# Patient Record
Sex: MALE | NOT HISPANIC OR LATINO | Employment: OTHER
[De-identification: names, ages, dates, MRNs, and addresses within clinical notes are randomized per-mention and may not be internally consistent; named-entity substitution may affect disease eponyms.]

---

## 2018-12-23 ENCOUNTER — HOSPITAL (OUTPATIENT)
Dept: OTHER | Age: 68
End: 2018-12-23
Attending: EMERGENCY MEDICINE

## 2018-12-23 LAB
ANALYZER ANC (IANC): NORMAL
ANION GAP SERPL CALC-SCNC: 12 MMOL/L (ref 10–20)
ANNOTATION COMMENT IMP: POSITIVE
APTT PPP: 35 SECONDS (ref 22–32)
APTT PPP: ABNORMAL S
BASOPHILS # BLD: 0.1 THOUSAND/MCL (ref 0–0.3)
BASOPHILS NFR BLD: 1 %
BUN SERPL-MCNC: 22 MG/DL (ref 6–20)
BUN/CREAT SERPL: 20 (ref 7–25)
CALCIUM SERPL-MCNC: 8.9 MG/DL (ref 8.4–10.2)
CHLORIDE: 105 MMOL/L (ref 98–107)
CO2 SERPL-SCNC: 26 MMOL/L (ref 21–32)
CREAT SERPL-MCNC: 1.12 MG/DL (ref 0.67–1.17)
DIFFERENTIAL METHOD BLD: NORMAL
EOSINOPHIL # BLD: 0.4 THOUSAND/MCL (ref 0.1–0.5)
EOSINOPHIL NFR BLD: 4 %
ERYTHROCYTE [DISTWIDTH] IN BLOOD: 13.2 % (ref 11–15)
GAMMA INTERFERON BACKGROUND BLD IA-ACNC: 0.08 UNIT/ML
GLUCOSE SERPL-MCNC: 114 MG/DL (ref 65–99)
HEMATOCRIT: 43.1 % (ref 39–51)
HGB BLD-MCNC: 14.6 GM/DL (ref 13–17)
IMM GRANULOCYTES # BLD AUTO: 0 THOUSAND/MCL (ref 0–0.2)
IMM GRANULOCYTES NFR BLD: 0 %
INR PPP: 0.9
LYMPHOCYTES # BLD: 2.2 THOUSAND/MCL (ref 1–4)
LYMPHOCYTES NFR BLD: 27 %
M TB IFN-G CD4+ BCKGRND COR BLD-ACNC: 8.81 UNIT/ML
M TB IFN-G CD4+CD8+ BCKGRND COR BLD-ACNC: 8.75 UNIT/ML
MCH RBC QN AUTO: 30.9 PG (ref 26–34)
MCHC RBC AUTO-ENTMCNC: 33.9 GM/DL (ref 32–36.5)
MCV RBC AUTO: 91.1 FL (ref 78–100)
MITOGEN IGNF BCKGRD COR BLD-ACNC: 8.85 UNIT/ML
MONOCYTES # BLD: 0.5 THOUSAND/MCL (ref 0.3–0.9)
MONOCYTES NFR BLD: 5 %
NEUTROPHILS # BLD: 5.2 THOUSAND/MCL (ref 1.8–7.7)
NEUTROPHILS NFR BLD: 63 %
NEUTS SEG NFR BLD: NORMAL %
NRBC (NRBCRE): 0 /100 WBC
NT-PROBNP SERPL-MCNC: 82 PG/ML
PLATELET # BLD: 192 THOUSAND/MCL (ref 140–450)
POTASSIUM SERPL-SCNC: 3.8 MMOL/L (ref 3.4–5.1)
PROTHROMBIN TIME: 9.7 SECONDS (ref 9.7–11.8)
PROTHROMBIN TIME: NORMAL
RBC # BLD: 4.73 MILLION/MCL (ref 4.5–5.9)
SODIUM SERPL-SCNC: 139 MMOL/L (ref 135–145)
TROPONIN I SERPL HS-MCNC: <0.02 NG/ML
WBC # BLD: 8.3 THOUSAND/MCL (ref 4.2–11)

## 2019-01-16 ENCOUNTER — PRIOR ORIGINAL RECORDS (OUTPATIENT)
Dept: OTHER | Age: 69
End: 2019-01-16

## 2019-01-16 ASSESSMENT — PAIN SCALES - GENERAL: PAINLEVEL_OUTOF10: 0

## 2019-02-13 ENCOUNTER — HOSPITAL (OUTPATIENT)
Dept: OTHER | Age: 69
End: 2019-02-13
Attending: INTERNAL MEDICINE

## 2019-02-13 LAB
GLUCOSE BLDC GLUCOMTR-MCNC: 98 MG/DL (ref 65–99)
GLUCOSE BLDC GLUCOMTR-MCNC: 98 MG/DL (ref 65–99)

## 2019-02-14 ENCOUNTER — PRIOR ORIGINAL RECORDS (OUTPATIENT)
Dept: OTHER | Age: 69
End: 2019-02-14

## 2019-02-14 ASSESSMENT — PAIN SCALES - GENERAL: PAINLEVEL_OUTOF10: 0

## 2019-02-19 ENCOUNTER — PRIOR ORIGINAL RECORDS (OUTPATIENT)
Dept: OTHER | Age: 69
End: 2019-02-19

## 2019-03-08 ENCOUNTER — PRIOR ORIGINAL RECORDS (OUTPATIENT)
Dept: OTHER | Age: 69
End: 2019-03-08

## 2019-03-14 ENCOUNTER — PRIOR ORIGINAL RECORDS (OUTPATIENT)
Dept: OTHER | Age: 69
End: 2019-03-14

## 2019-03-18 LAB
ALBUMIN SERPL-MCNC: 3.4 GM/DL (ref 3.6–5.1)
ALBUMIN/GLOB SERPL: 0.8 {RATIO} (ref 1–2.4)
ALP SERPL-CCNC: 88 UNIT/L (ref 45–117)
ALT SERPL-CCNC: 16 UNIT/L
ANALYZER ANC (IANC): ABNORMAL
ANION GAP SERPL CALC-SCNC: 12 MMOL/L (ref 10–20)
AST SERPL-CCNC: 9 UNIT/L
BASOPHILS # BLD: 0 THOUSAND/MCL (ref 0–0.3)
BASOPHILS NFR BLD: 0 %
BILIRUB SERPL-MCNC: 0.7 MG/DL (ref 0.2–1)
BUN SERPL-MCNC: 34 MG/DL (ref 6–20)
BUN/CREAT SERPL: 28 (ref 7–25)
CALCIUM SERPL-MCNC: 8.4 MG/DL (ref 8.4–10.2)
CHLORIDE: 105 MMOL/L (ref 98–107)
CO2 SERPL-SCNC: 24 MMOL/L (ref 21–32)
CREAT SERPL-MCNC: 1.23 MG/DL (ref 0.67–1.17)
DIFFERENTIAL METHOD BLD: ABNORMAL
EOSINOPHIL # BLD: 0.3 THOUSAND/MCL (ref 0.1–0.5)
EOSINOPHIL NFR BLD: 2 %
ERYTHROCYTE [DISTWIDTH] IN BLOOD: 13.8 % (ref 11–15)
GLOBULIN SER-MCNC: 4.5 GM/DL (ref 2–4)
GLUCOSE SERPL-MCNC: 92 MG/DL (ref 65–99)
HEMATOCRIT: 38 % (ref 39–51)
HGB BLD-MCNC: 13 GM/DL (ref 13–17)
IMM GRANULOCYTES # BLD AUTO: 0.1 THOUSAND/MCL (ref 0–0.2)
IMM GRANULOCYTES NFR BLD: 1 %
LIPASE SERPL-CCNC: 103 UNIT/L (ref 73–393)
LYMPHOCYTES # BLD: 1.8 THOUSAND/MCL (ref 1–4)
LYMPHOCYTES NFR BLD: 14 %
MCH RBC QN AUTO: 31.3 PG (ref 26–34)
MCHC RBC AUTO-ENTMCNC: 34.2 GM/DL (ref 32–36.5)
MCV RBC AUTO: 91.6 FL (ref 78–100)
MONOCYTES # BLD: 0.6 THOUSAND/MCL (ref 0.3–0.9)
MONOCYTES NFR BLD: 5 %
NEUTROPHILS # BLD: 10.4 THOUSAND/MCL (ref 1.8–7.7)
NEUTROPHILS NFR BLD: 78 %
NEUTS SEG NFR BLD: ABNORMAL %
NRBC (NRBCRE): 0 /100 WBC
PLATELET # BLD: 202 THOUSAND/MCL (ref 140–450)
POTASSIUM SERPL-SCNC: 3.6 MMOL/L (ref 3.4–5.1)
PROT SERPL-MCNC: 7.9 GM/DL (ref 6.4–8.2)
RBC # BLD: 4.15 MILLION/MCL (ref 4.5–5.9)
SODIUM SERPL-SCNC: 137 MMOL/L (ref 135–145)
WBC # BLD: 13.2 THOUSAND/MCL (ref 4.2–11)

## 2019-03-19 ENCOUNTER — HOSPITAL (OUTPATIENT)
Dept: OTHER | Age: 69
End: 2019-03-19

## 2019-03-19 LAB
ALBUMIN SERPL-MCNC: 3 GM/DL (ref 3.6–5.1)
ALBUMIN/GLOB SERPL: 0.7 {RATIO} (ref 1–2.4)
ALP SERPL-CCNC: 77 UNIT/L (ref 45–117)
ALT SERPL-CCNC: 14 UNIT/L
ANALYZER ANC (IANC): ABNORMAL
ANION GAP SERPL CALC-SCNC: 11 MMOL/L (ref 10–20)
AST SERPL-CCNC: 8 UNIT/L
BASOPHILS # BLD: 0.1 THOUSAND/MCL (ref 0–0.3)
BASOPHILS NFR BLD: 1 %
BILIRUB SERPL-MCNC: 0.7 MG/DL (ref 0.2–1)
BUN SERPL-MCNC: 28 MG/DL (ref 6–20)
BUN/CREAT SERPL: 25 (ref 7–25)
CALCIUM SERPL-MCNC: 8.3 MG/DL (ref 8.4–10.2)
CHLORIDE: 104 MMOL/L (ref 98–107)
CO2 SERPL-SCNC: 27 MMOL/L (ref 21–32)
CREAT SERPL-MCNC: 1.12 MG/DL (ref 0.67–1.17)
DIFFERENTIAL METHOD BLD: ABNORMAL
EOSINOPHIL # BLD: 0.4 THOUSAND/MCL (ref 0.1–0.5)
EOSINOPHIL NFR BLD: 4 %
ERYTHROCYTE [DISTWIDTH] IN BLOOD: 13.6 % (ref 11–15)
GLOBULIN SER-MCNC: 4.4 GM/DL (ref 2–4)
GLUCOSE SERPL-MCNC: 103 MG/DL (ref 65–99)
HEMATOCRIT: 37.1 % (ref 39–51)
HGB BLD-MCNC: 12.6 GM/DL (ref 13–17)
IMM GRANULOCYTES # BLD AUTO: 0 THOUSAND/MCL (ref 0–0.2)
IMM GRANULOCYTES NFR BLD: 1 %
LYMPHOCYTES # BLD: 1.8 THOUSAND/MCL (ref 1–4)
LYMPHOCYTES NFR BLD: 21 %
MCH RBC QN AUTO: 30.8 PG (ref 26–34)
MCHC RBC AUTO-ENTMCNC: 34 GM/DL (ref 32–36.5)
MCV RBC AUTO: 90.7 FL (ref 78–100)
MONOCYTES # BLD: 0.5 THOUSAND/MCL (ref 0.3–0.9)
MONOCYTES NFR BLD: 6 %
NEUTROPHILS # BLD: 6 THOUSAND/MCL (ref 1.8–7.7)
NEUTROPHILS NFR BLD: 67 %
NEUTS SEG NFR BLD: ABNORMAL %
NRBC (NRBCRE): 0 /100 WBC
PLATELET # BLD: 199 THOUSAND/MCL (ref 140–450)
POTASSIUM SERPL-SCNC: 3.7 MMOL/L (ref 3.4–5.1)
PROT SERPL-MCNC: 7.4 GM/DL (ref 6.4–8.2)
RBC # BLD: 4.09 MILLION/MCL (ref 4.5–5.9)
SODIUM SERPL-SCNC: 138 MMOL/L (ref 135–145)
WBC # BLD: 8.8 THOUSAND/MCL (ref 4.2–11)

## 2019-03-20 ENCOUNTER — HOSPITAL (OUTPATIENT)
Dept: OTHER | Age: 69
End: 2019-03-20
Attending: INTERNAL MEDICINE

## 2019-03-20 LAB
ALBUMIN SERPL-MCNC: 2.6 GM/DL (ref 3.6–5.1)
ALBUMIN/GLOB SERPL: 0.6 {RATIO} (ref 1–2.4)
ALP SERPL-CCNC: 81 UNIT/L (ref 45–117)
ALT SERPL-CCNC: 11 UNIT/L
ANALYZER ANC (IANC): ABNORMAL
ANION GAP SERPL CALC-SCNC: 11 MMOL/L (ref 10–20)
AST SERPL-CCNC: 10 UNIT/L
BASOPHILS # BLD: 0.1 THOUSAND/MCL (ref 0–0.3)
BASOPHILS NFR BLD: 1 %
BILIRUB SERPL-MCNC: 0.7 MG/DL (ref 0.2–1)
BUN SERPL-MCNC: 21 MG/DL (ref 6–20)
BUN/CREAT SERPL: 18 (ref 7–25)
CALCIUM SERPL-MCNC: 8.2 MG/DL (ref 8.4–10.2)
CHLORIDE: 106 MMOL/L (ref 98–107)
CO2 SERPL-SCNC: 26 MMOL/L (ref 21–32)
CREAT SERPL-MCNC: 1.17 MG/DL (ref 0.67–1.17)
CRP SERPL-MCNC: 11 MG/DL
DIFFERENTIAL METHOD BLD: ABNORMAL
EOSINOPHIL # BLD: 0.2 THOUSAND/MCL (ref 0.1–0.5)
EOSINOPHIL NFR BLD: 4 %
ERYTHROCYTE [DISTWIDTH] IN BLOOD: 13.2 % (ref 11–15)
GLOBULIN SER-MCNC: 4.2 GM/DL (ref 2–4)
GLUCOSE SERPL-MCNC: 107 MG/DL (ref 65–99)
HEMATOCRIT: 35.8 % (ref 39–51)
HGB BLD-MCNC: 11.9 GM/DL (ref 13–17)
IMM GRANULOCYTES # BLD AUTO: 0 THOUSAND/MCL (ref 0–0.2)
IMM GRANULOCYTES NFR BLD: 1 %
LYMPHOCYTES # BLD: 1.8 THOUSAND/MCL (ref 1–4)
LYMPHOCYTES NFR BLD: 28 %
MAGNESIUM SERPL-MCNC: 2.1 MG/DL (ref 1.7–2.4)
MCH RBC QN AUTO: 30.9 PG (ref 26–34)
MCHC RBC AUTO-ENTMCNC: 33.2 GM/DL (ref 32–36.5)
MCV RBC AUTO: 93 FL (ref 78–100)
MONOCYTES # BLD: 0.4 THOUSAND/MCL (ref 0.3–0.9)
MONOCYTES NFR BLD: 7 %
NEUTROPHILS # BLD: 3.8 THOUSAND/MCL (ref 1.8–7.7)
NEUTROPHILS NFR BLD: 59 %
NEUTS SEG NFR BLD: ABNORMAL %
NRBC (NRBCRE): 0 /100 WBC
PHOSPHATE SERPL-MCNC: 3.5 MG/DL (ref 2.4–4.7)
PLATELET # BLD: 212 THOUSAND/MCL (ref 140–450)
POTASSIUM SERPL-SCNC: 3.7 MMOL/L (ref 3.4–5.1)
PROT SERPL-MCNC: 6.8 GM/DL (ref 6.4–8.2)
RBC # BLD: 3.85 MILLION/MCL (ref 4.5–5.9)
SODIUM SERPL-SCNC: 139 MMOL/L (ref 135–145)
WBC # BLD: 6.3 THOUSAND/MCL (ref 4.2–11)

## 2019-03-22 LAB
ALBUMIN SERPL-MCNC: 2.8 GM/DL (ref 3.6–5.1)
ALBUMIN/GLOB SERPL: 0.7 {RATIO} (ref 1–2.4)
ALP SERPL-CCNC: 84 UNIT/L (ref 45–117)
ALT SERPL-CCNC: 24 UNIT/L
ANALYZER ANC (IANC): ABNORMAL
ANION GAP SERPL CALC-SCNC: 11 MMOL/L (ref 10–20)
AST SERPL-CCNC: 38 UNIT/L
BILIRUB SERPL-MCNC: 0.6 MG/DL (ref 0.2–1)
BUN SERPL-MCNC: 11 MG/DL (ref 6–20)
BUN/CREAT SERPL: 10 (ref 7–25)
CALCIUM SERPL-MCNC: 8.7 MG/DL (ref 8.4–10.2)
CHLORIDE: 109 MMOL/L (ref 98–107)
CO2 SERPL-SCNC: 24 MMOL/L (ref 21–32)
CREAT SERPL-MCNC: 1.13 MG/DL (ref 0.67–1.17)
ERYTHROCYTE [DISTWIDTH] IN BLOOD: 12.9 % (ref 11–15)
GLOBULIN SER-MCNC: 4.3 GM/DL (ref 2–4)
GLUCOSE SERPL-MCNC: 95 MG/DL (ref 65–99)
HEMATOCRIT: 37.5 % (ref 39–51)
HGB BLD-MCNC: 12.7 GM/DL (ref 13–17)
MCH RBC QN AUTO: 31.4 PG (ref 26–34)
MCHC RBC AUTO-ENTMCNC: 33.9 GM/DL (ref 32–36.5)
MCV RBC AUTO: 92.6 FL (ref 78–100)
NRBC (NRBCRE): 0 /100 WBC
PLATELET # BLD: 247 THOUSAND/MCL (ref 140–450)
POTASSIUM SERPL-SCNC: 3.8 MMOL/L (ref 3.4–5.1)
PROT SERPL-MCNC: 7.1 GM/DL (ref 6.4–8.2)
RBC # BLD: 4.05 MILLION/MCL (ref 4.5–5.9)
SODIUM SERPL-SCNC: 140 MMOL/L (ref 135–145)
WBC # BLD: 5.9 THOUSAND/MCL (ref 4.2–11)

## 2019-03-25 LAB
ANALYZER ANC (IANC): ABNORMAL
ANION GAP SERPL CALC-SCNC: 10 MMOL/L (ref 10–20)
BUN SERPL-MCNC: 9 MG/DL (ref 6–20)
BUN/CREAT SERPL: 7 (ref 7–25)
CALCIUM SERPL-MCNC: 8.8 MG/DL (ref 8.4–10.2)
CHLORIDE: 111 MMOL/L (ref 98–107)
CO2 SERPL-SCNC: 26 MMOL/L (ref 21–32)
CREAT SERPL-MCNC: 1.33 MG/DL (ref 0.67–1.17)
ERYTHROCYTE [DISTWIDTH] IN BLOOD: 13.2 % (ref 11–15)
GLUCOSE SERPL-MCNC: 110 MG/DL (ref 65–99)
HEMATOCRIT: 37.4 % (ref 39–51)
HGB BLD-MCNC: 12.6 GM/DL (ref 13–17)
MAGNESIUM SERPL-MCNC: 1.9 MG/DL (ref 1.7–2.4)
MCH RBC QN AUTO: 31.1 PG (ref 26–34)
MCHC RBC AUTO-ENTMCNC: 33.7 GM/DL (ref 32–36.5)
MCV RBC AUTO: 92.3 FL (ref 78–100)
NRBC (NRBCRE): 0 /100 WBC
PLATELET # BLD: 281 THOUSAND/MCL (ref 140–450)
POTASSIUM SERPL-SCNC: 3.7 MMOL/L (ref 3.4–5.1)
RBC # BLD: 4.05 MILLION/MCL (ref 4.5–5.9)
SODIUM SERPL-SCNC: 143 MMOL/L (ref 135–145)
WBC # BLD: 5.9 THOUSAND/MCL (ref 4.2–11)

## 2019-03-26 LAB
ANALYZER ANC (IANC): ABNORMAL
ANION GAP SERPL CALC-SCNC: 12 MMOL/L (ref 10–20)
BUN SERPL-MCNC: 11 MG/DL (ref 6–20)
BUN/CREAT SERPL: 9 (ref 7–25)
CALCIUM SERPL-MCNC: 8.6 MG/DL (ref 8.4–10.2)
CHLORIDE: 110 MMOL/L (ref 98–107)
CO2 SERPL-SCNC: 23 MMOL/L (ref 21–32)
CREAT SERPL-MCNC: 1.28 MG/DL (ref 0.67–1.17)
ERYTHROCYTE [DISTWIDTH] IN BLOOD: 13.2 % (ref 11–15)
GLUCOSE SERPL-MCNC: 86 MG/DL (ref 65–99)
HEMATOCRIT: 37.8 % (ref 39–51)
HGB BLD-MCNC: 12.8 GM/DL (ref 13–17)
MAGNESIUM SERPL-MCNC: 2.2 MG/DL (ref 1.7–2.4)
MCH RBC QN AUTO: 31.2 PG (ref 26–34)
MCHC RBC AUTO-ENTMCNC: 33.9 GM/DL (ref 32–36.5)
MCV RBC AUTO: 92.2 FL (ref 78–100)
NRBC (NRBCRE): 0 /100 WBC
PLATELET # BLD: 294 THOUSAND/MCL (ref 140–450)
POTASSIUM SERPL-SCNC: 3.9 MMOL/L (ref 3.4–5.1)
RBC # BLD: 4.1 MILLION/MCL (ref 4.5–5.9)
SODIUM SERPL-SCNC: 141 MMOL/L (ref 135–145)
WBC # BLD: 6 THOUSAND/MCL (ref 4.2–11)

## 2019-04-08 ENCOUNTER — PRIOR ORIGINAL RECORDS (OUTPATIENT)
Dept: OTHER | Age: 69
End: 2019-04-08

## 2019-04-09 ENCOUNTER — PRIOR ORIGINAL RECORDS (OUTPATIENT)
Dept: OTHER | Age: 69
End: 2019-04-09

## 2019-08-13 ENCOUNTER — TELEPHONE (OUTPATIENT)
Dept: SURGERY | Age: 69
End: 2019-08-13

## 2019-08-14 ENCOUNTER — TELEPHONE (OUTPATIENT)
Dept: SURGERY | Age: 69
End: 2019-08-14

## 2019-08-14 ENCOUNTER — OFFICE VISIT (OUTPATIENT)
Dept: SURGERY | Age: 69
End: 2019-08-14

## 2019-08-14 ENCOUNTER — APPOINTMENT (OUTPATIENT)
Dept: SURGERY | Age: 69
End: 2019-08-14

## 2019-08-14 VITALS
HEIGHT: 64 IN | BODY MASS INDEX: 21.68 KG/M2 | TEMPERATURE: 98.4 F | DIASTOLIC BLOOD PRESSURE: 66 MMHG | SYSTOLIC BLOOD PRESSURE: 126 MMHG | WEIGHT: 127 LBS | HEART RATE: 76 BPM

## 2019-08-14 DIAGNOSIS — K40.90 RIGHT INGUINAL HERNIA: Primary | ICD-10-CM

## 2019-08-14 PROCEDURE — 99202 OFFICE O/P NEW SF 15 MIN: CPT

## 2019-08-14 SDOH — HEALTH STABILITY: MENTAL HEALTH: HOW OFTEN DO YOU HAVE A DRINK CONTAINING ALCOHOL?: NEVER

## 2019-08-18 PROBLEM — K40.90 LEFT INGUINAL HERNIA: Status: ACTIVE | Noted: 2019-08-18

## 2019-08-30 ENCOUNTER — EXTERNAL RECORD (OUTPATIENT)
Dept: HEALTH INFORMATION MANAGEMENT | Facility: OTHER | Age: 69
End: 2019-08-30

## 2019-09-09 PROCEDURE — 49505 PRP I/HERN INIT REDUC >5 YR: CPT

## 2019-09-10 ENCOUNTER — HOSPITAL (OUTPATIENT)
Dept: OTHER | Age: 69
End: 2019-09-10

## 2019-09-11 ENCOUNTER — HOSPITAL (OUTPATIENT)
Dept: OTHER | Age: 69
End: 2019-09-11
Attending: SURGERY

## 2019-09-16 ENCOUNTER — HOSPITAL (OUTPATIENT)
Dept: OTHER | Age: 69
End: 2019-09-16

## 2019-09-18 ENCOUNTER — OFFICE VISIT (OUTPATIENT)
Dept: SURGERY | Age: 69
End: 2019-09-18

## 2019-09-18 VITALS
HEIGHT: 64 IN | BODY MASS INDEX: 21.51 KG/M2 | HEART RATE: 72 BPM | WEIGHT: 126 LBS | DIASTOLIC BLOOD PRESSURE: 50 MMHG | SYSTOLIC BLOOD PRESSURE: 90 MMHG | TEMPERATURE: 96.8 F

## 2019-09-18 DIAGNOSIS — K40.90 RIGHT INGUINAL HERNIA: Primary | ICD-10-CM

## 2019-09-18 PROCEDURE — 99024 POSTOP FOLLOW-UP VISIT: CPT

## 2019-09-18 RX ORDER — PREDNISONE 20 MG/1
20 TABLET ORAL DAILY
COMMUNITY
End: 2020-03-27 | Stop reason: ALTCHOICE

## 2019-09-18 RX ORDER — ATORVASTATIN CALCIUM 20 MG/1
20 TABLET, FILM COATED ORAL DAILY
COMMUNITY

## 2019-09-18 RX ORDER — AMLODIPINE BESYLATE 5 MG/1
5 TABLET ORAL DAILY
COMMUNITY
End: 2020-03-27 | Stop reason: ALTCHOICE

## 2019-09-18 RX ORDER — CETIRIZINE HYDROCHLORIDE 10 MG/1
10 TABLET ORAL DAILY
COMMUNITY

## 2019-10-16 ENCOUNTER — OFFICE VISIT (OUTPATIENT)
Dept: SURGERY | Age: 69
End: 2019-10-16

## 2019-10-16 VITALS
DIASTOLIC BLOOD PRESSURE: 70 MMHG | SYSTOLIC BLOOD PRESSURE: 154 MMHG | TEMPERATURE: 97 F | BODY MASS INDEX: 22.53 KG/M2 | HEART RATE: 96 BPM | WEIGHT: 132 LBS | HEIGHT: 64 IN

## 2019-10-16 DIAGNOSIS — K40.90 RIGHT INGUINAL HERNIA: Primary | ICD-10-CM

## 2019-10-16 PROCEDURE — 99024 POSTOP FOLLOW-UP VISIT: CPT

## 2019-11-19 ENCOUNTER — TRANSFERRED RECORDS (OUTPATIENT)
Dept: HEALTH INFORMATION MANAGEMENT | Facility: CLINIC | Age: 69
End: 2019-11-19

## 2019-11-19 ENCOUNTER — HOSPITAL (OUTPATIENT)
Dept: OTHER | Age: 69
End: 2019-11-19
Attending: INTERNAL MEDICINE

## 2019-11-19 LAB — CREAT BLD-MCNC: 1.2 MG/DL (ref 0.67–1.17)

## 2019-12-23 ENCOUNTER — TRANSFERRED RECORDS (OUTPATIENT)
Dept: HEALTH INFORMATION MANAGEMENT | Facility: CLINIC | Age: 69
End: 2019-12-23

## 2019-12-23 ENCOUNTER — HOSPITAL (OUTPATIENT)
Dept: OTHER | Age: 69
End: 2019-12-23

## 2019-12-23 ENCOUNTER — DIAGNOSTIC TRANS (OUTPATIENT)
Dept: OTHER | Age: 69
End: 2019-12-23

## 2019-12-23 LAB
ALBUMIN SERPL-MCNC: 2.9 G/DL (ref 3.6–5.1)
ALP SERPL-CCNC: 124 UNITS/L (ref 45–117)
ALT SERPL-CCNC: 37 UNITS/L
ANALYZER ANC (IANC): ABNORMAL
ANION GAP SERPL CALC-SCNC: 10 MMOL/L (ref 10–20)
AST SERPL-CCNC: 24 UNITS/L
BASOPHILS # BLD: 0.1 K/MCL (ref 0–0.3)
BASOPHILS NFR BLD: 1 %
BILIRUB CONJ SERPL-MCNC: 0.1 MG/DL (ref 0–0.2)
BILIRUB SERPL-MCNC: 0.2 MG/DL (ref 0.2–1)
BUN SERPL-MCNC: 23 MG/DL (ref 6–20)
BUN/CREAT SERPL: 22 (ref 7–25)
CALCIUM SERPL-MCNC: 9.2 MG/DL (ref 8.4–10.2)
CHLORIDE SERPL-SCNC: 106 MMOL/L (ref 98–107)
CO2 SERPL-SCNC: 28 MMOL/L (ref 21–32)
CREAT SERPL-MCNC: 1.03 MG/DL (ref 0.67–1.17)
DIFFERENTIAL METHOD BLD: ABNORMAL
EOSINOPHIL # BLD: 0.1 K/MCL (ref 0.1–0.5)
EOSINOPHIL NFR BLD: 1 %
ERYTHROCYTE [DISTWIDTH] IN BLOOD: 13.3 % (ref 11–15)
GLUCOSE SERPL-MCNC: 92 MG/DL (ref 65–99)
HCT VFR BLD CALC: 39.1 % (ref 39–51)
HGB BLD-MCNC: 12.6 G/DL (ref 13–17)
IMM GRANULOCYTES # BLD AUTO: 0 K/MCL (ref 0–0.2)
IMM GRANULOCYTES NFR BLD: 0 %
INR PPP: 1
INR PPP: NORMAL
LACTATE BLDV-SCNC: 1.4 MMOL/L (ref 0–2)
LIPASE SERPL-CCNC: 93 UNITS/L (ref 73–393)
LYMPHOCYTES # BLD: 2.1 K/MCL (ref 1–4)
LYMPHOCYTES NFR BLD: 20 %
MCH RBC QN AUTO: 29.9 PG (ref 26–34)
MCHC RBC AUTO-ENTMCNC: 32.2 G/DL (ref 32–36.5)
MCV RBC AUTO: 92.7 FL (ref 78–100)
MONOCYTES # BLD: 0.3 K/MCL (ref 0.3–0.9)
MONOCYTES NFR BLD: 2 %
NEUTROPHILS # BLD: 8.1 K/MCL (ref 1.8–7.7)
NEUTROPHILS NFR BLD: 76 %
NEUTS SEG NFR BLD: ABNORMAL %
NRBC (NRBCRE): 0 /100 WBC
PLATELET # BLD: 334 K/MCL (ref 140–450)
POTASSIUM SERPL-SCNC: 3.3 MMOL/L (ref 3.4–5.1)
PROT SERPL-MCNC: 8 G/DL (ref 6.4–8.2)
PROTHROMBIN TIME (PRT2): NORMAL
PROTHROMBIN TIME: 10.3 SEC (ref 9.7–11.8)
RBC # BLD: 4.22 MIL/MCL (ref 4.5–5.9)
SODIUM SERPL-SCNC: 141 MMOL/L (ref 135–145)
WBC # BLD: 10.7 K/MCL (ref 4.2–11)

## 2019-12-23 PROCEDURE — 99285 EMERGENCY DEPT VISIT HI MDM: CPT | Performed by: EMERGENCY MEDICINE

## 2019-12-24 ENCOUNTER — TRANSFERRED RECORDS (OUTPATIENT)
Dept: HEALTH INFORMATION MANAGEMENT | Facility: CLINIC | Age: 69
End: 2019-12-24

## 2019-12-24 PROCEDURE — 99222 1ST HOSP IP/OBS MODERATE 55: CPT | Performed by: INTERNAL MEDICINE

## 2019-12-26 ENCOUNTER — TRANSFERRED RECORDS (OUTPATIENT)
Dept: HEALTH INFORMATION MANAGEMENT | Facility: CLINIC | Age: 69
End: 2019-12-26

## 2019-12-26 PROCEDURE — 99232 SBSQ HOSP IP/OBS MODERATE 35: CPT | Performed by: INTERNAL MEDICINE

## 2019-12-28 LAB
BACTERIA BLD CULT: NORMAL

## 2019-12-31 LAB
ANER/AEROBE CUL/SMR (AANC) HL: NORMAL

## 2020-01-08 LAB — PATH REPORT, FNA: NORMAL

## 2020-01-14 ENCOUNTER — HOSPITAL (OUTPATIENT)
Dept: OTHER | Age: 70
End: 2020-01-14
Attending: INTERNAL MEDICINE

## 2020-01-17 ENCOUNTER — TRANSFERRED RECORDS (OUTPATIENT)
Dept: HEALTH INFORMATION MANAGEMENT | Facility: CLINIC | Age: 70
End: 2020-01-17

## 2020-01-17 LAB — CREAT BLD-MCNC: 1 MG/DL (ref 0.67–1.17)

## 2020-01-23 ENCOUNTER — HOSPITAL (OUTPATIENT)
Dept: OTHER | Age: 70
End: 2020-01-23
Attending: INTERNAL MEDICINE

## 2020-01-24 LAB
FUNGAL CULTURE/SMEAR (FNCS) HL: NORMAL

## 2020-02-05 LAB
MYCOBACTERIA CUL/SMR (CAFBS) HL: NORMAL

## 2020-02-06 LAB
MYCOBACTERIA CUL/SMR (CAFBS) HL: NORMAL

## 2020-02-10 LAB
MYCOBACTERIA CUL/SMR (CAFBS) HL: NORMAL

## 2020-02-12 ENCOUNTER — HOSPITAL ENCOUNTER (OUTPATIENT)
Dept: PET IMAGING | Age: 70
End: 2020-02-12

## 2020-02-12 ENCOUNTER — APPOINTMENT (OUTPATIENT)
Dept: PET IMAGING | Age: 70
End: 2020-02-12
Attending: INTERNAL MEDICINE

## 2020-02-12 ENCOUNTER — HOSPITAL ENCOUNTER (OUTPATIENT)
Dept: PET IMAGING | Age: 70
End: 2020-02-12
Attending: INTERNAL MEDICINE

## 2020-02-13 ENCOUNTER — TRANSFERRED RECORDS (OUTPATIENT)
Dept: HEALTH INFORMATION MANAGEMENT | Facility: CLINIC | Age: 70
End: 2020-02-13

## 2020-02-13 ENCOUNTER — HOSPITAL ENCOUNTER (OUTPATIENT)
Dept: PET IMAGING | Age: 70
Discharge: HOME | End: 2020-02-13
Attending: INTERNAL MEDICINE

## 2020-02-13 DIAGNOSIS — C34.90 MALIGNANT NEOPLASM OF LUNG, UNSPECIFIED LATERALITY, UNSPECIFIED PART OF LUNG (CMD): ICD-10-CM

## 2020-02-13 LAB — GLUCOSE BLDC GLUCOMTR-MCNC: 91 MG/DL (ref 70–99)

## 2020-02-13 PROCEDURE — 78815 PET IMAGE W/CT SKULL-THIGH: CPT

## 2020-02-13 PROCEDURE — A9552 F18 FDG: HCPCS | Performed by: INTERNAL MEDICINE

## 2020-02-13 PROCEDURE — 82962 GLUCOSE BLOOD TEST: CPT

## 2020-02-13 PROCEDURE — 10006150 HB RX 343: Performed by: INTERNAL MEDICINE

## 2020-02-13 RX ORDER — FLUDEOXYGLUCOSE F-18 300 MCI/ML
10 INJECTION INTRAVENOUS ONCE
Status: COMPLETED | OUTPATIENT
Start: 2020-02-13 | End: 2020-02-13

## 2020-02-13 RX ORDER — FLUDEOXYGLUCOSE F-18 300 MCI/ML
10 INJECTION INTRAVENOUS ONCE
Status: DISCONTINUED | OUTPATIENT
Start: 2020-02-13 | End: 2020-02-14 | Stop reason: SDUPTHER

## 2020-02-13 RX ADMIN — FLUDEOXYGLUCOSE F-18 10.6 MILLICURIE: 300 INJECTION INTRAVENOUS at 10:25

## 2020-02-19 ENCOUNTER — TRANSFERRED RECORDS (OUTPATIENT)
Dept: HEALTH INFORMATION MANAGEMENT | Facility: CLINIC | Age: 70
End: 2020-02-19

## 2020-03-03 ENCOUNTER — HOSPITAL ENCOUNTER (OUTPATIENT)
Dept: GENERAL RADIOLOGY | Age: 70
Discharge: HOME | End: 2020-03-03

## 2020-03-03 DIAGNOSIS — G70.00 MYASTHENIA GRAVIS (CMD): ICD-10-CM

## 2020-03-03 DIAGNOSIS — G70.00 MYASTHENIA GRAVIS WITHOUT EXACERBATION (CMD): Primary | ICD-10-CM

## 2020-03-03 PROCEDURE — 71046 X-RAY EXAM CHEST 2 VIEWS: CPT

## 2020-03-11 ENCOUNTER — HOSPITAL ENCOUNTER (OUTPATIENT)
Dept: RESPIRATORY THERAPY | Age: 70
Discharge: HOME | End: 2020-03-11

## 2020-03-11 DIAGNOSIS — G70.00 MYASTHENIA GRAVIS WITHOUT EXACERBATION (CMD): ICD-10-CM

## 2020-03-11 PROCEDURE — 94726 PLETHYSMOGRAPHY LUNG VOLUMES: CPT

## 2020-03-11 PROCEDURE — 94060 EVALUATION OF WHEEZING: CPT

## 2020-03-11 PROCEDURE — 94729 DIFFUSING CAPACITY: CPT

## 2020-03-25 ENCOUNTER — TELEPHONE (OUTPATIENT)
Dept: SCHEDULING | Age: 70
End: 2020-03-25

## 2020-03-27 ENCOUNTER — OFFICE VISIT (OUTPATIENT)
Dept: PULMONOLOGY | Age: 70
End: 2020-03-27

## 2020-03-27 VITALS
DIASTOLIC BLOOD PRESSURE: 80 MMHG | RESPIRATION RATE: 18 BRPM | BODY MASS INDEX: 22.2 KG/M2 | HEIGHT: 64 IN | SYSTOLIC BLOOD PRESSURE: 134 MMHG | OXYGEN SATURATION: 98 % | TEMPERATURE: 97.1 F | HEART RATE: 74 BPM | WEIGHT: 130 LBS

## 2020-03-27 DIAGNOSIS — J44.9 CHRONIC OBSTRUCTIVE PULMONARY DISEASE, UNSPECIFIED COPD TYPE (CMD): ICD-10-CM

## 2020-03-27 DIAGNOSIS — F17.200 SMOKING: ICD-10-CM

## 2020-03-27 DIAGNOSIS — C34.32 MALIGNANT NEOPLASM OF LOWER LOBE OF LEFT LUNG (CMD): Primary | ICD-10-CM

## 2020-03-27 PROCEDURE — 99205 OFFICE O/P NEW HI 60 MIN: CPT | Performed by: INTERNAL MEDICINE

## 2020-03-27 RX ORDER — AMLODIPINE BESYLATE 10 MG/1
TABLET ORAL
COMMUNITY
Start: 2020-03-26

## 2020-03-27 RX ORDER — UMECLIDINIUM BROMIDE AND VILANTEROL TRIFENATATE 62.5; 25 UG/1; UG/1
POWDER RESPIRATORY (INHALATION)
COMMUNITY
Start: 2020-03-26

## 2020-03-27 RX ORDER — TRAMADOL HYDROCHLORIDE 50 MG/1
TABLET ORAL
COMMUNITY
Start: 2020-01-10

## 2020-03-27 RX ORDER — TAMSULOSIN HYDROCHLORIDE 0.4 MG/1
0.4 CAPSULE ORAL
COMMUNITY
End: 2020-03-27 | Stop reason: ALTCHOICE

## 2020-03-27 SDOH — HEALTH STABILITY: MENTAL HEALTH: HOW OFTEN DO YOU HAVE A DRINK CONTAINING ALCOHOL?: NEVER

## 2020-03-27 ASSESSMENT — ENCOUNTER SYMPTOMS
TREMORS: 0
DIARRHEA: 0
APNEA: 0
SHORTNESS OF BREATH: 1
SINUS PAIN: 0
LIGHT-HEADEDNESS: 0
WOUND: 0
VOICE CHANGE: 0
CONFUSION: 0
SPEECH DIFFICULTY: 0
DIAPHORESIS: 1
ADENOPATHY: 0
CHOKING: 0
SLEEP DISTURBANCE: 0
RHINORRHEA: 0
NAUSEA: 0
HALLUCINATIONS: 0
FEVER: 0
CONSTIPATION: 0
STRIDOR: 0
WHEEZING: 0
DIZZINESS: 0
CHEST TIGHTNESS: 0
EYE REDNESS: 0
APPETITE CHANGE: 0
NUMBNESS: 0
FATIGUE: 0
WEAKNESS: 0
BLOOD IN STOOL: 0
COUGH: 1
EYE PAIN: 0
CHILLS: 0
NERVOUS/ANXIOUS: 0
SORE THROAT: 0
TROUBLE SWALLOWING: 0
ABDOMINAL PAIN: 0
UNEXPECTED WEIGHT CHANGE: 0
BRUISES/BLEEDS EASILY: 0
SINUS PRESSURE: 0
VOMITING: 0
BACK PAIN: 0
ABDOMINAL DISTENTION: 0
HEADACHES: 0

## 2020-03-27 ASSESSMENT — PATIENT HEALTH QUESTIONNAIRE - PHQ9
1. LITTLE INTEREST OR PLEASURE IN DOING THINGS: NOT AT ALL
SUM OF ALL RESPONSES TO PHQ9 QUESTIONS 1 AND 2: 1
2. FEELING DOWN, DEPRESSED OR HOPELESS: SEVERAL DAYS
SUM OF ALL RESPONSES TO PHQ9 QUESTIONS 1 AND 2: 1

## 2020-03-30 ENCOUNTER — HOSPITAL (OUTPATIENT)
Dept: OTHER | Age: 70
End: 2020-03-30
Attending: INTERNAL MEDICINE

## 2020-03-30 LAB
BASE DEFICIT BLDA-SCNC: ABNORMAL MMOL/L
BASE EXCESS BLDA CALC-SCNC: 1 MMOL/L (ref 0–3)
BDY SITE: ABNORMAL
BODY TEMPERATURE: 37 DEGREES
CA-I BLD ISE-SCNC: 1.17 MMOL/L (ref 1.15–1.29)
CA-I BLDA-SCNC: 20 % (ref 15–23)
CHLORIDE BLD-SCNC: 106 MMOL/L (ref 98–107)
COHGB MFR BLD: 1.1 %
CONDITION-RC: ABNORMAL
CONDITION: ABNORMAL
GLUCOSE BLD-MCNC: 98 MG/DL (ref 65–99)
HCO3 BLDA-SCNC: 25 MMOL/L (ref 22–28)
HGB BLD-MCNC: 14.7 G/DL (ref 13–17)
HOROWITZ INDEX BLD+IHG-RTO: 21 %
LACTATE BLDA-MCNC: 1.2 MMOL/L
METHGB MFR BLD: 0.6 %
OXYHGB MFR BLD: 96.8 % (ref 94–98)
PAO2 / FIO2 RATIO (RPFR): 512 (ref 300–500)
PCO2 BLDA: 37 MM HG (ref 35–48)
PH BLDA: 7.44 UNITS (ref 7.35–7.45)
PO2 BLDA: 108 MM HG (ref 83–108)
POTASSIUM BLD-SCNC: 3.8 MMOL/L (ref 3.4–5.1)
SAO2 % BLDA: 98 % (ref 95–99)
SODIUM BLD-SCNC: 140 MMOL/L (ref 135–145)

## 2020-04-01 ENCOUNTER — HOSPITAL ENCOUNTER (OUTPATIENT)
Dept: NUCLEAR MEDICINE | Age: 70
Discharge: HOME | End: 2020-04-01
Attending: INTERNAL MEDICINE

## 2020-04-01 DIAGNOSIS — C34.32 MALIGNANT NEOPLASM OF LOWER LOBE OF LEFT LUNG (CMD): ICD-10-CM

## 2020-04-01 PROCEDURE — A9540 TC99M MAA: HCPCS | Performed by: INTERNAL MEDICINE

## 2020-04-01 PROCEDURE — 78597 LUNG PERFUSION DIFFERENTIAL: CPT

## 2020-04-01 PROCEDURE — 10006150 HB RX 343: Performed by: INTERNAL MEDICINE

## 2020-04-01 RX ADMIN — KIT FOR THE PREPARATION OF TECHNETIUM TC 99M ALBUMIN AGGREGATED 4.2 MILLICURIE: 2.5 INJECTION, POWDER, FOR SOLUTION INTRAVENOUS at 08:15

## 2020-04-03 ENCOUNTER — APPOINTMENT (OUTPATIENT)
Dept: PULMONOLOGY | Age: 70
End: 2020-04-03

## 2020-04-14 ENCOUNTER — TELEPHONE (OUTPATIENT)
Dept: SCHEDULING | Age: 70
End: 2020-04-14

## 2020-04-17 ENCOUNTER — HOSPITAL ENCOUNTER (OUTPATIENT)
Dept: CARDIOLOGY | Age: 70
Discharge: HOME | End: 2020-04-17
Attending: THORACIC SURGERY (CARDIOTHORACIC VASCULAR SURGERY)

## 2020-04-17 DIAGNOSIS — Z01.810 PRE-OPERATIVE CARDIOVASCULAR EXAMINATION: ICD-10-CM

## 2020-04-17 DIAGNOSIS — G70.00 MYASTHENIA GRAVIS (CMD): ICD-10-CM

## 2020-04-17 PROCEDURE — 93351 STRESS TTE COMPLETE: CPT | Performed by: INTERNAL MEDICINE

## 2020-04-17 PROCEDURE — 10002800 HB RX 250 W HCPCS

## 2020-04-17 PROCEDURE — 10002800 HB RX 250 W HCPCS: Performed by: INTERNAL MEDICINE

## 2020-04-17 PROCEDURE — 93351 STRESS TTE COMPLETE: CPT

## 2020-04-17 RX ORDER — ATROPINE SULFATE 0.1 MG/ML
0.25 INJECTION INTRAVENOUS
Status: DISCONTINUED | OUTPATIENT
Start: 2020-04-17 | End: 2020-04-17

## 2020-04-17 RX ORDER — DOBUTAMINE HYDROCHLORIDE 200 MG/100ML
0-20 INJECTION INTRAVENOUS CONTINUOUS
Status: DISCONTINUED | OUTPATIENT
Start: 2020-04-17 | End: 2020-04-18 | Stop reason: HOSPADM

## 2020-04-17 RX ADMIN — DOBUTAMINE IN DEXTROSE 10 MCG/KG/MIN: 200 INJECTION, SOLUTION INTRAVENOUS at 08:30

## 2020-04-17 RX ADMIN — ATROPINE SULFATE 0.25 MG: 0.1 INJECTION INTRAVENOUS at 08:40

## 2020-05-06 ENCOUNTER — TRANSFERRED RECORDS (OUTPATIENT)
Dept: HEALTH INFORMATION MANAGEMENT | Facility: CLINIC | Age: 70
End: 2020-05-06

## 2020-05-09 ENCOUNTER — TRANSFERRED RECORDS (OUTPATIENT)
Dept: HEALTH INFORMATION MANAGEMENT | Facility: CLINIC | Age: 70
End: 2020-05-09

## 2020-06-03 ENCOUNTER — RECORDS - HEALTHEAST (OUTPATIENT)
Dept: ADMINISTRATIVE | Facility: OTHER | Age: 70
End: 2020-06-03

## 2020-06-03 ENCOUNTER — VIRTUAL VISIT (OUTPATIENT)
Dept: FAMILY MEDICINE | Facility: CLINIC | Age: 70
End: 2020-06-03
Payer: MEDICARE

## 2020-06-03 ENCOUNTER — HOME CARE/HOSPICE - HEALTHEAST (OUTPATIENT)
Dept: HOME HEALTH SERVICES | Facility: HOME HEALTH | Age: 70
End: 2020-06-03

## 2020-06-03 DIAGNOSIS — I10 ESSENTIAL HYPERTENSION: ICD-10-CM

## 2020-06-03 DIAGNOSIS — C34.92 MALIGNANT NEOPLASM OF LEFT LUNG, UNSPECIFIED PART OF LUNG (H): Primary | ICD-10-CM

## 2020-06-03 DIAGNOSIS — I63.9 CEREBROVASCULAR ACCIDENT (CVA), UNSPECIFIED MECHANISM (H): ICD-10-CM

## 2020-06-03 PROCEDURE — 99443 ZZC PHYSICIAN TELEPHONE EVALUATION 21-30 MIN: CPT | Performed by: FAMILY MEDICINE

## 2020-06-03 RX ORDER — AMLODIPINE BESYLATE 10 MG/1
10 TABLET ORAL DAILY
Qty: 30 TABLET | Refills: 3 | Status: SHIPPED | OUTPATIENT
Start: 2020-06-03 | End: 2020-06-03

## 2020-06-03 RX ORDER — TRAMADOL HYDROCHLORIDE 50 MG/1
50 TABLET ORAL EVERY 6 HOURS PRN
Qty: 60 TABLET | Refills: 0 | Status: SHIPPED | OUTPATIENT
Start: 2020-06-03 | End: 2020-06-19

## 2020-06-03 RX ORDER — AMLODIPINE BESYLATE 10 MG/1
TABLET ORAL
Qty: 90 TABLET | Refills: 0 | Status: SHIPPED | OUTPATIENT
Start: 2020-06-03 | End: 2020-09-04

## 2020-06-03 RX ORDER — METOPROLOL TARTRATE 25 MG/1
25 TABLET, FILM COATED ORAL 2 TIMES DAILY
Qty: 60 TABLET | Refills: 2 | Status: SHIPPED | OUTPATIENT
Start: 2020-06-03 | End: 2020-06-03

## 2020-06-03 RX ORDER — METOPROLOL TARTRATE 25 MG/1
TABLET, FILM COATED ORAL
Qty: 180 TABLET | Refills: 0 | Status: SHIPPED | OUTPATIENT
Start: 2020-06-03 | End: 2020-08-11

## 2020-06-03 RX ORDER — TRAMADOL HYDROCHLORIDE 50 MG/1
50 TABLET ORAL EVERY 6 HOURS PRN
COMMUNITY
End: 2020-06-03

## 2020-06-03 RX ORDER — METOPROLOL TARTRATE 25 MG/1
25 TABLET, FILM COATED ORAL 2 TIMES DAILY
COMMUNITY
End: 2020-06-03

## 2020-06-03 NOTE — PROGRESS NOTES
"Moises Chacon is a 69 year old male who is being evaluated via a billable telephone visit.      The patient has been notified of following:     \"This telephone visit will be conducted via a call between you and your physician/provider. We have found that certain health care needs can be provided without the need for a physical exam.  This service lets us provide the care you need with a short phone conversation.  If a prescription is necessary we can send it directly to your pharmacy.  If lab work is needed we can place an order for that and you can then stop by our lab to have the test done at a later time.    Telephone visits are billed at different rates depending on your insurance coverage. During this emergency period, for some insurers they may be billed the same as an in-person visit.  Please reach out to your insurance provider with any questions.    If during the course of the call the physician/provider feels a telephone visit is not appropriate, you will not be charged for this service.\"    Patient has given verbal consent for Telephone visit?  Yes    What phone number would you like to be contacted at? [phone]    How would you like to obtain your AVS? Mail a copy    Subjective     Moises Chacon is a 69 year old male who presents via phone visit today for the following health issues:    HPI  New Patient/Transfer of Care    Patient is new to this area as he is living with his sister locally.  Patient apparently with new diagnosis of left-sided lung cancer after having syncopal episode on 5/6/2020 admitted in [place] to [facility].  I do not have full records and some difficulty with getting historical information over the phone today.    Patient is doing well and denies any significant shortness of breath and no cough.  No fever.  Does have some chest pain at surgical wounds and does not have pain medication at this time.  Looks like he was discharged with tramadol and gabapentin 300 " mg 3 times daily though unclear if he took this.  He had follow-up set up in [place] but did not go to them as he was in [facility] with his sister.    Patient with history of hypertension currently on amlodipine 10 mg daily metoprolol 25 mg twice daily.    Patient with history of distant stroke though I do not have any further information on this.  Currently taking aspirin.    Patient also with recent emergency room visit to [facility] for pneumonia treated with cefuroxime and doxycycline.  Tested for coronavirus and normal reportedly.  Again, he is feeling well from a respiratory and infection standpoint at this time.      Patient Active Problem List   Diagnosis     Dyslipidemia     Hypertension     Stroke (H)     Smoker     GERD (gastroesophageal reflux disease)     Cardiomegaly     HYPERLIPIDEMIA LDL GOAL <130     Past Surgical History:   Procedure Laterality Date     NO HISTORY OF SURGERY         Social History     Tobacco Use     Smoking status: Current Every Day Smoker     Smokeless tobacco: Former User     Tobacco comment: 10 cigarettes per day   Substance Use Topics     Alcohol use: No     Family History   Problem Relation Age of Onset     Heart Disease Mother      Cerebrovascular Disease Father      Family History Negative Sister         4-one sister  from heart dis     Family History Negative Brother         2           Reviewed and updated as needed this visit by Provider  Problems         Review of Systems   CONSTITUTIONAL: No fever  RESP: Denies cough or shortness of breath  CV: Chest wall pain but otherwise no pain and no palpitations       Objective   Reported vitals:  There were no vitals taken for this visit.   alert and no distress  PSYCH: History primarily through  and sister  RESP: No cough, no audible wheezing, able to talk in full sentences  Remainder of exam unable to be completed due to telephone visits            Assessment/Plan:  1. Malignant neoplasm of left lung,  unspecified part of lung (H)  Patient with new lung cancer and has not had follow-up care as he has moved to this area.  Will need to get prompt oncology follow-up.  We will try to set up home care to help with medication and overall care.  - traMADol (ULTRAM) 50 MG tablet; Take 1 tablet (50 mg) by mouth every 6 hours as needed for severe pain  Dispense: 60 tablet; Refill: 0  - Oncology/Hematology Adult Referral; Future  - umeclidinium-vilanterol (ANORO ELLIPTA) 62.5-25 MCG/INH oral inhaler; Inhale 1 puff into the lungs daily  Dispense: 1 Inhaler; Refill: 1  - HOME CARE NURSING REFERRAL    2. Essential hypertension  Continue current medication.  - metoprolol tartrate (LOPRESSOR) 25 MG tablet; Take 1 tablet (25 mg) by mouth 2 times daily  Dispense: 60 tablet; Refill: 2  - amLODIPine (NORVASC) 10 MG tablet; Take 1 tablet (10 mg) by mouth daily  Dispense: 30 tablet; Refill: 3    3. Cerebrovascular accident (CVA), unspecified mechanism (H)  Continue aspirin.  - aspirin (ASA) 81 MG EC tablet; Take 1 tablet (81 mg) by mouth daily  Dispense: 90 tablet; Refill: 1    Return in about 3 months (around 9/3/2020) for Medicare Wellness visit.      Phone call duration:  22 minutes    Serge Botello MD

## 2020-06-04 ENCOUNTER — TRANSFERRED RECORDS (OUTPATIENT)
Dept: HEALTH INFORMATION MANAGEMENT | Facility: CLINIC | Age: 70
End: 2020-06-04

## 2020-06-04 ENCOUNTER — TELEPHONE (OUTPATIENT)
Dept: FAMILY MEDICINE | Facility: CLINIC | Age: 70
End: 2020-06-04

## 2020-06-04 ENCOUNTER — TELEPHONE (OUTPATIENT)
Dept: ONCOLOGY | Facility: CLINIC | Age: 70
End: 2020-06-04

## 2020-06-04 NOTE — TELEPHONE ENCOUNTER
Shantelle calling from [facility] for orders.    OK to delay PT start of care until 6/6/20 due to staffing.       to evaluate.      Verbal order given.  Will fax for MD signature.  Ayla Alfonso RN

## 2020-06-04 NOTE — TELEPHONE ENCOUNTER
Using Ukrainian , I spoke to pt and his sister (Rosa) via phone today regarding referral to Oncology for newly diagnosed lung cancer. Rosa speaks some English, pt requires .    Pt had left lobectomy at [facility]/ Thoracic Surgery in early May. Path shows invasive adenocarcinoma, [id number]. I have no imaging records to review & pt denies having recent scans. Pt also denies establishing care with an oncologist, although notes fr  reference a Dr Adolph Dunbar. Pt's sister, Rosa, is also on the phone and she does not have much detail either. They give me permission to contact Thoracic Surgery at [facility] and JOHNY Dunbar's office.    I spoke to Dr Dunbar's office, they confirm that pt is a recently established patient. They have imaging records, path, etc. Will send over BEBETO for medical records request.     Pt will possibly need to see Thoracic Surgery here at [facility] as notes show a mediastinal mass which requires resection per [facility] Thoracic team. And pt will need lung cancer med onc (prefers [place] location if chemo needed). I will update pt once we have more records. Pt & sister agree & verbalize understanding of this plan.

## 2020-06-05 ENCOUNTER — TELEPHONE (OUTPATIENT)
Dept: ONCOLOGY | Facility: CLINIC | Age: 70
End: 2020-06-05

## 2020-06-05 NOTE — TELEPHONE ENCOUNTER
Requested records and imaging from [facility].   Records from [facility] received and sent to scanning.

## 2020-06-06 ENCOUNTER — HOME CARE/HOSPICE - HEALTHEAST (OUTPATIENT)
Dept: HOME HEALTH SERVICES | Facility: HOME HEALTH | Age: 70
End: 2020-06-06

## 2020-06-06 ENCOUNTER — RECORDS - HEALTHEAST (OUTPATIENT)
Dept: ADMINISTRATIVE | Facility: OTHER | Age: 70
End: 2020-06-06

## 2020-06-08 ENCOUNTER — HOME CARE/HOSPICE - HEALTHEAST (OUTPATIENT)
Dept: HOME HEALTH SERVICES | Facility: HOME HEALTH | Age: 70
End: 2020-06-08

## 2020-06-08 ENCOUNTER — TELEPHONE (OUTPATIENT)
Dept: FAMILY MEDICINE | Facility: CLINIC | Age: 70
End: 2020-06-08

## 2020-06-08 NOTE — TELEPHONE ENCOUNTER
[facility] for orders-    PT  2xwk/3wks    Nursing evaluation and  evaluation    Verbal order given.  Will fax for MD signature.  Ayla Alfonso RN

## 2020-06-09 ENCOUNTER — HOME CARE/HOSPICE - HEALTHEAST (OUTPATIENT)
Dept: HOME HEALTH SERVICES | Facility: HOME HEALTH | Age: 70
End: 2020-06-09

## 2020-06-09 ENCOUNTER — MEDICAL CORRESPONDENCE (OUTPATIENT)
Dept: HEALTH INFORMATION MANAGEMENT | Facility: CLINIC | Age: 70
End: 2020-06-09

## 2020-06-09 NOTE — TELEPHONE ENCOUNTER
RECORDS STATUS - ALL OTHER DIAGNOSIS      RECORDS RECEIVED FROM: [facility]/ [facility] ([place])    DATE RECEIVED: 2020    NOTES STATUS DETAILS   OFFICE NOTE from referring provider Serge Trejo MD    OFFICE NOTE from medical oncologist [facility] Records are in [facility]    IMG Report From [facility] is in [facility]   DISCHARGE SUMMARY from hospital     DISCHARGE REPORT from the ER     OPERATIVE REPORT Complete See Biopsy Report In [facility] ([facility])    MEDICATION LIST Complete [facility]   CLINICAL TRIAL TREATMENTS TO DATE     LABS     PATHOLOGY REPORTS Complete-[facility]   Tracking Number:   [id number]   Path Reports are in Epic    2020     Ph: [phone]  Fax: [phone]    [facility] Pathology Dept   [address] [facility] [address]  [phone]     ANYTHING RELATED TO DIAGNOSIS     GENONOMIC TESTING     TYPE:     IMAGING (NEED IMAGES & REPORT)     CT SCANS     MRI     Xray Chest Complete -[facility] Radiology Dept    Ph: [phone]  Fax: [phone]  Mailing Address:   [address] [place] [id number]    Tracking Number:   [id number]    Complete- [facility] ph: [phone]  CT Chest 2020     Ph: [phone] Opt 1   (Already Requested by Tomasa Garay)   Fax: [phone]  Trackin IMG Reports In EPIC   MAMMO     ULTRASOUND     PET       Action    Action Taken 2020 11:12am   I called [facility]'s Radiology and pathology Depts    2020 11:56am   I called [facility] Rad and Path Depts back to see if they received the fax requests and shipping labels.     The radiology dept hasn't received my fax request yet but they are going to move forward with getting the image disc created now.     The pathology dept said the request was received and is being processed right now.     I called [facility] in [place] to request an IMG  disc to be mailed as well. PH: [phone]- Tomasa Garay already requested IMG.     6/10/2020 9:17am   I called [facility] to follow up on the image disc request. They said the image disc was sent out yesterday. The tracking number states that the image disc hasn't been sent yet but maybe it just needs to catch up.     I also checked the tracking number for [facility]  Ray, 10 2020 9:25 AM On [employer] vehicle for delivery [place], [place]   The image disc should arrive today.     The path slides are supposed to arrive today too. Ray, 10 2020 9:25 AM On [employer] vehicle for delivery [place], [place]

## 2020-06-09 NOTE — TELEPHONE ENCOUNTER
RECORDS STATUS - ALL OTHER DIAGNOSIS      RECORDS RECEIVED FROM: [facility]   DATE RECEIVED: 6/11/2020    NOTES STATUS DETAILS   OFFICE NOTE from referring provider Serge Trejo MD   OFFICE NOTE from medical oncologist     DISCHARGE SUMMARY from hospital     DISCHARGE REPORT from the ER     OPERATIVE REPORT     MEDICATION LIST     CLINICAL TRIAL TREATMENTS TO DATE     LABS     PATHOLOGY REPORTS     ANYTHING RELATED TO DIAGNOSIS     GENONOMIC TESTING     TYPE:     IMAGING (NEED IMAGES & REPORT)     CT SCANS     MRI     MAMMO     ULTRASOUND     PET

## 2020-06-10 ENCOUNTER — HOME CARE/HOSPICE - HEALTHEAST (OUTPATIENT)
Dept: HOME HEALTH SERVICES | Facility: HOME HEALTH | Age: 70
End: 2020-06-10

## 2020-06-10 PROCEDURE — 00000346 ZZHCL STATISTIC REVIEW OUTSIDE SLIDES TC 88321: Performed by: INTERNAL MEDICINE

## 2020-06-10 NOTE — TELEPHONE ENCOUNTER
Action 06/10/20 1:49 PM - Myrna   Action Taken  Imaging discs and reports received from [facility] and [facility]; [facility] into PACs.  Report sent to [facility] to be scanned into the chart.     Action 06/10/20 11:35 AM - Myrna   Action Taken  Pathology received from [facility] and sent to be reviewed with filled out pathology form.

## 2020-06-11 ENCOUNTER — HOME CARE/HOSPICE - HEALTHEAST (OUTPATIENT)
Dept: HOME HEALTH SERVICES | Facility: HOME HEALTH | Age: 70
End: 2020-06-11

## 2020-06-11 ENCOUNTER — RECORDS - HEALTHEAST (OUTPATIENT)
Dept: ADMINISTRATIVE | Facility: OTHER | Age: 70
End: 2020-06-11

## 2020-06-11 ENCOUNTER — VIRTUAL VISIT (OUTPATIENT)
Dept: FAMILY MEDICINE | Facility: CLINIC | Age: 70
End: 2020-06-11
Payer: MEDICARE

## 2020-06-11 ENCOUNTER — PRE VISIT (OUTPATIENT)
Dept: ONCOLOGY | Facility: CLINIC | Age: 70
End: 2020-06-11

## 2020-06-11 VITALS — DIASTOLIC BLOOD PRESSURE: 74 MMHG | HEART RATE: 84 BPM | SYSTOLIC BLOOD PRESSURE: 156 MMHG | OXYGEN SATURATION: 96 %

## 2020-06-11 DIAGNOSIS — R07.89 CHEST WALL PAIN FOLLOWING SURGERY: ICD-10-CM

## 2020-06-11 DIAGNOSIS — I63.9 CEREBROVASCULAR ACCIDENT (CVA), UNSPECIFIED MECHANISM (H): ICD-10-CM

## 2020-06-11 DIAGNOSIS — N39.43 BENIGN PROSTATIC HYPERPLASIA WITH POST-VOID DRIBBLING: ICD-10-CM

## 2020-06-11 DIAGNOSIS — J30.2 SEASONAL ALLERGIES: ICD-10-CM

## 2020-06-11 DIAGNOSIS — N40.1 BENIGN PROSTATIC HYPERPLASIA WITH POST-VOID DRIBBLING: ICD-10-CM

## 2020-06-11 DIAGNOSIS — C34.92 MALIGNANT NEOPLASM OF LEFT LUNG, UNSPECIFIED PART OF LUNG (H): Primary | ICD-10-CM

## 2020-06-11 DIAGNOSIS — G89.18 CHEST WALL PAIN FOLLOWING SURGERY: ICD-10-CM

## 2020-06-11 DIAGNOSIS — J98.59 MEDIASTINAL MASS: ICD-10-CM

## 2020-06-11 PROCEDURE — 99214 OFFICE O/P EST MOD 30 MIN: CPT | Mod: 95 | Performed by: FAMILY MEDICINE

## 2020-06-11 RX ORDER — CETIRIZINE HYDROCHLORIDE 10 MG/1
10 TABLET ORAL DAILY
COMMUNITY
End: 2020-06-11

## 2020-06-11 RX ORDER — DOCUSATE SODIUM 100 MG/1
100 CAPSULE, LIQUID FILLED ORAL
COMMUNITY
End: 2020-08-11

## 2020-06-11 RX ORDER — TAMSULOSIN HYDROCHLORIDE 0.4 MG/1
0.4 CAPSULE ORAL DAILY
Qty: 30 CAPSULE | Refills: 1 | Status: SHIPPED | OUTPATIENT
Start: 2020-06-11 | End: 2020-08-03

## 2020-06-11 RX ORDER — GABAPENTIN 300 MG/1
300 CAPSULE ORAL 4 TIMES DAILY
Qty: 120 CAPSULE | Refills: 1 | Status: SHIPPED | OUTPATIENT
Start: 2020-06-11 | End: 2020-06-23

## 2020-06-11 RX ORDER — TAMSULOSIN HYDROCHLORIDE 0.4 MG/1
0.4 CAPSULE ORAL DAILY
COMMUNITY
End: 2020-06-11

## 2020-06-11 RX ORDER — TAMSULOSIN HYDROCHLORIDE 0.4 MG/1
CAPSULE ORAL
Qty: 90 CAPSULE | OUTPATIENT
Start: 2020-06-11

## 2020-06-11 RX ORDER — GABAPENTIN 300 MG/1
300 CAPSULE ORAL
COMMUNITY
End: 2020-06-11

## 2020-06-11 RX ORDER — CETIRIZINE HYDROCHLORIDE 10 MG/1
10 TABLET ORAL DAILY
Qty: 30 TABLET | Refills: 1 | Status: SHIPPED | OUTPATIENT
Start: 2020-06-11 | End: 2020-12-22

## 2020-06-11 NOTE — Clinical Note
Patient needs follow-up with oncology - missed appt today as he was not reachable it appears.  He needs to have this rescheduled - please assist.

## 2020-06-11 NOTE — PROGRESS NOTES
"Moises Chacon is a 69 year old male who is being evaluated via a billable video visit.      The patient has been notified of following:     \"This video visit will be conducted via a call between you and your physician/provider. We have found that certain health care needs can be provided without the need for an in-person physical exam.  This service lets us provide the care you need with a video conversation.  If a prescription is necessary we can send it directly to your pharmacy.  If lab work is needed we can place an order for that and you can then stop by our lab to have the test done at a later time.    Video visits are billed at different rates depending on your insurance coverage.  Please reach out to your insurance provider with any questions.    If during the course of the call the physician/provider feels a video visit is not appropriate, you will not be charged for this service.\"    Patient has given verbal consent for Video visit? Yes    How would you like to obtain your AVS? Mail a copy  Patient would like the video invitation sent by: Text to cell phone: [phone]    Will anyone else be joining your video visit? Desire , wilfrido bueno      Subjective     Moises Chacon is a 69 year old male who presents today via video visit for the following health issues:    HPI  Need video visit to establish home care    Video Start Time: 2:33 PM    Patient is new to this area as he is living with his sister locally.  Patient with new diagnosis of left-sided lung cancer after having syncopal episode on 5/6/2020 admitted in [place] to [facility].  See scanned records for detail.    Patient had plan for follow-up with thoracic surgery for removal of anterior mediastinal mass per records reviewed.  He had recommendations to follow-up with oncology as well.  He had follow-up set up in [place] but did not go to them as he was in [facility] with his sister.    Patient with history " "of hypertension currently on amlodipine 10 mg daily metoprolol 25 mg twice daily.  In reviewing medications with home nurse today patient is only taking metoprolol once daily.  Blood pressure elevated today as noted below.    Patient with history of distant stroke though I do not have any further information on this history.  Currently taking aspirin.    Patient also with recent emergency room visit to [facility] for pneumonia treated with cefuroxime and doxycycline.  Tested for coronavirus and normal reportedly.  Again, he is feeling well from a respiratory and infection standpoint at this time.    Patient Active Problem List   Diagnosis     Dyslipidemia     Hypertension     Cerebrovascular accident (H)     Smoker     GERD (gastroesophageal reflux disease)     Cardiomegaly     HYPERLIPIDEMIA LDL GOAL <130     Mediastinal mass     Chest wall pain following surgery     Seasonal allergies     Benign prostatic hyperplasia with post-void dribbling     Malignant neoplasm of left lung, unspecified part of lung (H)     Past Surgical History:   Procedure Laterality Date     NO HISTORY OF SURGERY         Social History     Tobacco Use     Smoking status: Current Every Day Smoker     Smokeless tobacco: Former User     Tobacco comment: 10 cigarettes per day   Substance Use Topics     Alcohol use: No     Family History   Problem Relation Age of Onset     Heart Disease Mother      Cerebrovascular Disease Father      Family History Negative Sister         4-one sister  from heart dis     Family History Negative Brother         2           Reviewed and updated as needed this visit by Provider  Tobacco         Review of Systems   CONSTITUTIONAL: no fever  RESP: as above, no cough  : some leaking urine, improving      Objective    BP (!) 156/74   Pulse 84   SpO2 96%   Estimated body mass index is 24.2 kg/m  as calculated from the following:    Height as of 09: 1.651 m (5' 5\").    Weight as of 09: 66 kg " (145 lb 6.4 oz).  Physical Exam     GENERAL: Healthy, alert and no distress  EYES: Eyes grossly normal to inspection.  No discharge or erythema, or obvious scleral/conjunctival abnormalities.  RESP: No audible wheeze, cough, or visible cyanosis.  No visible retractions or increased work of breathing.    SKIN: Visible skin clear. No significant rash, abnormal pigmentation or lesions.  NEURO: Cranial nerves grossly intact.  Mentation and speech appropriate for age.  PSYCH: Mentation appears normal, affect normal/bright, judgement and insight intact, normal speech and appearance well-groomed.            Assessment & Plan     1. Malignant neoplasm of left lung, unspecified part of lung (H)  Recommend follow-up with oncology.  Patient had a virtual appointment today but was not able to be reached.  Discussed importance of having this follow-up with oncology as well as thoracic surgery with the oncology clinic.    2. Mediastinal mass  Per notes there was a possibility of he surgery for removal of anterior mediastinal mass.  Recommend follow-up with oncology and thoracic surgery.    3. Chest wall pain following surgery  Continue gabapentin and tramadol.  - gabapentin (NEURONTIN) 300 MG capsule; Take 1 capsule (300 mg) by mouth 4 times daily  Dispense: 120 capsule; Refill: 1    4. Cerebrovascular accident (CVA), unspecified mechanism (H)  Continue aspirin.  I do not know his full history though will likely should consider statin in the future.    5. Seasonal allergies  - cetirizine (ZYRTEC) 10 MG tablet; Take 1 tablet (10 mg) by mouth daily  Dispense: 30 tablet; Refill: 1    6. Benign prostatic hyperplasia with post-void dribbling  - tamsulosin (FLOMAX) 0.4 MG capsule; Take 1 capsule (0.4 mg) by mouth daily  Dispense: 30 capsule; Refill: 1     Tobacco Cessation:   reports that he has been smoking. He has quit using smokeless tobacco.  Tobacco Cessation Action Plan: Information offered: Patient not interested at this  time            Return in about 1 month (around 7/11/2020) for Medicare Wellness visit, video visit.    Serge Botello MD  [facility]      Video-Visit Details    Type of service:  Video Visit    Video End Time: 2:58 PM    Originating Location (pt. Location): Home    Distant Location (provider location):  [facility]     Platform used for Video Visit: Doximity    Return in about 1 month (around 7/11/2020) for Medicare Wellness visit, video visit.       Serge Botello MD

## 2020-06-11 NOTE — PROGRESS NOTES
Spoke with Ines at the oncology location and they will be contacting pt to reschedule.  They have a rescheduling cue for when pt's no show so they can follow up with them.  Sammy Adams RN, BSN

## 2020-06-12 ENCOUNTER — HOME CARE/HOSPICE - HEALTHEAST (OUTPATIENT)
Dept: HOME HEALTH SERVICES | Facility: HOME HEALTH | Age: 70
End: 2020-06-12

## 2020-06-15 ENCOUNTER — RECORDS - HEALTHEAST (OUTPATIENT)
Dept: ADMINISTRATIVE | Facility: OTHER | Age: 70
End: 2020-06-15

## 2020-06-15 ENCOUNTER — HOME CARE/HOSPICE - HEALTHEAST (OUTPATIENT)
Dept: HOME HEALTH SERVICES | Facility: HOME HEALTH | Age: 70
End: 2020-06-15

## 2020-06-15 NOTE — TELEPHONE ENCOUNTER
RECORDS STATUS - ALL OTHER DIAGNOSIS      RECORDS RECEIVED FROM: [facility]/ [facility] ([place])    DATE RECEIVED: 2020    NOTES STATUS DETAILS   OFFICE NOTE from referring provider Serge Trejo MD    OFFICE NOTE from medical oncologist [facility] Records are in [facility]    IMG Report From [facility] is in [facility]   DISCHARGE SUMMARY from hospital     DISCHARGE REPORT from the ER     OPERATIVE REPORT Complete See Biopsy Report In [facility] ([facility])    MEDICATION LIST Complete [facility]   CLINICAL TRIAL TREATMENTS TO DATE     LABS     PATHOLOGY REPORTS Complete-[facility]   Tracking Number:   [id number]   Path Reports are in Epic    2020     Ph: [phone]  Fax: [phone]    [facility] Pathology Dept   [address] [facility] [address]  [phone]     ANYTHING RELATED TO DIAGNOSIS     GENONOMIC TESTING     TYPE:     IMAGING (NEED IMAGES & REPORT)     CT SCANS     MRI     Xray Chest Complete -[facility] Radiology Dept    Ph: [phone]  Fax: [phone]  Mailing Address:   [address] [place] [id number]    Tracking Number:   [id number]    Complete- [facility] ph: [phone]  CT Chest 2020     Ph: [phone] Opt 1   (Already Requested by Tomasa Garay)   Fax: [phone]  Trackin IMG Reports In EPIC   MAMMO     ULTRASOUND     PET       Action    Action Taken 2020 11:12am   I called [facility]'s Radiology and pathology Depts    2020 11:56am   I called [facility] Rad and Path Depts back to see if they received the fax requests and shipping labels.     The radiology dept hasn't received my fax request yet but they are going to move forward with getting the image disc created now.     The pathology dept said the request was received and is being processed right now.     I called [facility] in [place] to request an IMG  disc to be mailed as well. PH: [phone]- Tomasa Garay already requested IMG.     6/10/2020 9:17am   I called [facility] to follow up on the image disc request. They said the image disc was sent out yesterday. The tracking number states that the image disc hasn't been sent yet but maybe it just needs to catch up.     I also checked the tracking number for [facility]  Ray, 10 2020 9:25 AM On [employer] vehicle for delivery [place], [place]   The image disc should arrive today.     The path slides are supposed to arrive today too. Ray, 10 2020 9:25 AM On [employer] vehicle for delivery [place], [place]

## 2020-06-16 ENCOUNTER — RECORDS - HEALTHEAST (OUTPATIENT)
Dept: ADMINISTRATIVE | Facility: OTHER | Age: 70
End: 2020-06-16

## 2020-06-16 ENCOUNTER — HOME CARE/HOSPICE - HEALTHEAST (OUTPATIENT)
Dept: HOME HEALTH SERVICES | Facility: HOME HEALTH | Age: 70
End: 2020-06-16

## 2020-06-18 ENCOUNTER — HOME CARE/HOSPICE - HEALTHEAST (OUTPATIENT)
Dept: HOME HEALTH SERVICES | Facility: HOME HEALTH | Age: 70
End: 2020-06-18

## 2020-06-19 ENCOUNTER — HOSPITAL ENCOUNTER (OUTPATIENT)
Facility: CLINIC | Age: 70
Setting detail: SPECIMEN
End: 2020-06-19
Attending: INTERNAL MEDICINE
Payer: MEDICARE

## 2020-06-19 ENCOUNTER — PRE VISIT (OUTPATIENT)
Dept: ONCOLOGY | Facility: CLINIC | Age: 70
End: 2020-06-19

## 2020-06-19 ENCOUNTER — ONCOLOGY VISIT (OUTPATIENT)
Dept: ONCOLOGY | Facility: CLINIC | Age: 70
End: 2020-06-19
Attending: INTERNAL MEDICINE
Payer: MEDICARE

## 2020-06-19 ENCOUNTER — HOSPITAL ENCOUNTER (OUTPATIENT)
Facility: CLINIC | Age: 70
Setting detail: SPECIMEN
Discharge: HOME | End: 2020-06-19
Attending: INTERNAL MEDICINE | Admitting: INTERNAL MEDICINE
Payer: MEDICARE

## 2020-06-19 VITALS
DIASTOLIC BLOOD PRESSURE: 77 MMHG | SYSTOLIC BLOOD PRESSURE: 145 MMHG | TEMPERATURE: 98.2 F | HEART RATE: 69 BPM | HEIGHT: 65 IN | RESPIRATION RATE: 16 BRPM | WEIGHT: 128.3 LBS | BODY MASS INDEX: 21.38 KG/M2 | OXYGEN SATURATION: 99 %

## 2020-06-19 DIAGNOSIS — D50.9 IRON DEFICIENCY ANEMIA, UNSPECIFIED IRON DEFICIENCY ANEMIA TYPE: ICD-10-CM

## 2020-06-19 DIAGNOSIS — G89.3 CANCER ASSOCIATED PAIN: ICD-10-CM

## 2020-06-19 DIAGNOSIS — C34.92 MALIGNANT NEOPLASM OF LEFT LUNG, UNSPECIFIED PART OF LUNG (H): Primary | ICD-10-CM

## 2020-06-19 LAB
ALBUMIN SERPL-MCNC: 3.1 G/DL (ref 3.4–5)
ALP SERPL-CCNC: 101 U/L (ref 40–150)
ALT SERPL W P-5'-P-CCNC: 15 U/L (ref 0–70)
ANION GAP SERPL CALCULATED.3IONS-SCNC: 7 MMOL/L (ref 3–14)
AST SERPL W P-5'-P-CCNC: 10 U/L (ref 0–45)
BASOPHILS # BLD AUTO: 0.1 10E9/L (ref 0–0.2)
BASOPHILS NFR BLD AUTO: 1.1 %
BILIRUB SERPL-MCNC: 0.3 MG/DL (ref 0.2–1.3)
BUN SERPL-MCNC: 29 MG/DL (ref 7–30)
CALCIUM SERPL-MCNC: 9 MG/DL (ref 8.5–10.1)
CHLORIDE SERPL-SCNC: 106 MMOL/L (ref 94–109)
CO2 SERPL-SCNC: 29 MMOL/L (ref 20–32)
COPATH REPORT: NORMAL
CREAT SERPL-MCNC: 1.14 MG/DL (ref 0.66–1.25)
CRP SERPL-MCNC: 5 MG/L (ref 0–8)
DIFFERENTIAL METHOD BLD: ABNORMAL
EOSINOPHIL # BLD AUTO: 0.4 10E9/L (ref 0–0.7)
EOSINOPHIL NFR BLD AUTO: 5.8 %
ERYTHROCYTE [DISTWIDTH] IN BLOOD BY AUTOMATED COUNT: 13.8 % (ref 10–15)
FERRITIN SERPL-MCNC: 178 NG/ML (ref 26–388)
GFR SERPL CREATININE-BSD FRML MDRD: 64 ML/MIN/{1.73_M2}
GLUCOSE SERPL-MCNC: 110 MG/DL (ref 70–99)
HCT VFR BLD AUTO: 40.1 % (ref 40–53)
HGB BLD-MCNC: 12.7 G/DL (ref 13.3–17.7)
IMM GRANULOCYTES # BLD: 0 10E9/L (ref 0–0.4)
IMM GRANULOCYTES NFR BLD: 0 %
IRON SATN MFR SERPL: 27 % (ref 15–46)
IRON SERPL-MCNC: 71 UG/DL (ref 35–180)
LDH SERPL L TO P-CCNC: 168 U/L (ref 85–227)
LYMPHOCYTES # BLD AUTO: 2.1 10E9/L (ref 0.8–5.3)
LYMPHOCYTES NFR BLD AUTO: 33.7 %
MAGNESIUM SERPL-MCNC: 2.1 MG/DL (ref 1.6–2.3)
MCH RBC QN AUTO: 30.4 PG (ref 26.5–33)
MCHC RBC AUTO-ENTMCNC: 31.7 G/DL (ref 31.5–36.5)
MCV RBC AUTO: 96 FL (ref 78–100)
MONOCYTES # BLD AUTO: 0.3 10E9/L (ref 0–1.3)
MONOCYTES NFR BLD AUTO: 4.4 %
NEUTROPHILS # BLD AUTO: 3.4 10E9/L (ref 1.6–8.3)
NEUTROPHILS NFR BLD AUTO: 55 %
NRBC # BLD AUTO: 0 10*3/UL
NRBC BLD AUTO-RTO: 0 /100
PLATELET # BLD AUTO: 198 10E9/L (ref 150–450)
POTASSIUM SERPL-SCNC: 4 MMOL/L (ref 3.4–5.3)
PROT SERPL-MCNC: 7.9 G/DL (ref 6.8–8.8)
RBC # BLD AUTO: 4.18 10E12/L (ref 4.4–5.9)
SODIUM SERPL-SCNC: 142 MMOL/L (ref 133–144)
TIBC SERPL-MCNC: 267 UG/DL (ref 240–430)
VIT B12 SERPL-MCNC: 399 PG/ML (ref 193–986)
WBC # BLD AUTO: 6.2 10E9/L (ref 4–11)

## 2020-06-19 PROCEDURE — 36415 COLL VENOUS BLD VENIPUNCTURE: CPT

## 2020-06-19 PROCEDURE — 86140 C-REACTIVE PROTEIN: CPT | Performed by: INTERNAL MEDICINE

## 2020-06-19 PROCEDURE — 83735 ASSAY OF MAGNESIUM: CPT | Performed by: INTERNAL MEDICINE

## 2020-06-19 PROCEDURE — 99205 OFFICE O/P NEW HI 60 MIN: CPT | Performed by: INTERNAL MEDICINE

## 2020-06-19 PROCEDURE — 85025 COMPLETE CBC W/AUTO DIFF WBC: CPT | Performed by: INTERNAL MEDICINE

## 2020-06-19 PROCEDURE — 80053 COMPREHEN METABOLIC PANEL: CPT | Performed by: INTERNAL MEDICINE

## 2020-06-19 PROCEDURE — 84238 ASSAY NONENDOCRINE RECEPTOR: CPT | Performed by: INTERNAL MEDICINE

## 2020-06-19 PROCEDURE — 82728 ASSAY OF FERRITIN: CPT | Performed by: INTERNAL MEDICINE

## 2020-06-19 PROCEDURE — 83550 IRON BINDING TEST: CPT | Performed by: INTERNAL MEDICINE

## 2020-06-19 PROCEDURE — G0463 HOSPITAL OUTPT CLINIC VISIT: HCPCS

## 2020-06-19 PROCEDURE — 83615 LACTATE (LD) (LDH) ENZYME: CPT | Performed by: INTERNAL MEDICINE

## 2020-06-19 PROCEDURE — 82607 VITAMIN B-12: CPT | Performed by: INTERNAL MEDICINE

## 2020-06-19 PROCEDURE — 83540 ASSAY OF IRON: CPT | Performed by: INTERNAL MEDICINE

## 2020-06-19 RX ORDER — TRAMADOL HYDROCHLORIDE 50 MG/1
50 TABLET ORAL EVERY 6 HOURS PRN
Qty: 60 TABLET | Refills: 0 | Status: SHIPPED | OUTPATIENT
Start: 2020-06-19 | End: 2020-08-11

## 2020-06-19 RX ORDER — MORPHINE SULFATE 15 MG/1
15 TABLET, FILM COATED, EXTENDED RELEASE ORAL EVERY 12 HOURS
Qty: 60 TABLET | Refills: 0 | Status: SHIPPED | OUTPATIENT
Start: 2020-06-19 | End: 2020-08-11

## 2020-06-19 ASSESSMENT — PAIN SCALES - GENERAL: PAINLEVEL: EXTREME PAIN (8)

## 2020-06-19 ASSESSMENT — MIFFLIN-ST. JEOR: SCORE: 1263.84

## 2020-06-19 NOTE — PROGRESS NOTES
[facility] CANCER CLINIC    NEW PATIENT VISIT NOTE    PATIENT NAME: Moises Chacon MRN # [id number]  DATE OF VISIT: June 19, 2020 YOB: 1950    REFERRING PROVIDER: Serge Botello MD  [id number] [place] [address]    CANCER TYPE: Adenocarcinoma from left lower lobe of lung; PDL 1+60%, K-zena mutation +  STAGE: IIB vs IIIA     TREATMENT SUMMARY:  -Patient has a 50+ pack year smoking history and was noted to have a left lower lobe mass in 2018.  This was not followed initially.  He was worked up for abdominal pain which revealed growth in his left lower lobe mass.  He underwent left lower lobectomy on 5/6/2020 at [facility] in [place].  He has moved to [place] after his surgery and established care with [facility].    CURRENT INTERVENTIONS:  Post surgical resection for locally advanced non-small cell lung cancer     HISTORY OF PRESENT ILLNESS   Moises Chacon is a 69-year-old chronic smoker with past medical history significant for hypertension, stroke and recently diagnosed locally advanced non-small cell lung cancer from left lower lobe for which he seeks to establish care in this clinic.    Moises was seen in person at this visit. He is alone but has his sister joining over the phone and Japanese to English .  History is presented by the patient at this clinic visit.  Additional information gathered from charts.    He presented with pain in his stomach. He presented to his PCP and was worked up with a CT scan. This revealed lung cancer. He had surgery to remove the cancer. He has been taking pain medication and is unable to function without. He has to take pain medication every 6 hrs. Pain medication does help the pain by 70%.     He denies any cough but admits having shortness of breath .     He was told that there is another area with cancer. He was told that this top area has to be operated as  soon as possible.     He denies any CAD. He has hypertension .     PAST MEDICAL HISTORY     Past Medical History:   Diagnosis Date     Dyslipidemia      Hypertension      Stroke (H)           CURRENT OUTPATIENT MEDICATIONS     Current Outpatient Medications   Medication Sig     amLODIPine (NORVASC) 10 MG tablet TAKE 1 TABLET BY MOUTH DAILY.     aspirin (ASA) 81 MG EC tablet Take 1 tablet (81 mg) by mouth daily     cetirizine (ZYRTEC) 10 MG tablet Take 1 tablet (10 mg) by mouth daily     docusate sodium (COLACE) 100 MG capsule Take 100 mg by mouth     gabapentin (NEURONTIN) 300 MG capsule Take 1 capsule (300 mg) by mouth 4 times daily     metoprolol tartrate (LOPRESSOR) 25 MG tablet TAKE 1 TABLET BY MOUTH TWICE DAILY.     tamsulosin (FLOMAX) 0.4 MG capsule Take 1 capsule (0.4 mg) by mouth daily     traMADol (ULTRAM) 50 MG tablet Take 1 tablet (50 mg) by mouth every 6 hours as needed for severe pain     umeclidinium-vilanterol (ANORO ELLIPTA) 62.5-25 MCG/INH oral inhaler Inhale 1 puff into the lungs daily     No current facility-administered medications for this visit.         ALLERGIES    No Known Allergies     SOCIAL HISTORY   He is  and lives with his wife. 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     He smokes - has been smoking for 50 yrs. He smokes 4 cigarettes a day. He used to smoke a pack a day in the past. He denies any recreational drug use     FAMILY HISTORY   None contributory     REVIEW OF SYSTEMS   As above in the HPI, o/w complete 12-point ROS was negative.     PHYSICAL EXAM   B/P: 145/77, T: 98.2, P: 69, R: 16  @LASTSAO2(4)@  Wt Readings from Last 3 Encounters:   06/19/20 58.2 kg (128 lb 4.8 oz)   11/20/09 66 kg (145 lb 6.4 oz)   11/09/09 65.3 kg (144 lb)     GEN: NAD  HEENT: PERRL, EOMI, no icterus, injection or pallor. Oropharynx is clear.  NECK: no cervical or supraclavicular lymphadenopathy  LUNGS: clear bilaterally  CV: regular, no murmurs, rubs, or gallops  ABDOMEN:  soft, non-tender, non-distended, normal bowel sounds, no hepatosplenomegaly by percussion or palpation  EXT: warm, well perfused, no edema  NEURO: alert  SKIN: no rashes     LABORATORY AND IMAGING STUDIES     Recent Labs   Lab Test 06/19/20  1523      POTASSIUM 4.0   CHLORIDE 106   CO2 29   ANIONGAP 7   BUN 29   CR 1.14   *   MERI 9.0     No results for input(s): MAG, PHOS in the last 00909 hours.  Recent Labs   Lab Test 06/19/20  1523   WBC 6.2   HGB 12.7*      MCV 96   NEUTROPHIL 55.0     Recent Labs   Lab Test 06/19/20  1523   BILITOTAL 0.3   ALKPHOS 101   ALT 15   AST 10   ALBUMIN 3.1*     TSH   Date Value Ref Range Status   11/09/2009 1.63 0.4 - 5.0 mU/L Final     No results for input(s): CEA in the last 71472 hours.  Results for orders placed or performed in visit on 11/09/09   CHEST X-RAY 2 VW    Impression       CHEST TWO VIEW* Nov 9, 2009 4:42:00 PM     HISTORY:  SOB (SHORTNESS OF BREATH),      FINDINGS: Mild cardiomegaly. Chest otherwise negative.       Lab Results   Component Value Date    PATH  06/10/2020     Patient Name: FINA SALOMON  MR#: [id number]  Specimen #: [id number]  Collected: 6/10/2020  Received: 6/10/2020  Reported: 6/19/2020 13:40  Ordering Phy(s): MICKEY COVARRUBIAS  Copy To: [facility]  Anatomic Pathology Support Services  [address] [place] [id number] [phone] fax    For improved result formatting, select 'View Enhanced Report Format' under   Linked Documents section.    TEST(S):  27 slides, case #[id number]    FINAL DIAGNOSIS:  CASE FROM [facility], [place], [place] ([id number],   OBTAINED 05/06/20):  A. LYMPH NODE, 4R:  - One lymph node, negative for carcinoma (0/1).    B. LYMPH NODES, LEVEL 7:  - One lymph node, negative for carcinoma (0/1).    C. LUNG, LEFT LOWER LOBE, LOBECTOMY:  - INVASIVE LUNG ADENOCARCINOMA, poorly differentiated, solid predominant,   5.2 cm in greatest  dimension.  - Margins are negative for carcinoma.  - Fifteen lymph nodes, negative for carcinoma (0/15).  - AJCC pathologic staging is pT3 N0.  - See comment and outside synoptic report for details.    D. LYMPH NODE, LEVEL 5:  - One lymph node, negative for carcinoma (0/1).    E. LYMPH NODE, LEVEL 10:  - One lymph node, negative for carcinoma (0/1).    F. LYMPH NODE, LEVEL 11:  - One lymph node, negative for carcinoma (0/1).    G. LYMPH NODE, LEVEL 11 #2:  - Two lymph nodes, negative for carcinoma (0/2).    H. LYMPH NODE, LEVEL 11 #3:  - One lymph node, negative for carcinoma (0/1).    I. LYMPH NODE, LEVEL 12:  - One lymph node, negative for carcinoma (0/1).    COMMENT:  Part C: Immunohistochemical stains show tumor cells positive for TTF-1 and   negative for p40, supporting the  diagnosis. An elastic stain was performed on C5 and C6. All stains were   performed at the referring institution  and provided for review.    Per report, molecular studies demonstrate that the neoplasm is KRAS   mutated (G12D, see outside molecular  genetics report for details).    I have personally reviewed all specimens and/or slides, including the   listed special stains, and used them  with my medical judgement to determine or confirm the final diagnosis.    Electronically signed out by:  Jamee Carmichael M.D., [facility]    CLINICAL HISTORY:  69-year-old man with left lower lobe adenocarcinoma    GROSS:  Received from [facility] in [place], [place] are 27 stained   slides labeled [id number]  (obtained 05/06/20) and a copy of the referring pathologist's report with   patient identifying information.  All slides are returned.    MICROSCOPIC:  Microscopic examination was performed. Representative block is   [id number].    The technical component of this testing was completed at the [facility], with the professional component performed   at the [facility]  [facility], [address] [place] [address],   [place] [address] ([phone])    CPT Codes:  A: 16278-QAH4    COLLECTION SITE:  Client:  [facility]  Location:  [id number] (B)    Resident  [email or web address]         Results for orders placed or performed in visit on 11/09/09   CHEST X-RAY 2 VW    Impression       CHEST TWO VIEW* Nov 9, 2009 4:42:00 PM     HISTORY:  SOB (SHORTNESS OF BREATH),      FINDINGS: Mild cardiomegaly. Chest otherwise negative.         ASSESSMENT    1. Postsurgical pain from recent left lower lobectomy  2. Stage IIb non-small cell adenocarcinoma of lung from left lower lobe s/p left lower lobectomy on 5/6/2020  3. Multiple medical comorbidities including hypertension, dyslipidemia  4. Limited to poor understanding of disease and health    DISCUSSION   I had a lengthy discussion with patient who was seen in person and his sister joined over the phone.  We had Estonian to English  over the Internet connected by video on iPad.    Patient notes that he has significant pain from his recent surgery.  He has to take pain medications almost around-the-clock.  I explained him that we do expect some postsurgical pain but we would have to assess if there are other explanations for his pain.  He is nearly 2 months out from his surgery and we could repeat his restaging CT scan of the chest, abdomen and pelvis.  I will refer him to my colleagues in thoracic surgery and seek their input on this.    I will prescribe him long-acting morphine sulfate 15 mg scheduled every 12 hours in addition to tramadol 50 mg every 6 hours as needed for pain control.    Subsequent management of his lung cancer will depend on findings of his CT scan.  He is a candidate for adjuvant chemotherapy due to his locally advanced non-small cell lung cancer.  Chemotherapy with cisplatin and pemetrexed as recommended for 4 cycles as an outpatient.  Both of these drugs  are administered intravenously in the clinic.  I reviewed the curative intent, the side effects, risks, benefits and alternatives for the chemotherapy regimen.    I will work-up his anemia and have the labs drawn after this clinic visit.  I will get basic labs including CBC with differential and comprehensive metabolic panel done today.     PLAN   -I will get labs for him after this clinic visit  -I will get restaging CT scan of the chest, abdomen and pelvis done soon  -I will have him follow-up with my nurse practitioner to review results from his labs and restaging scans.  -I will refer him to thoracic surgery for consultation given his postoperative pain and persistent mediastinal mass.    Over 60 min of direct face to face time spent with patient with more than 50% time spent in counseling and coordinating care.      Michael Ross  Adj ,  Division of Hematology, Oncology & Transplantation  [facility].

## 2020-06-19 NOTE — PROGRESS NOTES
"Oncology Rooming Note    June 19, 2020 3:30 PM   Moises Chacon is a 69 year old male who presents for:    No chief complaint on file.    Initial Vitals: BP (!) 145/77   Pulse 69   Temp 98.2  F (36.8  C) (Tympanic)   Resp 16   Ht 1.651 m (5' 5\")   Wt 58.2 kg (128 lb 4.8 oz)   SpO2 99%   BMI 21.35 kg/m   Estimated body mass index is 24.2 kg/m  as calculated from the following:    Height as of 11/20/09: 1.651 m (5' 5\").    Weight as of 11/20/09: 66 kg (145 lb 6.4 oz). There is no height or weight on file to calculate BSA.  Data Unavailable Comment: Data Unavailable   No LMP for male patient.  Allergies reviewed: Yes  Medications reviewed: Yes    Medications: Medication refills not needed today.  Pharmacy name entered into MOVL:    [id number] [place], [place] - [address] [id number] [place], [place] - [address] AT [facility] [address] [facility] [id number] - [place], [place] - [address] AT [facility] OF [facility] [address]    Clinical concerns: Pain from surgery and cancer in another area that should be taken out Dr. Ross was notified.      Jaye Abreu CMA                "

## 2020-06-19 NOTE — LETTER
"    6/19/2020         RE: Moises Chacon  [address] [facility] [address]        Dear Colleague,    Thank you for referring your patient, Moises Chacon, to the [facility]. Please see a copy of my visit note below.    Oncology Rooming Note    June 19, 2020 3:30 PM   Moises Chacon is a 69 year old male who presents for:    No chief complaint on file.    Initial Vitals: BP (!) 145/77   Pulse 69   Temp 98.2  F (36.8  C) (Tympanic)   Resp 16   Ht 1.651 m (5' 5\")   Wt 58.2 kg (128 lb 4.8 oz)   SpO2 99%   BMI 21.35 kg/m   Estimated body mass index is 24.2 kg/m  as calculated from the following:    Height as of 11/20/09: 1.651 m (5' 5\").    Weight as of 11/20/09: 66 kg (145 lb 6.4 oz). There is no height or weight on file to calculate BSA.  Data Unavailable Comment: Data Unavailable   No LMP for male patient.  Allergies reviewed: Yes  Medications reviewed: Yes    Medications: Medication refills not needed today.  Pharmacy name entered into AlertMe:    [facility] [address] [place], [place] - [address] [id number] [place], [place] - [address] [facility] [address] [facility] OF [facility] [address] & [id number] - [place], [place] - [address] AT [facility] & [facility] [address]    Clinical concerns: Pain from surgery and cancer in another area that should be taken out Dr. Ross was notified.      Jaye Abreu CMA                  [facility]    NEW PATIENT VISIT NOTE    PATIENT NAME: Moises Chacon MRN # [id number]  DATE OF VISIT: June 19, 2020 YOB: 1950    REFERRING PROVIDER: Serge Botello MD  [address] [place] [address]    CANCER TYPE: Adenocarcinoma from left lower lobe of lung; PDL 1+60%, K-zena mutation +  STAGE: IIB vs IIIA     TREATMENT SUMMARY:  -Patient has a 50+ pack year smoking history and was noted to have a left lower lobe mass in 2018.  This was not followed initially.  He was worked up for " abdominal pain which revealed growth in his left lower lobe mass.  He underwent left lower lobectomy on 5/6/2020 at [facility] in [place].  He has moved to [place] after his surgery and established care with [facility].    CURRENT INTERVENTIONS:  Post surgical resection for locally advanced non-small cell lung cancer     HISTORY OF PRESENT ILLNESS   Moises Chacon is a 69-year-old chronic smoker with past medical history significant for hypertension, stroke and recently diagnosed locally advanced non-small cell lung cancer from left lower lobe for which he seeks to establish care in this clinic.    Moises was seen in person at this visit. He is alone but has his sister joining over the phone and Chinese to English .  History is presented by the patient at this clinic visit.  Additional information gathered from charts.    He presented with pain in his stomach. He presented to his PCP and was worked up with a CT scan. This revealed lung cancer. He had surgery to remove the cancer. He has been taking pain medication and is unable to function without. He has to take pain medication every 6 hrs. Pain medication does help the pain by 70%.     He denies any cough but admits having shortness of breath .     He was told that there is another area with cancer. He was told that this top area has to be operated as soon as possible.     He denies any CAD. He has hypertension .     PAST MEDICAL HISTORY     Past Medical History:   Diagnosis Date     Dyslipidemia      Hypertension      Stroke (H)           CURRENT OUTPATIENT MEDICATIONS     Current Outpatient Medications   Medication Sig     amLODIPine (NORVASC) 10 MG tablet TAKE 1 TABLET BY MOUTH DAILY.     aspirin (ASA) 81 MG EC tablet Take 1 tablet (81 mg) by mouth daily     cetirizine (ZYRTEC) 10 MG tablet Take 1 tablet (10 mg) by mouth daily     docusate sodium (COLACE) 100 MG capsule Take 100 mg by  mouth     gabapentin (NEURONTIN) 300 MG capsule Take 1 capsule (300 mg) by mouth 4 times daily     metoprolol tartrate (LOPRESSOR) 25 MG tablet TAKE 1 TABLET BY MOUTH TWICE DAILY.     tamsulosin (FLOMAX) 0.4 MG capsule Take 1 capsule (0.4 mg) by mouth daily     traMADol (ULTRAM) 50 MG tablet Take 1 tablet (50 mg) by mouth every 6 hours as needed for severe pain     umeclidinium-vilanterol (ANORO ELLIPTA) 62.5-25 MCG/INH oral inhaler Inhale 1 puff into the lungs daily     No current facility-administered medications for this visit.         ALLERGIES    No Known Allergies     SOCIAL HISTORY   He is  and lives with his wife. 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     He smokes - has been smoking for 50 yrs. He smokes 4 cigarettes a day. He used to smoke a pack a day in the past. He denies any recreational drug use     FAMILY HISTORY   None contributory     REVIEW OF SYSTEMS   As above in the HPI, o/w complete 12-point ROS was negative.     PHYSICAL EXAM   B/P: 145/77, T: 98.2, P: 69, R: 16  @LASTSAO2(4)@  Wt Readings from Last 3 Encounters:   06/19/20 58.2 kg (128 lb 4.8 oz)   11/20/09 66 kg (145 lb 6.4 oz)   11/09/09 65.3 kg (144 lb)     GEN: NAD  HEENT: PERRL, EOMI, no icterus, injection or pallor. Oropharynx is clear.  NECK: no cervical or supraclavicular lymphadenopathy  LUNGS: clear bilaterally  CV: regular, no murmurs, rubs, or gallops  ABDOMEN: soft, non-tender, non-distended, normal bowel sounds, no hepatosplenomegaly by percussion or palpation  EXT: warm, well perfused, no edema  NEURO: alert  SKIN: no rashes     LABORATORY AND IMAGING STUDIES     Recent Labs   Lab Test 06/19/20  1523      POTASSIUM 4.0   CHLORIDE 106   CO2 29   ANIONGAP 7   BUN 29   CR 1.14   *   MERI 9.0     No results for input(s): MAG, PHOS in the last 96130 hours.  Recent Labs   Lab Test 06/19/20  1523   WBC 6.2   HGB 12.7*      MCV 96   NEUTROPHIL 55.0     Recent Labs   Lab Test  06/19/20  1523   BILITOTAL 0.3   ALKPHOS 101   ALT 15   AST 10   ALBUMIN 3.1*     TSH   Date Value Ref Range Status   11/09/2009 1.63 0.4 - 5.0 mU/L Final     No results for input(s): CEA in the last 84305 hours.  Results for orders placed or performed in visit on 11/09/09   CHEST X-RAY 2 VW    Impression       CHEST TWO VIEW* Nov 9, 2009 4:42:00 PM     HISTORY:  SOB (SHORTNESS OF BREATH),      FINDINGS: Mild cardiomegaly. Chest otherwise negative.       Lab Results   Component Value Date    PATH  06/10/2020     Patient Name: FINA SALOMON  MR#: [id number]  Specimen #: [id number]  Collected: 6/10/2020  Received: 6/10/2020  Reported: 6/19/2020 13:40  Ordering Phy(s): MICKEY COVARRUBIAS  Copy To: [facility]  Anatomic Pathology Support Services  [address] [place] [address] [phone] fax    For improved result formatting, select 'View Enhanced Report Format' under   Linked Documents section.    TEST(S):  27 slides, case #[id number]    FINAL DIAGNOSIS:  CASE FROM [facility], [place], [place] ([id number],   OBTAINED 05/06/20):  A. LYMPH NODE, 4R:  - One lymph node, negative for carcinoma (0/1).    B. LYMPH NODES, LEVEL 7:  - One lymph node, negative for carcinoma (0/1).    C. LUNG, LEFT LOWER LOBE, LOBECTOMY:  - INVASIVE LUNG ADENOCARCINOMA, poorly differentiated, solid predominant,   5.2 cm in greatest dimension.  - Margins are negative for carcinoma.  - Fifteen lymph nodes, negative for carcinoma (0/15).  - AJCC pathologic staging is pT3 N0.  - See comment and outside synoptic report for details.    D. LYMPH NODE, LEVEL 5:  - One lymph node, negative for carcinoma (0/1).    E. LYMPH NODE, LEVEL 10:  - One lymph node, negative for carcinoma (0/1).    F. LYMPH NODE, LEVEL 11:  - One lymph node, negative for carcinoma (0/1).    G. LYMPH NODE, LEVEL 11 #2:  - Two lymph nodes, negative for carcinoma (0/2).    H. LYMPH NODE, LEVEL 11 #3:  - One  lymph node, negative for carcinoma (0/1).    I. LYMPH NODE, LEVEL 12:  - One lymph node, negative for carcinoma (0/1).    COMMENT:  Part C: Immunohistochemical stains show tumor cells positive for TTF-1 and   negative for p40, supporting the  diagnosis. An elastic stain was performed on C5 and C6. All stains were   performed at the referring institution  and provided for review.    Per report, molecular studies demonstrate that the neoplasm is KRAS   mutated (G12D, see outside molecular  genetics report for details).    I have personally reviewed all specimens and/or slides, including the   listed special stains, and used them  with my medical judgement to determine or confirm the final diagnosis.    Electronically signed out by:  Jamee Carmichael M.D., [facility]    CLINICAL HISTORY:  69-year-old man with left lower lobe adenocarcinoma    GROSS:  Received from [facility] in [place], [place] are 27 stained   slides labeled [id number]  (obtained 05/06/20) and a copy of the referring pathologist's report with   patient identifying information.  All slides are returned.    MICROSCOPIC:  Microscopic examination was performed. Representative block is   [id number].    The technical component of this testing was completed at the [facility], with the professional component performed   at the [facility], [address] [place] [phone] ([phone])    CPT Codes:  A: 40845-TNC7    COLLECTION SITE:  Client:  [facility]  Location:  [facility] (B)    Resident  KER         Results for orders placed or performed in visit on 11/09/09   CHEST X-RAY 2 VW    Impression       CHEST TWO VIEW* Nov 9, 2009 4:42:00 PM     HISTORY:  SOB (SHORTNESS OF BREATH),      FINDINGS: Mild cardiomegaly. Chest otherwise negative.         ASSESSMENT    1. Postsurgical  pain from recent left lower lobectomy  2. Stage IIb non-small cell adenocarcinoma of lung from left lower lobe s/p left lower lobectomy on 5/6/2020  3. Multiple medical comorbidities including hypertension, dyslipidemia  4. Limited to poor understanding of disease and health    DISCUSSION   I had a lengthy discussion with patient who was seen in person and his sister joined over the phone.  We had Chinese to English  over the Internet connected by video on iPad.    Patient notes that he has significant pain from his recent surgery.  He has to take pain medications almost around-the-clock.  I explained him that we do expect some postsurgical pain but we would have to assess if there are other explanations for his pain.  He is nearly 2 months out from his surgery and we could repeat his restaging CT scan of the chest, abdomen and pelvis.  I will refer him to my colleagues in thoracic surgery and seek their input on this.    I will prescribe him long-acting morphine sulfate 15 mg scheduled every 12 hours in addition to tramadol 50 mg every 6 hours as needed for pain control.    Subsequent management of his lung cancer will depend on findings of his CT scan.  He is a candidate for adjuvant chemotherapy due to his locally advanced non-small cell lung cancer.  Chemotherapy with cisplatin and pemetrexed as recommended for 4 cycles as an outpatient.  Both of these drugs are administered intravenously in the clinic.  I reviewed the curative intent, the side effects, risks, benefits and alternatives for the chemotherapy regimen.    I will work-up his anemia and have the labs drawn after this clinic visit.  I will get basic labs including CBC with differential and comprehensive metabolic panel done today.     PLAN   -I will get labs for him after this clinic visit  -I will get restaging CT scan of the chest, abdomen and pelvis done soon  -I will have him follow-up with my nurse practitioner to review results from his  labs and restaging scans.  -I will refer him to thoracic surgery for consultation given his postoperative pain and persistent mediastinal mass.    Over 60 min of direct face to face time spent with patient with more than 50% time spent in counseling and coordinating care.      Michael Hancock ,  Division of Hematology, Oncology & Transplantation  [facility].       Again, thank you for allowing me to participate in the care of your patient.        Sincerely,        Michael Ross MD

## 2020-06-19 NOTE — PROGRESS NOTES
Medical Assistant Note:  Moises Chacon presents today for blood draw.    Patient seen by provider today: Yes: Dr. Ross.   present during visit today: Not Applicable.    Concerns: No Concerns.    Procedure:  Labs drawn    Post Assessment:  Labs drawn without difficulty: Yes.    Discharge Plan:  Departure Mode: Ambulatory.    Face to Face Time: 10 min.    Jaye Abreu CMA

## 2020-06-21 LAB — STFR SERPL-SCNC: 2.8 MG/L (ref 2.2–5)

## 2020-06-22 NOTE — TELEPHONE ENCOUNTER
RECORDS STATUS - ALL OTHER DIAGNOSIS      RECORDS RECEIVED FROM: [facility]/ [facility] ([place])    DATE RECEIVED: 2020    NOTES STATUS DETAILS   OFFICE NOTE from referring provider Serge Trejo MD    OFFICE NOTE from medical oncologist [facility] Records are in [facility]    IMG Report From [facility] is in [facility]   DISCHARGE SUMMARY from hospital     DISCHARGE REPORT from the ER     OPERATIVE REPORT Complete See Biopsy Report In [facility] ([facility])    MEDICATION LIST Complete [facility]   CLINICAL TRIAL TREATMENTS TO DATE     LABS     PATHOLOGY REPORTS Complete-[facility]   Tracking Number:   [id number]   Path Reports are in Epic    2020     Ph: [phone]  Fax: [phone]    [facility] Pathology Dept   [address] [facility] [address]  [phone]     ANYTHING RELATED TO DIAGNOSIS     GENONOMIC TESTING     TYPE:     IMAGING (NEED IMAGES & REPORT)     CT SCANS     MRI     Xray Chest Complete -[facility] Radiology Dept    Ph: [phone]  Fax: [phone]  Mailing Address:   [address] [place] [address]    Tracking Number:   [id number]    Complete- [facility] ph: [phone]  CT Chest 2020     Ph: [phone] Opt 1   (Already Requested by Tomasa Garay)   Fax: [phone]  Trackin IMG Reports In EPIC   MAMMO     ULTRASOUND     PET       Action    Action Taken 2020 11:12am   I called [facility]'s Radiology and pathology Depts    2020 11:56am   I called [facility] Rad and Path Depts back to see if they received the fax requests and shipping labels.     The radiology dept hasn't received my fax request yet but they are going to move forward with getting the image disc created now.     The pathology dept said the request was received and is being processed right now.     I called [facility] in [place] to request an IMG  disc to be mailed as well. PH: [phone]- Tomasa Garay already requested IMG.     6/10/2020 9:17am   I called [facility] to follow up on the image disc request. They said the image disc was sent out yesterday. The tracking number states that the image disc hasn't been sent yet but maybe it just needs to catch up.     I also checked the tracking number for [facility]  Ray, 10 2020 9:25 AM On [employer] vehicle for delivery [place], [place]   The image disc should arrive today.     The path slides are supposed to arrive today too. Ray, 10 2020 9:25 AM On [employer] vehicle for delivery [place], [place]

## 2020-06-23 ENCOUNTER — RECORDS - HEALTHEAST (OUTPATIENT)
Dept: ADMINISTRATIVE | Facility: OTHER | Age: 70
End: 2020-06-23

## 2020-06-23 ENCOUNTER — TELEPHONE (OUTPATIENT)
Dept: FAMILY MEDICINE | Facility: CLINIC | Age: 70
End: 2020-06-23

## 2020-06-23 ENCOUNTER — HOSPITAL ENCOUNTER (OUTPATIENT)
Dept: CT IMAGING | Facility: CLINIC | Age: 70
Discharge: HOME | End: 2020-06-23
Attending: INTERNAL MEDICINE | Admitting: INTERNAL MEDICINE
Payer: MEDICARE

## 2020-06-23 ENCOUNTER — MEDICAL CORRESPONDENCE (OUTPATIENT)
Dept: HEALTH INFORMATION MANAGEMENT | Facility: CLINIC | Age: 70
End: 2020-06-23

## 2020-06-23 ENCOUNTER — TELEPHONE (OUTPATIENT)
Dept: ONCOLOGY | Facility: CLINIC | Age: 70
End: 2020-06-23

## 2020-06-23 ENCOUNTER — HOME CARE/HOSPICE - HEALTHEAST (OUTPATIENT)
Dept: HOME HEALTH SERVICES | Facility: HOME HEALTH | Age: 70
End: 2020-06-23

## 2020-06-23 DIAGNOSIS — D50.9 IRON DEFICIENCY ANEMIA, UNSPECIFIED IRON DEFICIENCY ANEMIA TYPE: ICD-10-CM

## 2020-06-23 DIAGNOSIS — K59.03 DRUG-INDUCED CONSTIPATION: Primary | ICD-10-CM

## 2020-06-23 DIAGNOSIS — C34.92 MALIGNANT NEOPLASM OF LEFT LUNG, UNSPECIFIED PART OF LUNG (H): ICD-10-CM

## 2020-06-23 PROCEDURE — 25000128 H RX IP 250 OP 636: Performed by: INTERNAL MEDICINE

## 2020-06-23 PROCEDURE — 74177 CT ABD & PELVIS W/CONTRAST: CPT

## 2020-06-23 PROCEDURE — 25000125 ZZHC RX 250: Performed by: INTERNAL MEDICINE

## 2020-06-23 RX ORDER — DOCUSATE SODIUM 100 MG/1
100 CAPSULE, LIQUID FILLED ORAL 2 TIMES DAILY
Qty: 60 CAPSULE | Refills: 1 | Status: SHIPPED | OUTPATIENT
Start: 2020-06-23 | End: 2020-08-11

## 2020-06-23 RX ORDER — IOPAMIDOL 755 MG/ML
500 INJECTION, SOLUTION INTRAVASCULAR ONCE
Status: COMPLETED | OUTPATIENT
Start: 2020-06-23 | End: 2020-06-23

## 2020-06-23 RX ADMIN — IOPAMIDOL 63 ML: 755 INJECTION, SOLUTION INTRAVENOUS at 10:24

## 2020-06-23 RX ADMIN — SODIUM CHLORIDE 56 ML: 9 INJECTION, SOLUTION INTRAVENOUS at 10:24

## 2020-06-23 NOTE — TELEPHONE ENCOUNTER
Agnes nurse from [facility] called [phone]   for patient requesting;    1)  To refill Rx  docusate sodium (COLACE) 100 MG            capsule and Rx another stool softener because                 patient is taking morphine (MS CONTIN) 15 MG                CR tablet if it is okay  2)  To discontinue gabapentin (NEURONTIN) 300 MG               capsule               [facility] in [facility],               [phone]      Routed to Serge García   Please review and         Advise.    Panfilo Whitley RN

## 2020-06-23 NOTE — TELEPHONE ENCOUNTER
Agnes from  Home Care calling for orders:    1.  Skilled nursing visits 1 time a week x 3 weeks to teach sister medication set up.  2.  ARIANA castaneda:  Long range planning and resources for new CA diagnosis.    Verbal approval given per RN FMG protocol.    Chloe Robison RN

## 2020-06-23 NOTE — PROGRESS NOTES
Received positive distress screen regarding patient's concern for unintentional weight loss.  Called patient through Belarusian  and unable to reach and leave message.  Will reach out to patient at later time.      Darian Doe, RD, LD  Clinical Dietitian  [facility]  [phone] (direct)

## 2020-06-24 NOTE — TELEPHONE ENCOUNTER
[facility] Nurse Agnes called and informed that  Gabapentin discontinued and Colace sent in for a patient.     Panfilo Whitley RN

## 2020-06-29 ENCOUNTER — PRE VISIT (OUTPATIENT)
Dept: ONCOLOGY | Facility: CLINIC | Age: 70
End: 2020-06-29

## 2020-06-29 ENCOUNTER — VIRTUAL VISIT (OUTPATIENT)
Dept: ONCOLOGY | Facility: CLINIC | Age: 70
End: 2020-06-29
Attending: THORACIC SURGERY (CARDIOTHORACIC VASCULAR SURGERY)
Payer: MEDICARE

## 2020-06-29 ENCOUNTER — PREP FOR PROCEDURE (OUTPATIENT)
Dept: SURGERY | Facility: CLINIC | Age: 70
End: 2020-06-29

## 2020-06-29 DIAGNOSIS — C34.92 MALIGNANT NEOPLASM OF LEFT LUNG, UNSPECIFIED PART OF LUNG (H): Primary | ICD-10-CM

## 2020-06-29 DIAGNOSIS — J98.59 MEDIASTINAL MASS: Primary | ICD-10-CM

## 2020-06-29 DIAGNOSIS — J98.59 MEDIASTINAL MASS: ICD-10-CM

## 2020-06-29 PROCEDURE — 99442 ZZC PHYSICIAN TELEPHONE EVALUATION 11-20 MIN: CPT | Performed by: THORACIC SURGERY (CARDIOTHORACIC VASCULAR SURGERY)

## 2020-06-29 PROCEDURE — 40001009 ZZH VIDEO/TELEPHONE VISIT; NO CHARGE

## 2020-06-29 RX ORDER — CEFAZOLIN SODIUM 1 G/50ML
1 INJECTION, SOLUTION INTRAVENOUS SEE ADMIN INSTRUCTIONS
Status: CANCELLED | OUTPATIENT
Start: 2020-06-29

## 2020-06-29 RX ORDER — CEFAZOLIN SODIUM 2 G/50ML
2 SOLUTION INTRAVENOUS
Status: CANCELLED | OUTPATIENT
Start: 2020-06-29

## 2020-06-29 NOTE — LETTER
"    6/29/2020         RE: Moises Chacon  [address] [facility] [address]        Dear Colleague,    Thank you for referring your patient, Moises Chacon, to the [facility]. Please see a copy of my visit note below.    Moises Chacon is a 69 year old male who is being evaluated via a billable telephone visit.      The patient has been notified of following:     \"This telephone visit will be conducted via a call between you and your physician/provider. We have found that certain health care needs can be provided without the need for a physical exam.  This service lets us provide the care you need with a short phone conversation.  If a prescription is necessary we can send it directly to your pharmacy.  If lab work is needed we can place an order for that and you can then stop by our lab to have the test done at a later time.    Telephone visits are billed at different rates depending on your insurance coverage. During this emergency period, for some insurers they may be billed the same as an in-person visit.  Please reach out to your insurance provider with any questions.    If during the course of the call the physician/provider feels a telephone visit is not appropriate, you will not be charged for this service.\"    Patient has given verbal consent for Telephone visit?  Yes    What phone number would you like to be contacted at? [phone]    Needs Upper sorbian .  Services: [phone]. Opt 1, Opt 0 - Upper sorbian    - Would like to know if he has malignancy    How would you like to obtain your AVS? Mail a copy     Claudette Perez CMA      THORACIC SURGERY - NEW PATIENT OFFICE VISIT      Dear Dr. Botello,    I saw Mr. Chacon at Dr. Ross s request in consultation for the evaluation and treatment of an anterior mediastinal mass.     HPI  Moises Chacon is a 69 year old man with an anterior mediastinal mass.  He is a 50 pack year smoker and was found to have a left lower lobe mass in 2018.  " It was found to be adenocarcinoma and he underwent a robotic left lower lobectomy on 5/6/2020 at [facility] in [place].  The pathology showed a ypT3N0 adenocarcinoma, stage IIB.  He also has a known anterior mediastinal mass, present on imaging since at least 2015.   He continues to have postoperative pain, but has not been taking any pain medication for the last 3 to 4 days.  He walks about 30 minutes daily.  His shortness of breath is much improved.  His appetite is improved.  His weight is stable.      Previsit Tests   CT C/A/P 6/23/2020:  Mediastinal mass 4.1 x 3.2 cm    PET 2/13/2020:  Mediastinal mass 3.4 cm x 3.2 cm, SUV max 2.8    CT 12/23/2019:  Mediastinal mass 3.8 cm, unchanged from 11/19/2019    CT 11/19/2019:    Mediastinal mass 5.6 x 4.2 x 4.1 cm  Increased from 12/18/2018, when the mass was 5 x 3.7 x 3.9 cm; sl. 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  He is down to 5 cigarettes per day.    Physical examination      From a personal perspective, Mr. Chacon is  and lives with his wife in the [place] area.  He is currently living with his sister in [place] as he recovers from the lobectomy.  He has 1 daughter and three sons.  They have two grandchildren.  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      IMPRESSION (C34.92) Malignant neoplasm of left lung, unspecified part of lung (H)  (primary encounter diagnosis)  (J98.59) Mediastinal mass    This is a 69 year old man with a slow growing anterior mediastinal mass.  A malignancy is less likely, given the slow growth, but the differential includes:  Paraganglioma, thymic carcinoid, thymoma, thymic carcinoma.  Germ cell tumor seems less likely - I did not have the opportunity for a  testicular exam since this was a phone visit.    PLAN  I spent a total of 20 minutes with Mr. Chacon and his sister, more than 50% of which were spent in counseling, coordination of care, and face-to-face time. I reviewed the plan as follows:  Procedure planned: Bilateral subxiphoid mediastinal mass resection, possible thoracoscopy  The risks of the operation include bleeding requiring sternotomy or thoracotomy. There is a risk of injury to the phrenic nerves as well as the surrounding thoracic structures.  There is a risk of death.    Consent: Pending  Necessary Preop Tests & Appointments: PAC, PFT  Regional Anesthesia Plan: Intercostal block administered by surgeon  Anticoagulation Plan: Lovenox  Smoking Cessation:   Mr. Chacon was counseled on the importance of smoking cessation and its impact on the leatha-operative course. The patient is strongly encouraged to quit smoking for 3 weeks prior to their procedure. If he feels he is absolutely unable to quit, we will proceed as planned given the potential malignant nature of his disease.  This guideline is in accordance with the World Health Organization (WHO) and has shown to lower the risk of both surgical and anesthesia complications as well as improve post-operative outcomes.     All questions were answered and Moises Chacon and present family were in agreement with the plan.  I appreciate the opportunity to participate in the care of your patient and will keep you updated.  Sincerely,       Phone call duration: 20 minutes; interview conducted with .    Tate Garcia MD        Again, thank you for allowing me to participate in the care of your patient.        Sincerely,        Tate Garcia MD

## 2020-06-29 NOTE — PATIENT INSTRUCTIONS
Will schedule for surgical resection of the mediastinal mass.    Must stop smoking,for 3 to 4 weeks prior to smoking, if possible.    Will undergo pulmonary function testing and PAC evaluation prior to the OR.

## 2020-06-29 NOTE — LETTER
"    6/29/2020         RE: Moises Chacon  [address] [facility] [address]        Dear Colleague,    Thank you for referring your patient, Moises Chacon, to the [facility]. Please see a copy of my visit note below.    Moises Chacon is a 69 year old male who is being evaluated via a billable telephone visit.      The patient has been notified of following:     \"This telephone visit will be conducted via a call between you and your physician/provider. We have found that certain health care needs can be provided without the need for a physical exam.  This service lets us provide the care you need with a short phone conversation.  If a prescription is necessary we can send it directly to your pharmacy.  If lab work is needed we can place an order for that and you can then stop by our lab to have the test done at a later time.    Telephone visits are billed at different rates depending on your insurance coverage. During this emergency period, for some insurers they may be billed the same as an in-person visit.  Please reach out to your insurance provider with any questions.    If during the course of the call the physician/provider feels a telephone visit is not appropriate, you will not be charged for this service.\"    Patient has given verbal consent for Telephone visit?  Yes    What phone number would you like to be contacted at? [phone]    Needs Persian .  Services: [phone]. Opt 1, Opt 0 - Persian    - Would like to know if he has malignancy    How would you like to obtain your AVS? Mail a copy     Claudette Perez CMA      THORACIC SURGERY - NEW PATIENT OFFICE VISIT      Dear Dr. Botello,    I saw Mr. Chacon at Dr. Ross s request in consultation for the evaluation and treatment of an anterior mediastinal mass.     HPI  Moises Chacon is a 69 year old man with an anterior mediastinal mass.  He is a 50 pack year smoker and was found to have a left lower lobe mass in 2018.  " It was found to be adenocarcinoma and he underwent a robotic left lower lobectomy on 5/6/2020 at [facility] in [place].  The pathology showed a ypT3N0 adenocarcinoma, stage IIB.  He also has a known anterior mediastinal mass, present on imaging since at least 2015.   He continues to have postoperative pain, but has not been taking any pain medication for the last 3 to 4 days.  He walks about 30 minutes daily.  His shortness of breath is much improved.  His appetite is improved.  His weight is stable.      Previsit Tests   CT C/A/P 6/23/2020:  Mediastinal mass 4.1 x 3.2 cm    PET 2/13/2020:  Mediastinal mass 3.4 cm x 3.2 cm, SUV max 2.8    CT 12/23/2019:  Mediastinal mass 3.8 cm, unchanged from 11/19/2019    CT 11/19/2019:    Mediastinal mass 5.6 x 4.2 x 4.1 cm  Increased from 12/18/2018, when the mass was 5 x 3.7 x 3.9 cm; sl. 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  He is down to 5 cigarettes per day.    Physical examination      From a personal perspective, Mr. Chacon is  and lives with his wife in the [place] area.  He is currently living with his sister in [place] as he recovers from the lobectomy.  He has 1 daughter and three sons.  They have two grandchildren.  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      IMPRESSION (C34.92) Malignant neoplasm of left lung, unspecified part of lung (H)  (primary encounter diagnosis)  (J98.59) Mediastinal mass    This is a 69 year old man with a slow growing anterior mediastinal mass.  A malignancy is less likely, given the slow growth, but the differential includes:  Paraganglioma, thymic carcinoid, thymoma, thymic carcinoma.  Germ cell tumor seems less likely - I did not have the opportunity for a  testicular exam since this was a phone visit.    PLAN  I spent a total of 20 minutes with Mr. Chacon and his sister, more than 50% of which were spent in counseling, coordination of care, and face-to-face time. I reviewed the plan as follows:  Procedure planned: Bilateral subxiphoid mediastinal mass resection, possible thoracoscopy  The risks of the operation include bleeding requiring sternotomy or thoracotomy. There is a risk of injury to the phrenic nerves as well as the surrounding thoracic structures.  There is a risk of death.    Consent: Pending  Necessary Preop Tests & Appointments: PAC, PFT  Regional Anesthesia Plan: Intercostal block administered by surgeon  Anticoagulation Plan: Lovenox  Smoking Cessation:   Mr. Chacon was counseled on the importance of smoking cessation and its impact on the leatha-operative course. The patient is strongly encouraged to quit smoking for 3 weeks prior to their procedure. If he feels he is absolutely unable to quit, we will proceed as planned given the potential malignant nature of his disease.  This guideline is in accordance with the World Health Organization (WHO) and has shown to lower the risk of both surgical and anesthesia complications as well as improve post-operative outcomes.     All questions were answered and Moises Chacon and present family were in agreement with the plan.  I appreciate the opportunity to participate in the care of your patient and will keep you updated.  Sincerely,       Phone call duration: 20 minutes; interview conducted with .    Tate Garcia MD        Again, thank you for allowing me to participate in the care of your patient.        Sincerely,        Tate Garcia MD

## 2020-06-29 NOTE — PROGRESS NOTES
"Moises Chacon is a 69 year old male who is being evaluated via a billable telephone visit.      The patient has been notified of following:     \"This telephone visit will be conducted via a call between you and your physician/provider. We have found that certain health care needs can be provided without the need for a physical exam.  This service lets us provide the care you need with a short phone conversation.  If a prescription is necessary we can send it directly to your pharmacy.  If lab work is needed we can place an order for that and you can then stop by our lab to have the test done at a later time.    Telephone visits are billed at different rates depending on your insurance coverage. During this emergency period, for some insurers they may be billed the same as an in-person visit.  Please reach out to your insurance provider with any questions.    If during the course of the call the physician/provider feels a telephone visit is not appropriate, you will not be charged for this service.\"    Patient has given verbal consent for Telephone visit?  Yes    What phone number would you like to be contacted at? [phone]    Needs Polish .  Services: [phone]. Opt 1, Opt 0 - Polish    - Would like to know if he has malignancy    How would you like to obtain your AVS? Mail a copy     Claudette Perez [facility]      THORACIC SURGERY - NEW PATIENT OFFICE VISIT      Dear Dr. Botello,    I saw Mr. Chacon at Dr. Ross s request in consultation for the evaluation and treatment of an anterior mediastinal mass.     HPI  Moises Chacon is a 69 year old man with an anterior mediastinal mass.  He is a 50 pack year smoker and was found to have a left lower lobe mass in 2018.  It was found to be adenocarcinoma and he underwent a robotic left lower lobectomy on 5/6/2020 at [facility] in [place].  The pathology showed a ypT3N0 adenocarcinoma, stage IIB.  He also has a known anterior mediastinal mass, present on " imaging since at least 2015.   He continues to have postoperative pain, but has not been taking any pain medication for the last 3 to 4 days.  He walks about 30 minutes daily.  His shortness of breath is much improved.  His appetite is improved.  His weight is stable.      Previsit Tests   CT C/A/P 6/23/2020:  Mediastinal mass 4.1 x 3.2 cm    PET 2/13/2020:  Mediastinal mass 3.4 cm x 3.2 cm, SUV max 2.8    CT 12/23/2019:  Mediastinal mass 3.8 cm, unchanged from 11/19/2019    CT 11/19/2019:    Mediastinal mass 5.6 x 4.2 x 4.1 cm  Increased from 12/18/2018, when the mass was 5 x 3.7 x 3.9 cm; sl. 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  He is down to 5 cigarettes per day.    Physical examination      From a personal perspective, Mr. Chacon is  and lives with his wife in the [place] area.  He is currently living with his sister in [place] as he recovers from the lobectomy.  He has 1 daughter and three sons.  They have two grandchildren.  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      IMPRESSION (C34.92) Malignant neoplasm of left lung, unspecified part of lung (H)  (primary encounter diagnosis)  (J98.59) Mediastinal mass    This is a 69 year old man with a slow growing anterior mediastinal mass.  A malignancy is less likely, given the slow growth, but the differential includes:  Paraganglioma, thymic carcinoid, thymoma, thymic carcinoma.  Germ cell tumor seems less likely - I did not have the opportunity for a testicular exam since this was a phone visit.    PLAN  I spent a total of 20 minutes with Mr. Chacon and his sister, more than 50% of which were spent in counseling, coordination of care, and face-to-face time. I reviewed the plan as  follows:  Procedure planned: Bilateral subxiphoid mediastinal mass resection, possible thoracoscopy  The risks of the operation include bleeding requiring sternotomy or thoracotomy. There is a risk of injury to the phrenic nerves as well as the surrounding thoracic structures.  There is a risk of death.    Consent: Pending  Necessary Preop Tests & Appointments: PAC, PFT  Regional Anesthesia Plan: Intercostal block administered by surgeon  Anticoagulation Plan: Lovenox  Smoking Cessation:   Mr. Chacon was counseled on the importance of smoking cessation and its impact on the leatha-operative course. The patient is strongly encouraged to quit smoking for 3 weeks prior to their procedure. If he feels he is absolutely unable to quit, we will proceed as planned given the potential malignant nature of his disease.  This guideline is in accordance with the World Health Organization (WHO) and has shown to lower the risk of both surgical and anesthesia complications as well as improve post-operative outcomes.     All questions were answered and Moises Chacon and present family were in agreement with the plan.  I appreciate the opportunity to participate in the care of your patient and will keep you updated.  Sincerely,       Phone call duration: 20 minutes; interview conducted with .    Tate Garcia MD

## 2020-06-30 ENCOUNTER — HOME CARE/HOSPICE - HEALTHEAST (OUTPATIENT)
Dept: HOME HEALTH SERVICES | Facility: HOME HEALTH | Age: 70
End: 2020-06-30

## 2020-06-30 ENCOUNTER — TELEPHONE (OUTPATIENT)
Dept: ONCOLOGY | Facility: CLINIC | Age: 70
End: 2020-06-30

## 2020-06-30 ENCOUNTER — VIRTUAL VISIT (OUTPATIENT)
Dept: ONCOLOGY | Facility: CLINIC | Age: 70
End: 2020-06-30
Attending: PHYSICIAN ASSISTANT
Payer: MEDICARE

## 2020-06-30 DIAGNOSIS — C34.92 MALIGNANT NEOPLASM OF LEFT LUNG, UNSPECIFIED PART OF LUNG (H): Primary | ICD-10-CM

## 2020-06-30 DIAGNOSIS — J98.59 MEDIASTINAL MASS: ICD-10-CM

## 2020-06-30 DIAGNOSIS — K59.00 CONSTIPATION, UNSPECIFIED CONSTIPATION TYPE: ICD-10-CM

## 2020-06-30 PROCEDURE — 40001009 ZZH VIDEO/TELEPHONE VISIT; NO CHARGE

## 2020-06-30 PROCEDURE — 99443 ZZC PHYSICIAN TELEPHONE EVALUATION 21-30 MIN: CPT | Performed by: PHYSICIAN ASSISTANT

## 2020-06-30 RX ORDER — AMOXICILLIN 250 MG
1 CAPSULE ORAL DAILY
Qty: 30 TABLET | Refills: 1 | Status: SHIPPED | OUTPATIENT
Start: 2020-06-30 | End: 2020-08-11

## 2020-06-30 NOTE — LETTER
"    6/30/2020         RE: Moises Chacon  [address] [facility] [address]        Dear Colleague,    Thank you for referring your patient, Moises Chacon, to the [facility]. Please see a copy of my visit note below.    Moises Chacon is a 69 year old male who is being evaluated via a billable telephone visit.      The patient has been notified of following:     \"This telephone visit will be conducted via a call between you and your physician/provider. We have found that certain health care needs can be provided without the need for a physical exam.  This service lets us provide the care you need with a short phone conversation.  If a prescription is necessary we can send it directly to your pharmacy.  If lab work is needed we can place an order for that and you can then stop by our lab to have the test done at a later time.    Telephone visits are billed at different rates depending on your insurance coverage. During this emergency period, for some insurers they may be billed the same as an in-person visit.  Please reach out to your insurance provider with any questions.    If during the course of the call the physician/provider feels a telephone visit is not appropriate, you will not be charged for this service.\"    Patient has given verbal consent for Telephone visit?  Yes    What phone number would you like to be contacted at? [phone]    How would you like to obtain your AVS? Mail a copy     Jaye Abreu CMA on 6/30/2020 at 2:15 PM        Oncology/Hematology Visit Note    Jun 30, 2020    Reason for visit: Follow-up lung adenocarcinoma     Oncology HPI: Moises Chacon is a 69 year old male with lung adenocarcinoma. Left lower lobe lung lesions and mediastinal mass noted on imaging.  Biopsy of the left lower lung lesion positive for invasive adenocarcinoma, poorly differentiated.  He met with Dr. Dunbar with [place] [facility] on 1/13/2020 and recommended staging with PET " "scan and MRI, chemoradiation followed by immunotherapy.  He moved to [place] to be with family and established care with Dr. Botello, who referred patient urgently to oncology. He met with Dr Ross on 6/19/2020, who referred him to thoracic surgery and he met with Dr. Garcia on 6/29/2020, who recommended mediastinal mass resection.  CT chest/abdomen/pelvis revealed the mediastinal mass and other tiny pulmonary nodules.    Telephone visit today for close follow up.      Interval History: Moises was on the phone with  and his sister.  We briefly discussed his visit in [place] and his oncology visit.  He met with Dr. Garcia yesterday and he has no interest in quitting smoking right now and he is not sure if he wants surgery or chemotherapy or radiation.  He is feeling pretty well now, but does not elaborate.  His sister states that he \"acts like a child\" and he has been refusing a lot of treatment that is been recommended over the last several months.  He continues to have some left-sided chest pain and he has been refusing his OxyContin over the last 5 days due to constipation.  He has been doing 1 Colace per day, but no laxatives.  No fever or chills, dizziness, vomiting or diarrhea.    Review of Systems: See interval hx. Denies fevers, chills, HA, dizziness, cough, sore throat, SOB, abdominal pain, N/V, diarrhea, bleeding.     PMHx and Social Hx reviewed per EPIC.      Medications:  Current Outpatient Medications   Medication Sig Dispense Refill     amLODIPine (NORVASC) 10 MG tablet TAKE 1 TABLET BY MOUTH DAILY. 90 tablet 0     aspirin (ASA) 81 MG EC tablet Take 1 tablet (81 mg) by mouth daily 90 tablet 1     cetirizine (ZYRTEC) 10 MG tablet Take 1 tablet (10 mg) by mouth daily 30 tablet 1     docusate sodium (COLACE) 100 MG capsule Take 1 capsule (100 mg) by mouth 2 times daily 60 capsule 1     docusate sodium (COLACE) 100 MG capsule Take 100 mg by mouth       metoprolol tartrate (LOPRESSOR) 25 MG tablet " TAKE 1 TABLET BY MOUTH TWICE DAILY. 180 tablet 0     morphine (MS CONTIN) 15 MG CR tablet Take 1 tablet (15 mg) by mouth every 12 hours maximum 2 tablet(s) per day 60 tablet 0     tamsulosin (FLOMAX) 0.4 MG capsule Take 1 capsule (0.4 mg) by mouth daily (Patient not taking: Reported on 6/29/2020) 30 capsule 1     traMADol (ULTRAM) 50 MG tablet Take 1 tablet (50 mg) by mouth every 6 hours as needed for severe pain 60 tablet 0     umeclidinium-vilanterol (ANORO ELLIPTA) 62.5-25 MCG/INH oral inhaler Inhale 1 puff into the lungs daily 1 Inhaler 1       Allergies   Allergen Reactions     Flomax [Tamsulosin]      ITCHING       EXAM:    There were no vitals taken for this visit. Telephone visit, therefore VS and exam were not performed.       Labs:   Results for FINA SALOMON (MRN [id number]) as of 6/30/2020 14:41   6/19/2020 15:23   Sodium 142   Potassium 4.0   Chloride 106   Carbon Dioxide 29   Urea Nitrogen 29   Creatinine 1.14   GFR Estimate 64   GFR Estimate If Black 74   Calcium 9.0   Anion Gap 7   Magnesium 2.1   Albumin 3.1 (L)   Protein Total 7.9   Bilirubin Total 0.3   Alkaline Phosphatase 101   ALT 15   AST 10   CRP Inflammation 5.0   Ferritin 178   Iron 71   Iron Binding Cap 267   Iron Saturation Index 27   Lactate Dehydrogenase 168   Soluble Transferrin Receptor 2.8   Vitamin B12 399   Glucose 110 (H)   WBC 6.2   Hemoglobin 12.7 (L)   Hematocrit 40.1   Platelet Count 198   RBC Count 4.18 (L)   MCV 96   MCH 30.4   MCHC 31.7   RDW 13.8   Diff Method Automated Method   % Neutrophils 55.0   % Lymphocytes 33.7   % Monocytes 4.4   % Eosinophils 5.8   % Basophils 1.1   % Immature Granulocytes 0.0   Nucleated RBCs 0   Absolute Neutrophil 3.4   Absolute Lymphocytes 2.1   Absolute Monocytes 0.3   Absolute Eosinophils 0.4   Absolute Basophils 0.1   Abs Immature Granulocytes 0.0   Absolute Nucleated RBC 0.0       Imaging:   CT CHEST/ABDOMEN/PELVIS WITH CONTRAST 6/23/2020 10:35 AM     CLINICAL HISTORY: Non-small cell lung  cancer; LLL tumor resected. He  has severe pain in the region of resection. He had another RUL mass  that had to be removed in separate procedure. Malignant neoplasm of  left lung, unspecified part of lung (H). Iron deficiency anemia,  unspecified iron deficiency anemia type.     TECHNIQUE: CT scan of the chest, abdomen, and pelvis was performed  following injection of IV contrast. Multiplanar reformats were  obtained. Dose reduction techniques were used.   CONTRAST: 63 mL Isovue-370     COMPARISON: CT chest 4/28/2020. Outside PET/CT 2/13/2020.     FINDINGS:   LUNGS AND PLEURA: Small and mildly loculated left pleural fluid newly  seen. Previously demonstrated hypermetabolic mass at the left lower  lobe is not currently seen, and there are surgical changes in this  area. There are moderate areas of atelectasis and scarring noted. A  tiny 0.2 cm nodule is newly seen at the posterolateral left upper  lobe, series 12 image 70. Stable small right middle lobe peripheral  nodule that is 0.2 cm, series 12 image 183.     MEDIASTINUM/AXILLAE: Heterogeneous mass at the right anterior  mediastinum just deep to the sternum and along the right side of the  ascending aorta is 4.1 x 3.2 cm, stable in size, series 4 image 48.  Subcarinal enlarged lymph node measures 2.1 x 1.5 cm, previously 1.9 x  0.7 cm, series 4 image 60. Another precarinal lymph node is slightly  larger measuring 1.2 x 1 cm, previously 1.1 x 0.3 cm image 57. There  are several adjacent small lymph nodes noted. Diffuse coronary artery  calcifications.     HEPATOBILIARY: Cholelithiasis and distended gallbladder. There is mild  biliary ductal ectasia. This appears increased. Stable finding of a  calcification suggesting choledocholithiasis within the distal common  bile duct measuring 0.6 cm, series 4 image 170. No definable focal  hepatic lesion identified.     PANCREAS: No focal pancreatic lesion identified.     SPLEEN: Normal.     ADRENAL GLANDS: Stable minimal  thickening of the adrenals.     KIDNEYS/BLADDER: Bilateral renal parenchymal thinning without focal  worrisome lesion. No hydronephrosis identified.     BOWEL: No acute abnormality.     PELVIC ORGANS: Mildly prominent prostate measuring 4.9 cm.     ADDITIONAL FINDINGS: Diffuse vascular calcifications. Prominent  infrarenal abdominal aortic aneurysm measuring 5.5 cm with peripheral  mural thrombus. This is stable in size. No associated acute  abnormality at this time. Bilateral iliac artery stents. Stent on the  right extends to the distal aspect of the external iliac artery.     MUSCULOSKELETAL: No convincing destructive bony lesion is seen.  Diffuse spine degenerative changes.                                                                      IMPRESSION:  1.  Resection of the left lower lobe mass. There is some adjacent  scarring and small volume loculated left pleural fluid.  2.  No change of an indeterminate mass at the anterior mediastinum  worrisome for neoplasm.  3.  A few tiny pulmonary nodules noted. One of these is new at the  left upper lobe. Recommend surveillance imaging for follow up.  4.  Mild biliary ductal ectasia is newly seen. Stable calcification  suggesting choledocholithiasis of the distal common bile duct with a  potential stone measuring 0.6 cm.  5.  Infrarenal abdominal aortic aneurysm is stable measuring 5.5 cm.  6.  A few mildly larger indeterminate lymph nodes seen in the chest.      Impression/Plan: Moises Chacon is a 69 year old male with known lung adenocarcinoma status post lobectomy in [place], mediastinal mass and discussion of adjuvant chemoradiation.    Lung adenocarcinoma: Biopsy obtained in [place] positive for invasive adenocarcinoma, poorly differentiated and brain MRI negative for metastasis on 1/17/2020.  Spoke to Dr. Ross and we will recommend carboplatin/pemetrexed/pembrolizumab adjuvant chemotherapy with concurrent radiation, but we would like to wait until he completes  his thoracic surgery and mediastinal mass resection by Dr. Garcia.  Patient is not interested in any chemotherapy prior to the surgery, see below.  We briefly discussed toxicities with chemotherapy, radiation and immunotherapy.  I will hold off on schedule any further appointments with our oncology team for now.    Mediastinal mass: Pesent since 2015 and met with Dr. Garcia with thoracic surgery on 6/29/2020.  Recommended mediastinal mass resection, but preference is for patient to quit smoking prior to surgery.  Patient is very hesitant and not interested in quitting, but I reassured him that he would still proceed with surgery, however the anesthesia and death risks increase with smoking.    Constipation: Currently on OxyContin for pain in the left chest.  Last dose was 5 days ago, he has been constipated and no improvement with Colace daily.  New prescription for Suzanne-Colace sent to pharmacy today and discussed with his home health nurse.      Chart documentation with Dragon Voice recognition Software. Although reviewed after completion, some words and grammatical errors may remain.    Phone call duration: 31 minutes      Dora Carrillo PA-C  Hematology/Oncology  [facility]                  Again, thank you for allowing me to participate in the care of your patient.        Sincerely,        Dora Carrillo PA-C

## 2020-06-30 NOTE — LETTER
"    6/30/2020         RE: Moises Chacon  [address] [facility] [address]        Dear Colleague,    Thank you for referring your patient, Moises Chacon, to the [facility]. Please see a copy of my visit note below.    Moises Chacon is a 69 year old male who is being evaluated via a billable telephone visit.      The patient has been notified of following:     \"This telephone visit will be conducted via a call between you and your physician/provider. We have found that certain health care needs can be provided without the need for a physical exam.  This service lets us provide the care you need with a short phone conversation.  If a prescription is necessary we can send it directly to your pharmacy.  If lab work is needed we can place an order for that and you can then stop by our lab to have the test done at a later time.    Telephone visits are billed at different rates depending on your insurance coverage. During this emergency period, for some insurers they may be billed the same as an in-person visit.  Please reach out to your insurance provider with any questions.    If during the course of the call the physician/provider feels a telephone visit is not appropriate, you will not be charged for this service.\"    Patient has given verbal consent for Telephone visit?  Yes    What phone number would you like to be contacted at? [phone]    How would you like to obtain your AVS? Mail a copy     Jaye Abreu CMA on 6/30/2020 at 2:15 PM        Oncology/Hematology Visit Note    Jun 30, 2020    Reason for visit: Follow-up lung adenocarcinoma     Oncology HPI: Moises Chacon is a 69 year old male with lung adenocarcinoma. Left lower lobe lung lesions and mediastinal mass noted on imaging.  Biopsy of the left lower lung lesion positive for invasive adenocarcinoma, poorly differentiated.  He met with Dr. Dunbar with [place] [facility] on 1/13/2020 and recommended staging with PET " "scan and MRI, chemoradiation followed by immunotherapy.  He moved to [place] to be with family and established care with Dr. Botello, who referred patient urgently to oncology. He met with Dr Ross on 6/19/2020, who referred him to thoracic surgery and he met with Dr. Garcia on 6/29/2020, who recommended mediastinal mass resection.  CT chest/abdomen/pelvis revealed the mediastinal mass and other tiny pulmonary nodules.    Telephone visit today for close follow up.      Interval History: Moises was on the phone with  and his sister.  We briefly discussed his visit in [place] and his oncology visit.  He met with Dr. Garcia yesterday and he has no interest in quitting smoking right now and he is not sure if he wants surgery or chemotherapy or radiation.  He is feeling pretty well now, but does not elaborate.  His sister states that he \"acts like a child\" and he has been refusing a lot of treatment that is been recommended over the last several months.  He continues to have some left-sided chest pain and he has been refusing his OxyContin over the last 5 days due to constipation.  He has been doing 1 Colace per day, but no laxatives.  No fever or chills, dizziness, vomiting or diarrhea.    Review of Systems: See interval hx. Denies fevers, chills, HA, dizziness, cough, sore throat, SOB, abdominal pain, N/V, diarrhea, bleeding.     PMHx and Social Hx reviewed per EPIC.      Medications:  Current Outpatient Medications   Medication Sig Dispense Refill     amLODIPine (NORVASC) 10 MG tablet TAKE 1 TABLET BY MOUTH DAILY. 90 tablet 0     aspirin (ASA) 81 MG EC tablet Take 1 tablet (81 mg) by mouth daily 90 tablet 1     cetirizine (ZYRTEC) 10 MG tablet Take 1 tablet (10 mg) by mouth daily 30 tablet 1     docusate sodium (COLACE) 100 MG capsule Take 1 capsule (100 mg) by mouth 2 times daily 60 capsule 1     docusate sodium (COLACE) 100 MG capsule Take 100 mg by mouth       metoprolol tartrate (LOPRESSOR) 25 MG tablet " TAKE 1 TABLET BY MOUTH TWICE DAILY. 180 tablet 0     morphine (MS CONTIN) 15 MG CR tablet Take 1 tablet (15 mg) by mouth every 12 hours maximum 2 tablet(s) per day 60 tablet 0     tamsulosin (FLOMAX) 0.4 MG capsule Take 1 capsule (0.4 mg) by mouth daily (Patient not taking: Reported on 6/29/2020) 30 capsule 1     traMADol (ULTRAM) 50 MG tablet Take 1 tablet (50 mg) by mouth every 6 hours as needed for severe pain 60 tablet 0     umeclidinium-vilanterol (ANORO ELLIPTA) 62.5-25 MCG/INH oral inhaler Inhale 1 puff into the lungs daily 1 Inhaler 1       Allergies   Allergen Reactions     Flomax [Tamsulosin]      ITCHING       EXAM:    There were no vitals taken for this visit. Telephone visit, therefore VS and exam were not performed.       Labs:   Results for FINA SALOMON (MRN [id number]) as of 6/30/2020 14:41   6/19/2020 15:23   Sodium 142   Potassium 4.0   Chloride 106   Carbon Dioxide 29   Urea Nitrogen 29   Creatinine 1.14   GFR Estimate 64   GFR Estimate If Black 74   Calcium 9.0   Anion Gap 7   Magnesium 2.1   Albumin 3.1 (L)   Protein Total 7.9   Bilirubin Total 0.3   Alkaline Phosphatase 101   ALT 15   AST 10   CRP Inflammation 5.0   Ferritin 178   Iron 71   Iron Binding Cap 267   Iron Saturation Index 27   Lactate Dehydrogenase 168   Soluble Transferrin Receptor 2.8   Vitamin B12 399   Glucose 110 (H)   WBC 6.2   Hemoglobin 12.7 (L)   Hematocrit 40.1   Platelet Count 198   RBC Count 4.18 (L)   MCV 96   MCH 30.4   MCHC 31.7   RDW 13.8   Diff Method Automated Method   % Neutrophils 55.0   % Lymphocytes 33.7   % Monocytes 4.4   % Eosinophils 5.8   % Basophils 1.1   % Immature Granulocytes 0.0   Nucleated RBCs 0   Absolute Neutrophil 3.4   Absolute Lymphocytes 2.1   Absolute Monocytes 0.3   Absolute Eosinophils 0.4   Absolute Basophils 0.1   Abs Immature Granulocytes 0.0   Absolute Nucleated RBC 0.0       Imaging:   CT CHEST/ABDOMEN/PELVIS WITH CONTRAST 6/23/2020 10:35 AM     CLINICAL HISTORY: Non-small cell lung  cancer; LLL tumor resected. He  has severe pain in the region of resection. He had another RUL mass  that had to be removed in separate procedure. Malignant neoplasm of  left lung, unspecified part of lung (H). Iron deficiency anemia,  unspecified iron deficiency anemia type.     TECHNIQUE: CT scan of the chest, abdomen, and pelvis was performed  following injection of IV contrast. Multiplanar reformats were  obtained. Dose reduction techniques were used.   CONTRAST: 63 mL Isovue-370     COMPARISON: CT chest 4/28/2020. Outside PET/CT 2/13/2020.     FINDINGS:   LUNGS AND PLEURA: Small and mildly loculated left pleural fluid newly  seen. Previously demonstrated hypermetabolic mass at the left lower  lobe is not currently seen, and there are surgical changes in this  area. There are moderate areas of atelectasis and scarring noted. A  tiny 0.2 cm nodule is newly seen at the posterolateral left upper  lobe, series 12 image 70. Stable small right middle lobe peripheral  nodule that is 0.2 cm, series 12 image 183.     MEDIASTINUM/AXILLAE: Heterogeneous mass at the right anterior  mediastinum just deep to the sternum and along the right side of the  ascending aorta is 4.1 x 3.2 cm, stable in size, series 4 image 48.  Subcarinal enlarged lymph node measures 2.1 x 1.5 cm, previously 1.9 x  0.7 cm, series 4 image 60. Another precarinal lymph node is slightly  larger measuring 1.2 x 1 cm, previously 1.1 x 0.3 cm image 57. There  are several adjacent small lymph nodes noted. Diffuse coronary artery  calcifications.     HEPATOBILIARY: Cholelithiasis and distended gallbladder. There is mild  biliary ductal ectasia. This appears increased. Stable finding of a  calcification suggesting choledocholithiasis within the distal common  bile duct measuring 0.6 cm, series 4 image 170. No definable focal  hepatic lesion identified.     PANCREAS: No focal pancreatic lesion identified.     SPLEEN: Normal.     ADRENAL GLANDS: Stable minimal  thickening of the adrenals.     KIDNEYS/BLADDER: Bilateral renal parenchymal thinning without focal  worrisome lesion. No hydronephrosis identified.     BOWEL: No acute abnormality.     PELVIC ORGANS: Mildly prominent prostate measuring 4.9 cm.     ADDITIONAL FINDINGS: Diffuse vascular calcifications. Prominent  infrarenal abdominal aortic aneurysm measuring 5.5 cm with peripheral  mural thrombus. This is stable in size. No associated acute  abnormality at this time. Bilateral iliac artery stents. Stent on the  right extends to the distal aspect of the external iliac artery.     MUSCULOSKELETAL: No convincing destructive bony lesion is seen.  Diffuse spine degenerative changes.                                                                      IMPRESSION:  1.  Resection of the left lower lobe mass. There is some adjacent  scarring and small volume loculated left pleural fluid.  2.  No change of an indeterminate mass at the anterior mediastinum  worrisome for neoplasm.  3.  A few tiny pulmonary nodules noted. One of these is new at the  left upper lobe. Recommend surveillance imaging for follow up.  4.  Mild biliary ductal ectasia is newly seen. Stable calcification  suggesting choledocholithiasis of the distal common bile duct with a  potential stone measuring 0.6 cm.  5.  Infrarenal abdominal aortic aneurysm is stable measuring 5.5 cm.  6.  A few mildly larger indeterminate lymph nodes seen in the chest.      Impression/Plan: Moises Chacon is a 69 year old male with known lung adenocarcinoma status post lobectomy in [place], mediastinal mass and discussion of adjuvant chemoradiation.    Lung adenocarcinoma: Biopsy obtained in [place] positive for invasive adenocarcinoma, poorly differentiated and brain MRI negative for metastasis on 1/17/2020.  Spoke to Dr. Ross and we will recommend carboplatin/pemetrexed/pembrolizumab adjuvant chemotherapy with concurrent radiation, but we would like to wait until he completes  his thoracic surgery and mediastinal mass resection by Dr. Garcia.  Patient is not interested in any chemotherapy prior to the surgery, see below.  We briefly discussed toxicities with chemotherapy, radiation and immunotherapy.  I will hold off on schedule any further appointments with our oncology team for now.    Mediastinal mass: Pesent since 2015 and met with Dr. Garcia with thoracic surgery on 6/29/2020.  Recommended mediastinal mass resection, but preference is for patient to quit smoking prior to surgery.  Patient is very hesitant and not interested in quitting, but I reassured him that he would still proceed with surgery, however the anesthesia and death risks increase with smoking.    Constipation: Currently on OxyContin for pain in the left chest.  Last dose was 5 days ago, he has been constipated and no improvement with Colace daily.  New prescription for Suzanne-Colace sent to pharmacy today and discussed with his home health nurse.      Chart documentation with Dragon Voice recognition Software. Although reviewed after completion, some words and grammatical errors may remain.    Phone call duration: 31 minutes      Dora Carrillo PA-C  Hematology/Oncology  [facility]                  Again, thank you for allowing me to participate in the care of your patient.        Sincerely,        Dora Carrillo PA-C

## 2020-06-30 NOTE — PROGRESS NOTES
Received positive distress screen regarding patient's concern for current weight.  Called patient using .  Patient states his appetite has been good and does not have any nutrition concerns at this time,  Informed patient to contact clinic if does have any nutrition needs.  Patient verbalized understanding of plan.      Darian Doe, RD, LD  Clinical Dietitian  [facility] [place] [facility]  [phone] (direct)

## 2020-06-30 NOTE — PROGRESS NOTES
"Moises Chacon is a 69 year old male who is being evaluated via a billable telephone visit.      The patient has been notified of following:     \"This telephone visit will be conducted via a call between you and your physician/provider. We have found that certain health care needs can be provided without the need for a physical exam.  This service lets us provide the care you need with a short phone conversation.  If a prescription is necessary we can send it directly to your pharmacy.  If lab work is needed we can place an order for that and you can then stop by our lab to have the test done at a later time.    Telephone visits are billed at different rates depending on your insurance coverage. During this emergency period, for some insurers they may be billed the same as an in-person visit.  Please reach out to your insurance provider with any questions.    If during the course of the call the physician/provider feels a telephone visit is not appropriate, you will not be charged for this service.\"    Patient has given verbal consent for Telephone visit?  Yes    What phone number would you like to be contacted at? [phone]    How would you like to obtain your AVS? Mail a copy     Jaye Abreu CMA on 6/30/2020 at 2:15 PM        Oncology/Hematology Visit Note    Jun 30, 2020    Reason for visit: Follow-up lung adenocarcinoma     Oncology HPI: Moises Chacon is a 69 year old male with lung adenocarcinoma. Left lower lobe lung lesions and mediastinal mass noted on imaging.  Biopsy of the left lower lung lesion positive for invasive adenocarcinoma, poorly differentiated.  He met with Dr. Dunbar with [place] [facility] on 1/13/2020 and recommended staging with PET scan and MRI, chemoradiation followed by immunotherapy.  He moved to [place] to be with family and established care with Dr. Botello, who referred patient urgently to oncology. He met with Dr Ross on 6/19/2020, who referred him to thoracic surgery " "and he met with Dr. Garcia on 6/29/2020, who recommended mediastinal mass resection.  CT chest/abdomen/pelvis revealed the mediastinal mass and other tiny pulmonary nodules.    Telephone visit today for close follow up.      Interval History: Moises was on the phone with  and his sister.  We briefly discussed his visit in [place] and his oncology visit.  He met with Dr. Garcia yesterday and he has no interest in quitting smoking right now and he is not sure if he wants surgery or chemotherapy or radiation.  He is feeling pretty well now, but does not elaborate.  His sister states that he \"acts like a child\" and he has been refusing a lot of treatment that is been recommended over the last several months.  He continues to have some left-sided chest pain and he has been refusing his OxyContin over the last 5 days due to constipation.  He has been doing 1 Colace per day, but no laxatives.  No fever or chills, dizziness, vomiting or diarrhea.    Review of Systems: See interval hx. Denies fevers, chills, HA, dizziness, cough, sore throat, SOB, abdominal pain, N/V, diarrhea, bleeding.     PMHx and Social Hx reviewed per EPIC.      Medications:  Current Outpatient Medications   Medication Sig Dispense Refill     amLODIPine (NORVASC) 10 MG tablet TAKE 1 TABLET BY MOUTH DAILY. 90 tablet 0     aspirin (ASA) 81 MG EC tablet Take 1 tablet (81 mg) by mouth daily 90 tablet 1     cetirizine (ZYRTEC) 10 MG tablet Take 1 tablet (10 mg) by mouth daily 30 tablet 1     docusate sodium (COLACE) 100 MG capsule Take 1 capsule (100 mg) by mouth 2 times daily 60 capsule 1     docusate sodium (COLACE) 100 MG capsule Take 100 mg by mouth       metoprolol tartrate (LOPRESSOR) 25 MG tablet TAKE 1 TABLET BY MOUTH TWICE DAILY. 180 tablet 0     morphine (MS CONTIN) 15 MG CR tablet Take 1 tablet (15 mg) by mouth every 12 hours maximum 2 tablet(s) per day 60 tablet 0     tamsulosin (FLOMAX) 0.4 MG capsule Take 1 capsule (0.4 mg) by " mouth daily (Patient not taking: Reported on 6/29/2020) 30 capsule 1     traMADol (ULTRAM) 50 MG tablet Take 1 tablet (50 mg) by mouth every 6 hours as needed for severe pain 60 tablet 0     umeclidinium-vilanterol (ANORO ELLIPTA) 62.5-25 MCG/INH oral inhaler Inhale 1 puff into the lungs daily 1 Inhaler 1       Allergies   Allergen Reactions     Flomax [Tamsulosin]      ITCHING       EXAM:    There were no vitals taken for this visit. Telephone visit, therefore VS and exam were not performed.       Labs:   Results for FINA SALOMON (MRN [id number]) as of 6/30/2020 14:41   6/19/2020 15:23   Sodium 142   Potassium 4.0   Chloride 106   Carbon Dioxide 29   Urea Nitrogen 29   Creatinine 1.14   GFR Estimate 64   GFR Estimate If Black 74   Calcium 9.0   Anion Gap 7   Magnesium 2.1   Albumin 3.1 (L)   Protein Total 7.9   Bilirubin Total 0.3   Alkaline Phosphatase 101   ALT 15   AST 10   CRP Inflammation 5.0   Ferritin 178   Iron 71   Iron Binding Cap 267   Iron Saturation Index 27   Lactate Dehydrogenase 168   Soluble Transferrin Receptor 2.8   Vitamin B12 399   Glucose 110 (H)   WBC 6.2   Hemoglobin 12.7 (L)   Hematocrit 40.1   Platelet Count 198   RBC Count 4.18 (L)   MCV 96   MCH 30.4   MCHC 31.7   RDW 13.8   Diff Method Automated Method   % Neutrophils 55.0   % Lymphocytes 33.7   % Monocytes 4.4   % Eosinophils 5.8   % Basophils 1.1   % Immature Granulocytes 0.0   Nucleated RBCs 0   Absolute Neutrophil 3.4   Absolute Lymphocytes 2.1   Absolute Monocytes 0.3   Absolute Eosinophils 0.4   Absolute Basophils 0.1   Abs Immature Granulocytes 0.0   Absolute Nucleated RBC 0.0       Imaging:   CT CHEST/ABDOMEN/PELVIS WITH CONTRAST 6/23/2020 10:35 AM     CLINICAL HISTORY: Non-small cell lung cancer; LLL tumor resected. He  has severe pain in the region of resection. He had another RUL mass  that had to be removed in separate procedure. Malignant neoplasm of  left lung, unspecified part of lung (H). Iron deficiency  anemia,  unspecified iron deficiency anemia type.     TECHNIQUE: CT scan of the chest, abdomen, and pelvis was performed  following injection of IV contrast. Multiplanar reformats were  obtained. Dose reduction techniques were used.   CONTRAST: 63 mL Isovue-370     COMPARISON: CT chest 4/28/2020. Outside PET/CT 2/13/2020.     FINDINGS:   LUNGS AND PLEURA: Small and mildly loculated left pleural fluid newly  seen. Previously demonstrated hypermetabolic mass at the left lower  lobe is not currently seen, and there are surgical changes in this  area. There are moderate areas of atelectasis and scarring noted. A  tiny 0.2 cm nodule is newly seen at the posterolateral left upper  lobe, series 12 image 70. Stable small right middle lobe peripheral  nodule that is 0.2 cm, series 12 image 183.     MEDIASTINUM/AXILLAE: Heterogeneous mass at the right anterior  mediastinum just deep to the sternum and along the right side of the  ascending aorta is 4.1 x 3.2 cm, stable in size, series 4 image 48.  Subcarinal enlarged lymph node measures 2.1 x 1.5 cm, previously 1.9 x  0.7 cm, series 4 image 60. Another precarinal lymph node is slightly  larger measuring 1.2 x 1 cm, previously 1.1 x 0.3 cm image 57. There  are several adjacent small lymph nodes noted. Diffuse coronary artery  calcifications.     HEPATOBILIARY: Cholelithiasis and distended gallbladder. There is mild  biliary ductal ectasia. This appears increased. Stable finding of a  calcification suggesting choledocholithiasis within the distal common  bile duct measuring 0.6 cm, series 4 image 170. No definable focal  hepatic lesion identified.     PANCREAS: No focal pancreatic lesion identified.     SPLEEN: Normal.     ADRENAL GLANDS: Stable minimal thickening of the adrenals.     KIDNEYS/BLADDER: Bilateral renal parenchymal thinning without focal  worrisome lesion. No hydronephrosis identified.     BOWEL: No acute abnormality.     PELVIC ORGANS: Mildly prominent prostate  measuring 4.9 cm.     ADDITIONAL FINDINGS: Diffuse vascular calcifications. Prominent  infrarenal abdominal aortic aneurysm measuring 5.5 cm with peripheral  mural thrombus. This is stable in size. No associated acute  abnormality at this time. Bilateral iliac artery stents. Stent on the  right extends to the distal aspect of the external iliac artery.     MUSCULOSKELETAL: No convincing destructive bony lesion is seen.  Diffuse spine degenerative changes.                                                                      IMPRESSION:  1.  Resection of the left lower lobe mass. There is some adjacent  scarring and small volume loculated left pleural fluid.  2.  No change of an indeterminate mass at the anterior mediastinum  worrisome for neoplasm.  3.  A few tiny pulmonary nodules noted. One of these is new at the  left upper lobe. Recommend surveillance imaging for follow up.  4.  Mild biliary ductal ectasia is newly seen. Stable calcification  suggesting choledocholithiasis of the distal common bile duct with a  potential stone measuring 0.6 cm.  5.  Infrarenal abdominal aortic aneurysm is stable measuring 5.5 cm.  6.  A few mildly larger indeterminate lymph nodes seen in the chest.      Impression/Plan: Moises Chacon is a 69 year old male with known lung adenocarcinoma status post lobectomy in [place], mediastinal mass and discussion of adjuvant chemoradiation.    Lung adenocarcinoma: Biopsy obtained in [place] positive for invasive adenocarcinoma, poorly differentiated and brain MRI negative for metastasis on 1/17/2020.  Spoke to Dr. Ross and we will recommend carboplatin/pemetrexed/pembrolizumab adjuvant chemotherapy with concurrent radiation, but we would like to wait until he completes his thoracic surgery and mediastinal mass resection by Dr. Garcia.  Patient is not interested in any chemotherapy prior to the surgery, see below.  We briefly discussed toxicities with chemotherapy, radiation and immunotherapy.   I will hold off on schedule any further appointments with our oncology team for now.    Mediastinal mass: Pesent since 2015 and met with Dr. Garcia with thoracic surgery on 6/29/2020.  Recommended mediastinal mass resection, but preference is for patient to quit smoking prior to surgery.  Patient is very hesitant and not interested in quitting, but I reassured him that he would still proceed with surgery, however the anesthesia and death risks increase with smoking.    Constipation: Currently on OxyContin for pain in the left chest.  Last dose was 5 days ago, he has been constipated and no improvement with Colace daily.  New prescription for Suzanne-Colace sent to pharmacy today and discussed with his home health nurse.      Chart documentation with Dragon Voice recognition Software. Although reviewed after completion, some words and grammatical errors may remain.    Phone call duration: 31 minutes      Dora Carrillo PA-C  Hematology/Oncology  [facility]

## 2020-07-02 ENCOUNTER — HOME CARE/HOSPICE - HEALTHEAST (OUTPATIENT)
Dept: HOME HEALTH SERVICES | Facility: HOME HEALTH | Age: 70
End: 2020-07-02

## 2020-07-06 ENCOUNTER — PATIENT OUTREACH (OUTPATIENT)
Dept: ONCOLOGY | Facility: CLINIC | Age: 70
End: 2020-07-06

## 2020-07-06 ENCOUNTER — HOME CARE/HOSPICE - HEALTHEAST (OUTPATIENT)
Dept: HOME HEALTH SERVICES | Facility: HOME HEALTH | Age: 70
End: 2020-07-06

## 2020-07-06 ENCOUNTER — MEDICAL CORRESPONDENCE (OUTPATIENT)
Dept: HEALTH INFORMATION MANAGEMENT | Facility: CLINIC | Age: 70
End: 2020-07-06

## 2020-07-06 NOTE — PROGRESS NOTES
Agnes, RN from [facility], called in to clinic asking if there are any updates on scheduling Moises's surgery.     Agnes reports Moises has stopped smoking last week.    Writer will update Romy (OR ) and Dr. Garcia.     Writer will contact Moises and his sister, Rosa with pre-op teaching info as well.     Sharita Saunders RN, BSN, [facility]  Patient Care Coordinator  [facility]  [phone]

## 2020-07-07 ENCOUNTER — HOME CARE/HOSPICE - HEALTHEAST (OUTPATIENT)
Dept: ADMINISTRATIVE | Facility: OTHER | Age: 70
End: 2020-07-07

## 2020-07-13 NOTE — PROGRESS NOTES
"Writer called Moises Jonathon to arrange to set up for pre-op teaching over the phone.     Writer spoke with Moises's sister, Rosa. She was in the car at the time with Moises. She reports Moises is \"still thinking about surgery\" and has not yet made a decision. Writer confirmed they have the clinic phone number to notify me when Moises has made a decision about surgery.     Rosa verbalized understanding and is in agreement with the plan.     Sharita Saunders, RN, BSN, [facility]  Patient Care Coordinator  [facility]  [phone]      "

## 2020-07-21 ENCOUNTER — NURSE TRIAGE (OUTPATIENT)
Dept: NURSING | Facility: CLINIC | Age: 70
End: 2020-07-21

## 2020-07-21 NOTE — TELEPHONE ENCOUNTER
Rosa, Young sister's calls in to report that Moises does want to have the surgery now and as soon as possible. Sending to his provider and to the patient care coordinator Sharita Saunders if possible.

## 2020-07-22 ENCOUNTER — TELEPHONE (OUTPATIENT)
Dept: ONCOLOGY | Facility: CLINIC | Age: 70
End: 2020-07-22

## 2020-07-22 NOTE — TELEPHONE ENCOUNTER
Dr Botello does not schedule surgery.  Looks like FNA routed to oncology RN as well  Sammy Adams RN, BSN

## 2020-07-22 NOTE — TELEPHONE ENCOUNTER
Moises sister, Rosa Laughlin is calling.  She said that Moises isn't going to [place] and would like to have surgery now.  Last Telephone visit with Dr Garcia 6/29/20.  Please contact Rosa Laughlin at [phone].   katerin

## 2020-07-22 NOTE — TELEPHONE ENCOUNTER
Writer called and spoke with Rosa. She reports Moises is no longer going to [place] and would like to schedule surgery as soon as possible. Writer will notify Dr. Garica and his surgery scheduler.    Sharita Saunders RN, BSN, [facility]  Patient Care Coordinator  [facility]  [phone]

## 2020-07-22 NOTE — TELEPHONE ENCOUNTER
Writer called Rosa, pt's brother. She reports Moises is no longer going to [place] and is interested in having surgery done as soon as possible. Writer will notify Dr. Garcia and call pt back with further details.     Rosa verbalized understanding and is in agreement with the plan.     Sharita Saunders RN, BSN, [facility]  Patient Care Coordinator  [facility]  [phone]

## 2020-07-23 ENCOUNTER — MEDICAL CORRESPONDENCE (OUTPATIENT)
Dept: HEALTH INFORMATION MANAGEMENT | Facility: CLINIC | Age: 70
End: 2020-07-23

## 2020-07-23 ENCOUNTER — TELEPHONE (OUTPATIENT)
Dept: SURGERY | Facility: CLINIC | Age: 70
End: 2020-07-23

## 2020-07-23 DIAGNOSIS — Z11.59 ENCOUNTER FOR SCREENING FOR OTHER VIRAL DISEASES: Primary | ICD-10-CM

## 2020-07-23 NOTE — TELEPHONE ENCOUNTER
Spoke with patient to schedule procedure with Dr. Tate Garcia    Procedure was scheduled on 08/19 at [facility] OR  Patient will have H&P with PAC on 08/11    Patient is aware they will receive a call to schedule a COVID-19 test before their procedure.   The test should be with-in 4days of procedure.     Patient is aware a / is needed day of surgery.   Surgery letter was sent via Mail, patient has my direct contact information for any further questions.

## 2020-07-24 NOTE — TELEPHONE ENCOUNTER
FUTURE VISIT INFORMATION      SURGERY INFORMATION:    Date: 20    Location: [facility] or    Surgeon:  Tate Garcia MD     Anesthesia Type:  general    Procedure: Bilateral subxiphoid thoracoscopic thymectomy possible right thoracoscopy, possible sternotomy     Consult: virtual visit     RECORDS REQUESTED FROM:       Primary Care Provider: Serge Botello MD- [facility]    Pertinent Medical History: hypertension, cardiomegaly    Most recent EKG+ [id number]    Most recent PFT's: 20

## 2020-08-03 DIAGNOSIS — N39.43 BENIGN PROSTATIC HYPERPLASIA WITH POST-VOID DRIBBLING: ICD-10-CM

## 2020-08-03 DIAGNOSIS — N40.1 BENIGN PROSTATIC HYPERPLASIA WITH POST-VOID DRIBBLING: ICD-10-CM

## 2020-08-03 RX ORDER — TAMSULOSIN HYDROCHLORIDE 0.4 MG/1
CAPSULE ORAL
Qty: 30 CAPSULE | Refills: 1 | Status: SHIPPED | OUTPATIENT
Start: 2020-08-03 | End: 2020-08-11

## 2020-08-03 RX ORDER — TAMSULOSIN HYDROCHLORIDE 0.4 MG/1
CAPSULE ORAL
Qty: 90 CAPSULE | OUTPATIENT
Start: 2020-08-03

## 2020-08-10 ENCOUNTER — PATIENT OUTREACH (OUTPATIENT)
Dept: ONCOLOGY | Facility: CLINIC | Age: 70
End: 2020-08-10

## 2020-08-10 NOTE — PROGRESS NOTES
Writer called with the assistance of a Japanese , # [phone]. Writer called to discuss pre-op instructions prior to his thoracic surgery with Dr. Garcia, scheduled for 8/19.     left voicemail to return call to Dr. Jose Sheriff's nurse at [phone].    Sharita Saunders, RN, BSN, HEIKE  RN Care Coordinator  [facility]  [phone]

## 2020-08-11 ENCOUNTER — OFFICE VISIT (OUTPATIENT)
Dept: SURGERY | Facility: CLINIC | Age: 70
End: 2020-08-11
Attending: THORACIC SURGERY (CARDIOTHORACIC VASCULAR SURGERY)
Payer: MEDICARE

## 2020-08-11 ENCOUNTER — ANESTHESIA EVENT (OUTPATIENT)
Dept: SURGERY | Facility: CLINIC | Age: 70
DRG: 804 | End: 2020-08-11
Payer: MEDICARE

## 2020-08-11 ENCOUNTER — PRE VISIT (OUTPATIENT)
Dept: SURGERY | Facility: CLINIC | Age: 70
End: 2020-08-11

## 2020-08-11 VITALS
WEIGHT: 132 LBS | SYSTOLIC BLOOD PRESSURE: 207 MMHG | HEART RATE: 66 BPM | OXYGEN SATURATION: 100 % | BODY MASS INDEX: 21.21 KG/M2 | DIASTOLIC BLOOD PRESSURE: 94 MMHG | HEIGHT: 66 IN | RESPIRATION RATE: 16 BRPM | TEMPERATURE: 98.1 F

## 2020-08-11 DIAGNOSIS — J98.59 MEDIASTINAL MASS: ICD-10-CM

## 2020-08-11 DIAGNOSIS — C34.92 MALIGNANT NEOPLASM OF LEFT LUNG, UNSPECIFIED PART OF LUNG (H): ICD-10-CM

## 2020-08-11 DIAGNOSIS — Z01.818 PRE-OP EXAMINATION: Primary | ICD-10-CM

## 2020-08-11 LAB
ANION GAP SERPL CALCULATED.3IONS-SCNC: 5 MMOL/L (ref 3–14)
BUN SERPL-MCNC: 16 MG/DL (ref 7–30)
CALCIUM SERPL-MCNC: 9.1 MG/DL (ref 8.5–10.1)
CHLORIDE SERPL-SCNC: 107 MMOL/L (ref 94–109)
CO2 SERPL-SCNC: 29 MMOL/L (ref 20–32)
CREAT SERPL-MCNC: 0.98 MG/DL (ref 0.66–1.25)
ERYTHROCYTE [DISTWIDTH] IN BLOOD BY AUTOMATED COUNT: 13.5 % (ref 10–15)
GFR SERPL CREATININE-BSD FRML MDRD: 77 ML/MIN/{1.73_M2}
GLUCOSE SERPL-MCNC: 89 MG/DL (ref 70–99)
HCT VFR BLD AUTO: 45.7 % (ref 40–53)
HGB BLD-MCNC: 15.1 G/DL (ref 13.3–17.7)
MCH RBC QN AUTO: 30.3 PG (ref 26.5–33)
MCHC RBC AUTO-ENTMCNC: 33 G/DL (ref 31.5–36.5)
MCV RBC AUTO: 92 FL (ref 78–100)
NT-PROBNP SERPL-MCNC: 358 PG/ML (ref 0–125)
PLATELET # BLD AUTO: 186 10E9/L (ref 150–450)
POTASSIUM SERPL-SCNC: 4.4 MMOL/L (ref 3.4–5.3)
RBC # BLD AUTO: 4.98 10E12/L (ref 4.4–5.9)
SODIUM SERPL-SCNC: 141 MMOL/L (ref 133–144)
WBC # BLD AUTO: 7.2 10E9/L (ref 4–11)

## 2020-08-11 PROCEDURE — 86923 COMPATIBILITY TEST ELECTRIC: CPT | Performed by: PHYSICIAN ASSISTANT

## 2020-08-11 RX ORDER — METOPROLOL TARTRATE 25 MG/1
25 TABLET, FILM COATED ORAL 2 TIMES DAILY
COMMUNITY
End: 2020-10-05

## 2020-08-11 ASSESSMENT — MIFFLIN-ST. JEOR: SCORE: 1291.5

## 2020-08-11 ASSESSMENT — LIFESTYLE VARIABLES: TOBACCO_USE: 1

## 2020-08-11 NOTE — H&P
Pre-Operative H & P     CC:  Preoperative exam to assess for increased cardiopulmonary risk while undergoing surgery and anesthesia.    Date of Encounter: 8/11/2020  Primary Care Physician:  Serge Botello     Reason for visit: pre operative examination, Mediastinal mass    HPI  Moises Chacon is a 70 year old male who presents for pre-operative H & P in preparation for Bilateral subxiphoid thoracoscopic thymectomy, possible right thoracoscopy, possible sternotomy with Dr. Garcia on 8/19/20 at [facility].     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  He ended up undergoing a robotic left lobe lobectomy on 5/6/2020 in [place].  He has been moved to [place] to live with his sister.  Given his mediastinal mass he met with Dr. Garcia and they discussed his treatment options.  The patient otherwise has a past medical history significant for hypertension, hyperlipidemia, smoking, allergies, anemia, history of CVA, history of constipation, GERD, BPH and chronic left chest pain from his surgery.  The patient has also met with oncology and they discussed his treatment options.  The patient has refused to do neoadjuvant chemotherapy and his oncology team will plan to follow-up with him post surgery.  The patient has been scheduled for the procedure as above.    History is obtained from the patient, his sister through a professional Romanian ipad  and chart review    Past Medical History  Past Medical History:   Diagnosis Date     Allergies      Anemia      BPH (benign prostatic hyperplasia)      Chronic pain      Constipation      Dyslipidemia      GERD (gastroesophageal reflux disease)      Hypertension      Mediastinal mass      Primary lung adenocarcinoma (H)      Stroke (H)      Tobacco use        Past Surgical History  Past Surgical History:   Procedure  Laterality Date     ------------OTHER-------------  2016    Patient reports he had coiling ? stenting in the inguinal area     robotic lobectomy  2020       Hx of Blood transfusions/reactions: denies     Hx of abnormal bleeding or anti-platelet use: ASA 81 mg - 7 days prior     Menstrual history: No LMP for male patient.:     Steroid use in the last year: none    Personal or FH with difficulty with Anesthesia:  denies    Prior to Admission Medications  Current Outpatient Medications   Medication Sig Dispense Refill     amLODIPine (NORVASC) 10 MG tablet TAKE 1 TABLET BY MOUTH DAILY. (Patient taking differently: Take 10 mg by mouth every morning ) 90 tablet 0     aspirin (ASA) 81 MG EC tablet Take 1 tablet (81 mg) by mouth daily (Patient taking differently: Take 81 mg by mouth At Bedtime ) 90 tablet 1     cetirizine (ZYRTEC) 10 MG tablet Take 1 tablet (10 mg) by mouth daily (Patient taking differently: Take 10 mg by mouth At Bedtime ) 30 tablet 1     METOPROLOL TARTRATE PO Take by mouth 2 times daily       umeclidinium-vilanterol (ANORO ELLIPTA) 62.5-25 MCG/INH oral inhaler Inhale 1 puff into the lungs daily (Patient taking differently: Inhale 1 puff into the lungs as needed ) 1 Inhaler 1       Allergies  Allergies   Allergen Reactions     Flomax [Tamsulosin]      ITCHING       Social History  Social History     Socioeconomic History     Marital status:      Spouse name: Not on file     Number of children: Not on file     Years of education: Not on file     Highest education level: Not on file   Occupational History     Not on file   Social Needs     Financial resource strain: Not on file     Food insecurity     Worry: Not on file     Inability: Not on file     Transportation needs     Medical: Not on file     Non-medical: Not on file   Tobacco Use     Smoking status: Former Smoker     Packs/day: 0.50     Years: 60.00     Pack years: 30.00     Last attempt to quit: 2020     Years since quittin.0      Smokeless tobacco: Former User   Substance and Sexual Activity     Alcohol use: No     Drug use: Yes     Types: Marijuana     Sexual activity: Not Currently   Lifestyle     Physical activity     Days per week: Not on file     Minutes per session: Not on file     Stress: Not on file   Relationships     Social connections     Talks on phone: Not on file     Gets together: Not on file     Attends Restorationist service: Not on file     Active member of club or organization: Not on file     Attends meetings of clubs or organizations: Not on file     Relationship status: Not on file     Intimate partner violence     Fear of current or ex partner: Not on file     Emotionally abused: Not on file     Physically abused: Not on file     Forced sexual activity: Not on file   Other Topics Concern     Parent/sibling w/ CABG, MI or angioplasty before 65F 55M? Not Asked   Social History Narrative     Not on file       Family History  Family History   Problem Relation Age of Onset     Heart Disease Mother      Cerebrovascular Disease Father      Other - See Comments Brother         passed in [place] war     Cerebrovascular Disease Sister      Cerebrovascular Disease Sister        Review of Systems    ROS/MED HX    ENT/Pulmonary:     (+)CLARY risk factors hypertension, allergic rhinitis, tobacco use (patient has 30 year pack history and quit on 7/28/20. ), Past use 0.5 packs/day  , . .    Neurologic:     (+)CVA (per patient's sister he had this maybe around 2009 x2 and no residual issues. ) date: ?2009 without deficits    Cardiovascular: Comment: Patient's blood pressure 207/94 today. His sister told him not to eat or drink and not take his blood pressure medications before our visit.     His last blood pressure reading at home care RN was 150/80 on 7/6/20.     ? S/p Iliac vs femoral stents in 2016 in [place].     (+) Dyslipidemia, hypertension----. : . . . :. . Previous cardiac testing date:results:date: results:ECG reviewed  "date:8/11/20 results:Sinus rhythm, voltage criteria for left ventricular hypertrophy date: results:          METS/Exercise Tolerance:  4 - Raking leaves, gardening   Hematologic:     (+) Anemia, -     (-) history of blood clots   Musculoskeletal:   (+)  other musculoskeletal- chronic left chest pain after robotic left lower lobectomy       GI/Hepatic:     (+) Other GI/Hepatic history of constipation, ongoing LLQ discomfort     (-) GERD   Renal/Genitourinary:     (+) BPH,       Endo:  - neg endo ROS       Psychiatric:  - neg psychiatric ROS       Infectious Disease:  - neg infectious disease ROS       Malignancy:   (+) Malignancy History of Lung  Lung CA Active status post Surgery. S/p robotic left lower lobectomy on 5/6/20 in [place].     Mediastinal mass        Other:    (+) H/O Chronic Pain,H/O chronic opiod use , no other significant disability        The complete review of systems is negative other than noted in the HPI or here.   Temp: 98.1  F (36.7  C) Temp src: Oral BP: (!) 207/94 Pulse: 66   Resp: 16 SpO2: 100 %         132 lbs 0 oz  5' 6\"[PT REPORTED[   Body mass index is 21.31 kg/m .       Physical Exam  Constitutional: Awake, alert, cooperative, no apparent distress, and appears stated age.  Eyes: Pupils equal, round and reactive to light, extra ocular muscles intact, sclera clear, conjunctiva normal.  HENT: Normocephalic, oral pharynx with moist mucus membranes, edentulous . No goiter appreciated.   Respiratory: Clear to auscultation bilaterally, no crackles or wheezing.  Cardiovascular: Regular rate and rhythm, normal S1 and S2, and no murmur noted.  Carotids +2, no bruits. No edema. Palpable pulses to radial  DP and PT arteries.   GI: Normal bowel sounds, soft, non-distended, non-tender, no masses palpated, no hepatosplenomegaly.  Surgical scars: well healed. Mild tenderness to deep palpation in the LLQ. No rebound or guarding.   Lymph/Hematologic: No cervical lymphadenopathy and no supraclavicular " lymphadenopathy.  Genitourinary:  defer  Skin: Warm and dry.  No rashes at anticipated surgical site.   Musculoskeletal: Limited ROM of neck. There is no redness, warmth, or swelling of the joints. Gross motor strength is normal.    Neurologic: Awake, alert, oriented to name, place and time. Cranial nerves II-XII are grossly intact. Gait is normal.   Neuropsychiatric: Calm, cooperative. Normal affect.     Labs: (personally reviewed)  Results for FINA SALOMON (MRN [id number]) as of 2020 16:37   Ref. Range 2020 12:34   Sodium Latest Ref Range: 133 - 144 mmol/L 141   Potassium Latest Ref Range: 3.4 - 5.3 mmol/L 4.4   Chloride Latest Ref Range: 94 - 109 mmol/L 107   Carbon Dioxide Latest Ref Range: 20 - 32 mmol/L 29   Urea Nitrogen Latest Ref Range: 7 - 30 mg/dL 16   Creatinine Latest Ref Range: 0.66 - 1.25 mg/dL 0.98   GFR Estimate Latest Ref Range: >60 mL/min/1.73_m2 77   GFR Estimate If Black Latest Ref Range: >60 mL/min/1.73_m2 89   Calcium Latest Ref Range: 8.5 - 10.1 mg/dL 9.1   Anion Gap Latest Ref Range: 3 - 14 mmol/L 5   N-Terminal Pro Bnp Latest Ref Range: 0 - 125 pg/mL 358 (H)   Glucose Latest Ref Range: 70 - 99 mg/dL 89   WBC Latest Ref Range: 4.0 - 11.0 10e9/L 7.2   Hemoglobin Latest Ref Range: 13.3 - 17.7 g/dL 15.1   Hematocrit Latest Ref Range: 40.0 - 53.0 % 45.7   Platelet Count Latest Ref Range: 150 - 450 10e9/L 186   RBC Count Latest Ref Range: 4.4 - 5.9 10e12/L 4.98   MCV Latest Ref Range: 78 - 100 fl 92   MCH Latest Ref Range: 26.5 - 33.0 pg 30.3   MCHC Latest Ref Range: 31.5 - 36.5 g/dL 33.0   RDW Latest Ref Range: 10.0 - 15.0 % 13.5   BNP is noted and could be secondary to cancer as well but will order post op troponins.     EKG: Personally reviewed but formal cardiology read pendin20  Normal sinus rhythm, voltage criteria for LVH.       CT CHEST/ABDOMEN/PELVIS WITH CONTRAST 2020 10:35 AM  ADDITIONAL FINDINGS: Diffuse vascular calcifications. Prominent  infrarenal abdominal  aortic aneurysm measuring 5.5 cm with peripheral  mural thrombus. This is stable in size. No associated acute  abnormality at this time. Bilateral iliac artery stents. Stent on the  right extends to the distal aspect of the external iliac artery.    IMPRESSION:  1.  Resection of the left lower lobe mass. There is some adjacent  scarring and small volume loculated left pleural fluid.  2.  No change of an indeterminate mass at the anterior mediastinum  worrisome for neoplasm.  3.  A few tiny pulmonary nodules noted. One of these is new at the  left upper lobe. Recommend surveillance imaging for follow up.  4.  Mild biliary ductal ectasia is newly seen. Stable calcification  suggesting choledocholithiasis of the distal common bile duct with a  potential stone measuring 0.6 cm.  5.  Infrarenal abdominal aortic aneurysm is stable measuring 5.5 cm.  6.  A few mildly larger indeterminate lymph nodes seen in the chest.    The patient's records and results personally reviewed by this provider.     Outside records reviewed from: care everywhere     ASSESSMENT and PLAN  Moises Holt is a 69 year old man who is scheduled for Bilateral subxiphoid thoracoscopic thymectomy, possible right thoracoscopy, possible sternotomy on 8/19/20 by Dr. Garcia in treatment of Mediastinal mass.  PAC referral for risk assessment and optimization for anesthesia with comorbid conditions of HTN, HLD, lung adenocarcinoma, former smoking, allergies, anemia, history of CVA, constipation, GERD, BPH, left chest:    Pre-operative considerations:  1.  Cardiac:  Functional status- METS 4, the patient walks daily from a couple of blocks to 30 minutes.  High risk surgery with 0.4% (RCRI #) risk of major adverse cardiac event.   ~ HTN - continue metoprolol and Norvasc. The patient's blood pressure was 207/94 today. He did not take his blood pressure medication and did not eat or drink before coming to our clinic today. An EKG was obtained and showed normal sinus  "rhythm and voltage criteria for LVH. He denies cardiac symptoms otherwise. He was instructed to go home and take his blood pressure medications right away. He was advised if he developed worsening headache or chest pain he needs to go to the ER. In review of previous BP from prior visit's his blood pressure has been in the 140-150/80 range. BNP will also be ordered.   ~ HLD - Hold ASA 81 mg 7 days prior to surgery.   ~ S/p Iliac stents - the patient reports 4 years ago in [place] he had a procedure, \"to have wires placed into his groin area to open up the area\". He was having pain with walking which is why he had the procedure. (Stent placement can be confirmed on CT abdomen as above) He is not on any anticoagulation.     2.  Pulm:  Airway feasible.  CLARY risk: Intermediate (male, age, HTN)  ~ Lung adenocarcinoma s/p robotic left lower lobectomy on 5/6/20 in [place]. Continues with mediastinal mass - procedure as above. Continue Anoro Ellipta.   ~ Former smoking - the patient smoked 0.5 ppd for 60 years and quit on 7/28/20. He was congratulated and advised to continue smoking cessation.    ~ Allergies - continue Zyrtec.     3. Heme:  Anemia - the patient's last hgb was 12.7 on 6/19/20. Check full labs today.     4. Neuro: History of CVA - patient and his sister were unsure of timing but maybe around 2009. No residual affects.     5. GI:  Risk of PONV score = 1.  If > 2, anti-emetic intervention recommended.  ~ History of constipation 2/2 narcotic medications - patient reports daily bowel movements and off medications. He does have LLQ discomfort. No nausea or vomiting.     6. Musculoskeletal: Chronic left chest pain secondary to lung surgery - the patient is off narcotic pain medications. He is smoking marijuana for his cancer pain. It was discussed to either follow up with his PCP about getting medical marijuana or a referral for our pain clinic here was placed.     VTE risk: 3%    Patient was discussed with Dr" Wahr.    The patient is optimized for their procedure. AVS with information on surgery time/arrival time, meds and NPO status given by nursing staff.        Dai Redman PA-C  Preoperative Assessment Center  [facility]  Clinic and Surgery Center  Phone: [phone]  Fax: [phone]

## 2020-08-11 NOTE — PROGRESS NOTES
Attempt #2 made with Urdu  # [id number]. Left voicemail to return call to Dr. Garcia's RN, Sharita.    Sharita Saunders, RN, BSN, HEIKE  RN Care Coordinator  [facility]  [phone]

## 2020-08-11 NOTE — ANESTHESIA PREPROCEDURE EVALUATION
Anesthesia Pre-Procedure Evaluation    Patient: Moises Salomon   MRN:     [id number] Gender:   male   Age:    70 year old :      1950        Preoperative Diagnosis: Mediastinal mass [J98.59]   Procedure(s):  Bilateral subxiphoid thoracoscopic thymectomy  possible right thoracoscopy, possible sternotomy     Results for MOISES SALOMON (MRN [id number]) as of 2020 16:37   Ref. Range 2020 12:34   Sodium Latest Ref Range: 133 - 144 mmol/L 141   Potassium Latest Ref Range: 3.4 - 5.3 mmol/L 4.4   Chloride Latest Ref Range: 94 - 109 mmol/L 107   Carbon Dioxide Latest Ref Range: 20 - 32 mmol/L 29   Urea Nitrogen Latest Ref Range: 7 - 30 mg/dL 16   Creatinine Latest Ref Range: 0.66 - 1.25 mg/dL 0.98   GFR Estimate Latest Ref Range: >60 mL/min/1.73_m2 77   GFR Estimate If Black Latest Ref Range: >60 mL/min/1.73_m2 89   Calcium Latest Ref Range: 8.5 - 10.1 mg/dL 9.1   Anion Gap Latest Ref Range: 3 - 14 mmol/L 5   N-Terminal Pro Bnp Latest Ref Range: 0 - 125 pg/mL 358 (H)   Glucose Latest Ref Range: 70 - 99 mg/dL 89   WBC Latest Ref Range: 4.0 - 11.0 10e9/L 7.2   Hemoglobin Latest Ref Range: 13.3 - 17.7 g/dL 15.1   Hematocrit Latest Ref Range: 40.0 - 53.0 % 45.7   Platelet Count Latest Ref Range: 150 - 450 10e9/L 186   RBC Count Latest Ref Range: 4.4 - 5.9 10e12/L 4.98   MCV Latest Ref Range: 78 - 100 fl 92   MCH Latest Ref Range: 26.5 - 33.0 pg 30.3   MCHC Latest Ref Range: 31.5 - 36.5 g/dL 33.0   RDW Latest Ref Range: 10.0 - 15.0 % 13.5   BNP is noted and could be secondary to cancer as well but will order post op troponins.     Preop Vitals    BP Readings from Last 3 Encounters:   20 (!) 145/77   20 (!) 156/74   09 178/99    Pulse Readings from Last 3 Encounters:   20 69   20 84   09 67      Resp Readings from Last 3 Encounters:   20 16    SpO2 Readings from Last 3 Encounters:   20 99%   20 96%   09 97%      Temp Readings from Last 1 Encounters:  "  06/19/20 98.2  F (36.8  C) (Tympanic)    Ht Readings from Last 1 Encounters:   06/19/20 1.651 m (5' 5\")      Wt Readings from Last 1 Encounters:   06/19/20 58.2 kg (128 lb 4.8 oz)    Estimated body mass index is 21.35 kg/m  as calculated from the following:    Height as of 6/19/20: 1.651 m (5' 5\").    Weight as of 6/19/20: 58.2 kg (128 lb 4.8 oz).     LDA:        Past Medical History:   Diagnosis Date     Allergies      Anemia      BPH (benign prostatic hyperplasia)      Chronic pain      Constipation      Dyslipidemia      GERD (gastroesophageal reflux disease)      Hypertension      Mediastinal mass      Primary lung adenocarcinoma (H)      Stroke (H)      Tobacco use       Past Surgical History:   Procedure Laterality Date     NO HISTORY OF SURGERY        Allergies   Allergen Reactions     Flomax [Tamsulosin]      ITCHING        Anesthesia Evaluation     . Pt has had prior anesthetic. Type: General    No history of anesthetic complications          ROS/MED HX    ENT/Pulmonary:     (+)CLARY risk factors hypertension, allergic rhinitis, tobacco use (patient has 30 year pack history and quit on 7/28/20. ), Past use 0.5 packs/day  , . .    Neurologic:     (+)CVA (per patient's sister he had this maybe around 2009 x2 and no residual issues. ) date: ?2009 without deficits    Cardiovascular: Comment: Patient's blood pressure 207/94 today. His sister told him not to eat or drink and not take his blood pressure medications before our visit.     His last blood pressure reading at home care RN was 150/80 on 7/6/20.     S/p bilateral iliac artery stent placement    (+) Dyslipidemia, hypertension----. : . . . :. . Previous cardiac testing date:results:date: results:ECG reviewed date:8/11/20 results:Sinus rhythm, voltage criteria for left ventricular hypertrophy date: results:          METS/Exercise Tolerance:  4 - Raking leaves, gardening   Hematologic:     (+) Anemia, -     (-) history of blood clots   Musculoskeletal:   (+) "  other musculoskeletal- chronic left chest pain after robotic left lower lobectomy       GI/Hepatic:     (+) Other GI/Hepatic history of constipation, ongoing LLQ discomfort     (-) GERD   Renal/Genitourinary:  - ROS Renal section negative       Endo:  - neg endo ROS       Psychiatric:  - neg psychiatric ROS       Infectious Disease:  - neg infectious disease ROS       Malignancy:   (+) Malignancy History of Lung  Lung CA Active status post Surgery. S/p robotic left lower lobectomy on 5/6/20 in [place].     Mediastinal mass        Other:    (+) H/O Chronic Pain,H/O chronic opiod use , no other significant disability                        PHYSICAL EXAM:   Mental Status/Neuro: Age Appropriate; A/A/O   Airway: Facies: Feasible  Mallampati: II  Mouth/Opening: Full  TM distance: > 6 cm  Neck ROM: Limited   Respiratory:   Resp. Rate: Normal     Resp. Effort: Normal      CV:   Heart: Normal Sounds  Edema: None  Pulses: Normal   Comments:      Dental: Endentulous                Assessment:   ASA SCORE: 3    H&P: History and physical reviewed and following examination; no interval change.   Smoking Status:  Non-Smoker/Unknown   NPO Status: NPO Appropriate     Plan:   Anes. Type:  General   Pre-Medication: None   Induction:  IV (Standard)   Airway: ETT; Oral   Access/Monitoring: PIV; A-Line   Maintenance: Balanced     Advanced Monitoring: GENARO Adult            ADULT GENARO Checklist:               Absolute Contra-Indications: NONE               Relative Contra-Indications:  NONE               Final Plan: Proceed with GENARO     Postop Plan:   Postop Pain: Opioids; Long Acting Opioids  Postop Sedation/Airway: Not planned  Disposition: Inpatient/Admit     PONV Management:   Adult Risk Factors:, Non-Smoker, Postop Opioids   Prevention: Ondansetron     CONSENT: Direct conversation   Plan and risks discussed with: Patient   Blood Products: Consented (ALL Blood Products)                PAC Discussion and Assessment    ASA Classification:  "3  Case is suitable for: [facility]  Anesthetic techniques and relevant risks discussed: GA with regional block for post-op pain control  Invasive monitoring and risk discussed:   Types:   Possibility and Risk of blood transfusion discussed:   NPO instructions given:   Additional anesthetic preparation and risks discussed:   Needs early admission to pre-op area:   Other:     PAC Resident/NP Anesthesia Assessment:  Moises Holt is a 69 year old man who is scheduled for Bilateral subxiphoid thoracoscopic thymectomy, possible right thoracoscopy, possible sternotomy on 8/19/20 by Dr. Garcia in treatment of Mediastinal mass.  PAC referral for risk assessment and optimization for anesthesia with comorbid conditions of HTN, HLD, lung adenocarcinoma, former smoking, allergies, anemia, history of CVA, constipation, GERD, BPH, left chest:    Pre-operative considerations:  1.  Cardiac:  Functional status- METS 4, the patient walks daily from a couple of blocks to 30 minutes.  High risk surgery with 0.4% (RCRI #) risk of major adverse cardiac event.   ~ HTN - continue metoprolol and Norvasc. The patient's blood pressure was 207/94 today. He did not take his blood pressure medication and did not eat or drink before coming to our clinic today. An EKG was obtained and showed normal sinus rhythm and voltage criteria for LVH. He denies cardiac symptoms otherwise. He was instructed to go home and take his blood pressure medications right away. He was advised if he developed worsening headache or chest pain he needs to go to the ER. In review of previous BP from prior visit's his blood pressure has been in the 140-150/80 range. BNP will also be ordered.   ~ HLD - Hold ASA 81 mg 7 days prior to surgery.   ~ S/p Iliac stents - the patient reports 4 years ago in [place] he had a procedure, \"to have wires placed into his groin area to open up the area\". He was having pain with walking which is why he had the procedure. (Stent placement can " be confirmed on CT abdomen) He is not on any anticoagulation.     2.  Pulm:  Airway feasible.  CLARY risk: Intermediate (male, age, HTN)  ~ Lung adenocarcinoma s/p robotic left lower lobectomy on 5/6/20 in [place]. Continues with mediastinal mass - procedure as above. Continue Anoro Ellipta.   ~ Former smoking - the patient smoked 0.5 ppd for 60 years and quit on 7/28/20. He was congratulated and advised to continue smoking cessation.    ~ Allergies - continue Zyrtec.     3. Heme:  Anemia - the patient's last hgb was 12.7 on 6/19/20. Check full labs today.     4. Neuro: History of CVA - patient and his sister were unsure of timing but maybe around 2009. No residual affects.     5. GI:  Risk of PONV score = 1.  If > 2, anti-emetic intervention recommended.  ~ History of constipation 2/2 narcotic medications - patient reports daily bowel movements and off medications. He does have LLQ discomfort. No nausea or vomiting.     6. Musculoskeletal: Chronic left chest pain secondary to lung surgery - the patient is off narcotic pain medications. He is smoking marijuana for his cancer pain. It was discussed to either follow up with his PCP about getting medical marijuana or a referral for our pain clinic here was placed.     VTE risk: 3%    Patient is optimized and is acceptable candidate for the proposed procedure.  No further diagnostic evaluation is needed.     Patient discussed with Dr Roy.     For further details of assessment, testing, and physical exam please see H and P completed on same date.    Dai Redman PA-C          Mid-Level Provider/Resident: Dai Redman PA-C  Date: 8/11/20  Time:     Attending Anesthesiologist Anesthesia Assessment:        Anesthesiologist:   Date:   Time:   Pass/Fail:   Disposition:     PAC Pharmacist Assessment:        Pharmacist:   Date:   Time:    Dai Redman PA-C

## 2020-08-11 NOTE — PATIENT INSTRUCTIONS
Preparing for Your Surgery      Name:  Moises Chacon   MRN:  [id number]   :  1950   Today's Date:  2020       Arriving for surgery:  Surgery date:  20  Arrival time:  5:45 am    Restrictions due to COVID 19:  Patients are allowed one visitor in the pre-op period  All visitors must wear a mask  No visitors under 18  No ill visitors   parking is not available     Please come to:       [facility] [address] [place] [address]  [id number]       -    Please proceed to the Surgery Lounge on the 3rd floor. [phone]?     - ?If you are in need of directions, wheelchair or escort please stop at the Information Desk in the lobby.  Inform the information person that you are here for surgery; a wheelchair and escort will be provided to the Surgery Lounge .?     What can I eat or drink?  -  You may eat and drink normally for up to 8 hours before your surgery. (Until 11:45 pm)  -  You may have clear liquids until 2 hours before surgery. (Until 5:45 am)    Examples of clear liquids:  Water  Clear broth  Juices (apple, white grape, white cranberry  and cider) without pulp  Noncarbonated, powder based beverages  (lemonade and Randolph-Aid)  Sodas (Sprite, 7-Up, ginger ale and seltzer)  Coffee or tea (without milk or cream)  Gatorade    -  No Alcohol for at least 24 hours before surgery     Which medicines can I take?    Hold Aspirin for 7 days before surgery.   Hold Multivitamins for 7 days before surgery.  Hold Supplements for 7 days before surgery.  Hold Ibuprofen (Advil, Motrin) for 1 day before surgery--unless otherwise directed by surgeon.  Hold Naproxen (Aleve) for 4 days before surgery.    -  DO NOT take these medications the day of surgery:  Docusate Sodium (Colace)  Senna-Docusate (Senokot-Pericolace)    -  PLEASE TAKE these medications the day of surgery:  Amlodipine (Norvasc)  Cetirizine (Zyrtec)  Metoprolol (Lopressor)  Tamsulosin (Flomax)  Tramadol  (Ultram)  Umeclidinium-Vilanterol (AnoroEllipta) Inhaler    How do I prepare myself?  - Please take 2 showers before surgery using Scrubcare or Hibiclens soap.    Use this soap only from the neck to your toes.     Leave the soap on your skin for one minute--then rinse thoroughly.      You may use your own shampoo and conditioner; no other hair products.   - Please remove all jewelry and body piercings.  - No lotions, deodorants or fragrance.  - Bring your ID and insurance card.    - All patients are required to have a Covid-19 test within 4 days of surgery/procedure.      -Patients will be contacted by the [facility] scheduling team within 1 week of surgery to make an appointment.      - Patients may call the Scheduling team at [phone] if they have not been scheduled within 4 days of  surgery.      ALL PATIENTS GOING HOME THE SAME DAY OF SURGERY ARE REQUIRED TO HAVE A RESPONSIBLE ADULT TO DRIVE AND BE IN ATTENDANCE WITH THEM FOR 24 HOURS FOLLOWING SURGERY     Questions or Concerns:    - For any questions regarding the day of surgery or your hospital stay, please contact the Pre Admission Nursing Office at [phone].       - If you have health changes between today and your surgery please call your surgeon.       For questions after surgery please call your surgeons office.

## 2020-08-12 LAB
DLCOUNC-%PRED-PRE: 63 %
DLCOUNC-PRE: 13.95 ML/MIN/MMHG
DLCOUNC-PRED: 21.83 ML/MIN/MMHG
ERV-%PRED-PRE: 11 %
ERV-PRE: 0.12 L
ERV-PRED: 1.08 L
EXPTIME-PRE: 10.02 SEC
FEF2575-%PRED-PRE: 25 %
FEF2575-PRE: 0.5 L/SEC
FEF2575-PRED: 1.97 L/SEC
FEFMAX-%PRED-PRE: 48 %
FEFMAX-PRE: 3.44 L/SEC
FEFMAX-PRED: 7.07 L/SEC
FEV1-%PRED-PRE: 52 %
FEV1-PRE: 1.3 L
FEV1FEV6-PRE: 60 %
FEV1FEV6-PRED: 77 %
FEV1FVC-PRE: 60 %
FEV1FVC-PRED: 77 %
FEV1SVC-PRE: 54 %
FEV1SVC-PRED: 65 %
FIFMAX-PRE: 3.55 L/SEC
FRCPLETH-%PRED-PRE: 90 %
FRCPLETH-PRE: 3.11 L
FRCPLETH-PRED: 3.42 L
FVC-%PRED-PRE: 67 %
FVC-PRE: 2.17 L
FVC-PRED: 3.21 L
IC-%PRED-PRE: 82 %
IC-PRE: 2.28 L
IC-PRED: 2.76 L
INTERPRETATION ECG - MUSE: NORMAL
RVPLETH-%PRED-PRE: 118 %
RVPLETH-PRE: 2.99 L
RVPLETH-PRED: 2.52 L
TLCPLETH-%PRED-PRE: 88 %
TLCPLETH-PRE: 5.39 L
TLCPLETH-PRED: 6.11 L
VA-%PRED-PRE: 70 %
VA-PRE: 3.77 L
VC-%PRED-PRE: 62 %
VC-PRE: 2.4 L
VC-PRED: 3.84 L

## 2020-08-13 NOTE — PROGRESS NOTES
Mailed pre-op instructions including shower instructions, Preparing for your surgery, and map of [facility] to the pt.     Writer will attempt to contact the patient one more time to discuss pre-op information over the phone.     Sharita Saunders, RN, BSN, HEIKE  RN Care Coordinator  [facility]  [phone]

## 2020-08-14 NOTE — PROGRESS NOTES
Rosa returned writer's call. Writer discussed all the pre-op information with Rosa. She verbalized understanding and all questions were answered.     Sharita Saunders, RN, BSN, HEIKE  RN Care Coordinator  [facility]  [phone]

## 2020-08-17 DIAGNOSIS — J98.59 MEDIASTINAL MASS: Primary | ICD-10-CM

## 2020-08-17 DIAGNOSIS — Z11.59 ENCOUNTER FOR SCREENING FOR OTHER VIRAL DISEASES: ICD-10-CM

## 2020-08-17 PROCEDURE — U0003 INFECTIOUS AGENT DETECTION BY NUCLEIC ACID (DNA OR RNA); SEVERE ACUTE RESPIRATORY SYNDROME CORONAVIRUS 2 (SARS-COV-2) (CORONAVIRUS DISEASE [COVID-19]), AMPLIFIED PROBE TECHNIQUE, MAKING USE OF HIGH THROUGHPUT TECHNOLOGIES AS DESCRIBED BY CMS-2020-01-R: HCPCS | Performed by: THORACIC SURGERY (CARDIOTHORACIC VASCULAR SURGERY)

## 2020-08-18 LAB
SARS-COV-2 RNA SPEC QL NAA+PROBE: NOT DETECTED
SPECIMEN SOURCE: NORMAL

## 2020-08-19 ENCOUNTER — HOSPITAL ENCOUNTER (INPATIENT)
Facility: CLINIC | Age: 70
LOS: 2 days | Discharge: HOME | DRG: 804 | End: 2020-08-21
Attending: THORACIC SURGERY (CARDIOTHORACIC VASCULAR SURGERY) | Admitting: THORACIC SURGERY (CARDIOTHORACIC VASCULAR SURGERY)
Payer: MEDICARE

## 2020-08-19 ENCOUNTER — ANESTHESIA (OUTPATIENT)
Dept: SURGERY | Facility: CLINIC | Age: 70
DRG: 804 | End: 2020-08-19
Payer: MEDICARE

## 2020-08-19 ENCOUNTER — APPOINTMENT (OUTPATIENT)
Dept: GENERAL RADIOLOGY | Facility: CLINIC | Age: 70
DRG: 804 | End: 2020-08-19
Attending: THORACIC SURGERY (CARDIOTHORACIC VASCULAR SURGERY)
Payer: MEDICARE

## 2020-08-19 DIAGNOSIS — J98.59 MEDIASTINAL MASS: ICD-10-CM

## 2020-08-19 DIAGNOSIS — J98.59 MEDIASTINAL MASS: Primary | ICD-10-CM

## 2020-08-19 LAB
ABO + RH BLD: NORMAL
ABO + RH BLD: NORMAL
BLD GP AB SCN SERPL QL: NORMAL
BLD PROD TYP BPU: NORMAL
BLD UNIT ID BPU: 0
BLD UNIT ID BPU: 0
BLOOD BANK CMNT PATIENT-IMP: NORMAL
BLOOD BANK CMNT PATIENT-IMP: NORMAL
BLOOD PRODUCT CODE: NORMAL
BLOOD PRODUCT CODE: NORMAL
BPU ID: NORMAL
BPU ID: NORMAL
CREAT SERPL-MCNC: 0.99 MG/DL (ref 0.66–1.25)
GFR SERPL CREATININE-BSD FRML MDRD: 76 ML/MIN/{1.73_M2}
GLUCOSE BLDC GLUCOMTR-MCNC: 91 MG/DL (ref 70–99)
NUM BPU REQUESTED: 2
SPECIMEN EXP DATE BLD: NORMAL
TRANSFUSION STATUS PATIENT QL: NORMAL
TROPONIN I SERPL-MCNC: 0.02 UG/L (ref 0–0.04)

## 2020-08-19 PROCEDURE — 88307 TISSUE EXAM BY PATHOLOGIST: CPT | Performed by: THORACIC SURGERY (CARDIOTHORACIC VASCULAR SURGERY)

## 2020-08-19 PROCEDURE — 8E0W4CZ ROBOTIC ASSISTED PROCEDURE OF TRUNK REGION, PERCUTANEOUS ENDOSCOPIC APPROACH: ICD-10-PCS | Performed by: THORACIC SURGERY (CARDIOTHORACIC VASCULAR SURGERY)

## 2020-08-19 PROCEDURE — 25000128 H RX IP 250 OP 636: Performed by: STUDENT IN AN ORGANIZED HEALTH CARE EDUCATION/TRAINING PROGRAM

## 2020-08-19 PROCEDURE — 88341 IMHCHEM/IMCYTCHM EA ADD ANTB: CPT | Performed by: THORACIC SURGERY (CARDIOTHORACIC VASCULAR SURGERY)

## 2020-08-19 PROCEDURE — 36415 COLL VENOUS BLD VENIPUNCTURE: CPT

## 2020-08-19 PROCEDURE — 25000128 H RX IP 250 OP 636

## 2020-08-19 PROCEDURE — 40000170 ZZH STATISTIC PRE-PROCEDURE ASSESSMENT II: Performed by: THORACIC SURGERY (CARDIOTHORACIC VASCULAR SURGERY)

## 2020-08-19 PROCEDURE — 36000064 ZZH SURGERY LEVEL 4 EA 15 ADDTL MIN - UMMC: Performed by: THORACIC SURGERY (CARDIOTHORACIC VASCULAR SURGERY)

## 2020-08-19 PROCEDURE — 25000128 H RX IP 250 OP 636: Performed by: THORACIC SURGERY (CARDIOTHORACIC VASCULAR SURGERY)

## 2020-08-19 PROCEDURE — 40000275 ZZH STATISTIC RCP TIME EA 10 MIN

## 2020-08-19 PROCEDURE — 37000008 ZZH ANESTHESIA TECHNICAL FEE, 1ST 30 MIN: Performed by: THORACIC SURGERY (CARDIOTHORACIC VASCULAR SURGERY)

## 2020-08-19 PROCEDURE — 71000017 ZZH RECOVERY PHASE 1 LEVEL 3 EA ADDTL HR: Performed by: THORACIC SURGERY (CARDIOTHORACIC VASCULAR SURGERY)

## 2020-08-19 PROCEDURE — 71045 X-RAY EXAM CHEST 1 VIEW: CPT

## 2020-08-19 PROCEDURE — 36000062 ZZH SURGERY LEVEL 4 1ST 30 MIN - UMMC: Performed by: THORACIC SURGERY (CARDIOTHORACIC VASCULAR SURGERY)

## 2020-08-19 PROCEDURE — 25000566 ZZH SEVOFLURANE, EA 15 MIN: Performed by: THORACIC SURGERY (CARDIOTHORACIC VASCULAR SURGERY)

## 2020-08-19 PROCEDURE — 25000125 ZZHC RX 250

## 2020-08-19 PROCEDURE — 27210794 ZZH OR GENERAL SUPPLY STERILE: Performed by: THORACIC SURGERY (CARDIOTHORACIC VASCULAR SURGERY)

## 2020-08-19 PROCEDURE — 84484 ASSAY OF TROPONIN QUANT: CPT | Performed by: PHYSICIAN ASSISTANT

## 2020-08-19 PROCEDURE — 82565 ASSAY OF CREATININE: CPT

## 2020-08-19 PROCEDURE — P9016 RBC LEUKOCYTES REDUCED: HCPCS | Performed by: PHYSICIAN ASSISTANT

## 2020-08-19 PROCEDURE — 37000009 ZZH ANESTHESIA TECHNICAL FEE, EACH ADDTL 15 MIN: Performed by: THORACIC SURGERY (CARDIOTHORACIC VASCULAR SURGERY)

## 2020-08-19 PROCEDURE — 25800030 ZZH RX IP 258 OP 636

## 2020-08-19 PROCEDURE — 25000132 ZZH RX MED GY IP 250 OP 250 PS 637: Mod: GY

## 2020-08-19 PROCEDURE — 12000001 ZZH R&B MED SURG/OB UMMC

## 2020-08-19 PROCEDURE — 71000016 ZZH RECOVERY PHASE 1 LEVEL 3 FIRST HR: Performed by: THORACIC SURGERY (CARDIOTHORACIC VASCULAR SURGERY)

## 2020-08-19 PROCEDURE — 88342 IMHCHEM/IMCYTCHM 1ST ANTB: CPT | Performed by: THORACIC SURGERY (CARDIOTHORACIC VASCULAR SURGERY)

## 2020-08-19 PROCEDURE — 07TM4ZZ RESECTION OF THYMUS, PERCUTANEOUS ENDOSCOPIC APPROACH: ICD-10-PCS | Performed by: THORACIC SURGERY (CARDIOTHORACIC VASCULAR SURGERY)

## 2020-08-19 PROCEDURE — 40000014 ZZH STATISTIC ARTERIAL MONITORING DAILY

## 2020-08-19 PROCEDURE — 25000125 ZZHC RX 250: Performed by: STUDENT IN AN ORGANIZED HEALTH CARE EDUCATION/TRAINING PROGRAM

## 2020-08-19 PROCEDURE — 00000146 ZZHCL STATISTIC GLUCOSE BY METER IP

## 2020-08-19 PROCEDURE — 88305 TISSUE EXAM BY PATHOLOGIST: CPT | Performed by: THORACIC SURGERY (CARDIOTHORACIC VASCULAR SURGERY)

## 2020-08-19 RX ORDER — SODIUM CHLORIDE, SODIUM LACTATE, POTASSIUM CHLORIDE, CALCIUM CHLORIDE 600; 310; 30; 20 MG/100ML; MG/100ML; MG/100ML; MG/100ML
INJECTION, SOLUTION INTRAVENOUS CONTINUOUS PRN
Status: DISCONTINUED | OUTPATIENT
Start: 2020-08-19 | End: 2020-08-19

## 2020-08-19 RX ORDER — CEFAZOLIN SODIUM 1 G/3ML
1 INJECTION, POWDER, FOR SOLUTION INTRAMUSCULAR; INTRAVENOUS SEE ADMIN INSTRUCTIONS
Status: DISCONTINUED | OUTPATIENT
Start: 2020-08-19 | End: 2020-08-19 | Stop reason: HOSPADM

## 2020-08-19 RX ORDER — HYDROMORPHONE HYDROCHLORIDE 1 MG/ML
0.3 INJECTION, SOLUTION INTRAMUSCULAR; INTRAVENOUS; SUBCUTANEOUS
Status: DISCONTINUED | OUTPATIENT
Start: 2020-08-19 | End: 2020-08-21

## 2020-08-19 RX ORDER — HYDROMORPHONE HYDROCHLORIDE 1 MG/ML
.3-.5 INJECTION, SOLUTION INTRAMUSCULAR; INTRAVENOUS; SUBCUTANEOUS EVERY 5 MIN PRN
Status: DISCONTINUED | OUTPATIENT
Start: 2020-08-19 | End: 2020-08-19 | Stop reason: HOSPADM

## 2020-08-19 RX ORDER — CETIRIZINE HYDROCHLORIDE 10 MG/1
10 TABLET ORAL EVERY EVENING
Status: DISCONTINUED | OUTPATIENT
Start: 2020-08-19 | End: 2020-08-21 | Stop reason: HOSPADM

## 2020-08-19 RX ORDER — NALOXONE HYDROCHLORIDE 0.4 MG/ML
.1-.4 INJECTION, SOLUTION INTRAMUSCULAR; INTRAVENOUS; SUBCUTANEOUS
Status: ACTIVE | OUTPATIENT
Start: 2020-08-19 | End: 2020-08-20

## 2020-08-19 RX ORDER — ONDANSETRON 4 MG/1
4 TABLET, ORALLY DISINTEGRATING ORAL EVERY 30 MIN PRN
Status: DISCONTINUED | OUTPATIENT
Start: 2020-08-19 | End: 2020-08-19 | Stop reason: HOSPADM

## 2020-08-19 RX ORDER — GLYCOPYRROLATE 0.2 MG/ML
INJECTION, SOLUTION INTRAMUSCULAR; INTRAVENOUS PRN
Status: DISCONTINUED | OUTPATIENT
Start: 2020-08-19 | End: 2020-08-19

## 2020-08-19 RX ORDER — ONDANSETRON 4 MG/1
4 TABLET, ORALLY DISINTEGRATING ORAL EVERY 6 HOURS PRN
Status: DISCONTINUED | OUTPATIENT
Start: 2020-08-19 | End: 2020-08-21 | Stop reason: HOSPADM

## 2020-08-19 RX ORDER — SODIUM CHLORIDE, SODIUM LACTATE, POTASSIUM CHLORIDE, CALCIUM CHLORIDE 600; 310; 30; 20 MG/100ML; MG/100ML; MG/100ML; MG/100ML
INJECTION, SOLUTION INTRAVENOUS CONTINUOUS
Status: DISCONTINUED | OUTPATIENT
Start: 2020-08-19 | End: 2020-08-19 | Stop reason: HOSPADM

## 2020-08-19 RX ORDER — LIDOCAINE HYDROCHLORIDE 20 MG/ML
INJECTION, SOLUTION INFILTRATION; PERINEURAL PRN
Status: DISCONTINUED | OUTPATIENT
Start: 2020-08-19 | End: 2020-08-19

## 2020-08-19 RX ORDER — LABETALOL 20 MG/4 ML (5 MG/ML) INTRAVENOUS SYRINGE
10
Status: DISCONTINUED | OUTPATIENT
Start: 2020-08-19 | End: 2020-08-19 | Stop reason: HOSPADM

## 2020-08-19 RX ORDER — DEXAMETHASONE SODIUM PHOSPHATE 4 MG/ML
INJECTION, SOLUTION INTRA-ARTICULAR; INTRALESIONAL; INTRAMUSCULAR; INTRAVENOUS; SOFT TISSUE PRN
Status: DISCONTINUED | OUTPATIENT
Start: 2020-08-19 | End: 2020-08-19

## 2020-08-19 RX ORDER — LIDOCAINE 40 MG/G
CREAM TOPICAL
Status: DISCONTINUED | OUTPATIENT
Start: 2020-08-19 | End: 2020-08-21 | Stop reason: HOSPADM

## 2020-08-19 RX ORDER — BUPIVACAINE HYDROCHLORIDE 2.5 MG/ML
INJECTION, SOLUTION EPIDURAL; INFILTRATION; INTRACAUDAL PRN
Status: DISCONTINUED | OUTPATIENT
Start: 2020-08-19 | End: 2020-08-19 | Stop reason: HOSPADM

## 2020-08-19 RX ORDER — NALOXONE HYDROCHLORIDE 0.4 MG/ML
.1-.4 INJECTION, SOLUTION INTRAMUSCULAR; INTRAVENOUS; SUBCUTANEOUS
Status: DISCONTINUED | OUTPATIENT
Start: 2020-08-19 | End: 2020-08-21 | Stop reason: HOSPADM

## 2020-08-19 RX ORDER — CEFAZOLIN SODIUM 2 G/100ML
2 INJECTION, SOLUTION INTRAVENOUS
Status: DISCONTINUED | OUTPATIENT
Start: 2020-08-19 | End: 2020-08-19 | Stop reason: HOSPADM

## 2020-08-19 RX ORDER — CEFAZOLIN SODIUM 2 G/100ML
INJECTION, SOLUTION INTRAVENOUS PRN
Status: DISCONTINUED | OUTPATIENT
Start: 2020-08-19 | End: 2020-08-19

## 2020-08-19 RX ORDER — ASPIRIN 81 MG/1
81 TABLET ORAL DAILY
Status: DISCONTINUED | OUTPATIENT
Start: 2020-08-19 | End: 2020-08-21 | Stop reason: HOSPADM

## 2020-08-19 RX ORDER — EPHEDRINE SULFATE 50 MG/ML
INJECTION, SOLUTION INTRAMUSCULAR; INTRAVENOUS; SUBCUTANEOUS PRN
Status: DISCONTINUED | OUTPATIENT
Start: 2020-08-19 | End: 2020-08-19

## 2020-08-19 RX ORDER — OXYCODONE HYDROCHLORIDE 5 MG/1
5 TABLET ORAL EVERY 4 HOURS PRN
Status: DISCONTINUED | OUTPATIENT
Start: 2020-08-19 | End: 2020-08-20

## 2020-08-19 RX ORDER — FENTANYL CITRATE 50 UG/ML
INJECTION, SOLUTION INTRAMUSCULAR; INTRAVENOUS PRN
Status: DISCONTINUED | OUTPATIENT
Start: 2020-08-19 | End: 2020-08-19

## 2020-08-19 RX ORDER — ONDANSETRON 2 MG/ML
4 INJECTION INTRAMUSCULAR; INTRAVENOUS EVERY 30 MIN PRN
Status: DISCONTINUED | OUTPATIENT
Start: 2020-08-19 | End: 2020-08-19 | Stop reason: HOSPADM

## 2020-08-19 RX ORDER — ACETAMINOPHEN 325 MG/1
975 TABLET ORAL EVERY 6 HOURS
Status: DISCONTINUED | OUTPATIENT
Start: 2020-08-19 | End: 2020-08-21 | Stop reason: HOSPADM

## 2020-08-19 RX ORDER — PROPOFOL 10 MG/ML
INJECTION, EMULSION INTRAVENOUS PRN
Status: DISCONTINUED | OUTPATIENT
Start: 2020-08-19 | End: 2020-08-19

## 2020-08-19 RX ORDER — AMLODIPINE BESYLATE 10 MG/1
10 TABLET ORAL DAILY
Status: DISCONTINUED | OUTPATIENT
Start: 2020-08-19 | End: 2020-08-21 | Stop reason: HOSPADM

## 2020-08-19 RX ORDER — ONDANSETRON 2 MG/ML
INJECTION INTRAMUSCULAR; INTRAVENOUS PRN
Status: DISCONTINUED | OUTPATIENT
Start: 2020-08-19 | End: 2020-08-19

## 2020-08-19 RX ORDER — FENTANYL CITRATE 50 UG/ML
25-50 INJECTION, SOLUTION INTRAMUSCULAR; INTRAVENOUS
Status: DISCONTINUED | OUTPATIENT
Start: 2020-08-19 | End: 2020-08-19 | Stop reason: HOSPADM

## 2020-08-19 RX ADMIN — FENTANYL CITRATE 100 MCG: 50 INJECTION, SOLUTION INTRAMUSCULAR; INTRAVENOUS at 08:51

## 2020-08-19 RX ADMIN — HYDROMORPHONE HYDROCHLORIDE 0.3 MG: 1 INJECTION, SOLUTION INTRAMUSCULAR; INTRAVENOUS; SUBCUTANEOUS at 15:47

## 2020-08-19 RX ADMIN — PHENYLEPHRINE HYDROCHLORIDE 100 MCG: 10 INJECTION INTRAVENOUS at 09:22

## 2020-08-19 RX ADMIN — Medication 5 MG: at 08:40

## 2020-08-19 RX ADMIN — ACETAMINOPHEN 975 MG: 325 TABLET, FILM COATED ORAL at 23:51

## 2020-08-19 RX ADMIN — SUGAMMADEX 200 MG: 100 INJECTION, SOLUTION INTRAVENOUS at 10:59

## 2020-08-19 RX ADMIN — Medication 2 G: at 08:09

## 2020-08-19 RX ADMIN — GLYCOPYRROLATE 0.2 MG: 0.2 INJECTION, SOLUTION INTRAMUSCULAR; INTRAVENOUS at 08:39

## 2020-08-19 RX ADMIN — AMLODIPINE BESYLATE 10 MG: 10 TABLET ORAL at 15:46

## 2020-08-19 RX ADMIN — PHENYLEPHRINE HYDROCHLORIDE 100 MCG: 10 INJECTION INTRAVENOUS at 08:58

## 2020-08-19 RX ADMIN — FENTANYL CITRATE 50 MCG: 50 INJECTION, SOLUTION INTRAMUSCULAR; INTRAVENOUS at 11:35

## 2020-08-19 RX ADMIN — NOREPINEPHRINE BITARTRATE 6.4 MCG: 1 INJECTION, SOLUTION, CONCENTRATE INTRAVENOUS at 09:26

## 2020-08-19 RX ADMIN — PHENYLEPHRINE HYDROCHLORIDE 100 MCG: 10 INJECTION INTRAVENOUS at 09:07

## 2020-08-19 RX ADMIN — OXYCODONE HYDROCHLORIDE 5 MG: 5 TABLET ORAL at 20:10

## 2020-08-19 RX ADMIN — PROPOFOL 180 MG: 10 INJECTION, EMULSION INTRAVENOUS at 07:47

## 2020-08-19 RX ADMIN — HYDROMORPHONE HYDROCHLORIDE 0.5 MG: 1 INJECTION, SOLUTION INTRAMUSCULAR; INTRAVENOUS; SUBCUTANEOUS at 11:20

## 2020-08-19 RX ADMIN — FENTANYL CITRATE 50 MCG: 50 INJECTION, SOLUTION INTRAMUSCULAR; INTRAVENOUS at 08:36

## 2020-08-19 RX ADMIN — Medication 5 MG: at 10:24

## 2020-08-19 RX ADMIN — DEXAMETHASONE SODIUM PHOSPHATE 8 MG: 4 INJECTION, SOLUTION INTRA-ARTICULAR; INTRALESIONAL; INTRAMUSCULAR; INTRAVENOUS; SOFT TISSUE at 10:10

## 2020-08-19 RX ADMIN — NOREPINEPHRINE BITARTRATE 6.4 MCG: 1 INJECTION, SOLUTION, CONCENTRATE INTRAVENOUS at 09:23

## 2020-08-19 RX ADMIN — ACETAMINOPHEN 975 MG: 325 TABLET, FILM COATED ORAL at 15:46

## 2020-08-19 RX ADMIN — HYDROMORPHONE HYDROCHLORIDE 0.5 MG: 1 INJECTION, SOLUTION INTRAMUSCULAR; INTRAVENOUS; SUBCUTANEOUS at 11:30

## 2020-08-19 RX ADMIN — FENTANYL CITRATE 50 MCG: 50 INJECTION, SOLUTION INTRAMUSCULAR; INTRAVENOUS at 11:45

## 2020-08-19 RX ADMIN — PHENYLEPHRINE HYDROCHLORIDE 0.3 MCG: 10 INJECTION INTRAVENOUS at 09:27

## 2020-08-19 RX ADMIN — PHENYLEPHRINE HYDROCHLORIDE 100 MCG: 10 INJECTION INTRAVENOUS at 08:45

## 2020-08-19 RX ADMIN — UMECLIDINIUM BROMIDE AND VILANTEROL TRIFENATATE 1 PUFF: 62.5; 25 POWDER RESPIRATORY (INHALATION) at 17:18

## 2020-08-19 RX ADMIN — CETIRIZINE HYDROCHLORIDE 10 MG: 10 TABLET, FILM COATED ORAL at 20:10

## 2020-08-19 RX ADMIN — PROPOFOL 40 MG: 10 INJECTION, EMULSION INTRAVENOUS at 08:51

## 2020-08-19 RX ADMIN — PHENYLEPHRINE HYDROCHLORIDE 100 MCG: 10 INJECTION INTRAVENOUS at 09:01

## 2020-08-19 RX ADMIN — PHENYLEPHRINE HYDROCHLORIDE 100 MCG: 10 INJECTION INTRAVENOUS at 09:10

## 2020-08-19 RX ADMIN — ONDANSETRON 4 MG: 4 TABLET, ORALLY DISINTEGRATING ORAL at 20:35

## 2020-08-19 RX ADMIN — SODIUM CHLORIDE, POTASSIUM CHLORIDE, SODIUM LACTATE AND CALCIUM CHLORIDE: 600; 310; 30; 20 INJECTION, SOLUTION INTRAVENOUS at 07:38

## 2020-08-19 RX ADMIN — ASPIRIN 81 MG: 81 TABLET, COATED ORAL at 17:17

## 2020-08-19 RX ADMIN — FENTANYL CITRATE 100 MCG: 50 INJECTION, SOLUTION INTRAMUSCULAR; INTRAVENOUS at 07:47

## 2020-08-19 RX ADMIN — FENTANYL CITRATE 50 MCG: 50 INJECTION, SOLUTION INTRAMUSCULAR; INTRAVENOUS at 11:01

## 2020-08-19 RX ADMIN — LIDOCAINE HYDROCHLORIDE 60 MG: 20 INJECTION, SOLUTION INFILTRATION; PERINEURAL at 07:47

## 2020-08-19 RX ADMIN — ROCURONIUM BROMIDE 20 MG: 10 INJECTION INTRAVENOUS at 09:05

## 2020-08-19 RX ADMIN — ENOXAPARIN SODIUM 40 MG: 40 INJECTION SUBCUTANEOUS at 06:51

## 2020-08-19 RX ADMIN — ROCURONIUM BROMIDE 30 MG: 10 INJECTION INTRAVENOUS at 09:55

## 2020-08-19 RX ADMIN — Medication 1 G: at 10:12

## 2020-08-19 RX ADMIN — PHENYLEPHRINE HYDROCHLORIDE 100 MCG: 10 INJECTION INTRAVENOUS at 09:09

## 2020-08-19 RX ADMIN — ONDANSETRON 4 MG: 2 INJECTION INTRAMUSCULAR; INTRAVENOUS at 10:59

## 2020-08-19 RX ADMIN — ROCURONIUM BROMIDE 50 MG: 10 INJECTION INTRAVENOUS at 07:47

## 2020-08-19 RX ADMIN — NOREPINEPHRINE BITARTRATE 6.4 MCG: 1 INJECTION, SOLUTION, CONCENTRATE INTRAVENOUS at 10:27

## 2020-08-19 RX ADMIN — ONDANSETRON 4 MG: 2 INJECTION INTRAMUSCULAR; INTRAVENOUS at 12:06

## 2020-08-19 RX ADMIN — FENTANYL CITRATE 50 MCG: 50 INJECTION, SOLUTION INTRAMUSCULAR; INTRAVENOUS at 12:06

## 2020-08-19 RX ADMIN — Medication 5 MG: at 08:45

## 2020-08-19 RX ADMIN — NOREPINEPHRINE BITARTRATE 6.4 MCG: 1 INJECTION, SOLUTION, CONCENTRATE INTRAVENOUS at 09:14

## 2020-08-19 ASSESSMENT — MIFFLIN-ST. JEOR: SCORE: 1300.5

## 2020-08-19 ASSESSMENT — ACTIVITIES OF DAILY LIVING (ADL)
ADLS_ACUITY_SCORE: 16
ADLS_ACUITY_SCORE: 16

## 2020-08-19 NOTE — PLAN OF CARE
"Care from [id number]. Icelandic  may be needed.    Status: Patient arrive to 7B from PACU around 1337. POD #0: B/L subxiphoid thorascopic thymectomy.  Vitals: BP (!) 141/73 (BP Location: Left arm)   Pulse 93   Temp 96.7  F (35.9  C) (Oral)   Resp 11   Ht 1.676 m (5' 5.98\")   Wt 60.8 kg (134 lb 0.6 oz)   SpO2 98%   BMI 21.65 kg/m  . Capno monitoring continued. Patient was sating low 80s to 90 with 2L O2 via NC. Oxymask was placed and O2 was increased with no change according to the monitor. New capno machine ordered, another pulse oximeter was placed on portable vital machine showing % O2 sats with 2L O2 via NC.  Neuros: Lethargic, snoring.  IV: L PIV SL.  Resp/trach: LS clear, posterior LS with fine crackles.  Diet: No diet order.  Bowel status: BS hypoactive. No BM.  : Voiding using urinal.  Skin: Full skin check completed by writer. Preventative mepilex on coccyx. 4 lap sites with liquid bandage. CT site CDI, hematoma marked & unchanged. CT to water seal with sanguinous output. Abdominal binder.  Pain: Denies pain.  Activity: Patient was rolled for skin check, has not gotten out of bed yet.  Social: PACU RN reported calling family to update them.  Plan: Thoracic surgery was paged regarding patient's status with capnography machine. Stat chest x-ray ordered. Will continue to monitor and follow POC.  "

## 2020-08-19 NOTE — ANESTHESIA CARE TRANSFER NOTE
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    Anesthesia Type:   General     Note:  Airway :Face Mask  Patient transferred to:PACU  Comments: VSS. Nursing noticed hematoma on right abdomen. Surgeon notified and evaluated. Handoff Report: Identifed the Patient, Identified the Reponsible Provider, Reviewed the pertinent medical history, Discussed the surgical course, Reviewed Intra-OP anesthesia mangement and issues during anesthesia, Set expectations for post-procedure period and Allowed opportunity for questions and acknowledgement of understanding      Vitals: (Last set prior to Anesthesia Care Transfer)    CRNA VITALS  8/19/2020 1041 - 8/19/2020 1133      8/19/2020             Pulse:  76    ART BP:  124/64    ART Mean:  90    Ht Rate:  82    SpO2:  100 %    Resp Rate (observed):  (!) 4    EKG:  Sinus rhythm;PVC's;NSR                Electronically Signed By: Rob Gramajo MD  August 19, 2020  11:33 AM

## 2020-08-19 NOTE — PROGRESS NOTES
SPIRITUAL HEALTH SERVICES  [facility] ([place] 3C   PRE-SURGERY VISIT    Had pre-surgery visit with pt.  Provided spiritual support, prayer. Pt's spouse was with him.    Rev. Tania Camp MDiv, [facility]  Staff    Pager [phone]

## 2020-08-19 NOTE — PLAN OF CARE
"Vital signs:  /74   Pulse 93   Temp 96.7  F (35.9  C) (Oral)   Resp 14   Ht 1.676 m (5' 5.98\")   Wt 60.8 kg (134 lb 0.6 oz)   SpO2 93%   BMI 21.65 kg/m      Shift:[id number]     POD 0- Bilateral subxiphoid thoracoscopic thymectomy      Activity: Assist x1-lethargic throughout night, stood at side of bed for around 5 min, walked in place and tried to void  Neuros: A&O x4  Cardiac:tachy at times  Respiratory: Ls:clear and fine crackles, crepitus around neck, pt complains of SOB at times, CAPNO w/ 2 L NC, encourage IS use.   GI/: +BS, last BM 8/18, Bladder scanned @ 2130 and only had 250 ml. Voided 75 ml later.   Diet:Reg  Skin: intact other than 4 surgical port sites and CT site, R lower abd site w/ hematoma-unchanged during shift. L PIV   Lines:L hand PIV  Labs: troponin 0.017  Pain/nausea:chest pain controlled with IV dilaudid & oxycodone, int nausea controlled with zofran  Plan:Cont POC       "

## 2020-08-19 NOTE — OP NOTE
Procedure Date: 2020      PREOPERATIVE DIAGNOSIS:  Thymic tumor.      POSTOPERATIVE DIAGNOSIS:  Thymic tumor.      PROCEDURES PERFORMED:  Subxiphoid and bilateral thoracoscopic thymectomy.      SURGEON:  Cecile Trent MD (I am dictating as cosurgeon with Dr. Garcia, this was a very challenging case that required 2 staff surgeons at all times despite the presence of a qualified resident).      DESCRIPTION OF PROCEDURE:  I participated with placement of ports.  I placed 2 left subcostal ports.  For the left most lateral subcostal port, made a 1 cm incision and then first performed digital dissection to the level of the diaphragm and then placed a 5 mm port through that incision to reduce the chance of peritoneal entrance.  Once we had all our 5 ports in, I assisted Dr. Garcia with the right-sided dissection and then I performed the entire left-sided dissection along the left phrenic nerve and the left leora.  I also performed the circumferential dissection of the innominate vein on the right side, including placement of a vessel loop.  After this, we removed the specimen through an EndoCatch bag.  At the end, we also performed a laparoscopy to see that both lateral subcostal ports were indeed extraperitoneal.      The patient tolerated the procedure well.      Please refer to Dr. Garcia's report for further details.         CECILE TRENT MD             D: 2020   T: 2020   MT: KASSI      Name:     FINA SALOMON   MRN:      [id number]        Account:        [id number]   :      1950           Procedure Date: 2020      Document: [id number]

## 2020-08-19 NOTE — BRIEF OP NOTE
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]    Post-operative diagnosis Same as pre-operative diagnosis    Procedure:  Bilateral Subxiphoid Thoracoscopic Thymectomy     Surgeon: Surgeon(s) and Role:     * Tate Garcia MD - Primary     * Jorge Trent MD - Assisting      * Clark Hardwick MD - Fellow    Anesthesia: General     Estimated blood loss: Less than 50 ml    Drains:  19 Fr Khris Drain (Right Pleural) to Water Seal    Specimens:   ID Type Source Tests Collected by Time Destination   A : Left Superior Mediastinal Lymph Node Tissue Lymph Node SURGICAL PATHOLOGY EXAM Tate Garcia MD 8/19/2020  9:28 AM    B :  Tissue Thymus SURGICAL PATHOLOGY EXAM Tate Garcia MD 8/19/2020 10:42 AM      Findings: None    Complications: None    Implants: None

## 2020-08-19 NOTE — ADDENDUM NOTE
Addendum  created 08/19/20 8898 by Unique Loomis MD    Child order released for a procedure order, Clinical Note Signed, Intraprocedure Blocks edited

## 2020-08-19 NOTE — ANESTHESIA POSTPROCEDURE EVALUATION
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     Anesthesia Type: General       Disposition: Admission   Postop Pain Control: Uneventful            Sign Out: Well controlled pain   PONV: No   Neuro/Psych: Uneventful            Sign Out: Acceptable/Baseline neuro status   Airway/Respiratory: Uneventful            Sign Out: Acceptable/Baseline resp. status   CV/Hemodynamics: Uneventful            Sign Out: Acceptable CV status   Other NRE: NONE   DID A NON-ROUTINE EVENT OCCUR? No         Last Anesthesia Record Vitals:  CRNA VITALS  2020 1041 - 2020 1141      2020             Pulse:  76    ART BP:  124/64    ART Mean:  90    Ht Rate:  82    SpO2:  100 %    Resp Rate (observed):  (!) 4    EKG:  Sinus rhythm;PVC's;NSR          Last PACU Vitals:  Vitals Value Taken Time   /80 2020 12:15 PM   Temp 35.9  C (96.7  F) 2020 11:19 AM   Pulse 92 2020 12:17 PM   Resp 25 2020 11:30 AM   SpO2 99 % 2020 12:17 PM   Temp src     NIBP     Pulse     SpO2     Resp     Temp     Ht Rate     Temp 2     Vitals shown include unvalidated device data.      Electronically Signed By: Myke Medina MD, 2020, 12:18 PM

## 2020-08-19 NOTE — ANESTHESIA PROCEDURE NOTES
Perioperative GENARO Report    Preanesthesia Checklist:  Patient identified, IV assessed, risks and benefits discussed, monitors and equipment assessed, procedure being performed at surgeon's request and anesthesia consent obtained.  General Procedure Information  Modalities:  2D, CW Doppler and PW Doppler    Anterior mediastinal mass  Echocardiographic and Doppler Measurements  Right Ventricle:  Cavity size dilated.   Global function normal.     Left Ventricle:  Hypertrophy present.   Global Function normal.       Ventricular Regional Function:  1- Basal Anteroseptal:  hypokinetic  2- Basal Anterior:  hypokinetic  3- Basal Anterolateral:  hypokinetic  4- Basal Inferolateral:  hypokinetic  5- Basal Inferior:  hypokinetic  6- Basal Inferoseptal:  hypokinetic  7- Mid Anteroseptal:  hypokinetic  8- Mid Anterior:  hypokinetic  9- Mid Anterolateral:  hypokinetic  10- Mid Inferolateral:  hypokinetic  11- Mid Inferior:  hypokinetic  12- Mid Inferoseptal:  hypokinetic  13- Apical Anterior:  hypokinetic  14- Apical Lateral:  hypokinetic  15- Apical Inferior:  hypokinetic  16- Apical Septal:  hypokinetic  17- [place]:  hypokinetic    Valves  Aortic Valve: Annulus normal.  Regurgitation +1.  Leaflets normal.  Leaflet motions normal.    Mitral Valve: Annulus normal.  Leaflets normal.  Leaflet motions normal.    Tricuspid Valve: Annulus normal.  Regurgitation absent.  Leaflets normal.  Leaflet motions normal.    Pulmonic Valve: Annulus normal.      Aorta: Ascending Aorta: Size normal.    Aortic Arch: Size normal.     Descending Aorta: Size normal.         Right Atrium:  Size normal.   Spontaneous echo contrast not present.   Thrombus not present.   Tumor not present.   Device not present.     Left Atrium: Size normal.  Spontaneous echo contrast not present.  Thrombus not present.  Tumor not present.  Device not present.    Left atrial appendage normal.     Atrial Septum: Intra-atrial septal morphology normal.     Ventricular Septum:  Intra-ventricular septum morphology hypertrophy.       Other Findings:   Pericardium:  normal.  Pleural Effusion:  none. Pulmonary Arteries:  normal.  .  No coronary sinus catheter present.  .  .  Post Intervention Findings  . Global function:  Unchanged.   Regional wall motion:  Unchanged   Surgeon(s) notified of all postintervention findings:  Yes  .  .  .   .  .  .  .  .  .  .        Echocardiogram Comments

## 2020-08-19 NOTE — ANESTHESIA PROCEDURE NOTES
Arterial Line Procedure Note  Staff -   Anesthesiologist:  Myke Medina MD  Resident/Fellow: Rob Gramajo MD    Performed By: resident          Location: In OR After Induction  Procedure Start/Stop Times:     patient identified, IV checked, site marked, risks and benefits discussed, informed consent, monitors and equipment checked, pre-op evaluation and at physician/surgeon's request      Correct Patient: Yes      Correct Position: Yes      Correct Site: Yes      Correct Procedure: Yes      Correct Laterality:  Yes    Site Marked:  Yes  Line Placement:     Procedure:  Arterial Line    Insertion Site:  Radial    Insertion laterality:  Right    Skin Prep: Chloraprep      Patient Prep: patient draped and mask      Local skin infiltration:  None    Ultrasound Guided?: No      Catheter size:  20 gauge, Quick cath    Cath secured with: anchor securement device      Dressing:  Tegaderm    Complications:  None obvious    Arterial waveform: Yes      IBP within 10% of NIBP: Yes

## 2020-08-19 NOTE — OP NOTE
Preoperative diagnosis:                         Anterior mediastinal mass  Postoperative diagnosis:                       Anterior mediastinal mass     Procedure:   1. Bilateral subxiphoid VATS thymectomy     Anesthesia: General   Surgeon: Tate Garcia   Co-surgeon:  Jorge Trent  Resident surgeon: Clark Hardwick  EBL:  Less than 30 mL     Complications: none immediate     Description of procedure The patient was brought to the room and placed supine upon the table.  After confirming the patient's identity and verifying the consent, appropriate monitoring devices were placed, as well as SCD boots. General anesthesia was administered and the patient intubated with a single lumen endotracheal tube.   Intravenous antibiotic was administered within 1 hour prior to the incision.  A roll was placed under the back and the arms were secured.  The chest was prepped with chlorhexidine and  draped in the standard surgical fashion.       A subxiphoid incision was made and the pleural space was entered with blunt dissection. A 12 mm port was used to enter the right pleural space and the cavity was insufflated to 10 mm Hg . The port was upsized to 15 mm.  Two 5 mm ports were placed, one in either pleural cavity.  Using a 5 mm, 45 degree camera, a 12 port was placed on either side more laterally.      The mass was noted in the right anterior mediastinum, anterior to the phrenic nerve.   Given its intimate association with the superior vena cava and innominate vein, Dr. Trent assisted with the dissection.  The fat pad near the diaphragm was taken down and the dissection was continued cephalad, taking care to stay away from the phrenic nerve, which was quite far.  The dissection was carried cephalad and towards the right.  The mammary vessels were easily identified and spared from dissection. The right phrenic nerve was again identified and the dissection along the left side was carried out a safe distance away from the phrenic  nerve.  As the bilateral dissection continued cephalad, we identified the innominate vein and identified branches going to the thymus. The horns were dissected free.  The mass was dissected carefully off the SVC and the junction with the innominate vein was exposed.  The innominate was dissected free on either side of the mass and the vein was controlled with double looped vessel loops on either side.  The mass was  from the vein without incident.  An Endo Catch bag was used to bring out the thymus, which was passed off for permanent pathologic analysis.    The cavity was inspected for hemostasis, which was noted.  One 19 Ecuadorean Khris drain was placed. This was secured to the skin using 0 silk suture.  The gas was allowed to escape and the lungs were observed to inflate appropriately.  The scope was passed into the peritoneal cavity and the ports had not violated the peritoneum. The incisions were irrigated and closed.  The areas were cleaned and dried and Exofin was applied.  Sterile dressings were applied to the chest tube site.    At  the end of the case, sponge, needle, and instrument counts were correct.

## 2020-08-19 NOTE — OR NURSING
Pt arrived in PACU at 1119 with an extreme amount of pain. Pt noticed to have a hematoma forming around right incision. Mulu GALVEZ at bedside and ordered to place a 1L bag over incision. Hematoma marked. Pain is getting better.     1145: chest x-ray complete. Mulu GALVEZ at bedside. Pt has a moderate amount of crepitus on left side of chest along with around bilateral neck. Plan to keep chest tube to water seal. Abdominal binder removed and hematoma decreasing. Mulu GALVEZ stated that patient no longer needs the 1L bag on incision but continue to monitor.

## 2020-08-20 ENCOUNTER — APPOINTMENT (OUTPATIENT)
Dept: GENERAL RADIOLOGY | Facility: CLINIC | Age: 70
DRG: 804 | End: 2020-08-20
Attending: THORACIC SURGERY (CARDIOTHORACIC VASCULAR SURGERY)
Payer: MEDICARE

## 2020-08-20 LAB
ANION GAP SERPL CALCULATED.3IONS-SCNC: 6 MMOL/L (ref 3–14)
ANION GAP SERPL CALCULATED.3IONS-SCNC: 7 MMOL/L (ref 3–14)
BUN SERPL-MCNC: 32 MG/DL (ref 7–30)
BUN SERPL-MCNC: 36 MG/DL (ref 7–30)
CALCIUM SERPL-MCNC: 8.8 MG/DL (ref 8.5–10.1)
CALCIUM SERPL-MCNC: 9 MG/DL (ref 8.5–10.1)
CHLORIDE SERPL-SCNC: 104 MMOL/L (ref 94–109)
CHLORIDE SERPL-SCNC: 104 MMOL/L (ref 94–109)
CO2 SERPL-SCNC: 28 MMOL/L (ref 20–32)
CO2 SERPL-SCNC: 28 MMOL/L (ref 20–32)
CREAT SERPL-MCNC: 1.04 MG/DL (ref 0.66–1.25)
CREAT SERPL-MCNC: 1.14 MG/DL (ref 0.66–1.25)
ERYTHROCYTE [DISTWIDTH] IN BLOOD BY AUTOMATED COUNT: 13.7 % (ref 10–15)
GFR SERPL CREATININE-BSD FRML MDRD: 64 ML/MIN/{1.73_M2}
GFR SERPL CREATININE-BSD FRML MDRD: 71 ML/MIN/{1.73_M2}
GLUCOSE BLDC GLUCOMTR-MCNC: 144 MG/DL (ref 70–99)
GLUCOSE SERPL-MCNC: 112 MG/DL (ref 70–99)
GLUCOSE SERPL-MCNC: 119 MG/DL (ref 70–99)
HCT VFR BLD AUTO: 39.9 % (ref 40–53)
HGB BLD-MCNC: 13.1 G/DL (ref 13.3–17.7)
MAGNESIUM SERPL-MCNC: 2 MG/DL (ref 1.6–2.3)
MCH RBC QN AUTO: 29.8 PG (ref 26.5–33)
MCHC RBC AUTO-ENTMCNC: 32.8 G/DL (ref 31.5–36.5)
MCV RBC AUTO: 91 FL (ref 78–100)
PLATELET # BLD AUTO: 171 10E9/L (ref 150–450)
POTASSIUM SERPL-SCNC: 3.7 MMOL/L (ref 3.4–5.3)
POTASSIUM SERPL-SCNC: 4.1 MMOL/L (ref 3.4–5.3)
RBC # BLD AUTO: 4.39 10E12/L (ref 4.4–5.9)
SODIUM SERPL-SCNC: 137 MMOL/L (ref 133–144)
SODIUM SERPL-SCNC: 139 MMOL/L (ref 133–144)
TROPONIN I SERPL-MCNC: 0.02 UG/L (ref 0–0.04)
WBC # BLD AUTO: 15.3 10E9/L (ref 4–11)

## 2020-08-20 PROCEDURE — 25000128 H RX IP 250 OP 636

## 2020-08-20 PROCEDURE — 80048 BASIC METABOLIC PNL TOTAL CA: CPT | Performed by: STUDENT IN AN ORGANIZED HEALTH CARE EDUCATION/TRAINING PROGRAM

## 2020-08-20 PROCEDURE — 85049 AUTOMATED PLATELET COUNT: CPT | Performed by: STUDENT IN AN ORGANIZED HEALTH CARE EDUCATION/TRAINING PROGRAM

## 2020-08-20 PROCEDURE — 93005 ELECTROCARDIOGRAM TRACING: CPT

## 2020-08-20 PROCEDURE — 00000146 ZZHCL STATISTIC GLUCOSE BY METER IP

## 2020-08-20 PROCEDURE — 36415 COLL VENOUS BLD VENIPUNCTURE: CPT | Performed by: STUDENT IN AN ORGANIZED HEALTH CARE EDUCATION/TRAINING PROGRAM

## 2020-08-20 PROCEDURE — 12000001 ZZH R&B MED SURG/OB UMMC

## 2020-08-20 PROCEDURE — 25000132 ZZH RX MED GY IP 250 OP 250 PS 637: Mod: GY | Performed by: STUDENT IN AN ORGANIZED HEALTH CARE EDUCATION/TRAINING PROGRAM

## 2020-08-20 PROCEDURE — 25000128 H RX IP 250 OP 636: Performed by: STUDENT IN AN ORGANIZED HEALTH CARE EDUCATION/TRAINING PROGRAM

## 2020-08-20 PROCEDURE — 71045 X-RAY EXAM CHEST 1 VIEW: CPT | Mod: 77

## 2020-08-20 PROCEDURE — 40000141 ZZH STATISTIC PERIPHERAL IV START W/O US GUIDANCE

## 2020-08-20 PROCEDURE — 83735 ASSAY OF MAGNESIUM: CPT | Performed by: STUDENT IN AN ORGANIZED HEALTH CARE EDUCATION/TRAINING PROGRAM

## 2020-08-20 PROCEDURE — 85027 COMPLETE CBC AUTOMATED: CPT | Performed by: STUDENT IN AN ORGANIZED HEALTH CARE EDUCATION/TRAINING PROGRAM

## 2020-08-20 PROCEDURE — 25000132 ZZH RX MED GY IP 250 OP 250 PS 637: Mod: GY

## 2020-08-20 PROCEDURE — 93010 ELECTROCARDIOGRAM REPORT: CPT | Performed by: INTERNAL MEDICINE

## 2020-08-20 PROCEDURE — 84484 ASSAY OF TROPONIN QUANT: CPT | Performed by: STUDENT IN AN ORGANIZED HEALTH CARE EDUCATION/TRAINING PROGRAM

## 2020-08-20 PROCEDURE — 71045 X-RAY EXAM CHEST 1 VIEW: CPT

## 2020-08-20 RX ORDER — LIDOCAINE 4 G/G
2 PATCH TOPICAL
Status: DISCONTINUED | OUTPATIENT
Start: 2020-08-20 | End: 2020-08-21 | Stop reason: HOSPADM

## 2020-08-20 RX ORDER — POLYETHYLENE GLYCOL 3350 17 G/17G
17 POWDER, FOR SOLUTION ORAL DAILY
Status: DISCONTINUED | OUTPATIENT
Start: 2020-08-20 | End: 2020-08-21 | Stop reason: HOSPADM

## 2020-08-20 RX ORDER — SENNOSIDES 8.6 MG
8.6 TABLET ORAL 2 TIMES DAILY
Status: DISCONTINUED | OUTPATIENT
Start: 2020-08-20 | End: 2020-08-21 | Stop reason: HOSPADM

## 2020-08-20 RX ORDER — METOPROLOL TARTRATE 25 MG/1
25 TABLET, FILM COATED ORAL 2 TIMES DAILY
Status: DISCONTINUED | OUTPATIENT
Start: 2020-08-20 | End: 2020-08-21 | Stop reason: HOSPADM

## 2020-08-20 RX ORDER — BISACODYL 10 MG
10 SUPPOSITORY, RECTAL RECTAL DAILY PRN
Status: DISCONTINUED | OUTPATIENT
Start: 2020-08-20 | End: 2020-08-21 | Stop reason: HOSPADM

## 2020-08-20 RX ORDER — OXYCODONE HYDROCHLORIDE 5 MG/1
5-10 TABLET ORAL EVERY 4 HOURS PRN
Status: DISCONTINUED | OUTPATIENT
Start: 2020-08-20 | End: 2020-08-21 | Stop reason: HOSPADM

## 2020-08-20 RX ORDER — POTASSIUM CHLORIDE 1.5 G/1.58G
40 POWDER, FOR SOLUTION ORAL ONCE
Status: COMPLETED | OUTPATIENT
Start: 2020-08-20 | End: 2020-08-20

## 2020-08-20 RX ORDER — METHOCARBAMOL 500 MG/1
500 TABLET, FILM COATED ORAL 4 TIMES DAILY PRN
Status: DISCONTINUED | OUTPATIENT
Start: 2020-08-20 | End: 2020-08-21 | Stop reason: HOSPADM

## 2020-08-20 RX ADMIN — HYDROMORPHONE HYDROCHLORIDE 0.3 MG: 1 INJECTION, SOLUTION INTRAMUSCULAR; INTRAVENOUS; SUBCUTANEOUS at 11:38

## 2020-08-20 RX ADMIN — ONDANSETRON 4 MG: 4 TABLET, ORALLY DISINTEGRATING ORAL at 15:54

## 2020-08-20 RX ADMIN — ENOXAPARIN SODIUM 40 MG: 40 INJECTION SUBCUTANEOUS at 08:30

## 2020-08-20 RX ADMIN — ACETAMINOPHEN 975 MG: 325 TABLET, FILM COATED ORAL at 05:45

## 2020-08-20 RX ADMIN — OXYCODONE HYDROCHLORIDE 5 MG: 5 TABLET ORAL at 05:45

## 2020-08-20 RX ADMIN — HYDROMORPHONE HYDROCHLORIDE 0.3 MG: 1 INJECTION, SOLUTION INTRAMUSCULAR; INTRAVENOUS; SUBCUTANEOUS at 06:59

## 2020-08-20 RX ADMIN — UMECLIDINIUM BROMIDE AND VILANTEROL TRIFENATATE 1 PUFF: 62.5; 25 POWDER RESPIRATORY (INHALATION) at 08:27

## 2020-08-20 RX ADMIN — METOPROLOL TARTRATE 25 MG: 25 TABLET, FILM COATED ORAL at 19:45

## 2020-08-20 RX ADMIN — OXYCODONE HYDROCHLORIDE 10 MG: 5 TABLET ORAL at 09:42

## 2020-08-20 RX ADMIN — STANDARDIZED SENNA CONCENTRATE 8.6 MG: 8.6 TABLET ORAL at 19:45

## 2020-08-20 RX ADMIN — HYDROMORPHONE HYDROCHLORIDE 0.3 MG: 1 INJECTION, SOLUTION INTRAMUSCULAR; INTRAVENOUS; SUBCUTANEOUS at 01:50

## 2020-08-20 RX ADMIN — ASPIRIN 81 MG: 81 TABLET, COATED ORAL at 08:27

## 2020-08-20 RX ADMIN — LIDOCAINE 2 PATCH: 560 PATCH PERCUTANEOUS; TOPICAL; TRANSDERMAL at 11:26

## 2020-08-20 RX ADMIN — HYDROMORPHONE HYDROCHLORIDE 0.3 MG: 1 INJECTION, SOLUTION INTRAMUSCULAR; INTRAVENOUS; SUBCUTANEOUS at 15:05

## 2020-08-20 RX ADMIN — ACETAMINOPHEN 975 MG: 325 TABLET, FILM COATED ORAL at 18:01

## 2020-08-20 RX ADMIN — ACETAMINOPHEN 975 MG: 325 TABLET, FILM COATED ORAL at 11:34

## 2020-08-20 RX ADMIN — METHOCARBAMOL 500 MG: 500 TABLET, FILM COATED ORAL at 13:48

## 2020-08-20 RX ADMIN — POTASSIUM CHLORIDE 40 MEQ: 1.5 POWDER, FOR SOLUTION ORAL at 18:01

## 2020-08-20 RX ADMIN — AMLODIPINE BESYLATE 10 MG: 10 TABLET ORAL at 08:30

## 2020-08-20 RX ADMIN — OXYCODONE HYDROCHLORIDE 10 MG: 5 TABLET ORAL at 13:40

## 2020-08-20 RX ADMIN — CETIRIZINE HYDROCHLORIDE 10 MG: 10 TABLET, FILM COATED ORAL at 19:45

## 2020-08-20 RX ADMIN — METHOCARBAMOL 500 MG: 500 TABLET, FILM COATED ORAL at 09:42

## 2020-08-20 ASSESSMENT — ACTIVITIES OF DAILY LIVING (ADL)
ADLS_ACUITY_SCORE: 15
ADLS_ACUITY_SCORE: 16
ADLS_ACUITY_SCORE: 15

## 2020-08-20 NOTE — PROVIDER NOTIFICATION
"Paged thoracic on-call re: right chest pain, pt described it \"tight\". Dilaudid 0.3 mg IV given with some relief.   "

## 2020-08-20 NOTE — PLAN OF CARE
Lung sounds diminished. O2 sats 90-92% on 3L. Pain at right CT site this AM and also lower back. Lidoderm patches on lower back per pt request. Walked to de paz in back in AM with some dizziness with assist of one. Voided 200cc at beginning of shift. Tolerated 2 meals well. No nausea. During second walk, pt decided to stop at the BR to void, unable, then wanted to continue to walk to hallway. He became weak and put in chair and rolled back to bed. He was able to pivot back to bed and lift legs. Then he c/o extreme chest pain with some pain radiating to L arm. Thoracic team notified and rapid response called. EKG and labs ordered. Dilaudid 0.3 mg given for chest pain. Oxycodone given x2 this shift and Diaudid additional x1 earlier for CT pain.

## 2020-08-20 NOTE — PLAN OF CARE
"Patient is on O2 at 3 L/nc, sats at high 90's. Noted pt moaning and holding his right chest around 0200, described it as \"tight\"; tachy and SBP at 150's; chest pain relived by Dilaudid and Oxycodone; MD notified. Right CT in place to WS. 4 lap sites celsa, cdi. Voiding in urinal, marginal output, fluid intake encouraged. DBE encouraged. Continue poc.   Vitals:    08/19/20 1959 08/19/20 2347 08/20/20 0151 08/20/20 0443   BP: 134/75 138/78 (!) 156/92 (!) 141/72   BP Location: Left arm Left arm Left arm Left arm   Pulse: 106 97 110 99   Resp: 18 17 19 18   Temp: 96.4  F (35.8  C) 98.4  F (36.9  C)  98.5  F (36.9  C)   TempSrc: Oral Oral  Oral   SpO2: 93% 97% 95% 95%   Weight:       Height:          "

## 2020-08-20 NOTE — DISCHARGE SUMMARY
NAME: Moises Chacon   MRN: [id number]   : 1950     DATE OF ADMISSION: 2020     PRE/POSTOPERATIVE DIAGNOSES: Thymic tumor    PROCEDURES PERFORMED: Subxiphoid and bilateral thoracoscopic thymectomy    PATHOLOGY RESULTS: Pending     CULTURE RESULTS: None     INTRAOPERATIVE COMPLICATIONS: None     POSTOPERATIVE COMPLICATIONS: None     DRAINS/TUBES PRESENT AT DISCHARGE: None    DATE OF DISCHARGE:  2020    HOSPITAL COURSE: Moises Chacon is a 70 year old male who on 2020  underwent the above-named procedures.  He tolerated the operation well and postoperatively was transferred to the general post-surgical unit.  The remainder of his course was essentially uncomplicated.  Prior to discharge, his pain was controlled, he was able to perform ADLs and ambulate independently without difficulty.  On , he was discharged home in stable condition.    DISCHARGE EXAM:   A&O, NAD  Resp non-labored on room air  Chest tube site c/d/i  Extremities WWP     DISCHARGE INSTRUCTIONS:  Discharge Procedure Orders   X-ray Chest 2 vws*   Standing Status: Future Standing Exp. Date: 20     Order Specific Question Answer Comments   Priority Routine      Reason for your hospital stay   Order Comments: Surgery     Adult [facility] Follow-up and recommended labs and tests   Order Comments: 1.) Follow up with primary care physician, Serge Botello, in 1-2 weeks.  2.) Follow up with a thoracic surgery Clinical Nurse Specialist in Thoracic Surgery clinic in 1 month, prior to which a CXR should be performed.    Appointments on [place] and/or [facility] (with [facility] or [facility] provider or service). Call [phone] if you haven't heard regarding these appointments within 7 days of discharge.     Discharge Instructions   Order Comments: THORACIC SURGERY DISCHARGE INSTRUCTIONS    DIET: Regular diet - as prior to admission     If your plans upon discharge include prolonged periods of sitting (i.e a lengthy car or plane ride),  "it is highly beneficial to get up and walk at least once per hour to help prevent swelling and blood clots.     You may remove chest tube dressing 48 hours after tube removal and bandage the site at your own discretion thereafter.  Small amounts of leakage are normal for 2-3 days after removal.  Feel free to call with questions.    You may get incision wet 2 days after operation. Do not submerge, soak, or scrub incision or swim until seen in follow-up.    Take incentive spirometer home for continued frequent use    Activity as tolerated, no strenous activity until seen in follow-up, no lifting greater than 20 pounds for the next 2 weeks.    Stay hydrated. Take over the counter fiber (metamucil or benefiber) and stool softeners (Miralax, docusate or senna) if becoming constipated.     Call for fever greater than 101.5, chills, increased size of incision, red skin around incision, vision changes, muscle strength changes, sensation changes, shortness of breath, or other concerns.    No driving while taking narcotic pain medication.    Transition to ibuprofen or tylenol/acetaminophen for pain control. Do not take tylenol/acetaminophen and acetaminophen containing narcotic (e.g., percocet or vicodin) at the same time. If you have known ulcer problems, or kidney trouble (elevated creatinine) do not take the ibuprofen.    In emergencies, call 911    For other Questions or Concerns;   A.) During weekday working hours (Monday through Friday 8am to 4:30pm)   call [phone] and ask to speak to a clinical nurse specialist.     B.) At nights (after 4:30pm), on weekends, or if urgent call [phone] and   tell the  \"I would like to page job code [phone], the thoracic surgery   fellow on call, please.\"       DISCHARGE MEDICATIONS:   Current Discharge Medication List      START taking these medications    Details   acetaminophen (TYLENOL) 325 MG tablet Take 3 tablets (975 mg) by mouth every 6 hours  Qty:      " Associated Diagnoses: Mediastinal mass      methocarbamol (ROBAXIN) 500 MG tablet Take 1 tablet (500 mg) by mouth 4 times daily as needed for muscle spasms  Qty: 20 tablet, Refills: 0    Associated Diagnoses: Mediastinal mass      oxyCODONE (ROXICODONE) 5 MG tablet Take 1-2 tablets (5-10 mg) by mouth every 4 hours as needed for moderate to severe pain  Qty: 30 tablet, Refills: 0    Associated Diagnoses: Mediastinal mass      polyethylene glycol (MIRALAX) 17 g packet Take 17 g by mouth daily  Qty: 7 packet, Refills: 0    Associated Diagnoses: Mediastinal mass      sennosides (SENOKOT) 8.6 MG tablet Take 1 tablet by mouth 2 times daily  Qty: 14 tablet, Refills: 0    Associated Diagnoses: Mediastinal mass         CONTINUE these medications which have NOT CHANGED    Details   amLODIPine (NORVASC) 10 MG tablet TAKE 1 TABLET BY MOUTH DAILY.  Qty: 90 tablet, Refills: 0    Comments: **Patient requests 90 days supply**  Associated Diagnoses: Essential hypertension      aspirin (ASA) 81 MG EC tablet Take 1 tablet (81 mg) by mouth daily  Qty: 90 tablet, Refills: 1    Associated Diagnoses: Cerebrovascular accident (CVA), unspecified mechanism (H)      cetirizine (ZYRTEC) 10 MG tablet Take 1 tablet (10 mg) by mouth daily  Qty: 30 tablet, Refills: 1    Associated Diagnoses: Seasonal allergies      metoprolol tartrate (LOPRESSOR) 25 MG tablet Take 25 mg by mouth 2 times daily       umeclidinium-vilanterol (ANORO ELLIPTA) 62.5-25 MCG/INH oral inhaler Inhale 1 puff into the lungs daily  Qty: 1 Inhaler, Refills: 1    Associated Diagnoses: Malignant neoplasm of left lung, unspecified part of lung (H)

## 2020-08-20 NOTE — PROVIDER NOTIFICATION
08/20/20 1700   Call Information   Date of Call 08/20/20   Time of Call 1456   Name of person requesting the team Quiana MCCLAIN   Title of person requesting team RN   RRT Arrival time 1501   Time RRT ended 1536   Reason for call   Type of RRT Adult   Primary reason for call Cardiovascular   Cardiovascular Other (describe)  (Sudden onset of severe chest pain)   Was patient transferred from the ED, ICU, or PACU within last 24 hours prior to RRT call? Yes   SBAR   Situation Pt was up ambulating and had sudden onset of severe chest pain radiating down the left arm   Background Hx: HTN, HLD, smoking, anemia, CVA, GERD, BPH, and chronic left chest pain from his surgery. Pt here for Bilateral subxiphoid thoracoscopic thymectomy   Notable History/Conditions Cardiac;Hypertension   Assessment Pt is sitting in the bed. BPs 170's over 80's. Pt complaining of chest pain that is radiating down the left arm. Able to get enough air in.    Interventions CXR;ECG;Labs   Adjustments to Recommend BMP, and troponin   Patient Outcome   Patient Outcome Stabilized on unit   RRT Team   Attending/Primary/Covering Physician Thoracic surgery   Date Attending Physician notified 08/20/20   Time Attending Physician notified 1501   Physician(s) Dr. Reyes   Lead RN Lisa MCCLAIN    RT Fidel   Post RRT Intervention Assessment   Post RRT Assessment Stable/Improved   Date Follow Up Done 08/20/20   Time Follow Up Done 5005

## 2020-08-20 NOTE — PROGRESS NOTES
"THORACIC SURGERY PROGRESS NOTE     S: Pain deep in the right chest, improved with medications, but not using these as frequently as available. Has not been up to chair or walking yet. Voiding. Also having some abdominal discomfort, but passing flatus.      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 Doing well clinically.     - Khris drain to waterseal, to be removed once patient up to chair/after walking   - Encourage pain control with oxycodone q4h, dilaudid for breakthrough, anticipate significant improvement once chest tube out as well  - Wean O2   - Regular diet  - Scheduled bowel regimen  - Dispo: discharge home later today versus tomorrow AM    Seen and discussed with staff, Nereyda Ashley and Jose Grant MD (PGY-1)  General Surgery Resident  Thoracic Surgery      "

## 2020-08-21 ENCOUNTER — APPOINTMENT (OUTPATIENT)
Dept: GENERAL RADIOLOGY | Facility: CLINIC | Age: 70
DRG: 804 | End: 2020-08-21
Attending: THORACIC SURGERY (CARDIOTHORACIC VASCULAR SURGERY)
Payer: MEDICARE

## 2020-08-21 VITALS
WEIGHT: 134.04 LBS | HEIGHT: 66 IN | OXYGEN SATURATION: 96 % | HEART RATE: 71 BPM | SYSTOLIC BLOOD PRESSURE: 169 MMHG | RESPIRATION RATE: 18 BRPM | BODY MASS INDEX: 21.54 KG/M2 | DIASTOLIC BLOOD PRESSURE: 83 MMHG | TEMPERATURE: 98.3 F

## 2020-08-21 LAB
ALBUMIN UR-MCNC: 10 MG/DL
ANION GAP SERPL CALCULATED.3IONS-SCNC: 6 MMOL/L (ref 3–14)
APPEARANCE UR: CLEAR
BILIRUB UR QL STRIP: NEGATIVE
BUN SERPL-MCNC: 28 MG/DL (ref 7–30)
CALCIUM SERPL-MCNC: 9 MG/DL (ref 8.5–10.1)
CHLORIDE SERPL-SCNC: 107 MMOL/L (ref 94–109)
CO2 SERPL-SCNC: 26 MMOL/L (ref 20–32)
COLOR UR AUTO: YELLOW
CREAT SERPL-MCNC: 0.97 MG/DL (ref 0.66–1.25)
ERYTHROCYTE [DISTWIDTH] IN BLOOD BY AUTOMATED COUNT: 14.2 % (ref 10–15)
GFR SERPL CREATININE-BSD FRML MDRD: 78 ML/MIN/{1.73_M2}
GLUCOSE SERPL-MCNC: 95 MG/DL (ref 70–99)
GLUCOSE UR STRIP-MCNC: NEGATIVE MG/DL
HCT VFR BLD AUTO: 38.9 % (ref 40–53)
HGB BLD-MCNC: 13 G/DL (ref 13.3–17.7)
HGB UR QL STRIP: NEGATIVE
KETONES UR STRIP-MCNC: NEGATIVE MG/DL
LEUKOCYTE ESTERASE UR QL STRIP: NEGATIVE
MCH RBC QN AUTO: 30.4 PG (ref 26.5–33)
MCHC RBC AUTO-ENTMCNC: 33.4 G/DL (ref 31.5–36.5)
MCV RBC AUTO: 91 FL (ref 78–100)
NITRATE UR QL: NEGATIVE
PH UR STRIP: 6.5 PH (ref 5–7)
PLATELET # BLD AUTO: 179 10E9/L (ref 150–450)
POTASSIUM SERPL-SCNC: 3.9 MMOL/L (ref 3.4–5.3)
RBC # BLD AUTO: 4.27 10E12/L (ref 4.4–5.9)
RBC #/AREA URNS AUTO: 1 /HPF (ref 0–2)
SODIUM SERPL-SCNC: 138 MMOL/L (ref 133–144)
SOURCE: ABNORMAL
SP GR UR STRIP: 1.01 (ref 1–1.03)
UROBILINOGEN UR STRIP-MCNC: NORMAL MG/DL (ref 0–2)
WBC # BLD AUTO: 14.6 10E9/L (ref 4–11)
WBC #/AREA URNS AUTO: 1 /HPF (ref 0–5)

## 2020-08-21 PROCEDURE — 71045 X-RAY EXAM CHEST 1 VIEW: CPT

## 2020-08-21 PROCEDURE — 81001 URINALYSIS AUTO W/SCOPE: CPT | Performed by: PHYSICIAN ASSISTANT

## 2020-08-21 PROCEDURE — 25000132 ZZH RX MED GY IP 250 OP 250 PS 637: Mod: GY

## 2020-08-21 PROCEDURE — 80048 BASIC METABOLIC PNL TOTAL CA: CPT | Performed by: STUDENT IN AN ORGANIZED HEALTH CARE EDUCATION/TRAINING PROGRAM

## 2020-08-21 PROCEDURE — 25000132 ZZH RX MED GY IP 250 OP 250 PS 637: Mod: GY | Performed by: STUDENT IN AN ORGANIZED HEALTH CARE EDUCATION/TRAINING PROGRAM

## 2020-08-21 PROCEDURE — 25000128 H RX IP 250 OP 636

## 2020-08-21 PROCEDURE — 36415 COLL VENOUS BLD VENIPUNCTURE: CPT | Performed by: STUDENT IN AN ORGANIZED HEALTH CARE EDUCATION/TRAINING PROGRAM

## 2020-08-21 PROCEDURE — 85027 COMPLETE CBC AUTOMATED: CPT | Performed by: STUDENT IN AN ORGANIZED HEALTH CARE EDUCATION/TRAINING PROGRAM

## 2020-08-21 RX ORDER — SENNOSIDES 8.6 MG
1 TABLET ORAL 2 TIMES DAILY
Qty: 14 TABLET | Refills: 0 | Status: SHIPPED | OUTPATIENT
Start: 2020-08-21 | End: 2020-10-05

## 2020-08-21 RX ORDER — METHOCARBAMOL 500 MG/1
500 TABLET, FILM COATED ORAL 4 TIMES DAILY PRN
Qty: 20 TABLET | Refills: 0 | Status: SHIPPED | OUTPATIENT
Start: 2020-08-21 | End: 2020-10-05

## 2020-08-21 RX ORDER — OXYCODONE HYDROCHLORIDE 5 MG/1
5-10 TABLET ORAL EVERY 4 HOURS PRN
Qty: 30 TABLET | Refills: 0 | Status: SHIPPED | OUTPATIENT
Start: 2020-08-21 | End: 2020-10-05

## 2020-08-21 RX ORDER — ACETAMINOPHEN 325 MG/1
975 TABLET ORAL EVERY 6 HOURS
COMMUNITY
Start: 2020-08-21

## 2020-08-21 RX ORDER — POLYETHYLENE GLYCOL 3350 17 G/17G
17 POWDER, FOR SOLUTION ORAL DAILY
Qty: 7 PACKET | Refills: 0 | Status: SHIPPED | OUTPATIENT
Start: 2020-08-21 | End: 2020-10-05

## 2020-08-21 RX ADMIN — ACETAMINOPHEN 975 MG: 325 TABLET, FILM COATED ORAL at 05:52

## 2020-08-21 RX ADMIN — POLYETHYLENE GLYCOL 3350 17 G: 17 POWDER, FOR SOLUTION ORAL at 08:34

## 2020-08-21 RX ADMIN — STANDARDIZED SENNA CONCENTRATE 8.6 MG: 8.6 TABLET ORAL at 08:33

## 2020-08-21 RX ADMIN — ACETAMINOPHEN 975 MG: 325 TABLET, FILM COATED ORAL at 12:09

## 2020-08-21 RX ADMIN — LIDOCAINE 2 PATCH: 560 PATCH PERCUTANEOUS; TOPICAL; TRANSDERMAL at 08:34

## 2020-08-21 RX ADMIN — UMECLIDINIUM BROMIDE AND VILANTEROL TRIFENATATE 1 PUFF: 62.5; 25 POWDER RESPIRATORY (INHALATION) at 08:35

## 2020-08-21 RX ADMIN — ENOXAPARIN SODIUM 40 MG: 40 INJECTION SUBCUTANEOUS at 08:34

## 2020-08-21 RX ADMIN — METOPROLOL TARTRATE 25 MG: 25 TABLET, FILM COATED ORAL at 08:34

## 2020-08-21 RX ADMIN — AMLODIPINE BESYLATE 10 MG: 10 TABLET ORAL at 08:33

## 2020-08-21 RX ADMIN — OXYCODONE HYDROCHLORIDE 5 MG: 5 TABLET ORAL at 12:14

## 2020-08-21 RX ADMIN — ASPIRIN 81 MG: 81 TABLET, COATED ORAL at 08:34

## 2020-08-21 ASSESSMENT — ACTIVITIES OF DAILY LIVING (ADL)
ADLS_ACUITY_SCORE: 12

## 2020-08-21 NOTE — PLAN OF CARE
"Vital signs:  BP (!) 149/83 (BP Location: Left arm)   Pulse 102   Temp 97.8  F (36.6  C) (Oral)   Resp 20   Ht 1.676 m (5' 5.98\")   Wt 60.8 kg (134 lb 0.6 oz)   SpO2 95%   BMI 21.65 kg/m      Shift:7258-8861      POD 1- Bilateral subxiphoid thoracoscopic thymectomy      Activity: Assist x1-SBA was up in chair after rapid response from day shift.   Neuros: A&O x4  Cardiac:tachy at times, new order for metoprolol   Respiratory: Ls:clear/diminished, crepitus around neck, After rapid response from day shift pt was on 4L, weaned pt down to 2L.  GI/: +BS, last BM 8/18-sennosides given, voiding adequately  Diet:Reg  Skin: intact other than 4 surgical port sites and CT site, R PIV   Lines:R PIV  Labs: K 3.7 WBC 15.3 Hgb 13.1   Pain/nausea:Dilaudid given after RR, offered robaxin and oxycodone x3 in the shift and pt refused saying \"no pain\"  Plan:Cont POC, continue to wean off O2.         "

## 2020-08-21 NOTE — PLAN OF CARE
Alert and oriented x 4. Vital signs stable. On room air.Good oral intake. Voiding and passing gas. Pain partially managed. Ambulates independently Intervention/Assessment: Reviewed discharge instruction with  ([id number]) via Ipad. Answered questions, removed iv line. Discharged.  time 2 PM.

## 2020-08-21 NOTE — PLAN OF CARE
Denied pain, accepted tylenol this morning. On 2 liters of O2  oxymask , sats 97% Denied shortness of breath. Incisions intact. Patient was unstable when out of bed and bed alarm turned on.

## 2020-08-23 ENCOUNTER — PATIENT OUTREACH (OUTPATIENT)
Dept: CARE COORDINATION | Facility: CLINIC | Age: 70
End: 2020-08-23

## 2020-08-23 LAB — INTERPRETATION ECG - MUSE: NORMAL

## 2020-08-24 NOTE — PROGRESS NOTES
[facility]: Post-Discharge Note  SITUATION                                                      Admission:    Admission Date: 08/19/20   Reason for Admission: Subxiphoid and bilateral thoracoscopic thymectomy  Discharge:   Discharge Date: 08/21/20  Discharge Diagnosis: Subxiphoid and bilateral thoracoscopic thymectomy  Discharge Service: thoracic surgery  Discharge Plan:  thoracic surgery    BACKGROUND                                                      Moises Chacon is a 70 year old male who on 8/19/2020  underwent the above-named procedures.  He tolerated the operation well and postoperatively was transferred to the general post-surgical unit.  The remainder of his course was essentially uncomplicated.  Prior to discharge, his pain was controlled, he was able to perform ADLs and ambulate independently without difficulty.  On 8/21, he was discharged home in stable condition.    ASSESSMENT      Discharge Assessment  Patient reports symptoms are: Improved(having trouble using the restroom-BM. otherwise feeling well)  Does the patient have all of their medications?: Yes  Does patient know what their new medications are for?: Yes  Does patient have a follow-up appointment scheduled?: Yes  Does patient have any other questions or concerns?: No    Post-op  Did the patient have surgery or a procedure: Yes  Incision: healing  Drainage: No  Bleeding: none  Fever: No  Chills: No  Redness: No  Warmth: No  Swelling: No  Incision site pain: No  Eating & Drinking: eating and drinking without complaints/concerns  PO Intake: regular diet;soft foods  Bowel Function: normal  Urinary Status: voiding without complaint/concerns        PLAN                                                      Outpatient Plan:      Future Appointments   Date Time Provider Department Center   9/1/2020  9:15 AM [id number]           Ninoska Espinosa

## 2020-08-26 LAB — COPATH REPORT: NORMAL

## 2020-08-28 ENCOUNTER — APPOINTMENT (OUTPATIENT)
Dept: GENERAL RADIOLOGY | Facility: CLINIC | Age: 70
DRG: 291 | End: 2020-08-28
Attending: EMERGENCY MEDICINE
Payer: MEDICARE

## 2020-08-28 ENCOUNTER — APPOINTMENT (OUTPATIENT)
Dept: CARDIOLOGY | Facility: CLINIC | Age: 70
DRG: 291 | End: 2020-08-28
Attending: PHYSICIAN ASSISTANT
Payer: MEDICARE

## 2020-08-28 ENCOUNTER — APPOINTMENT (OUTPATIENT)
Dept: CT IMAGING | Facility: CLINIC | Age: 70
DRG: 291 | End: 2020-08-28
Attending: EMERGENCY MEDICINE
Payer: MEDICARE

## 2020-08-28 ENCOUNTER — HOSPITAL ENCOUNTER (INPATIENT)
Facility: CLINIC | Age: 70
LOS: 2 days | Discharge: HOME | DRG: 291 | End: 2020-08-30
Attending: EMERGENCY MEDICINE | Admitting: STUDENT IN AN ORGANIZED HEALTH CARE EDUCATION/TRAINING PROGRAM
Payer: MEDICARE

## 2020-08-28 DIAGNOSIS — R06.00 DYSPNEA, UNSPECIFIED TYPE: ICD-10-CM

## 2020-08-28 DIAGNOSIS — R06.00 ACUTE DYSPNEA: ICD-10-CM

## 2020-08-28 DIAGNOSIS — N30.00 ACUTE CYSTITIS WITHOUT HEMATURIA: Primary | ICD-10-CM

## 2020-08-28 DIAGNOSIS — R07.9 CHEST PAIN, UNSPECIFIED TYPE: ICD-10-CM

## 2020-08-28 DIAGNOSIS — Z98.890 PERSONAL HISTORY OF SURGERY TO HEART AND GREAT VESSELS, PRESENTING HAZARDS TO HEALTH: ICD-10-CM

## 2020-08-28 DIAGNOSIS — Z20.828 EXPOSURE TO SARS-ASSOCIATED CORONAVIRUS: ICD-10-CM

## 2020-08-28 LAB
ALBUMIN SERPL-MCNC: 2.7 G/DL (ref 3.4–5)
ALBUMIN UR-MCNC: 30 MG/DL
ALP SERPL-CCNC: 111 U/L (ref 40–150)
ALT SERPL W P-5'-P-CCNC: 24 U/L (ref 0–70)
ANION GAP SERPL CALCULATED.3IONS-SCNC: 6 MMOL/L (ref 3–14)
APPEARANCE UR: CLEAR
APTT PPP: 35 SEC (ref 22–37)
AST SERPL W P-5'-P-CCNC: ABNORMAL U/L (ref 0–45)
BASOPHILS # BLD AUTO: 0 10E9/L (ref 0–0.2)
BASOPHILS NFR BLD AUTO: 0.2 %
BILIRUB SERPL-MCNC: 0.7 MG/DL (ref 0.2–1.3)
BILIRUB UR QL STRIP: NEGATIVE
BUN SERPL-MCNC: 20 MG/DL (ref 7–30)
CALCIUM SERPL-MCNC: 8.8 MG/DL (ref 8.5–10.1)
CHLORIDE SERPL-SCNC: 102 MMOL/L (ref 94–109)
CO2 SERPL-SCNC: 26 MMOL/L (ref 20–32)
COLOR UR AUTO: YELLOW
CREAT SERPL-MCNC: 0.92 MG/DL (ref 0.66–1.25)
DIFFERENTIAL METHOD BLD: ABNORMAL
EOSINOPHIL # BLD AUTO: 0.1 10E9/L (ref 0–0.7)
EOSINOPHIL NFR BLD AUTO: 0.7 %
ERYTHROCYTE [DISTWIDTH] IN BLOOD BY AUTOMATED COUNT: 13.7 % (ref 10–15)
GFR SERPL CREATININE-BSD FRML MDRD: 82 ML/MIN/{1.73_M2}
GLUCOSE SERPL-MCNC: 126 MG/DL (ref 70–99)
GLUCOSE UR STRIP-MCNC: NEGATIVE MG/DL
HCT VFR BLD AUTO: 39.3 % (ref 40–53)
HGB BLD-MCNC: 12.7 G/DL (ref 13.3–17.7)
HGB UR QL STRIP: NEGATIVE
IMM GRANULOCYTES # BLD: 0.1 10E9/L (ref 0–0.4)
IMM GRANULOCYTES NFR BLD: 0.4 %
INR PPP: 1.05 (ref 0.86–1.14)
INTERPRETATION ECG - MUSE: NORMAL
KETONES UR STRIP-MCNC: NEGATIVE MG/DL
LABORATORY COMMENT REPORT: NORMAL
LACTATE BLD-SCNC: 1.6 MMOL/L (ref 0.7–2)
LACTATE BLD-SCNC: 1.8 MMOL/L (ref 0.7–2)
LACTATE BLD-SCNC: 2.4 MMOL/L (ref 0.7–2)
LACTATE BLD-SCNC: 3.5 MMOL/L (ref 0.7–2)
LEUKOCYTE ESTERASE UR QL STRIP: ABNORMAL
LIPASE SERPL-CCNC: 52 U/L (ref 73–393)
LYMPHOCYTES # BLD AUTO: 1.2 10E9/L (ref 0.8–5.3)
LYMPHOCYTES NFR BLD AUTO: 8.9 %
MCH RBC QN AUTO: 30.2 PG (ref 26.5–33)
MCHC RBC AUTO-ENTMCNC: 32.3 G/DL (ref 31.5–36.5)
MCV RBC AUTO: 93 FL (ref 78–100)
MONOCYTES # BLD AUTO: 0.7 10E9/L (ref 0–1.3)
MONOCYTES NFR BLD AUTO: 5.3 %
MUCOUS THREADS #/AREA URNS LPF: PRESENT /LPF
NEUTROPHILS # BLD AUTO: 11.4 10E9/L (ref 1.6–8.3)
NEUTROPHILS NFR BLD AUTO: 84.5 %
NITRATE UR QL: NEGATIVE
NRBC # BLD AUTO: 0 10*3/UL
NRBC BLD AUTO-RTO: 0 /100
PH UR STRIP: 5.5 PH (ref 5–7)
PLATELET # BLD AUTO: 354 10E9/L (ref 150–450)
POTASSIUM SERPL-SCNC: 4.7 MMOL/L (ref 3.4–5.3)
PROCALCITONIN SERPL-MCNC: <0.05 NG/ML
PROT SERPL-MCNC: 8.1 G/DL (ref 6.8–8.8)
RBC # BLD AUTO: 4.21 10E12/L (ref 4.4–5.9)
RBC #/AREA URNS AUTO: 2 /HPF (ref 0–2)
SARS-COV-2 RNA SPEC QL NAA+PROBE: NEGATIVE
SARS-COV-2 RNA SPEC QL NAA+PROBE: NORMAL
SODIUM SERPL-SCNC: 134 MMOL/L (ref 133–144)
SOURCE: ABNORMAL
SP GR UR STRIP: 1.02 (ref 1–1.03)
SPECIMEN SOURCE: NORMAL
SPECIMEN SOURCE: NORMAL
TROPONIN I SERPL-MCNC: <0.015 UG/L (ref 0–0.04)
UROBILINOGEN UR STRIP-MCNC: 2 MG/DL (ref 0–2)
WBC # BLD AUTO: 13.5 10E9/L (ref 4–11)
WBC #/AREA URNS AUTO: 14 /HPF (ref 0–5)

## 2020-08-28 PROCEDURE — 82040 ASSAY OF SERUM ALBUMIN: CPT

## 2020-08-28 PROCEDURE — 93306 TTE W/DOPPLER COMPLETE: CPT | Mod: 26 | Performed by: STUDENT IN AN ORGANIZED HEALTH CARE EDUCATION/TRAINING PROGRAM

## 2020-08-28 PROCEDURE — 12000001 ZZH R&B MED SURG/OB UMMC

## 2020-08-28 PROCEDURE — 84145 PROCALCITONIN (PCT): CPT | Performed by: EMERGENCY MEDICINE

## 2020-08-28 PROCEDURE — 96365 THER/PROPH/DIAG IV INF INIT: CPT | Performed by: EMERGENCY MEDICINE

## 2020-08-28 PROCEDURE — 93005 ELECTROCARDIOGRAM TRACING: CPT | Performed by: EMERGENCY MEDICINE

## 2020-08-28 PROCEDURE — 93010 ELECTROCARDIOGRAM REPORT: CPT | Performed by: INTERNAL MEDICINE

## 2020-08-28 PROCEDURE — 99285 EMERGENCY DEPT VISIT HI MDM: CPT | Mod: 25 | Performed by: EMERGENCY MEDICINE

## 2020-08-28 PROCEDURE — 84460 ALANINE AMINO (ALT) (SGPT): CPT

## 2020-08-28 PROCEDURE — 87040 BLOOD CULTURE FOR BACTERIA: CPT | Performed by: EMERGENCY MEDICINE

## 2020-08-28 PROCEDURE — 25000128 H RX IP 250 OP 636: Performed by: EMERGENCY MEDICINE

## 2020-08-28 PROCEDURE — 87088 URINE BACTERIA CULTURE: CPT | Performed by: EMERGENCY MEDICINE

## 2020-08-28 PROCEDURE — 85610 PROTHROMBIN TIME: CPT | Performed by: EMERGENCY MEDICINE

## 2020-08-28 PROCEDURE — 25000132 ZZH RX MED GY IP 250 OP 250 PS 637: Mod: GY | Performed by: PHYSICIAN ASSISTANT

## 2020-08-28 PROCEDURE — 71275 CT ANGIOGRAPHY CHEST: CPT

## 2020-08-28 PROCEDURE — 84155 ASSAY OF PROTEIN SERUM: CPT

## 2020-08-28 PROCEDURE — 84075 ASSAY ALKALINE PHOSPHATASE: CPT

## 2020-08-28 PROCEDURE — 96375 TX/PRO/DX INJ NEW DRUG ADDON: CPT | Performed by: EMERGENCY MEDICINE

## 2020-08-28 PROCEDURE — 99223 1ST HOSP IP/OBS HIGH 75: CPT | Mod: AI | Performed by: STUDENT IN AN ORGANIZED HEALTH CARE EDUCATION/TRAINING PROGRAM

## 2020-08-28 PROCEDURE — 83605 ASSAY OF LACTIC ACID: CPT | Performed by: PHYSICIAN ASSISTANT

## 2020-08-28 PROCEDURE — 93010 ELECTROCARDIOGRAM REPORT: CPT | Mod: Z6 | Performed by: EMERGENCY MEDICINE

## 2020-08-28 PROCEDURE — 83605 ASSAY OF LACTIC ACID: CPT | Performed by: EMERGENCY MEDICINE

## 2020-08-28 PROCEDURE — 83690 ASSAY OF LIPASE: CPT | Performed by: EMERGENCY MEDICINE

## 2020-08-28 PROCEDURE — 87086 URINE CULTURE/COLONY COUNT: CPT | Performed by: EMERGENCY MEDICINE

## 2020-08-28 PROCEDURE — 25000132 ZZH RX MED GY IP 250 OP 250 PS 637: Mod: GY | Performed by: EMERGENCY MEDICINE

## 2020-08-28 PROCEDURE — 93005 ELECTROCARDIOGRAM TRACING: CPT

## 2020-08-28 PROCEDURE — 87186 SC STD MICRODIL/AGAR DIL: CPT | Performed by: EMERGENCY MEDICINE

## 2020-08-28 PROCEDURE — 36415 COLL VENOUS BLD VENIPUNCTURE: CPT | Performed by: STUDENT IN AN ORGANIZED HEALTH CARE EDUCATION/TRAINING PROGRAM

## 2020-08-28 PROCEDURE — 80053 COMPREHEN METABOLIC PANEL: CPT | Performed by: EMERGENCY MEDICINE

## 2020-08-28 PROCEDURE — 96366 THER/PROPH/DIAG IV INF ADDON: CPT | Performed by: EMERGENCY MEDICINE

## 2020-08-28 PROCEDURE — 93306 TTE W/DOPPLER COMPLETE: CPT

## 2020-08-28 PROCEDURE — 84484 ASSAY OF TROPONIN QUANT: CPT | Performed by: EMERGENCY MEDICINE

## 2020-08-28 PROCEDURE — 81001 URINALYSIS AUTO W/SCOPE: CPT | Performed by: EMERGENCY MEDICINE

## 2020-08-28 PROCEDURE — 99291 CRITICAL CARE FIRST HOUR: CPT | Performed by: PHYSICIAN ASSISTANT

## 2020-08-28 PROCEDURE — 40000556 ZZH STATISTIC PERIPHERAL IV START W US GUIDANCE

## 2020-08-28 PROCEDURE — 85730 THROMBOPLASTIN TIME PARTIAL: CPT | Performed by: EMERGENCY MEDICINE

## 2020-08-28 PROCEDURE — 36415 COLL VENOUS BLD VENIPUNCTURE: CPT | Performed by: PHYSICIAN ASSISTANT

## 2020-08-28 PROCEDURE — 82247 BILIRUBIN TOTAL: CPT

## 2020-08-28 PROCEDURE — 25800030 ZZH RX IP 258 OP 636: Performed by: EMERGENCY MEDICINE

## 2020-08-28 PROCEDURE — 83605 ASSAY OF LACTIC ACID: CPT | Performed by: STUDENT IN AN ORGANIZED HEALTH CARE EDUCATION/TRAINING PROGRAM

## 2020-08-28 PROCEDURE — C9803 HOPD COVID-19 SPEC COLLECT: HCPCS | Performed by: EMERGENCY MEDICINE

## 2020-08-28 PROCEDURE — U0003 INFECTIOUS AGENT DETECTION BY NUCLEIC ACID (DNA OR RNA); SEVERE ACUTE RESPIRATORY SYNDROME CORONAVIRUS 2 (SARS-COV-2) (CORONAVIRUS DISEASE [COVID-19]), AMPLIFIED PROBE TECHNIQUE, MAKING USE OF HIGH THROUGHPUT TECHNOLOGIES AS DESCRIBED BY CMS-2020-01-R: HCPCS | Performed by: EMERGENCY MEDICINE

## 2020-08-28 PROCEDURE — 25800030 ZZH RX IP 258 OP 636: Performed by: PHYSICIAN ASSISTANT

## 2020-08-28 PROCEDURE — 71045 X-RAY EXAM CHEST 1 VIEW: CPT

## 2020-08-28 PROCEDURE — 25000125 ZZHC RX 250: Performed by: EMERGENCY MEDICINE

## 2020-08-28 PROCEDURE — 85025 COMPLETE CBC W/AUTO DIFF WBC: CPT | Performed by: EMERGENCY MEDICINE

## 2020-08-28 PROCEDURE — 80048 BASIC METABOLIC PNL TOTAL CA: CPT

## 2020-08-28 RX ORDER — METOPROLOL TARTRATE 25 MG/1
25 TABLET, FILM COATED ORAL 2 TIMES DAILY
Status: DISCONTINUED | OUTPATIENT
Start: 2020-08-28 | End: 2020-08-30 | Stop reason: HOSPADM

## 2020-08-28 RX ORDER — SODIUM CHLORIDE 9 MG/ML
INJECTION, SOLUTION INTRAVENOUS CONTINUOUS
Status: DISCONTINUED | OUTPATIENT
Start: 2020-08-28 | End: 2020-08-30 | Stop reason: HOSPADM

## 2020-08-28 RX ORDER — PIPERACILLIN SODIUM, TAZOBACTAM SODIUM 3; .375 G/15ML; G/15ML
3.38 INJECTION, POWDER, LYOPHILIZED, FOR SOLUTION INTRAVENOUS ONCE
Status: COMPLETED | OUTPATIENT
Start: 2020-08-28 | End: 2020-08-28

## 2020-08-28 RX ORDER — ACETAMINOPHEN 325 MG/1
975 TABLET ORAL EVERY 6 HOURS
Status: DISCONTINUED | OUTPATIENT
Start: 2020-08-28 | End: 2020-08-30 | Stop reason: HOSPADM

## 2020-08-28 RX ORDER — ACETAMINOPHEN 325 MG/1
975 TABLET ORAL ONCE
Status: COMPLETED | OUTPATIENT
Start: 2020-08-28 | End: 2020-08-28

## 2020-08-28 RX ORDER — ASPIRIN 81 MG/1
81 TABLET ORAL AT BEDTIME
Status: DISCONTINUED | OUTPATIENT
Start: 2020-08-28 | End: 2020-08-30 | Stop reason: HOSPADM

## 2020-08-28 RX ORDER — SENNOSIDES 8.6 MG
1 TABLET ORAL 2 TIMES DAILY
Status: DISCONTINUED | OUTPATIENT
Start: 2020-08-28 | End: 2020-08-30 | Stop reason: HOSPADM

## 2020-08-28 RX ORDER — IOPAMIDOL 755 MG/ML
75 INJECTION, SOLUTION INTRAVASCULAR ONCE
Status: COMPLETED | OUTPATIENT
Start: 2020-08-28 | End: 2020-08-28

## 2020-08-28 RX ORDER — OXYCODONE HYDROCHLORIDE 5 MG/1
5-10 TABLET ORAL EVERY 4 HOURS PRN
Status: DISCONTINUED | OUTPATIENT
Start: 2020-08-28 | End: 2020-08-30 | Stop reason: HOSPADM

## 2020-08-28 RX ORDER — NALOXONE HYDROCHLORIDE 0.4 MG/ML
.1-.4 INJECTION, SOLUTION INTRAMUSCULAR; INTRAVENOUS; SUBCUTANEOUS
Status: DISCONTINUED | OUTPATIENT
Start: 2020-08-28 | End: 2020-08-30 | Stop reason: HOSPADM

## 2020-08-28 RX ORDER — BISACODYL 10 MG
10 SUPPOSITORY, RECTAL RECTAL DAILY PRN
Status: DISCONTINUED | OUTPATIENT
Start: 2020-08-28 | End: 2020-08-30 | Stop reason: HOSPADM

## 2020-08-28 RX ORDER — LIDOCAINE 40 MG/G
CREAM TOPICAL
Status: DISCONTINUED | OUTPATIENT
Start: 2020-08-28 | End: 2020-08-30 | Stop reason: HOSPADM

## 2020-08-28 RX ORDER — POLYETHYLENE GLYCOL 3350 17 G/17G
17 POWDER, FOR SOLUTION ORAL DAILY
Status: DISCONTINUED | OUTPATIENT
Start: 2020-08-28 | End: 2020-08-30 | Stop reason: HOSPADM

## 2020-08-28 RX ORDER — METHOCARBAMOL 500 MG/1
500 TABLET, FILM COATED ORAL 4 TIMES DAILY PRN
Status: DISCONTINUED | OUTPATIENT
Start: 2020-08-28 | End: 2020-08-30 | Stop reason: HOSPADM

## 2020-08-28 RX ORDER — HYDROMORPHONE HYDROCHLORIDE 1 MG/ML
0.3 INJECTION, SOLUTION INTRAMUSCULAR; INTRAVENOUS; SUBCUTANEOUS
Status: COMPLETED | OUTPATIENT
Start: 2020-08-28 | End: 2020-08-28

## 2020-08-28 RX ORDER — AMLODIPINE BESYLATE 10 MG/1
10 TABLET ORAL DAILY
Status: DISCONTINUED | OUTPATIENT
Start: 2020-08-28 | End: 2020-08-30 | Stop reason: HOSPADM

## 2020-08-28 RX ORDER — CETIRIZINE HYDROCHLORIDE 10 MG/1
10 TABLET ORAL AT BEDTIME
Status: DISCONTINUED | OUTPATIENT
Start: 2020-08-28 | End: 2020-08-30 | Stop reason: HOSPADM

## 2020-08-28 RX ADMIN — CETIRIZINE HYDROCHLORIDE 10 MG: 10 TABLET, FILM COATED ORAL at 21:10

## 2020-08-28 RX ADMIN — SENNOSIDES 1 TABLET: 8.6 TABLET, FILM COATED ORAL at 21:09

## 2020-08-28 RX ADMIN — PIPERACILLIN AND TAZOBACTAM 3.38 G: 3; .375 INJECTION, POWDER, LYOPHILIZED, FOR SOLUTION INTRAVENOUS at 06:24

## 2020-08-28 RX ADMIN — ASPIRIN 81 MG: 81 TABLET, COATED ORAL at 21:10

## 2020-08-28 RX ADMIN — AMLODIPINE BESYLATE 10 MG: 10 TABLET ORAL at 15:57

## 2020-08-28 RX ADMIN — IOPAMIDOL 53 ML: 755 INJECTION, SOLUTION INTRAVENOUS at 08:11

## 2020-08-28 RX ADMIN — HYDROMORPHONE HYDROCHLORIDE 0.3 MG: 1 INJECTION, SOLUTION INTRAMUSCULAR; INTRAVENOUS; SUBCUTANEOUS at 08:29

## 2020-08-28 RX ADMIN — SODIUM CHLORIDE, PRESERVATIVE FREE 44 ML: 5 INJECTION INTRAVENOUS at 08:12

## 2020-08-28 RX ADMIN — ACETAMINOPHEN 975 MG: 325 TABLET, FILM COATED ORAL at 21:09

## 2020-08-28 RX ADMIN — METOPROLOL TARTRATE 25 MG: 25 TABLET, FILM COATED ORAL at 21:10

## 2020-08-28 RX ADMIN — SODIUM CHLORIDE 1000 ML: 9 INJECTION, SOLUTION INTRAVENOUS at 06:07

## 2020-08-28 RX ADMIN — ACETAMINOPHEN 975 MG: 325 TABLET, FILM COATED ORAL at 09:10

## 2020-08-28 RX ADMIN — SODIUM CHLORIDE, POTASSIUM CHLORIDE, SODIUM LACTATE AND CALCIUM CHLORIDE 1000 ML: 600; 310; 30; 20 INJECTION, SOLUTION INTRAVENOUS at 19:26

## 2020-08-28 RX ADMIN — AZITHROMYCIN MONOHYDRATE 500 MG: 500 INJECTION, POWDER, LYOPHILIZED, FOR SOLUTION INTRAVENOUS at 07:35

## 2020-08-28 RX ADMIN — POLYETHYLENE GLYCOL 3350 17 G: 17 POWDER, FOR SOLUTION ORAL at 15:56

## 2020-08-28 RX ADMIN — ACETAMINOPHEN 975 MG: 325 TABLET, FILM COATED ORAL at 15:57

## 2020-08-28 RX ADMIN — METOPROLOL TARTRATE 25 MG: 25 TABLET, FILM COATED ORAL at 15:57

## 2020-08-28 RX ADMIN — SODIUM CHLORIDE: 9 INJECTION, SOLUTION INTRAVENOUS at 16:02

## 2020-08-28 RX ADMIN — SODIUM CHLORIDE: 9 INJECTION, SOLUTION INTRAVENOUS at 07:35

## 2020-08-28 ASSESSMENT — ENCOUNTER SYMPTOMS
DIZZINESS: 0
COUGH: 0
RECTAL PAIN: 0
SHORTNESS OF BREATH: 1
ABDOMINAL PAIN: 0
WEAKNESS: 0
FEVER: 0
NAUSEA: 0
VOMITING: 0

## 2020-08-28 ASSESSMENT — ACTIVITIES OF DAILY LIVING (ADL)
ADLS_ACUITY_SCORE: 18
RETIRED_EATING: 0-->INDEPENDENT
BATHING: 0-->INDEPENDENT
TRANSFERRING: 2-->ASSISTIVE PERSON
SWALLOWING: 0-->SWALLOWS FOODS/LIQUIDS WITHOUT DIFFICULTY
DRESS: 0-->INDEPENDENT
ADLS_ACUITY_SCORE: 18
COGNITION: 0 - NO COGNITION ISSUES REPORTED
RETIRED_COMMUNICATION: 0-->UNDERSTANDS/COMMUNICATES WITHOUT DIFFICULTY
TOILETING: 0-->INDEPENDENT
FALL_HISTORY_WITHIN_LAST_SIX_MONTHS: NO
AMBULATION: 2-->ASSISTIVE PERSON

## 2020-08-28 NOTE — CONSULTS
THORACIC SURGERY CONSULT NOTE    Consult Reason: Acute onset shortness of breath, status post bilateral thoracoscopic thymectomy    HPI: 70-year-old male who had a thymectomy via bilateral thoracoscopic incisions on 8/19/2020.  Did well and had an unremarkable postoperative course and was discharged on postoperative day #2.  Experienced acute onset shortness of breath starting yesterday morning.  CT scan upon presentation to the emergency department today was negative for pulmonary embolism.  It does show small right-sided pleural effusion, small left loculated effusion, and small pericardial effusion as well as some station 5 lymphadenopathy.    A/P: Patient is a 70 year old male status post thymectomy with mediastinal lymphadenopathy and small bilateral pleural effusions.  -CT findings unlikely to be contributing to shortness of breath  -Recommend echocardiogram and cardiac workup as per medicine team  -Thoracic surgery to sign off  -Please feel free to call with questions     Patient and plan discussed with attending Jorge Trent MD     Thank you for the opportunity to participate in the care of this patient.    PMH:  Past Medical History:   Diagnosis Date     Allergies      Anemia      BPH (benign prostatic hyperplasia)      Chronic pain      Constipation      Dyslipidemia      GERD (gastroesophageal reflux disease)      Hypertension      Mediastinal mass      Primary lung adenocarcinoma (H)      Stroke (H)      Tobacco use        PSH:  Past Surgical History:   Procedure Laterality Date     ------------OTHER-------------  2016    Bilateral iliac artery stent placement     robotic lobectomy  05/06/2020     THORACOSCOPIC THYMECTOMY Bilateral 8/19/2020    Procedure: Bilateral subxiphoid thoracoscopic thymectomy;  Surgeon: Tate Garcia MD;  Location:  OR       FH:  family history includes Cerebrovascular Disease in his father, sister, and sister; Heart Disease in his mother; Other - See Comments in his  brother.      SH: Non-contributory     Allergies:  Allergies   Allergen Reactions     Flomax [Tamsulosin]      ITCHING       Home Meds:  Medications Prior to Admission   Medication Sig Dispense Refill Last Dose     acetaminophen (TYLENOL) 325 MG tablet Take 3 tablets (975 mg) by mouth every 6 hours        amLODIPine (NORVASC) 10 MG tablet TAKE 1 TABLET BY MOUTH DAILY. (Patient taking differently: Take 10 mg by mouth every morning ) 90 tablet 0      aspirin (ASA) 81 MG EC tablet Take 1 tablet (81 mg) by mouth daily (Patient taking differently: Take 81 mg by mouth At Bedtime ) 90 tablet 1      cetirizine (ZYRTEC) 10 MG tablet Take 1 tablet (10 mg) by mouth daily (Patient taking differently: Take 10 mg by mouth At Bedtime ) 30 tablet 1      methocarbamol (ROBAXIN) 500 MG tablet Take 1 tablet (500 mg) by mouth 4 times daily as needed for muscle spasms 20 tablet 0      metoprolol tartrate (LOPRESSOR) 25 MG tablet Take 25 mg by mouth 2 times daily         oxyCODONE (ROXICODONE) 5 MG tablet Take 1-2 tablets (5-10 mg) by mouth every 4 hours as needed for moderate to severe pain 30 tablet 0      polyethylene glycol (MIRALAX) 17 g packet Take 17 g by mouth daily 7 packet 0      sennosides (SENOKOT) 8.6 MG tablet Take 1 tablet by mouth 2 times daily 14 tablet 0      umeclidinium-vilanterol (ANORO ELLIPTA) 62.5-25 MCG/INH oral inhaler Inhale 1 puff into the lungs daily (Patient taking differently: Inhale 1 puff into the lungs as needed ) 1 Inhaler 1        ROS:  + for MOSELEY and reproducible chest pain, remainder of 12 point review of systems negative    Physical Exam:  Temp:  [97.6  F (36.4  C)-100.2  F (37.9  C)] 98.5  F (36.9  C)  Pulse:  [] 103  Resp:  [13-38] 20  BP: (103-186)/(73-99) 175/85  SpO2:  [78 %-100 %] 97 %    Gen: NAD, resting comfortably in bed (sleeping upon arrival to bedside)  Lungs: non-labored breathing  CV: Good color  Abd: NDNT  Ext: WWP  Neuro: AOx3    Labs:  ABG No lab results found in last 7  days.  CBC  Recent Labs   Lab 08/28/20 0441   WBC 13.5*   HGB 12.7*        BMP  Recent Labs   Lab 08/28/20 0441      POTASSIUM 4.7   CHLORIDE 102   CO2 26   BUN 20   CR 0.92   *     LFT  Recent Labs   Lab 08/28/20 0441   AST Canceled, Test credited   ALT 24   ALKPHOS 111   BILITOTAL 0.7   ALBUMIN 2.7*   INR 1.05     Pancreas  Recent Labs   Lab 08/28/20 0441   LIPASE 52*       Imaging as described above          Atul Richter PA-C

## 2020-08-28 NOTE — ED PROVIDER NOTES
History     Chief Complaint   Patient presents with     Chest Pain     HPI  Moises Chacon is a 70 year old male who presents to us with a chief complaint of pain shortness of breath.  Symptoms started yesterday morning.  Patient denies any cough or fevers.  No nausea or vomiting.  He has not previously had pain similar to this.  He has a history of  Subxiphoid and bilateral thoracoscopic thymectomy approximately a week ago.  He states he was doing well postsurgically until his symptoms started yesterday.      I have reviewed the Medications, Allergies, Past Medical and Surgical History, and Social History in the [employer] system.  PAST MEDICAL HISTORY:   Past Medical History:   Diagnosis Date     Allergies      Anemia      BPH (benign prostatic hyperplasia)      Chronic pain      Constipation      Dyslipidemia      GERD (gastroesophageal reflux disease)      Hypertension      Mediastinal mass      Primary lung adenocarcinoma (H)      Stroke (H)      Tobacco use        PAST SURGICAL HISTORY:   Past Surgical History:   Procedure Laterality Date     ------------OTHER-------------      Bilateral iliac artery stent placement     robotic lobectomy  2020     THORACOSCOPIC THYMECTOMY Bilateral 2020    Procedure: Bilateral subxiphoid thoracoscopic thymectomy;  Surgeon: Tate Garcia MD;  Location:  OR       Past medical history, past surgical history, medications, and allergies were reviewed with the patient. Additional pertinent items: None    FAMILY HISTORY:   Family History   Problem Relation Age of Onset     Heart Disease Mother      Cerebrovascular Disease Father      Other - See Comments Brother         passed in [place] war     Cerebrovascular Disease Sister      Cerebrovascular Disease Sister        SOCIAL HISTORY:   Social History     Tobacco Use     Smoking status: Former Smoker     Packs/day: 0.50     Years: 60.00     Pack years: 30.00     Last attempt to quit: 2020     Years since quittin.0      Smokeless tobacco: Former User   Substance Use Topics     Alcohol use: No     Social history was reviewed with the patient. Additional pertinent items: None      Patient's Medications   New Prescriptions    No medications on file   Previous Medications    ACETAMINOPHEN (TYLENOL) 325 MG TABLET    Take 3 tablets (975 mg) by mouth every 6 hours    AMLODIPINE (NORVASC) 10 MG TABLET    TAKE 1 TABLET BY MOUTH DAILY.    ASPIRIN (ASA) 81 MG EC TABLET    Take 1 tablet (81 mg) by mouth daily    CETIRIZINE (ZYRTEC) 10 MG TABLET    Take 1 tablet (10 mg) by mouth daily    METHOCARBAMOL (ROBAXIN) 500 MG TABLET    Take 1 tablet (500 mg) by mouth 4 times daily as needed for muscle spasms    METOPROLOL TARTRATE (LOPRESSOR) 25 MG TABLET    Take 25 mg by mouth 2 times daily     OXYCODONE (ROXICODONE) 5 MG TABLET    Take 1-2 tablets (5-10 mg) by mouth every 4 hours as needed for moderate to severe pain    POLYETHYLENE GLYCOL (MIRALAX) 17 G PACKET    Take 17 g by mouth daily    SENNOSIDES (SENOKOT) 8.6 MG TABLET    Take 1 tablet by mouth 2 times daily    UMECLIDINIUM-VILANTEROL (ANORO ELLIPTA) 62.5-25 MCG/INH ORAL INHALER    Inhale 1 puff into the lungs daily   Modified Medications    No medications on file   Discontinued Medications    No medications on file          Allergies   Allergen Reactions     Flomax [Tamsulosin]      ITCHING        Review of Systems   Constitutional: Negative for fever.   Respiratory: Positive for shortness of breath. Negative for cough.    Cardiovascular: Positive for chest pain.   Gastrointestinal: Negative for abdominal pain, nausea, rectal pain and vomiting.   Neurological: Negative for dizziness and weakness.   All other systems reviewed and are negative.    A complete review of systems was performed with pertinent positives and negatives noted in the HPI, and all other systems negative.    Physical Exam   BP: (!) 153/85  Pulse: 103  Temp: 97.6  F (36.4  C)  Resp: 22  SpO2: 94 %      Physical  Exam  Constitutional:       General: He is not in acute distress.     Appearance: He is not diaphoretic.   HENT:      Head: Normocephalic.      Mouth/Throat:      Pharynx: No oropharyngeal exudate.   Eyes:      Extraocular Movements: Extraocular movements intact.   Neck:      Musculoskeletal: Neck supple.   Cardiovascular:      Heart sounds: Normal heart sounds.   Pulmonary:      Effort: Respiratory distress present.      Comments: Bilateral basilar crackles with coarse breath sounds,  Conversational dyspnea    Chest wall incisions from thymectomy clean dry intact  Abdominal:      General: There is no distension.      Palpations: Abdomen is soft.      Tenderness: There is no abdominal tenderness.   Musculoskeletal:         General: No deformity.   Skin:     General: Skin is dry.   Neurological:      Mental Status: He is alert.      Comments: alert   Psychiatric:         Behavior: Behavior normal.         ED Course        Procedures             EKG Interpretation:      Interpreted by Adelfo Cartwright DO  Time reviewed: 440  Symptoms at time of EKG: Chest pain, dyspnea  Rhythm: normal sinus   Rate: 102  Axis: normal  Ectopy: none  Conduction: normal  ST Segments/ T Waves: No ST-T wave elevation or depression, nonspecific T wave changes  Q Waves: none  Comparison to prior: Unchanged    Clinical Impression: Abnormal EKG        Results for orders placed or performed during the hospital encounter of 08/28/20   XR Chest Port 1 View     Status: None    Narrative    EXAM: XR CHEST PORT 1 VW  LOCATION: [facility]  DATE/TIME: 8/28/2020 4:36 AM    INDICATION: Chest pain.  COMPARISON: None.    FINDINGS: Semiupright portable chest. The heart is at the upper limits normal in size. There is no pulmonary edema. Thoracic aorta is calcified. There are bibasilar infiltrates. No pneumothorax.      Impression    IMPRESSION: Bibasilar atelectasis or pneumonia.               CBC with platelets differential     Status:  Abnormal   Result Value Ref Range    WBC 13.5 (H) 4.0 - 11.0 10e9/L    RBC Count 4.21 (L) 4.4 - 5.9 10e12/L    Hemoglobin 12.7 (L) 13.3 - 17.7 g/dL    Hematocrit 39.3 (L) 40.0 - 53.0 %    MCV 93 78 - 100 fl    MCH 30.2 26.5 - 33.0 pg    MCHC 32.3 31.5 - 36.5 g/dL    RDW 13.7 10.0 - 15.0 %    Platelet Count 354 150 - 450 10e9/L    Diff Method Automated Method     % Neutrophils 84.5 %    % Lymphocytes 8.9 %    % Monocytes 5.3 %    % Eosinophils 0.7 %    % Basophils 0.2 %    % Immature Granulocytes 0.4 %    Nucleated RBCs 0 0 /100    Absolute Neutrophil 11.4 (H) 1.6 - 8.3 10e9/L    Absolute Lymphocytes 1.2 0.8 - 5.3 10e9/L    Absolute Monocytes 0.7 0.0 - 1.3 10e9/L    Absolute Eosinophils 0.1 0.0 - 0.7 10e9/L    Absolute Basophils 0.0 0.0 - 0.2 10e9/L    Abs Immature Granulocytes 0.1 0 - 0.4 10e9/L    Absolute Nucleated RBC 0.0    Comprehensive metabolic panel     Status: Abnormal   Result Value Ref Range    Sodium 134 133 - 144 mmol/L    Potassium 4.7 3.4 - 5.3 mmol/L    Chloride 102 94 - 109 mmol/L    Carbon Dioxide 26 20 - 32 mmol/L    Anion Gap 6 3 - 14 mmol/L    Glucose 126 (H) 70 - 99 mg/dL    Urea Nitrogen 20 7 - 30 mg/dL    Creatinine 0.92 0.66 - 1.25 mg/dL    GFR Estimate 82 >60 mL/min/[1.73_m2]    GFR Estimate If Black >90 >60 mL/min/[1.73_m2]    Calcium 8.8 8.5 - 10.1 mg/dL    Bilirubin Total 0.7 0.2 - 1.3 mg/dL    Albumin 2.7 (L) 3.4 - 5.0 g/dL    Protein Total 8.1 6.8 - 8.8 g/dL    Alkaline Phosphatase 111 40 - 150 U/L    ALT 24 0 - 70 U/L    AST Canceled, Test credited 0 - 45 U/L   Troponin I     Status: None   Result Value Ref Range    Troponin I ES <0.015 0.000 - 0.045 ug/L   Lipase     Status: Abnormal   Result Value Ref Range    Lipase 52 (L) 73 - 393 U/L   INR     Status: None   Result Value Ref Range    INR 1.05 0.86 - 1.14   Partial thromboplastin time     Status: None   Result Value Ref Range    PTT 35 22 - 37 sec   EKG 12-lead, tracing only     Status: None   Result Value Ref Range     Interpretation ECG Click View Image link to view waveform and result    Blood culture     Status: None (Preliminary result)    Specimen: Blood    Right Arm   Result Value Ref Range    Specimen Description Blood Right Arm     Culture Micro PENDING             Results for orders placed or performed during the hospital encounter of 08/28/20 (from the past 24 hour(s))   EKG 12-lead, tracing only   Result Value Ref Range    Interpretation ECG Click View Image link to view waveform and result    CBC with platelets differential   Result Value Ref Range    WBC 13.5 (H) 4.0 - 11.0 10e9/L    RBC Count 4.21 (L) 4.4 - 5.9 10e12/L    Hemoglobin 12.7 (L) 13.3 - 17.7 g/dL    Hematocrit 39.3 (L) 40.0 - 53.0 %    MCV 93 78 - 100 fl    MCH 30.2 26.5 - 33.0 pg    MCHC 32.3 31.5 - 36.5 g/dL    RDW 13.7 10.0 - 15.0 %    Platelet Count 354 150 - 450 10e9/L    Diff Method Automated Method     % Neutrophils 84.5 %    % Lymphocytes 8.9 %    % Monocytes 5.3 %    % Eosinophils 0.7 %    % Basophils 0.2 %    % Immature Granulocytes 0.4 %    Nucleated RBCs 0 0 /100    Absolute Neutrophil 11.4 (H) 1.6 - 8.3 10e9/L    Absolute Lymphocytes 1.2 0.8 - 5.3 10e9/L    Absolute Monocytes 0.7 0.0 - 1.3 10e9/L    Absolute Eosinophils 0.1 0.0 - 0.7 10e9/L    Absolute Basophils 0.0 0.0 - 0.2 10e9/L    Abs Immature Granulocytes 0.1 0 - 0.4 10e9/L    Absolute Nucleated RBC 0.0    Comprehensive metabolic panel   Result Value Ref Range    Sodium 134 133 - 144 mmol/L    Potassium 4.7 3.4 - 5.3 mmol/L    Chloride 102 94 - 109 mmol/L    Carbon Dioxide 26 20 - 32 mmol/L    Anion Gap 6 3 - 14 mmol/L    Glucose 126 (H) 70 - 99 mg/dL    Urea Nitrogen 20 7 - 30 mg/dL    Creatinine 0.92 0.66 - 1.25 mg/dL    GFR Estimate 82 >60 mL/min/[1.73_m2]    GFR Estimate If Black >90 >60 mL/min/[1.73_m2]    Calcium 8.8 8.5 - 10.1 mg/dL    Bilirubin Total 0.7 0.2 - 1.3 mg/dL    Albumin 2.7 (L) 3.4 - 5.0 g/dL    Protein Total 8.1 6.8 - 8.8 g/dL    Alkaline Phosphatase 111 40 - 150 U/L     ALT 24 0 - 70 U/L    AST Canceled, Test credited 0 - 45 U/L   Troponin I   Result Value Ref Range    Troponin I ES <0.015 0.000 - 0.045 ug/L   Lipase   Result Value Ref Range    Lipase 52 (L) 73 - 393 U/L   Blood culture    Specimen: Blood    Right Arm   Result Value Ref Range    Specimen Description Blood Right Arm     Culture Micro PENDING    INR   Result Value Ref Range    INR 1.05 0.86 - 1.14   Partial thromboplastin time   Result Value Ref Range    PTT 35 22 - 37 sec   XR Chest Port 1 View    Narrative    EXAM: XR CHEST PORT 1 VW  LOCATION: [facility]  DATE/TIME: 8/28/2020 4:36 AM    INDICATION: Chest pain.  COMPARISON: None.    FINDINGS: Semiupright portable chest. The heart is at the upper limits normal in size. There is no pulmonary edema. Thoracic aorta is calcified. There are bibasilar infiltrates. No pneumothorax.      Impression    IMPRESSION: Bibasilar atelectasis or pneumonia.                 Medications   iopamidol (ISOVUE-370) solution 75 mL (has no administration in time range)   sodium chloride 0.9 % bag 500mL for CT scan flush use (has no administration in time range)   piperacillin-tazobactam (ZOSYN) 3.375 g vial to attach to  mL bag (3.375 g Intravenous New Bag 8/28/20 0624)   azithromycin (ZITHROMAX) 500 mg in sodium chloride 0.9 % 250 mL intermittent infusion (has no administration in time range)   0.9% sodium chloride BOLUS (1,000 mLs Intravenous New Bag 8/28/20 0644)     Followed by   sodium chloride 0.9% infusion (has no administration in time range)   HYDROmorphone (PF) (DILAUDID) injection 0.3 mg (has no administration in time range)             Assessments & Plan (with Medical Decision Making)   70-year-old male presents to us with a chief complaint of chest pain shortness of breath.  He is postop approximately a week thorascopic thymectomy.  Differential includes but not limited to acute coronary syndrome, dysrhythmia, pneumonia, pneumothorax, pulmonary embolism,  postoperative pain, postoperative infection.  Patient was tachycardic and tachypneic here.  Maintain his sats in the mid 90s when talking on room air however he had severe conversational dyspnea.  This improved with supplemental O2.  Chest x-ray showed possible atelectasis versus infection.  CT scan was ordered to rule out PE and better characterize this as the patient's lack of cough and fever and sudden onset of symptoms do not necessarily correspond with pneumonia.  He was given antibiotics regardless.  Labs show normal troponin and CMP.  CBC shows a mild leukocytosis at 13.5.  EKG showed sinus tachycardia without concerning ST changes.  CT scan is pending at this point.  Patient will likely need to be admitted but his care will be signed out to dayshift physician pending CT scan.    I have reviewed the nursing notes.    I have reviewed the findings, diagnosis, plan and need for follow up with the patient.    New Prescriptions    No medications on file       Final diagnoses:   Chest pain, unspecified type   Dyspnea, unspecified type       8/28/2020   [facility], [place], EMERGENCY DEPARTMENT     Adelfo Cartwright,   08/28/20 0651

## 2020-08-28 NOTE — CONSULTS
Oncology Consult Note    Moises Chacon MRN# [id number]   Age: 70 year old YOB: 1950     Date of Admission: 8/28/2020    Reason for consult:  Lung cancer and thymoma           History of Present Illness:   History is obtained via [employer] language interpretation services and via chart review.     Moises Chacon is a 70 year old male with pertinent history of lung adenocarcinoma (PDL-1 60%) kras +), history of anterior mediastinal mass (thymoma) s.p b/l thoracoscopic thymectomy on 8/19/2020 who is admitted with chest pain and shortness of breath. Post operative course was unremarkable. He developed chest pain and shortness of breath since yesterday. He also endorses some soreness around his abdominal surgical wounds. He denies cough, fever or chills.     He has a 50+ pack year smoking history and was noted to have a left lower lobe mass in 2018. This was not followed up. He then developed abdominal pain and then was noted to have growth in the lung mass. He underwent robotic left lower lobectomy at [facility] in [place] on 5/6/2020. Pathology showed pT3N0 poorly differentiated adenocarcinoma, stage IIB. MRI brain was negative for metastases on 1/17/20 per out-patient notes. He then moved to [place] to live with his sister and established with Dr. Ross in June 2020.  Due to persistent mediastinal mass (present since 2015) and pain, Dr. Ross referred him to thoracic surgery.  Dr. Ross recommended carboplatin/pemetrexed/pembrolizumab adjuvant radiation with concurrent radiation however, patient elevated to wait evaluation and resection of the anterior mediastinal mass. He underwent thymectomy and resection of 4.8 cm thymoma on 8/19/2020. Margins were negative and capsular invasion was identified and one lymph node was negative for malignancy.         Review of Systems:     14-point review of systems was otherwise negative except as noted in HPI.          Past Medical History:   Past medical  history was reviewed.   Past Medical History:   Diagnosis Date     Allergies      Anemia      BPH (benign prostatic hyperplasia)      Chronic pain      Constipation      Dyslipidemia      GERD (gastroesophageal reflux disease)      Hypertension      Mediastinal mass      Primary lung adenocarcinoma (H)      Stroke (H)      Tobacco use              Past Surgical History:   Past surgical history was reviewed.   Past Surgical History:   Procedure Laterality Date     ------------OTHER-------------      Bilateral iliac artery stent placement     robotic lobectomy  2020     THORACOSCOPIC THYMECTOMY Bilateral 2020    Procedure: Bilateral subxiphoid thoracoscopic thymectomy;  Surgeon: Tate Garcia MD;  Location:  OR             Social History:   Social history was reviewed.   Social History     Tobacco Use     Smoking status: Former Smoker     Packs/day: 0.50     Years: 60.00     Pack years: 30.00     Last attempt to quit: 2020     Years since quittin.0     Smokeless tobacco: Former User   Substance Use Topics     Alcohol use: No             Family History:   Family history was reviewed.  Family History   Problem Relation Age of Onset     Heart Disease Mother      Cerebrovascular Disease Father      Other - See Comments Brother         passed in [place] war     Cerebrovascular Disease Sister      Cerebrovascular Disease Sister              Allergies:     Allergies   Allergen Reactions     Flomax [Tamsulosin]      ITCHING             Medications:   Medications Reviewed.   Current Facility-Administered Medications   Medication     acetaminophen (TYLENOL) tablet 975 mg     amLODIPine (NORVASC) tablet 10 mg     aspirin EC tablet 81 mg     bisacodyl (DULCOLAX) Suppository 10 mg     cetirizine (zyrTEC) tablet 10 mg     enoxaparin ANTICOAGULANT (LOVENOX) injection 40 mg     lidocaine (LMX4) cream     lidocaine 1 % 0.1-1 mL     melatonin tablet 1 mg     methocarbamol (ROBAXIN) tablet 500 mg      metoprolol tartrate (LOPRESSOR) tablet 25 mg     naloxone (NARCAN) injection 0.1-0.4 mg     oxyCODONE (ROXICODONE) tablet 5-10 mg     Patient is already receiving anticoagulation with heparin, enoxaparin (LOVENOX), warfarin (COUMADIN)  or other anticoagulant medication     polyethylene glycol (MIRALAX) Packet 17 g     sennosides (SENOKOT) tablet 1 tablet     sodium chloride (PF) 0.9% PF flush 3 mL     sodium chloride (PF) 0.9% PF flush 3 mL     sodium chloride 0.9% infusion             Physical Exam:   Vitals were reviewed.  Blood pressure (!) 175/85, pulse 103, temperature 98.5  F (36.9  C), temperature source Oral, resp. rate 20, SpO2 97 %.    Constitutional: awake, laying in bed, appears comfortable, in NAD  CV: RRR  Resp: Breathing comfortably on room air  Abd: Soft, non-tender, well-healing post surgical port incisions  MSK:  Warm, FROM, no joint swelling   Skin: no rash or lesions on limited exam  Neuro: Moves all four extremities         Data:   CMP  Recent Labs   Lab 08/28/20  0441      POTASSIUM 4.7   CHLORIDE 102   CO2 26   ANIONGAP 6   *   BUN 20   CR 0.92   GFRESTIMATED 82   GFRESTBLACK >90   MERI 8.8   PROTTOTAL 8.1   ALBUMIN 2.7*   BILITOTAL 0.7   ALKPHOS 111   AST Canceled, Test credited   ALT 24     CBC  Recent Labs   Lab 08/28/20  0441   WBC 13.5*   RBC 4.21*   HGB 12.7*   HCT 39.3*   MCV 93   MCH 30.2   MCHC 32.3   RDW 13.7        INR  Recent Labs   Lab 08/28/20  0441   INR 1.05     Arterial Blood GasNo lab results found in last 7 days.    IMAGING  Xr Chest Port 1 View  Result Date: 8/28/2020  EXAM: XR CHEST PORT 1 VW LOCATION: [facility] DATE/TIME: 8/28/2020 4:36 AM INDICATION: Chest pain. COMPARISON: None. FINDINGS: Semiupright portable chest. The heart is at the upper limits normal in size. There is no pulmonary edema. Thoracic aorta is calcified. There are bibasilar infiltrates. No pneumothorax.   IMPRESSION: Bibasilar atelectasis or pneumonia.        Ct Chest  Pulmonary Embolism W Contrast  Result Date: 8/28/2020  IMPRESSION: In this patient with a history of lung adenocarcinoma status post recent left lower lobe wedge resection on 8/19/2020: 1. There is been interval growth of a left hilar lymph node which now measures 1.9 x 2.9 cm (1.5 x 2.1 cm prior exam 6/23/2020]. There is also increased mediastinal and AP window lymphadenopathy, all possibly reactive or secondary to CHF. 2. . Small bilateral pleural effusions with the left-sided pleural effusion layering at the left major fissure having a loculated component. Right-sided effusion is causing atelectasis of the right lower lobe. Unable to exclude a malignant component to these pleural effusions. 3. 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 4.  Small amount of subcutaneous air in the neck, likely sequela from recent surgery.    PATHOLOGY  A: Lymph node, left superior mediastinal   B: Thymus    A. Lymph node, left superior mediastinal, excision:   - One lymph node, negative for malignancy (0/1)     B. Thymus, thymectomy:   - Thymoma, type A   - Tumor size: 4.8 cm in greatest dimension   - Resection margins free of tumor   - Capsular invasion identified, focal and microscopic   - AJCC pathologic stage pT1aN0, see synoptic report          Assessment:   70 year old male with pertinent history of lung adenocarcinoma (PDL-1 60%) kras +) s/p LLL lobectomy in 5/2020, history of anterior mediastinal mass (thymoma) s.p b/l thoracoscopic thymectomy on 8/19/2020 who is admitted with chest pain and shortness of breath.     # Stage IIIA or IIIB adenocarcinoma of lung  # Stage II thymoma   - Interval growth noted in the left hilar lymph node 1.9 cm x 2.9 cm vs. 1.5 cm x 2.1 cm.      ATTESTATION.    I have seen this patient and concur with the findings.  I have independently seen and examined this patient and my assessment is in agreement with the above note.  I have reviewed all tests and past medical history and  my evaluation agrees with the findings in the note.    Patient with a stage 2 ([place] staging) Thymoma.  I don't believe he needs any adjuvant XRT or chemotherapy at this time, but I will leave that discussion to Dr. Ross.  Patient is well from our perspective and when discharged should have follow up with Dr. Ross.

## 2020-08-28 NOTE — PROGRESS NOTES
Tried to insert IV on right medial upper arm, but unsuccessful. Pt refused for the 2nd attempt of IV insertion. Pt is a difficult stick.   RN was informed.  Endorsed to [facility].

## 2020-08-28 NOTE — SIGNIFICANT EVENT
RRT called for pt triggering SBA sepsis. Lactic 2.4 reported. MD notified. Pt currently undergoing ECHO, will bolus if EF is WDL; otherwise maintain NS @ 125mL/hr + recheck lactic at 17:00. Pt VSS, ex HTN. Will restart PTA HTN medications.

## 2020-08-28 NOTE — H&P
[facility], [place]    History and Physical - Hospitalist Service, Gold 6       Date of Admission:  8/28/2020    Assessment & Plan   Mr. 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      # Acute dyspnea  # Hx of thymoma s/p thymectomy, 8/21/20  # Hx of lung cancer s/p left lower lobectomy, 5/2020  Admitted after presenting to ED with 1 day hx of severe dyspnea. Had chest pain that resolved in ED. Within context of recent thymectomy on 8/21 that was without any complications. EKG in ED unremarkable and trop negative. CXR with bibasilar atelectasis more likely than infection as pt afebrile and other VSS. COVID test negative. Initially on 3L O2 but learned later this was only for comfort as sats high 90s on RA. TTE obtained that revealed EF 65-70% and reportedly afib (which if accurate would be new) but repeat EKG sinus rhythm and otherwise unremarkable (computer mentioned poss pericarditis but pt denies CP and no friction rub on exam). Pt with hx of lung cancer s/p lobectomy back in May at OSH in [place], and and question dyspnea related to CT chest finding of interval growth of L hilar lymph node and possible malignant pleural effusions since previous scan on 6/23/20. Thoracic surgery consulted and reviewed scan and felt not related to recent thymectomy and have since signed off.   - Oncology and thoracic surgery consulted and appreciate recommendations.   - Await results of oncology rec's  - Will consider IR consult for thoracentesis is dyspnea persists.   - Continue prn supplemental O2 to keep O2 sats >90%.   - Incisional pain: Continue PTA scheduled Tylenol and prn oxycodone.      # Leukocytosis: Most likely reactive and 2/2 stress leukemoid response as pt afebrile, LA WNL, procal neg and other than dyspnea pt has no other s/s of underlying infection.   - CTM via daily CBC    # HTN: BPs stable yet elevated.   - Start pt's PTA antihypertensive regimen of amlodipine 10 mg daily, and metoprolol 25 mg BID with parameters to hold.      # Acute on chronic constipation: States last BM ~ 5 days ago. PTA on scheduled Miralax and Senokot-S of unclear compliance. Denies abd pain and nausea.   - Continue PTA bowel regimen  - Add prn Dulcolax suppository.     # BPH   # Microscopic pyuria  PTA previously prescribed Flomax but apparently not taking due to adverse effect of itching. Currently without any urinary concerns. UA collected in ED and revealed 14 WBCs, likely contaminant. UC ordered but NGTD.  - Continue to monitor UC to ensure NG.    # Remote hx of CVA: Unclear details but apparently occurred in 2009 but no further deficits.   - Continue PTA baby asa       Diet: Combination Diet Regular Diet Adult    DVT Prophylaxis: Enoxaparin (Lovenox) SQ  Melara Catheter: not present  Code Status: Full Code           Disposition Plan   Expected discharge: 2 - 3 days, recommended to prior living arrangement once workup of dyspnea complete and plan regarding enlarging hilar lymphadenopathy complete.  Entered: Mathieu Soria PA-C 08/28/2020, 2:23 PM     The patient's care was discussed with the Attending Physician, Dr. Jaramillo, Bedside Nurse and Patient.    Mathieu Soria PA-C  [facility], [place]  Pager: [phone]  Please see sticky note for cross cover information  ______________________________________________________________________    Chief Complaint   Acute dyspnea    History is obtained from the patient and electronic health record    History of Present Illness    Please refer to ED providers note, Dr. Cartwright, for full details. In brief, Mr. Moises Chacon is a 69 yo  Bengali speaking male with hx of HTN, CVA ( without deficits), PAD s/p iliac stenting (2016), 30 yr ppd smoking hx, constipation, lung cancer s/p LL lobectomy 2020 (at [facility] in [place]), and thymoma s/p thymectomy by thoracic surgery at [facility] 20, admitted to [facility] after presenting to ED with acute dyspnea.      Currently pt admits that dyspnea has significantly improved since evaluation and management in ED. Feels constipated with last BM approximately 5 days ago. Denies abd pain and nausea. Denies other acute physical concerns at this time.     Review of Systems    The 10 point Review of Systems is negative other than noted in the HPI or here.    Past Medical History    I have reviewed this patient's medical history and updated it with pertinent information if needed.   Past Medical History:   Diagnosis Date     Allergies      Anemia      BPH (benign prostatic hyperplasia)      Chronic pain      Constipation      Dyslipidemia      GERD (gastroesophageal reflux disease)      Hypertension      Mediastinal mass      Primary lung adenocarcinoma (H)      Stroke (H)      Tobacco use        Past Surgical History   I have reviewed this patient's surgical history and updated it with pertinent information if needed.  Past Surgical History:   Procedure Laterality Date     ------------OTHER-------------      Bilateral iliac artery stent placement     robotic lobectomy  2020     THORACOSCOPIC THYMECTOMY Bilateral 2020    Procedure: Bilateral subxiphoid thoracoscopic thymectomy;  Surgeon: Tate Garcia MD;  Location:  OR       Social History   I have reviewed this patient's social history and updated it with pertinent information if needed.  Social History     Tobacco Use     Smoking status: Former Smoker     Packs/day: 0.50     Years: 60.00     Pack years: 30.00     Last attempt to quit: 2020     Years since quittin.0     Smokeless tobacco: Former User   Substance Use Topics      Alcohol use: No     Drug use: Yes     Types: Marijuana       Family History   I have reviewed this patient's family history and updated it with pertinent information if needed.  Family History   Problem Relation Age of Onset     Heart Disease Mother      Cerebrovascular Disease Father      Other - See Comments Brother         passed in [place] war     Cerebrovascular Disease Sister      Cerebrovascular Disease Sister        Prior to Admission Medications   Prior to Admission Medications   Prescriptions Last Dose Informant Patient Reported? Taking?   acetaminophen (TYLENOL) 325 MG tablet   No No   Sig: Take 3 tablets (975 mg) by mouth every 6 hours   amLODIPine (NORVASC) 10 MG tablet   No No   Sig: TAKE 1 TABLET BY MOUTH DAILY.   Patient taking differently: Take 10 mg by mouth every morning    aspirin (ASA) 81 MG EC tablet   No No   Sig: Take 1 tablet (81 mg) by mouth daily   Patient taking differently: Take 81 mg by mouth At Bedtime    cetirizine (ZYRTEC) 10 MG tablet   No No   Sig: Take 1 tablet (10 mg) by mouth daily   Patient taking differently: Take 10 mg by mouth At Bedtime    methocarbamol (ROBAXIN) 500 MG tablet   No No   Sig: Take 1 tablet (500 mg) by mouth 4 times daily as needed for muscle spasms   metoprolol tartrate (LOPRESSOR) 25 MG tablet   Yes No   Sig: Take 25 mg by mouth 2 times daily    oxyCODONE (ROXICODONE) 5 MG tablet   No No   Sig: Take 1-2 tablets (5-10 mg) by mouth every 4 hours as needed for moderate to severe pain   polyethylene glycol (MIRALAX) 17 g packet   No No   Sig: Take 17 g by mouth daily   sennosides (SENOKOT) 8.6 MG tablet   No No   Sig: Take 1 tablet by mouth 2 times daily   umeclidinium-vilanterol (ANORO ELLIPTA) 62.5-25 MCG/INH oral inhaler   No No   Sig: Inhale 1 puff into the lungs daily   Patient taking differently: Inhale 1 puff into the lungs as needed       Facility-Administered Medications: None     Allergies   Allergies   Allergen Reactions     Flomax [Tamsulosin]       ITCHING       Physical Exam   Vital Signs: Temp: 98.5  F (36.9  C) Temp src: Oral BP: (!) 175/85 Pulse: 103   Resp: 20 SpO2: 97 % O2 Device: None (Room air) Oxygen Delivery: 3 LPM  Weight: 0 lbs 0 oz  GEN: In NAD  HEENT: NCAT; PERRL; sclerae non-icteric  LUNGS: CTAB  CV: RRR  ABD: +BSs; SNTND; recent abd incisions healing well without dehiscence of infection.   EXT: No BLE edema  SKIN: No acute rashes noted on exposed areas.  NEURO: AAOx3; CNs grossly intact; No acute focal deficits noted.      Data   Data reviewed today: I reviewed all medications, new labs and imaging results over the last 24 hours.  CMP  Recent Labs   Lab 08/28/20 0441      POTASSIUM 4.7   CHLORIDE 102   CO2 26   ANIONGAP 6   *   BUN 20   CR 0.92   GFRESTIMATED 82   GFRESTBLACK >90   MERI 8.8   PROTTOTAL 8.1   ALBUMIN 2.7*   BILITOTAL 0.7   ALKPHOS 111   AST Canceled, Test credited   ALT 24     CBC  Recent Labs   Lab 08/28/20 0441   WBC 13.5*   RBC 4.21*   HGB 12.7*   HCT 39.3*   MCV 93   MCH 30.2   MCHC 32.3   RDW 13.7        INR  Recent Labs   Lab 08/28/20 0441   INR 1.05

## 2020-08-28 NOTE — ED TRIAGE NOTES
Calledf with shortness of breath, 10\/10 chest pain. Worse with inspiration. Took ASA at home. Unclear dose. Given 100 mcg fentanyl about 20 minutes ago by medics with good relief. Vitally stable en route. Hx lung cancer, recent surgical procedure at [facility] for this.

## 2020-08-28 NOTE — ED NOTES
[facility], [place]   ED Nurse to Floor Handoff     Moises Chacon is a 70 year old male who speaks Faroese and basic English with complaints of chest pain and shortness of breath.     ED Chief Complaint: Chest Pain    ED Dx;   Final diagnoses:   Chest pain, unspecified type   Dyspnea, unspecified type         Needed?: Yes    Allergies:   Allergies   Allergen Reactions     Flomax [Tamsulosin]      ITCHING   .  Past Medical Hx:   Past Medical History:   Diagnosis Date     Allergies      Anemia      BPH (benign prostatic hyperplasia)      Chronic pain      Constipation      Dyslipidemia      GERD (gastroesophageal reflux disease)      Hypertension      Mediastinal mass      Primary lung adenocarcinoma (H)      Stroke (H)      Tobacco use       Baseline Mental status: WDL  Current Mental Status changes: at basesline    Infection present or suspected this encounter: cultures pending  Sepsis suspected: No  Isolation type: Special Precautions-COVID-19     Activity level - Baseline/Home:  independent  Activity Level - Current:   Stand with Assist    Bariatric equipment needed?: No    In the ED these meds were given:   Medications   0.9% sodium chloride BOLUS (0 mLs Intravenous Stopped 8/28/20 0726)     Followed by   sodium chloride 0.9% infusion ( Intravenous Rate/Dose Verify 8/28/20 1013)   iopamidol (ISOVUE-370) solution 75 mL (53 mLs Intravenous Given 8/28/20 0811)   sodium chloride 0.9 % bag 500mL for CT scan flush use (44 mLs Intravenous Given 8/28/20 0812)   piperacillin-tazobactam (ZOSYN) 3.375 g vial to attach to  mL bag (0 g Intravenous Stopped 8/28/20 0726)   azithromycin (ZITHROMAX) 500 mg in sodium chloride 0.9 % 250 mL intermittent infusion (0 mg Intravenous Stopped 8/28/20 0900)   HYDROmorphone (PF) (DILAUDID) injection 0.3 mg (0.3 mg Intravenous Given 8/28/20 0829)   acetaminophen (TYLENOL) tablet 975 mg (975 mg Oral Given 8/28/20 0910)       Drips running?   Yes    Home pump  No    Current LDAs  Peripheral IV 08/28/20 Right Wrist (Active)   Site Assessment WDL 08/28/20 0443   Line Status Saline locked 08/28/20 0443   Phlebitis Scale 0-->no symptoms 08/28/20 0443   Infiltration Scale 0 08/28/20 0443   Number of days: 0       Peripheral IV 08/28/20 Left Upper arm (Active)   Site Assessment [facility] 08/28/20 0537   Line Status Saline locked 08/28/20 0537   Phlebitis Scale 0-->no symptoms 08/28/20 0537   Infiltration Scale 0 08/28/20 0537   Number of days: 0       Peripheral IV 08/28/20 Left Upper forearm (Active)   Number of days: 0       Incision/Surgical Site 08/19/20 Bilateral Abdomen (Active)   Number of days: 9       Labs results:   Labs Ordered and Resulted from Time of ED Arrival Up to the Time of Departure from the ED   CBC WITH PLATELETS DIFFERENTIAL - Abnormal; Notable for the following components:       Result Value    WBC 13.5 (*)     RBC Count 4.21 (*)     Hemoglobin 12.7 (*)     Hematocrit 39.3 (*)     Absolute Neutrophil 11.4 (*)     All other components within normal limits   COMPREHENSIVE METABOLIC PANEL - Abnormal; Notable for the following components:    Glucose 126 (*)     Albumin 2.7 (*)     All other components within normal limits   LIPASE - Abnormal; Notable for the following components:    Lipase 52 (*)     All other components within normal limits   ROUTINE UA WITH MICROSCOPIC - Abnormal; Notable for the following components:    Protein Albumin Urine 30 (*)     Leukocyte Esterase Urine Small (*)     WBC Urine 14 (*)     Mucous Urine Present (*)     All other components within normal limits   TROPONIN I   INR   PARTIAL THROMBOPLASTIN TIME   LACTIC ACID WHOLE BLOOD   COVID-19 VIRUS (CORONAVIRUS) BY PCR   SARS-COV-2 (COVID-19) VIRUS RT-PCR   PERIPHERAL IV CATHETER   BLOOD CULTURE   BLOOD CULTURE   BLOOD CULTURE   URINE CULTURE AEROBIC BACTERIAL       Imaging Studies:   Recent Results (from the past 24 hour(s))   XR Chest Port 1 View    Narrative    EXAM:  XR CHEST PORT 1 VW  LOCATION: [facility]  DATE/TIME: 8/28/2020 4:36 AM    INDICATION: Chest pain.  COMPARISON: None.    FINDINGS: Semiupright portable chest. The heart is at the upper limits normal in size. There is no pulmonary edema. Thoracic aorta is calcified. There are bibasilar infiltrates. No pneumothorax.      Impression    IMPRESSION: Bibasilar atelectasis or pneumonia.               CT Chest Pulmonary Embolism w Contrast    Narrative    In this patient with a history of lung adenocarcinoma status post  recent left lower lobe wedge resection on 8/19/2020:  1. There is been interval growth of a left hilar lymph node which now  measures 1.9 x 2.9 cm (1.5 x 2.1 cm prior exam 6/23/2020]. There is  also increased mediastinal and left hilar lymphadenopathy. These  findings are concerning for progression of malignancy.  2. . Small bilateral pleural effusions with the left-sided pleural  effusion layering at the left major fissure having a loculated  component. Right-sided effusion is causing atelectasis of the right  lower lobe. Unable to exclude a malignant component to these pleural  effusions.  3. 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  4.  Small amount of subcutaneous air in the neck, likely sequela from  recent surgery.       Recent vital signs:   /73   Pulse 99   Temp 100.2  F (37.9  C) (Oral)   Resp 17   SpO2 100%     Dale Coma Scale Score: 15 (08/28/20 0730)       Cardiac Rhythm: Normal Sinus  Pt needs tele? To be determined by admitting team  Skin/wound Issues: None    Code Status: Full Code    Pain control: fair    Nausea control: pt had none    Abnormal labs/tests/findings requiring intervention: See Epic     Family present during ED course? No   Family Comments/Social Situation comments: none    Tasks needing completion: None    SERA CobosN RN CCRN    [phone] [facility]

## 2020-08-28 NOTE — ED NOTES
[facility], [place]   ED Nurse to Floor Handoff     Moises Chacon is a 70 year old male who speaks Luxembourgish and lives with family members,  in a home  They arrived in the ED by ambulance from home    ED Chief Complaint: Chest Pain    ED Dx;   Final diagnoses:   Chest pain, unspecified type   Dyspnea, unspecified type         Needed?: Yes    Allergies:   Allergies   Allergen Reactions     Flomax [Tamsulosin]      ITCHING   .  Past Medical Hx:   Past Medical History:   Diagnosis Date     Allergies      Anemia      BPH (benign prostatic hyperplasia)      Chronic pain      Constipation      Dyslipidemia      GERD (gastroesophageal reflux disease)      Hypertension      Mediastinal mass      Primary lung adenocarcinoma (H)      Stroke (H)      Tobacco use       Baseline Mental status: WDL  Current Mental Status changes: at basesline    Infection present or suspected this encounter: yes respiratory  Sepsis suspected: No  Isolation type: Special Precautions-COVID-19     Activity level - Baseline/Home:  Independent  Activity Level - Current:   Stand with Assist    Bariatric equipment needed?: No    In the ED these meds were given:   Medications   0.9% sodium chloride BOLUS (0 mLs Intravenous Stopped 8/28/20 0726)     Followed by   sodium chloride 0.9% infusion ( Intravenous Rate/Dose Verify 8/28/20 1013)   iopamidol (ISOVUE-370) solution 75 mL (53 mLs Intravenous Given 8/28/20 0811)   sodium chloride 0.9 % bag 500mL for CT scan flush use (44 mLs Intravenous Given 8/28/20 0812)   piperacillin-tazobactam (ZOSYN) 3.375 g vial to attach to  mL bag (0 g Intravenous Stopped 8/28/20 0726)   azithromycin (ZITHROMAX) 500 mg in sodium chloride 0.9 % 250 mL intermittent infusion (0 mg Intravenous Stopped 8/28/20 0900)   HYDROmorphone (PF) (DILAUDID) injection 0.3 mg (0.3 mg Intravenous Given 8/28/20 0829)   acetaminophen (TYLENOL) tablet 975 mg (975 mg Oral Given 8/28/20 0910)       Drips  running?  Yes    Home pump  No    Current LDAs  Peripheral IV 08/28/20 Right Wrist (Active)   Site Assessment WDL 08/28/20 0443   Line Status Saline locked 08/28/20 0443   Phlebitis Scale 0-->no symptoms 08/28/20 0443   Infiltration Scale 0 08/28/20 0443   Number of days: 0       Peripheral IV 08/28/20 Left Upper arm (Active)   Site Assessment WDL 08/28/20 0537   Line Status Saline locked 08/28/20 0537   Phlebitis Scale 0-->no symptoms 08/28/20 0537   Infiltration Scale 0 08/28/20 0537   Number of days: 0       Peripheral IV 08/28/20 Left Upper forearm (Active)   Number of days: 0       Incision/Surgical Site 08/19/20 Bilateral Abdomen (Active)   Number of days: 9       Labs results:   Labs Ordered and Resulted from Time of ED Arrival Up to the Time of Departure from the ED   CBC WITH PLATELETS DIFFERENTIAL - Abnormal; Notable for the following components:       Result Value    WBC 13.5 (*)     RBC Count 4.21 (*)     Hemoglobin 12.7 (*)     Hematocrit 39.3 (*)     Absolute Neutrophil 11.4 (*)     All other components within normal limits   COMPREHENSIVE METABOLIC PANEL - Abnormal; Notable for the following components:    Glucose 126 (*)     Albumin 2.7 (*)     All other components within normal limits   LIPASE - Abnormal; Notable for the following components:    Lipase 52 (*)     All other components within normal limits   ROUTINE UA WITH MICROSCOPIC - Abnormal; Notable for the following components:    Protein Albumin Urine 30 (*)     Leukocyte Esterase Urine Small (*)     WBC Urine 14 (*)     Mucous Urine Present (*)     All other components within normal limits   TROPONIN I   INR   PARTIAL THROMBOPLASTIN TIME   LACTIC ACID WHOLE BLOOD   COVID-19 VIRUS (CORONAVIRUS) BY PCR   SARS-COV-2 (COVID-19) VIRUS RT-PCR   PERIPHERAL IV CATHETER   BLOOD CULTURE   BLOOD CULTURE   BLOOD CULTURE   URINE CULTURE AEROBIC BACTERIAL       Imaging Studies:   Recent Results (from the past 24 hour(s))   XR Chest Port 1 View    Narrative     EXAM: XR CHEST PORT 1 VW  LOCATION: [facility]  DATE/TIME: 8/28/2020 4:36 AM    INDICATION: Chest pain.  COMPARISON: None.    FINDINGS: Semiupright portable chest. The heart is at the upper limits normal in size. There is no pulmonary edema. Thoracic aorta is calcified. There are bibasilar infiltrates. No pneumothorax.      Impression    IMPRESSION: Bibasilar atelectasis or pneumonia.               CT Chest Pulmonary Embolism w Contrast    Narrative    Examination: CT CHEST PULMONARY EMBOLISM W CONTRAST, 8/28/2020 8:12 AM    Comparison: 8/28/2020, CT 6/23/2020.    History: PE suspected, high pretest prob. Patient also has a history  of lung adenocarcinoma status post left lower lobe wedge resection on  8/19/2020.    Technique: Axial thin slice images from the lung apices to the  diaphragm were obtained following the injection of intravenous  contrast media in the pulmonary arterial phase.     Contrast dose: 53 cc of isovue 370    Findings:   Mediastinum/hilum: Thyroid is enlarged. Postoperative changes from  recent left lower lobectomy and mediastinal mass resection. The heart  size is stably enlarged. The great vessels are normal in caliber.  There is no evidence of a filling defect in the pulmonary arteries to  suggest a pulmonary embolism. There is increased size of a level 5 AP  window node, now measuring 1.9 x 2.9 cm (series 4, image 36). Several  other mediastinal lymph nodes are enlarged such as a 1.2 cm in short  axis lymph node (series 4, image 40) which measure 9 mm on the prior  exam. Small pericardial effusion is noted. Scattered atherosclerotic  changes involving the slightly ectatic ascending aorta.    Lungs/pleura: Small bilateral pleural effusions with areas of pleural  irregularity and nodularity. Similar appearance of the area of  loculated pleural effusion at the left lower lung layering along the  left major fissure. There is consolidation of the inferior left lower  lobe. No  significant ground glass opacities. Centrilobular  emphysematous changes of the upper lobes.    Upper Abdomen: Aneurysmal proximal abdominal aorta measuring 3.7 cm in  coronal diameter. Visualization of the upper abdomen is otherwise  limited due to bolus timing. Lobulated contour of the liver concerning  for cirrhosis. Liver size is enlarged. Remainder of the limited view  of the upper abdomen is unremarkable.    Bones/Soft tissues: Degenerative changes of the spine without acute  osseous abnormalities. Small amount of subcutaneous air within the  basilar neck likely sequela from recent surgery.      Impression    IMPRESSION:   In this patient with a history of lung adenocarcinoma status post  recent left lower lobe wedge resection on 8/19/2020:  1. There is been interval growth of a left hilar lymph node which now  measures 1.9 x 2.9 cm (1.5 x 2.1 cm prior exam 6/23/2020]. There is  also increased mediastinal and AP window lymphadenopathy, all possibly  reactive or secondary to CHF.  2. . Small bilateral pleural effusions with the left-sided pleural  effusion layering at the left major fissure having a loculated  component. Right-sided effusion is causing atelectasis of the right  lower lobe. Unable to exclude a malignant component to these pleural  effusions.  3. 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  4.  Small amount of subcutaneous air in the neck, likely sequela from  recent surgery.    I have personally reviewed the examination and initial interpretation  and I agree with the findings.    CHAO LOBO MD       Recent vital signs:   /84   Pulse 91   Temp 98.8  F (37.1  C) (Oral)   Resp 13   SpO2 95%     Dale Coma Scale Score: 15 (08/28/20 0730)       Cardiac Rhythm: Tachycardia  Pt needs tele? No  Skin/wound Issues: None    Code Status: Full Code    Pain control: good    Nausea control: pt had none    Abnormal labs/tests/findings requiring intervention: see  results    Family present during ED course? No   Family Comments/Social Situation comments:     Tasks needing completion: None    Isa Abdi, RN    [phone] [facility]

## 2020-08-28 NOTE — PROGRESS NOTES
Rapid Response Team Note    Admission Diagnosis: Dyspnea, unspecified type [R06.00]  Chest pain, unspecified type [R07.9]     Hospital Course   Brief Summary of events leading to rapid response:   A rapid response was called for Moises Chacon due to lactic acidosis identified by abnormal vitals triggering the SIRS/Sepsis screening alert.      The patients is not known to have an infection.    Significant Comorbidities:   Hypertension  Recent thymectomy surgery for thymoma, now 7 days post operative    Medications   Scheduled     acetaminophen  975 mg Oral Q6H     aspirin  81 mg Oral At Bedtime     cetirizine  10 mg Oral At Bedtime     enoxaparin ANTICOAGULANT  40 mg Subcutaneous Q24H     polyethylene glycol  17 g Oral Daily     sennosides  1 tablet Oral BID     sodium chloride (PF)  3 mL Intracatheter Q8H      PRN   bisacodyl, lidocaine 4%, lidocaine (buffered or not buffered), melatonin, methocarbamol, naloxone, oxyCODONE, - MEDICATION INSTRUCTIONS -, sodium chloride (PF)   Allergies   Allergies   Allergen Reactions     Flomax [Tamsulosin]      ITCHING        Physical Exam   Temp: 98.5  F (36.9  C) Temp  Min: 97.6  F (36.4  C)  Max: 100.2  F (37.9  C)  Resp: 20 Resp  Min: 13  Max: 38  SpO2: 97 % SpO2  Min: 78 %  Max: 100 %  Pulse: 103 Pulse  Min: 91  Max: 120    No data recorded  BP: (!) 175/85 Systolic (24hrs), Av , Min:103 , Max:186   Diastolic (24hrs), Av, Min:73, Max:99     I/Os: No intake/output data recorded.     Exam:   General: in no acute distress  Mental Status: is at baseline mental status.  LUNGS: CTAB  CV: Tachy yet regular  EXT: No BLE edema  NEURO: AAOx3    Significant Results and Procedures   Lactic Acid:   Recent Labs   Lab Test 20  1441 20  0619   LACT  --  1.8   LACTS 2.4*  --      CBC:   Recent Labs   Lab Test 20  0441 20  0703 20  0702   WBC 13.5* 14.6* 15.3*   HGB 12.7* 13.0* 13.1*   HCT 39.3* 38.9* 39.9*    179 171        Assessment   In assessment  a rapid response was called on Moises Chacon due to SIRS/Sepsis trigger.     This presentation is likely due to hypovolemia and worsened by the comorbidities listed above. The patient is NOT critically ill at this time.    Sepsis Evaluation   NO EVIDENCE OF SEPSIS at this time.  Vital sign, physical exam, and lab findings are likely due to hypovolemia.    Plan   IVF bolus with repeat LA in 2 hrs    Disposition: The patient will remain on the current unit. We will continue to monitor this patient closely..    The Internal Medicine primary team was able to be reached and they are in agreement with the above plan.    Reassessment   Reassessment and plan follow-up will be performed by the primary team. The current agreed upon plan for reassessment/follow-up includes the following labs: lactic acid and will be completed in 2 hours.      Time Spent on this Encounter   Total Critical Care time spent by me, excluding procedures, was 30 minutes.    Mathieu Soria PA-C  [facility] Job Code Contact #[id number]

## 2020-08-28 NOTE — PROVIDER NOTIFICATION
08/28/20 1430   Call Information   Date of Call 08/28/20   Time of Call 1449   Name of person requesting the team Xavi KEENE   Title of person requesting team RN   RRT Arrival time 1450   Time RRT ended 1500   Reason for call   Type of RRT Adult   Primary reason for call Sepsis suspected   Sepsis Suspected Elevated Lactate level   Was patient transferred from the ED, ICU, or PACU within last 24 hours prior to RRT call? Yes   SBAR   Situation LA 2.4   Background  status post thymectomy with mediastinal lymphadenopathy and small bilateral pleural effusions. Admitted with SOB.   Notable History/Conditions Cancer   Assessment A&O, MOSELEY   Interventions IV fluids   Patient Outcome   Patient Outcome Stabilized on unit   RRT Team   Attending/Primary/Covering Physician OZ Catalan   Date Attending Physician notified 08/28/20   Time Attending Physician notified 1449   Physician(s) OZ Catalan   Lead RN Sharita Macias   Post RRT Intervention Assessment   Post RRT Assessment Stable/Improved   Date Follow Up Done 08/28/20   Time Follow Up Done 8545   Comments LA pending

## 2020-08-29 LAB
ANION GAP SERPL CALCULATED.3IONS-SCNC: 7 MMOL/L (ref 3–14)
BACTERIA SPEC CULT: ABNORMAL
BACTERIA SPEC CULT: ABNORMAL
BUN SERPL-MCNC: 18 MG/DL (ref 7–30)
CALCIUM SERPL-MCNC: 8.2 MG/DL (ref 8.5–10.1)
CHLORIDE SERPL-SCNC: 111 MMOL/L (ref 94–109)
CO2 SERPL-SCNC: 22 MMOL/L (ref 20–32)
CREAT SERPL-MCNC: 0.88 MG/DL (ref 0.66–1.25)
ERYTHROCYTE [DISTWIDTH] IN BLOOD BY AUTOMATED COUNT: 13.9 % (ref 10–15)
GFR SERPL CREATININE-BSD FRML MDRD: 86 ML/MIN/{1.73_M2}
GLUCOSE SERPL-MCNC: 88 MG/DL (ref 70–99)
HCT VFR BLD AUTO: 33.8 % (ref 40–53)
HGB BLD-MCNC: 10.9 G/DL (ref 13.3–17.7)
Lab: ABNORMAL
MAGNESIUM SERPL-MCNC: 1.9 MG/DL (ref 1.6–2.3)
MCH RBC QN AUTO: 29.9 PG (ref 26.5–33)
MCHC RBC AUTO-ENTMCNC: 32.2 G/DL (ref 31.5–36.5)
MCV RBC AUTO: 93 FL (ref 78–100)
PLATELET # BLD AUTO: 291 10E9/L (ref 150–450)
POTASSIUM SERPL-SCNC: 4 MMOL/L (ref 3.4–5.3)
RBC # BLD AUTO: 3.64 10E12/L (ref 4.4–5.9)
SODIUM SERPL-SCNC: 140 MMOL/L (ref 133–144)
SPECIMEN SOURCE: ABNORMAL
WBC # BLD AUTO: 12.3 10E9/L (ref 4–11)

## 2020-08-29 PROCEDURE — 80048 BASIC METABOLIC PNL TOTAL CA: CPT | Performed by: PHYSICIAN ASSISTANT

## 2020-08-29 PROCEDURE — 40000893 ZZH STATISTIC PT IP EVAL DEFER

## 2020-08-29 PROCEDURE — 25000128 H RX IP 250 OP 636: Performed by: PHYSICIAN ASSISTANT

## 2020-08-29 PROCEDURE — 25000132 ZZH RX MED GY IP 250 OP 250 PS 637: Mod: GY | Performed by: INTERNAL MEDICINE

## 2020-08-29 PROCEDURE — 25000132 ZZH RX MED GY IP 250 OP 250 PS 637: Mod: GY | Performed by: PHYSICIAN ASSISTANT

## 2020-08-29 PROCEDURE — 25800030 ZZH RX IP 258 OP 636: Performed by: EMERGENCY MEDICINE

## 2020-08-29 PROCEDURE — 85027 COMPLETE CBC AUTOMATED: CPT | Performed by: PHYSICIAN ASSISTANT

## 2020-08-29 PROCEDURE — 83735 ASSAY OF MAGNESIUM: CPT | Performed by: PHYSICIAN ASSISTANT

## 2020-08-29 PROCEDURE — 25000128 H RX IP 250 OP 636: Performed by: INTERNAL MEDICINE

## 2020-08-29 PROCEDURE — 99232 SBSQ HOSP IP/OBS MODERATE 35: CPT | Performed by: INTERNAL MEDICINE

## 2020-08-29 PROCEDURE — 99233 SBSQ HOSP IP/OBS HIGH 50: CPT | Performed by: INTERNAL MEDICINE

## 2020-08-29 PROCEDURE — 12000001 ZZH R&B MED SURG/OB UMMC

## 2020-08-29 PROCEDURE — 36415 COLL VENOUS BLD VENIPUNCTURE: CPT | Performed by: PHYSICIAN ASSISTANT

## 2020-08-29 RX ORDER — FUROSEMIDE 10 MG/ML
20 INJECTION INTRAMUSCULAR; INTRAVENOUS ONCE
Status: COMPLETED | OUTPATIENT
Start: 2020-08-29 | End: 2020-08-29

## 2020-08-29 RX ORDER — CIPROFLOXACIN 250 MG/1
500 TABLET, FILM COATED ORAL EVERY 12 HOURS SCHEDULED
Status: DISCONTINUED | OUTPATIENT
Start: 2020-08-29 | End: 2020-08-29

## 2020-08-29 RX ORDER — AMOXICILLIN 875 MG
875 TABLET ORAL EVERY 12 HOURS SCHEDULED
Status: DISCONTINUED | OUTPATIENT
Start: 2020-08-29 | End: 2020-08-30 | Stop reason: HOSPADM

## 2020-08-29 RX ADMIN — ENOXAPARIN SODIUM 40 MG: 40 INJECTION SUBCUTANEOUS at 15:23

## 2020-08-29 RX ADMIN — CETIRIZINE HYDROCHLORIDE 10 MG: 10 TABLET, FILM COATED ORAL at 22:00

## 2020-08-29 RX ADMIN — METOPROLOL TARTRATE 25 MG: 25 TABLET, FILM COATED ORAL at 09:15

## 2020-08-29 RX ADMIN — SODIUM CHLORIDE: 9 INJECTION, SOLUTION INTRAVENOUS at 03:31

## 2020-08-29 RX ADMIN — METOPROLOL TARTRATE 25 MG: 25 TABLET, FILM COATED ORAL at 19:53

## 2020-08-29 RX ADMIN — ASPIRIN 81 MG: 81 TABLET, COATED ORAL at 22:00

## 2020-08-29 RX ADMIN — ACETAMINOPHEN 975 MG: 325 TABLET, FILM COATED ORAL at 20:00

## 2020-08-29 RX ADMIN — AMOXICILLIN 875 MG: 875 TABLET, FILM COATED ORAL at 19:53

## 2020-08-29 RX ADMIN — ACETAMINOPHEN 975 MG: 325 TABLET, FILM COATED ORAL at 03:27

## 2020-08-29 RX ADMIN — AMOXICILLIN 875 MG: 875 TABLET, FILM COATED ORAL at 12:31

## 2020-08-29 RX ADMIN — SENNOSIDES 1 TABLET: 8.6 TABLET, FILM COATED ORAL at 09:15

## 2020-08-29 RX ADMIN — ACETAMINOPHEN 975 MG: 325 TABLET, FILM COATED ORAL at 09:15

## 2020-08-29 RX ADMIN — FUROSEMIDE 20 MG: 10 INJECTION, SOLUTION INTRAVENOUS at 11:33

## 2020-08-29 RX ADMIN — AMLODIPINE BESYLATE 10 MG: 10 TABLET ORAL at 09:15

## 2020-08-29 RX ADMIN — POLYETHYLENE GLYCOL 3350 17 G: 17 POWDER, FOR SOLUTION ORAL at 09:15

## 2020-08-29 ASSESSMENT — ACTIVITIES OF DAILY LIVING (ADL)
ADLS_ACUITY_SCORE: 18

## 2020-08-29 NOTE — UTILIZATION REVIEW
"Admission Status; Secondary Review Determination     Admission Date: 8/28/2020  4:30 AM      Under the authority of the Utilization Management Committee, the utilization review process indicated a secondary review on the above patient.  The review outcome is based on review of the medical records, discussions with staff, and applying clinical experience noted on the date of the review.        (X)      Inpatient Status Appropriate - This patient's medical care is consistent with medical management for inpatient care and reasonable inpatient medical practice.      () Observation Status Appropriate - This patient does not meet hospital inpatient criteria and is placed in observation status. If this patient's primary payer is Medicare and was admitted as an inpatient, Condition Code 44 should be used and patient status changed to \"observation\".   () Admission Status NOT Appropriate - This patient's medical care is not consistent with medical management for Inpatient or Observation Status.          RATIONALE FOR DETERMINATION   71 yo male with CVA, PAD, HTN, lung cancer s/p LL lobectomy (May 2020) and  and thymoma s/p thymectomy by thoracic surgery at [facility] (8/21/20) who presented to the ER with acute dyspnea. CT chest w/ interval growth of a L hilar lymph node, bilateral pleural effusions and possible increased mediastinal lymphadenopathy. He has been started on IV antibiotics Overnight with lactic acidosis and received an IV fluid bolus. I spoke with Dr. Urbano who is caring for this patient today. On exam, Mr. Chacon has some crackles and they are going to start IV diuretics. Also concern for atrial fibrillation and he is being placed on telemetry. Given above, this patient is appropriate for inpatient cares. Additionally, the patient requires hospital based medical care which is anticipated to require a stay of 2 or more midnights; according to CMS guidelines the patient should be admitted as inpatient.     The " severity of illness, intensity of service provided, expected LOS and risk for adverse outcome make the care complex, high risk and appropriate for hospital admission.        The information on this document is developed by the utilization review team in order for the business office to ensure compliance.  This only denotes the appropriateness of proper admission status and does not reflect the quality of care rendered.         The definitions of Inpatient Status and Observation Status used in making the determination above are those provided in the CMS Coverage Manual, Chapter 1 and Chapter 6, section 70.4.      Sincerely,     Naila Brown MD MPH  Utilization Review  [facility].

## 2020-08-29 NOTE — PLAN OF CARE
/71 (BP Location: Right arm)   Pulse 83   Temp 95.4  F (35.2  C) (Axillary)   Resp 18   Wt 62.4 kg (137 lb 9.6 oz)   SpO2 96%   BMI 22.22 kg/m      Time: 7894-2499     Reason for admission: Chest pain, SOB  Vitals: VSS on RA  Activity: Up ad phoebe  Pain: Denies  Neuro:  A&Ox4, calls appropriately  Cardiac: NSR with occasional Afib??  Respiratory: C/o SOB, LS with bilateral crackles in bases  GI/: Good UO, last BM 8/28  Diet: Regular with poor appetite  Lines: 2 PIVs SL  Skin/Wounds: bruising, scabs/healing sites from recent surgery on chest  Labs:     New changes this shift: Pt given IV Lasix with good UO and some improvement in SOB. Tele ordered to watch for Afib. Amoxicillin started. Thoracic surgery signed off.      Plan: Expected discharge tomorrow to prior living arrangement, sister Rosa will be able to provide transportation.      Continue to monitor and follow POC

## 2020-08-29 NOTE — PLAN OF CARE
5A defer  Discharge Planner PT   Patient plan for discharge: home  Current status: pt denies concerns for mobility or need for IP PT. Demonstrates IND with all mobility in room. Endorses SOB however VSS on RA. PT to complete orders and sign off.   Barriers to return to prior living situation: medical status/O2 demand  Recommendations for discharge: anticipate to home  Rationale for recommendations: pt is mobilizing IND, has no mobility concerns.        Entered by: Adam Cherry 08/29/2020 9:36 AM

## 2020-08-29 NOTE — PROVIDER NOTIFICATION
Lactic recheck 3.5. MD aware. No RRT activated. 1L LR bolus rx'd and infusing. Lactic recheck again at 21:00.  Pt in NAD.

## 2020-08-29 NOTE — PLAN OF CARE
Time: 1400 - 1530    Reason for admission/Dx:   Dyspnea, unspecified type [R06.00]  Chest pain, unspecified type [R07.9]     BP (!) 153/84   Pulse 99   Temp 98.5  F (36.9  C) (Oral)   Resp 20   SpO2 97%     Shift changes:  Pt admitted for progressive worsening of SOB and onset of CP x1 day. Of note, pt s/p 1-week thorascopic thymectomy. Thorascopic sites appear scabbed and healing well, however tennis ball sized ecchymosis noted to R-abdomen. Pt notes CP resolved since 1x Dilaudid IV in ED. Trop negative. EKG sinus tach. Upon arrival to , pt HTN, tachycardic (low 100s), otherwise VSS, on RA, sats 96%+. Despite saturations, pt endorses subjective SOB/MOSELEY. Sepsis triggered; lactic 2.4, however procal negative. RRT called, while pt undergoing bedside TTE; resulted EF grossly normal.  MIVF NS @ 125mL/hr.  Interim, lactic recheck 3.5; MD aware; consequently 1L LR bolus admin'd. 2nd Lactic recheck 1.6. AOx4, has PMH of CVA, without obvious focal deficits, up ad phoebe. Kiswahili speaking, professional  utilized. Chest CT performed in [facility]; results concerning for malignancy. Urine and Blood cx pending. IV Zosyn and IV Azithromycin given. Thoracic surgery consulted. LBM reported x4 days, Miralax given. Pt hesitant to void; voided only 2x this shift, per pt report and with bedside urinal; 200mL and 125mL, despite MIVF and 1L bolus. Bladder scan for max 34mL.     Plan: Oncology to consult. Possible IR-thoracentesis. F/u on renal status.

## 2020-08-29 NOTE — PROGRESS NOTES
[facility] Progress Note - Hospitalist Service, Gold 11       Date of Admission:  8/28/2020  Assessment & Plan   Mr. Moises Chacon is a 69 yo Yakut speaking male with hx of HTN, CVA (2009 without deficits), PAD s/p iliac stenting (2016), 30 yr ppd smoking hx, constipation, lung cancer s/p LL lobectomy 5/2020 (at [facility] in [place]), and thymoma s/p thymectomy by thoracic surgery at [facility] 8/21/20, admitted to [facility] after presenting to ED with acute dyspnea.       Acute dyspnea  Symptoms appear to be intermittent. Differential includes a.fib, diastolic heart failure, and pneumonia. No fever and negative procalcitonin. Bilateral crackles suggest volume overload. No PE on CT. Echo with normal systolic function.  - IV lasix today  - telemetry to watch for atrial fibrillation  - monitor for fever or signs of pneumonia    Hx of thymoma s/p thymectomy, 8/21/20  Hx of lung cancer s/p left lower lobectomy, 5/2020 Pt with hx of lung cancer s/p lobectomy back in May at OSH in [place], and and question dyspnea related to CT chest finding of interval growth of L hilar lymph node and possible malignant pleural effusions since previous scan on 6/23/20. Thoracic surgery consulted and reviewed scan and felt not related to recent thymectomy and have since signed off.      Leukocytosis  UTI, enterococcus  BPH  - started amoxicillin today  - susceptibilities pending  - CBC in the morning     HTN:  - Continue amlodipine 10 mg daily, and metoprolol 25 mg BID       Constipation:  - Continue PTA bowel regimen  - Add prn Dulcolax suppository.      Hx of CVA:    - Continue PTA baby asa     Diet: Combination Diet Regular Diet Adult    DVT Prophylaxis: Enoxaparin (Lovenox) SQ  Melara Catheter: not present  Code Status: Full Code           Disposition Plan   Expected discharge: Tomorrow, recommended to prior living arrangement once adequate pain management/ tolerating PO medications,  antibiotic plan established and breathing stable on room air.  Entered: Jared Urbano MD 08/29/2020, 4:13 PM       The patient's care was discussed with the Bedside Nurse and Patient.    Jared Urbano MD  Hospitalist Service, [address], [place]  Pager: [phone]  Please see sticky note for cross cover information  ______________________________________________________________________    Interval History   Reports he is breathing comfortably this afternoon. Reports difficulty breathing comes on suddenly out of the blue. More difficulty breathing when lying down. Better when sitting up. No chest pain. No swelling in legs. No fever/chills    Data reviewed today: I reviewed all medications, new labs and imaging results over the last 24 hours. I have personally reviewed EKG which shows normal sinus rhythm    Physical Exam   Vital Signs: Temp: 95.4  F (35.2  C) Temp src: Axillary BP: 136/71 Pulse: 83   Resp: 18 SpO2: 96 % O2 Device: None (Room air)    Weight: 137 lbs 9.6 oz  Constitutional: awake, alert, cooperative, no apparent distress, and appears stated age  Respiratory: Breathing comfortably on room air, crackles in bilateral bases, no wheezing.  Cardiovascular: Normal apical impulse, regular rate and rhythm, normal S1 and S2, no S3 or S4, and no murmur noted

## 2020-08-29 NOTE — PLAN OF CARE
5A OT - Defer    Discharge Planner OT   Patient plan for discharge: Home  Current status: Pt up IND in room. Per PT, pt demonstrating IND with all mobility, denying concerns about ADLs. Pt endorsing SOB, but AVSS with activity on RA. Pt with no IP OT needs. Will complete orders.  Barriers to return to prior living situation: medical status  Recommendations for discharge: Home with assist for heavier IADLs, as needed.  Rationale for recommendations: Pt at ADL and functional mobility baseline.        Entered by: Marlin Cruz 08/29/2020 11:11 AM

## 2020-08-30 ENCOUNTER — PATIENT OUTREACH (OUTPATIENT)
Dept: CARE COORDINATION | Facility: CLINIC | Age: 70
End: 2020-08-30

## 2020-08-30 VITALS
BODY MASS INDEX: 21.33 KG/M2 | HEART RATE: 76 BPM | DIASTOLIC BLOOD PRESSURE: 66 MMHG | SYSTOLIC BLOOD PRESSURE: 130 MMHG | OXYGEN SATURATION: 95 % | RESPIRATION RATE: 18 BRPM | TEMPERATURE: 98.1 F | WEIGHT: 132.1 LBS

## 2020-08-30 LAB
ANION GAP SERPL CALCULATED.3IONS-SCNC: 10 MMOL/L (ref 3–14)
BUN SERPL-MCNC: 15 MG/DL (ref 7–30)
CALCIUM SERPL-MCNC: 8.8 MG/DL (ref 8.5–10.1)
CHLORIDE SERPL-SCNC: 107 MMOL/L (ref 94–109)
CO2 SERPL-SCNC: 17 MMOL/L (ref 20–32)
CREAT SERPL-MCNC: 0.76 MG/DL (ref 0.66–1.25)
ERYTHROCYTE [DISTWIDTH] IN BLOOD BY AUTOMATED COUNT: 13.7 % (ref 10–15)
GFR SERPL CREATININE-BSD FRML MDRD: >90 ML/MIN/{1.73_M2}
GLUCOSE SERPL-MCNC: 79 MG/DL (ref 70–99)
HCT VFR BLD AUTO: 36.4 % (ref 40–53)
HGB BLD-MCNC: 12.1 G/DL (ref 13.3–17.7)
INTERPRETATION ECG - MUSE: NORMAL
MCH RBC QN AUTO: 30.3 PG (ref 26.5–33)
MCHC RBC AUTO-ENTMCNC: 33.2 G/DL (ref 31.5–36.5)
MCV RBC AUTO: 91 FL (ref 78–100)
PLATELET # BLD AUTO: 336 10E9/L (ref 150–450)
POTASSIUM SERPL-SCNC: 4.5 MMOL/L (ref 3.4–5.3)
RBC # BLD AUTO: 3.99 10E12/L (ref 4.4–5.9)
SODIUM SERPL-SCNC: 135 MMOL/L (ref 133–144)
WBC # BLD AUTO: 13.2 10E9/L (ref 4–11)

## 2020-08-30 PROCEDURE — 36415 COLL VENOUS BLD VENIPUNCTURE: CPT | Performed by: INTERNAL MEDICINE

## 2020-08-30 PROCEDURE — 25000132 ZZH RX MED GY IP 250 OP 250 PS 637: Mod: GY | Performed by: PHYSICIAN ASSISTANT

## 2020-08-30 PROCEDURE — 80048 BASIC METABOLIC PNL TOTAL CA: CPT | Performed by: INTERNAL MEDICINE

## 2020-08-30 PROCEDURE — 99239 HOSP IP/OBS DSCHRG MGMT >30: CPT | Performed by: INTERNAL MEDICINE

## 2020-08-30 PROCEDURE — 85027 COMPLETE CBC AUTOMATED: CPT | Performed by: INTERNAL MEDICINE

## 2020-08-30 PROCEDURE — 25000132 ZZH RX MED GY IP 250 OP 250 PS 637: Mod: GY | Performed by: INTERNAL MEDICINE

## 2020-08-30 RX ORDER — FUROSEMIDE 20 MG
20 TABLET ORAL DAILY
Qty: 7 TABLET | Refills: 0 | Status: ON HOLD | OUTPATIENT
Start: 2020-08-30 | End: 2020-09-19

## 2020-08-30 RX ADMIN — SENNOSIDES 1 TABLET: 8.6 TABLET, FILM COATED ORAL at 09:35

## 2020-08-30 RX ADMIN — AMOXICILLIN 875 MG: 875 TABLET, FILM COATED ORAL at 09:35

## 2020-08-30 RX ADMIN — AMLODIPINE BESYLATE 10 MG: 10 TABLET ORAL at 09:35

## 2020-08-30 RX ADMIN — METOPROLOL TARTRATE 25 MG: 25 TABLET, FILM COATED ORAL at 09:35

## 2020-08-30 RX ADMIN — POLYETHYLENE GLYCOL 3350 17 G: 17 POWDER, FOR SOLUTION ORAL at 09:34

## 2020-08-30 ASSESSMENT — ACTIVITIES OF DAILY LIVING (ADL)
ADLS_ACUITY_SCORE: 18

## 2020-08-30 NOTE — PLAN OF CARE
/66 (BP Location: Right arm)   Pulse 76   Temp 98.1  F (36.7  C) (Oral)   Resp 18   Wt 62.4 kg (137 lb 9.6 oz)   SpO2 95%   BMI 22.22 kg/m      Time: 8104-8157     Reason for admission: Chest pain, SOB  Vitals: VSS on RA  Activity: Up ad phoebe  Pain: Denies  Neuro:  A&Ox4, calls appropriately  Cardiac: NSR   Respiratory: C/o SOB, LS with bilateral crackles in bases  GI/: Good UO, last BM 8/28  Diet: Regular with poor appetite  Lines: 2 PIVs SL  Skin/Wounds: bruising, scabs/healing sites from recent surgery on chest    Pt medically ready to discharge today. AVS reviewed and acknowledged with use of Armenian . PIVs removed. Medications for discharge picked up from Pharmacy. Sister Rosa providing ride back to home for patient. Discharged from hospital at 1400.

## 2020-08-30 NOTE — PLAN OF CARE
Time: 1900-0700    Reason for admission: 4740-4106  Vitals: VSS on RA  Activity: Independent   Pain: Denies  Neuro: A&Ox4.   Cardiac: Tele NSR.  Respiratory: C/o SOB, LS fine crackles  GI/: Good urine OP. Diuresed with IV lasix once 8/29.  Diet: Regular  Lines:  L PIV SL. R PIV SL.   Skin/Wounds: Generalized bruising and scabs.      New changes this shift: Kyrgyz speaking, iPad utilized for . Minimal english.     Plan: Discharge today, sister Rosa will provide transport.

## 2020-08-31 ENCOUNTER — TELEPHONE (OUTPATIENT)
Dept: FAMILY MEDICINE | Facility: CLINIC | Age: 70
End: 2020-08-31

## 2020-08-31 ENCOUNTER — PATIENT OUTREACH (OUTPATIENT)
Dept: NURSING | Facility: CLINIC | Age: 70
End: 2020-08-31
Payer: MEDICARE

## 2020-08-31 DIAGNOSIS — C34.92 MALIGNANT NEOPLASM OF LEFT LUNG, UNSPECIFIED PART OF LUNG (H): Primary | ICD-10-CM

## 2020-08-31 NOTE — LETTER
[place] CARE COORDINATION  [facility]  August 31, 2020    Moises Chacon  [address] [address]      Dear Moises,    I am a clinic community health worker who works with Serge Botello MD at the [facility]. I wanted to thank you for spending the time to talk with me.  Below is a description of clinic care coordination and how I can further assist you.      The clinic care coordination team is made up of a registered nurse,  and community health worker who understand the health care system. The goal of clinic care coordination is to help you manage your health and improve access to the health care system in the most efficient manner. The team can assist you in meeting your health care goals by providing education, coordinating services, strengthening the communication among your providers and supporting you with any resource needs.    Please feel free to contact me at [phone] with any questions or concerns. We are focused on providing you with the highest-quality healthcare experience possible and that all starts with you.     Sincerely,     JAMES Dillon   Clinic Care Coordination  [facility]:  [place], [place], [place], [place], and [place]  Phone: [phone]

## 2020-08-31 NOTE — DISCHARGE SUMMARY
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             Unresulted Labs Ordered in the Past 30 Days of this Admission     Date and Time Order Name Status Description    8/28/2020 0543 Blood culture Preliminary     8/28/2020 0543 Blood culture Preliminary     8/28/2020 0441 Blood culture Preliminary       These results will be followed up by Dr. Urbano    Discharge Disposition   Discharged to home  Condition at discharge: Stable    Hospital Course   Mr. Moises Chacon is a 69 yo male with history of CVA (2009 without deficits), PAD s/p iliac stenting (2016), 30 yr ppd smoking hx, constipation, lung cancer s/p LL lobectomy 5/2020 (at [facility] in [place]), and thymoma s/p thymectomy by thoracic surgery at [facility] 8/21/20, admitted to [facility] after presenting to ED with acute dyspnea.       Acute dyspnea  Symptoms appeared to be intermittent. Differential included a.fib, diastolic heart failure, and pneumonia. During his hospital stay, he had no fever and his procalcitonin was negative. Bilateral crackles on exam suggested volume overload. He was given furosemide with improvement in his breathing. He had no PE on CT. Echo showed normal systolic function. He will discharge with one week of furosemide 20mg PO daily with weight check and electrolyte check with PCP.    Due to  leukocytosis, UA was performed which showed evidence of UTI. Urine culture grew enterococcus sensitive to amoxicillin, and he was sent home with amox/clav to complete a 5 day course (would also cover potential pneumonia).    Consultations This Hospital Stay   VASCULAR ACCESS CARE ADULT IP CONSULT  THORACIC SURGERY ADULT IP CONSULT  ONCOLOGY ADULT IP CONSULT  PHYSICAL THERAPY ADULT IP CONSULT  OCCUPATIONAL THERAPY ADULT IP CONSULT    Code Status   Full Code    Time Spent on this Encounter   I, Jared Urbano MD, personally saw the patient today and spent greater than 30 minutes discharging this patient.       Jared Urbano MD  [facility] [place]  ______________________________________________________________________    Physical Exam   Vital Signs: Temp: 98.1  F (36.7  C) Temp src: Oral BP: 130/66 Pulse: 76   Resp: 18 SpO2: 95 % O2 Device: Nasal cannula Oxygen Delivery: 1 LPM  Weight: 132 lbs 1.6 oz  Constitutional: awake, alert, cooperative, no apparent distress, and appears stated age  Respiratory: Breathing comfortably on room air, minimal crackles in bilateral bases, no wheezing.  Cardiovascular: Normal apical impulse, regular rate and rhythm, normal S1 and S2, no S3 or S4, and no murmur noted       Primary Care Physician   Serge Botello    Discharge Orders      Reason for your hospital stay    You were admitted for difficulty breathing due to fluid on the lungs. We will start furosemide 20mg daily to help get rid of the fluid. Follow up with your doctor within a week to see if you still need this medication.    You were also found to have a urinary tract infection. Take amoxicillin/clavulanate for the next 5 days to help get rid of that infection or any possible lung infection.     Follow Up and recommended labs and tests    Follow up with primary care provider, Serge Botello, within 7 days for hospital follow- up and for electrolyte check.     Activity    Your  activity upon discharge: activity as tolerated     When to contact your care team    Call your primary doctor if you have any of the following: temperature greater than 100.4,  increased shortness of breath, increased pain or any other concerning symptoms.     Full Code     Diet    Follow this diet upon discharge: Regular diet. Start eating less junk food (candy, cookies, chips) and more healthy food (vegetables, meats, and fruits).       Significant Results and Procedures   Most Recent 3 CBC's:  Recent Labs   Lab Test 08/30/20  0710 08/29/20  0725 08/28/20 0441   WBC 13.2* 12.3* 13.5*   HGB 12.1* 10.9* 12.7*   MCV 91 93 93    291 354     Most Recent 3 BMP's:  Recent Labs   Lab Test 08/30/20 0710 08/29/20 0725 08/28/20 0441    140 134   POTASSIUM 4.5 4.0 4.7   CHLORIDE 107 111* 102   CO2 17* 22 26   BUN 15 18 20   CR 0.76 0.88 0.92   ANIONGAP 10 7 6   MERI 8.8 8.2* 8.8   GLC 79 88 126*     Most Recent 2 LFT's:  Recent Labs   Lab Test 08/28/20 0441 06/19/20  1523   AST Canceled, Test credited 10   ALT 24 15   ALKPHOS 111 101   BILITOTAL 0.7 0.3   ,   Results for orders placed or performed during the hospital encounter of 08/28/20   XR Chest Port 1 View    Narrative    EXAM: XR CHEST PORT 1 VW  LOCATION: [facility]  DATE/TIME: 8/28/2020 4:36 AM    INDICATION: Chest pain.  COMPARISON: None.    FINDINGS: Semiupright portable chest. The heart is at the upper limits normal in size. There is no pulmonary edema. Thoracic aorta is calcified. There are bibasilar infiltrates. No pneumothorax.      Impression    IMPRESSION: Bibasilar atelectasis or pneumonia.               CT Chest Pulmonary Embolism w Contrast    Narrative    Examination: CT CHEST PULMONARY EMBOLISM W CONTRAST, 8/28/2020 8:12 AM    Comparison: 8/28/2020, CT 6/23/2020.    History: PE suspected, high pretest prob. Patient also has a history  of lung adenocarcinoma status post left lower lobe wedge resection  on  8/19/2020.    Technique: Axial thin slice images from the lung apices to the  diaphragm were obtained following the injection of intravenous  contrast media in the pulmonary arterial phase.     Contrast dose: 53 cc of isovue 370    Findings:   Mediastinum/hilum: Thyroid is enlarged. Postoperative changes from  recent left lower lobectomy and mediastinal mass resection. The heart  size is stably enlarged. The great vessels are normal in caliber.  There is no evidence of a filling defect in the pulmonary arteries to  suggest a pulmonary embolism. There is increased size of a level 5 AP  window node, now measuring 1.9 x 2.9 cm (series 4, image 36). Several  other mediastinal lymph nodes are enlarged such as a 1.2 cm in short  axis lymph node (series 4, image 40) which measure 9 mm on the prior  exam. Small pericardial effusion is noted. Scattered atherosclerotic  changes involving the slightly ectatic ascending aorta.    Lungs/pleura: Small bilateral pleural effusions with areas of pleural  irregularity and nodularity. Similar appearance of the area of  loculated pleural effusion at the left lower lung layering along the  left major fissure. There is consolidation of the inferior left lower  lobe. No significant ground glass opacities. Centrilobular  emphysematous changes of the upper lobes.    Upper Abdomen: Aneurysmal proximal abdominal aorta measuring 3.7 cm in  coronal diameter. Visualization of the upper abdomen is otherwise  limited due to bolus timing. Lobulated contour of the liver concerning  for cirrhosis. Liver size is enlarged. Remainder of the limited view  of the upper abdomen is unremarkable.    Bones/Soft tissues: Degenerative changes of the spine without acute  osseous abnormalities. Small amount of subcutaneous air within the  basilar neck likely sequela from recent surgery.      Impression    IMPRESSION:   In this patient with a history of lung adenocarcinoma status post  recent left lower lobe  wedge resection on 2020:  1. There is been interval growth of a left hilar lymph node which now  measures 1.9 x 2.9 cm (1.5 x 2.1 cm prior exam 2020]. There is  also increased mediastinal and AP window lymphadenopathy, all possibly  reactive or secondary to CHF.  2. . Small bilateral pleural effusions with the left-sided pleural  effusion layering at the left major fissure having a loculated  component. Right-sided effusion is causing atelectasis of the right  lower lobe. Unable to exclude a malignant component to these pleural  effusions.  3. 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  4.  Small amount of subcutaneous air in the neck, likely sequela from  recent surgery.    I have personally reviewed the examination and initial interpretation  and I agree with the findings.    CHAO LOBO MD   Echo Complete    Narrative    [id number]  [id number]  [id number]  [id number]^WYATT^LINO^DUY           [facility],[place]  Echocardiography Laboratory  [address] [place] [address]     Name: FINA SALOMON  MRN: [id number]  : 1950  Study Date: 2020 02:46 PM  Age: 70 yrs  Gender: Male  Patient Location: [facility]  Reason For Study: Dyspnea  Ordering Physician: LINO SCHNEIDER  Performed By: SERENITY Sykes     BSA: 1.7 m2  Height: 66 in  Weight: 134 lb  BP: 175/85 mmHg  _____________________________________________________________________________  __        Procedure  Complete Portable Echo Adult.  _____________________________________________________________________________  __        Interpretation Summary  Global and regional left ventricular function is hyperkinetic with an EF of  65-70%.  The right ventricle is normal size. Global right ventricular function is  normal. Biventricular hypertrophy.  No significant valvular abnormalities.  Elevated biventricular filling pressures.  There is no prior study for direct  comparison.  _____________________________________________________________________________  __        Left Ventricle  Global and regional left ventricular function is hyperkinetic with an EF of  65-70%. Left ventricular size is normal. Moderate concentric wall thickening  consistent with left ventricular hypertrophy is present. Diastolic function  not assessed due to atrial fibrillation. Left atrial pressures are likely  elevated.     Right Ventricle  The right ventricle is normal size. Global right ventricular function is  normal. There is moderate right ventricular hypertrophy.     Atria  Both atria appear normal.        Mitral Valve  The mitral valve is normal. Trace mitral insufficiency is present.     Aortic Valve  Aortic valve is normal in structure and function. The aortic valve is  tricuspid.     Tricuspid Valve  The tricuspid valve is normal. Trace tricuspid insufficiency is present. The  peak velocity of the tricuspid regurgitant jet is not obtainable.     Pulmonic Valve  The pulmonic valve cannot be assessed. Trace pulmonic insufficiency is  present.     Vessels  Ascending aorta 3.9 cm. Sinuses of Valsalva 3.6 cm. Dilation of the inferior  vena cava is present with abnormal respiratory variation in diameter. IVC  diameter >2.1 cm collapsing <50% with sniff suggests a high RA pressure  estimated at 15 mmHg or greater.     Pericardium  Trivial pericardial effusion is present. Chamber compression is not present;  there is no evidence for tamponade.        Compared to Previous Study  There is no prior study for direct comparison.  _____________________________________________________________________________  __     MMode/2D Measurements & Calculations  IVSd: 1.3 cm  LVIDd: 3.9 cm  LVIDs: 2.8 cm  LVPWd: 1.3 cm  FS: 29.5 %  LV mass(C)d: 173.5 grams  LV mass(C)dI: 102.8 grams/m2  Ao root diam: 3.6 cm  asc Aorta Diam: 3.9 cm  LVOT diam: 2.0 cm  LVOT area: 3.1 cm2  LA Volume (BP): 44.5 ml  LA Volume Index (BP):  26.3 ml/m2     RWT: 0.66        Doppler Measurements & Calculations  MV E max yessy: 70.4 cm/sec  MV A max yessy: 94.3 cm/sec  MV E/A: 0.75  MV dec slope: 550.0 cm/sec2  PA V2 max: 64.4 cm/sec  PA max P.7 mmHg  PA acc time: 0.09 sec  E/E' av.1  Lateral E/e': 16.6  Medial E/e': 11.6     _____________________________________________________________________________  __           Report approved by: Shraddha Dumont 2020 03:25 PM            Discharge Medications   Discharge Medication List as of 2020 11:14 AM      START taking these medications    Details   amoxicillin-clavulanate (AUGMENTIN) 875-125 MG tablet Take 1 tablet by mouth 2 times daily for 5 days, Disp-10 tablet,R-0, E-Prescribe      furosemide (LASIX) 20 MG tablet Take 1 tablet (20 mg) by mouth daily for 7 days, Disp-7 tablet,R-0, E-Prescribe         CONTINUE these medications which have NOT CHANGED    Details   acetaminophen (TYLENOL) 325 MG tablet Take 3 tablets (975 mg) by mouth every 6 hours, OTC      amLODIPine (NORVASC) 10 MG tablet TAKE 1 TABLET BY MOUTH DAILY., Disp-90 tablet,R-0, E-Prescribe**Patient requests 90 days supply**      aspirin (ASA) 81 MG EC tablet Take 1 tablet (81 mg) by mouth daily, Disp-90 tablet,R-1, E-Prescribe      cetirizine (ZYRTEC) 10 MG tablet Take 1 tablet (10 mg) by mouth daily, Disp-30 tablet,R-1, E-Prescribe      methocarbamol (ROBAXIN) 500 MG tablet Take 1 tablet (500 mg) by mouth 4 times daily as needed for muscle spasms, Disp-20 tablet,R-0, E-Prescribe      metoprolol tartrate (LOPRESSOR) 25 MG tablet Take 25 mg by mouth 2 times daily , Historical      oxyCODONE (ROXICODONE) 5 MG tablet Take 1-2 tablets (5-10 mg) by mouth every 4 hours as needed for moderate to severe pain, Disp-30 tablet,R-0, E-Prescribe      polyethylene glycol (MIRALAX) 17 g packet Take 17 g by mouth daily, Disp-7 packet,R-0, E-Prescribe      sennosides (SENOKOT) 8.6 MG tablet Take 1 tablet by mouth 2 times daily, Disp-14 tablet,R-0,  E-Prescribe      umeclidinium-vilanterol (ANORO ELLIPTA) 62.5-25 MCG/INH oral inhaler Inhale 1 puff into the lungs daily, Disp-1 Inhaler,R-1, E-Prescribe           Allergies   Allergies   Allergen Reactions     Flomax [Tamsulosin]      ITCHING

## 2020-08-31 NOTE — PROGRESS NOTES
Clinic Care Coordination Contact  Community Health Worker Initial Outreach  Spoke with patients Rosa villanueva    Chart Review: Referral made off of discharge report. In hospital on 8/28-8/30 for chest pain. Has follow up with PCP today at 1:30pm, and two appointments tomorrow.     CHW introduced self and role with CC. Rosa states that patient is doing well  She has questions about upcoming appoints, CHW clarified appointments tomorrow. Times and locations.   Rosa understood and does not have further questions.   CHW inquired if patient is in need of nhy support or resources however Rosa declined.  CHW notified Rosa of letter being sent with further contact information.      Patient accepts CC: No, patient is not in need of CC support at this time. Patient will be sent Care Coordination introduction letter for future reference.     JAMES Dillon   Clinic Care Coordination  [facility] Clinics:  [place], [place], [place], [place], and [place]  Phone: [phone]

## 2020-08-31 NOTE — TELEPHONE ENCOUNTER
Pt was more than 10 min late provider unable to see pt.     Pt is here with his sister acting as interp -    Pt needs to have BMP and f/u with PCP as he is now on lasix and not taking any other BP medications.      Pt is somewhat winded today after walking.  He denies chest pain and states the shortness of breath is worse when he wears mask.     Pt was rescheduled to see ZG tomorrow and will check BMP today.      Vitals stable for discharge today.  Continue with medications as discharged with     /76   P 76  R 20     Lungs sound clear today.     Agnes Dowd RN

## 2020-08-31 NOTE — PROGRESS NOTES
Date: 8/31/2020 Status: Kasie   Time: 1:30 PM  1:30 PM Length: 20  20   Visit Type: OFFICE VISIT SB1 [0023] FERNANDO:     Provider: Serge Botello MD  PHONE,  Department:  FAMILY [facility]  UR  SERVICE     Patient has clinic visit within 24-48 hours of Discharge so no post DC follow up call is needed

## 2020-09-01 ENCOUNTER — HOSPITAL ENCOUNTER (OUTPATIENT)
Dept: GENERAL RADIOLOGY | Facility: CLINIC | Age: 70
Discharge: HOME | End: 2020-09-01
Attending: PHYSICIAN ASSISTANT | Admitting: PHYSICIAN ASSISTANT
Payer: MEDICARE

## 2020-09-01 ENCOUNTER — OFFICE VISIT (OUTPATIENT)
Dept: FAMILY MEDICINE | Facility: CLINIC | Age: 70
End: 2020-09-01
Payer: MEDICARE

## 2020-09-01 VITALS
TEMPERATURE: 98.3 F | SYSTOLIC BLOOD PRESSURE: 126 MMHG | OXYGEN SATURATION: 100 % | DIASTOLIC BLOOD PRESSURE: 78 MMHG | BODY MASS INDEX: 20.51 KG/M2 | HEART RATE: 84 BPM | WEIGHT: 127 LBS

## 2020-09-01 DIAGNOSIS — R06.09 DYSPNEA ON EXERTION: Primary | ICD-10-CM

## 2020-09-01 DIAGNOSIS — F43.23 ADJUSTMENT DISORDER WITH MIXED ANXIETY AND DEPRESSED MOOD: ICD-10-CM

## 2020-09-01 DIAGNOSIS — N39.0 URINARY TRACT INFECTION WITHOUT HEMATURIA, SITE UNSPECIFIED: ICD-10-CM

## 2020-09-01 DIAGNOSIS — J98.59 MEDIASTINAL MASS: ICD-10-CM

## 2020-09-01 LAB
ERYTHROCYTE [DISTWIDTH] IN BLOOD BY AUTOMATED COUNT: 13.4 % (ref 10–15)
HCT VFR BLD AUTO: 40.4 % (ref 40–53)
HGB BLD-MCNC: 13.4 G/DL (ref 13.3–17.7)
MCH RBC QN AUTO: 29.7 PG (ref 26.5–33)
MCHC RBC AUTO-ENTMCNC: 33.2 G/DL (ref 31.5–36.5)
MCV RBC AUTO: 90 FL (ref 78–100)
PLATELET # BLD AUTO: 470 10E9/L (ref 150–450)
RBC # BLD AUTO: 4.51 10E12/L (ref 4.4–5.9)
WBC # BLD AUTO: 8.2 10E9/L (ref 4–11)

## 2020-09-01 PROCEDURE — 85027 COMPLETE CBC AUTOMATED: CPT | Performed by: FAMILY MEDICINE

## 2020-09-01 PROCEDURE — 99214 OFFICE O/P EST MOD 30 MIN: CPT | Performed by: FAMILY MEDICINE

## 2020-09-01 PROCEDURE — 80053 COMPREHEN METABOLIC PANEL: CPT | Performed by: FAMILY MEDICINE

## 2020-09-01 PROCEDURE — 36415 COLL VENOUS BLD VENIPUNCTURE: CPT | Performed by: FAMILY MEDICINE

## 2020-09-01 PROCEDURE — 71046 X-RAY EXAM CHEST 2 VIEWS: CPT

## 2020-09-01 RX ORDER — MIRTAZAPINE 7.5 MG/1
7.5 TABLET, FILM COATED ORAL AT BEDTIME
Qty: 30 TABLET | Refills: 0 | Status: SHIPPED | OUTPATIENT
Start: 2020-09-01 | End: 2020-10-05

## 2020-09-01 ASSESSMENT — ENCOUNTER SYMPTOMS
WHEEZING: 0
SHORTNESS OF BREATH: 1
COUGH: 0
PALPITATIONS: 0
FEVER: 0

## 2020-09-01 NOTE — PROGRESS NOTES
Subjective     Moises Chacon is a 70 year old male who presents to clinic today for the following health issues:    HPI         Hospital Follow-up Visit:    Hospital/Nursing Home/IP Rehab Facility: 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 No   Problems taking medications regularly:  None  Medication changes since discharge: None  Problems adhering to non-medication therapy:  None        Summary of hospitalization:  [facility] discharge summary reviewed  Diagnostic Tests/Treatments reviewed.  Follow up needed: none  Other Healthcare Providers Involved in Patient s Care:         None  Update since discharge: stable.   Post Discharge Medication Reconciliation: discharge medications reconciled, continue medications without change.  Plan of care communicated with patient              Patient is 15-year-old male recently moved from [place] and is currently being taken care of his older sister who presents to the clinic for hospital follow-up.  Extensive past medical history including [place] lung adenocarcinoma that is post left lower lobectomy at [facility], thymoma status post thymectomy was having follow-up for evaluation regarding dyspnea.  He was thought that he was having fluid overload and was diuresed.  He did have a echocardiogram that did not show diastolic dysfunction and hypokinetic ejection fraction.    He still having exertional shortness of breath.  Unable to go up flight of stairs.  He is able to walk without difficulty and does not endorse any chest pain, palpitations, bleeding or leg swelling.    His symptoms  of urinary tract infections are improving.     His sister voices tremendous concern that he has had very low appetite following his surgeries.  In addition has noticed that he has been more anxious and also had depressed mood.  States that he was kicked out of his family household from [place] after finding a house that he was.  She is now current caregiver and feels some exhaustion as well.      Review of Systems   Constitutional: Negative for fever.   Respiratory: Positive for shortness of breath. Negative for cough and wheezing.    Cardiovascular: Negative for chest pain and palpitations.            Objective    /78 (BP Location: Right arm, Patient Position: Sitting, Cuff Size: Adult Regular)   Pulse 84   Temp 98.3  F (36.8  C) (Oral)   Wt 57.6 kg (127 lb)   SpO2 100%   BMI 20.51 kg/m    Body mass index is 20.51 kg/m .  Physical Exam  Constitutional:       Appearance: He is not ill-appearing.   Cardiovascular:      Rate and Rhythm: Normal rate and regular rhythm.   Pulmonary:      Effort: Pulmonary effort is normal.      Breath sounds: Normal breath sounds. No rales.   Musculoskeletal:      Right lower leg: No edema.      Left lower leg: No edema.   Psychiatric:      Comments: Guarded            CBC RESULTS:   Recent Labs   Lab Test 08/30/20  0710   WBC 13.2*   RBC 3.99*   HGB 12.1*   HCT 36.4*   MCV 91   MCH 30.3   MCHC 33.2   RDW 13.7        Last Comprehensive Metabolic Panel:  Sodium   Date Value Ref Range Status   08/30/2020 135 133 - 144 mmol/L Final     Potassium   Date Value Ref Range Status   08/30/2020 4.5 3.4 - 5.3 mmol/L Final     Comment:     Specimen slightly hemolyzed, potassium may be falsely elevated     Chloride   Date Value Ref Range Status   08/30/2020 107 94 - 109 mmol/L Final     Carbon Dioxide   Date Value Ref Range Status   08/30/2020 17 (L) 20 - 32 mmol/L Final     Anion Gap   Date Value Ref Range Status   08/30/2020 10 3 - 14 mmol/L Final     Glucose   Date Value Ref  Range Status   08/30/2020 79 70 - 99 mg/dL Final     Urea Nitrogen   Date Value Ref Range Status   08/30/2020 15 7 - 30 mg/dL Final     Creatinine   Date Value Ref Range Status   08/30/2020 0.76 0.66 - 1.25 mg/dL Final     GFR Estimate   Date Value Ref Range Status   08/30/2020 >90 >60 mL/min/[1.73_m2] Final     Comment:     Non  GFR Calc  Starting 12/18/2018, serum creatinine based estimated GFR (eGFR) will be   calculated using the Chronic Kidney Disease Epidemiology Collaboration   (CKD-EPI) equation.       Calcium   Date Value Ref Range Status   08/30/2020 8.8 8.5 - 10.1 mg/dL Final               Assessment & Plan     Dyspnea on exertion  Multifactorial etiologies.  He is currently not in fluid overload and echocardiogram in combination with clinical exam does not show congestive heart failure.  Per chart review, he does have normocytic anemia in combination with both cardiopulmonary compensation as he has both biventricular hypertrophy in the setting of left lower lobectomy given history of lung adenocarcinoma.  He is currently in no respiratory distress and is saturating well on room air.  We will continue to monitor, recheck labs.  - CBC with platelets  - Comprehensive metabolic panel (BMP + Alb, Alk Phos, ALT, AST, Total. Bili, TP)    Urinary tract infection without hematuria, site unspecified  - CBC with platelets    Adjustment disorder with mixed anxiety and depressed mood  Start Remeron for management of mental health including decreased appetite.  - mirtazapine (REMERON) 7.5 MG tablet; Take 1 tablet (7.5 mg) by mouth At Bedtime       Return in about 1 month (around 10/1/2020) for mental health recheck.    Dylan Villasenor MD  [facility]

## 2020-09-02 LAB
ALBUMIN SERPL-MCNC: 2.9 G/DL (ref 3.4–5)
ALP SERPL-CCNC: 122 U/L (ref 40–150)
ALT SERPL W P-5'-P-CCNC: 22 U/L (ref 0–70)
ANION GAP SERPL CALCULATED.3IONS-SCNC: 7 MMOL/L (ref 3–14)
AST SERPL W P-5'-P-CCNC: 18 U/L (ref 0–45)
BILIRUB SERPL-MCNC: 0.3 MG/DL (ref 0.2–1.3)
BUN SERPL-MCNC: 20 MG/DL (ref 7–30)
CALCIUM SERPL-MCNC: 10 MG/DL (ref 8.5–10.1)
CHLORIDE SERPL-SCNC: 103 MMOL/L (ref 94–109)
CO2 SERPL-SCNC: 27 MMOL/L (ref 20–32)
CREAT SERPL-MCNC: 0.97 MG/DL (ref 0.66–1.25)
GFR SERPL CREATININE-BSD FRML MDRD: 78 ML/MIN/{1.73_M2}
GLUCOSE SERPL-MCNC: 96 MG/DL (ref 70–99)
POTASSIUM SERPL-SCNC: 4.1 MMOL/L (ref 3.4–5.3)
PROT SERPL-MCNC: 8.9 G/DL (ref 6.8–8.8)
SODIUM SERPL-SCNC: 137 MMOL/L (ref 133–144)

## 2020-09-03 LAB
BACTERIA SPEC CULT: NO GROWTH
SPECIMEN SOURCE: NORMAL

## 2020-09-04 DIAGNOSIS — I10 ESSENTIAL HYPERTENSION: ICD-10-CM

## 2020-09-04 RX ORDER — AMLODIPINE BESYLATE 10 MG/1
TABLET ORAL
Qty: 90 TABLET | Refills: 3 | Status: SHIPPED | OUTPATIENT
Start: 2020-09-04 | End: 2021-08-11

## 2020-09-04 NOTE — TELEPHONE ENCOUNTER
Prescription approved per [facility] Refill Protocol.  Sammy Adams RN, BSN

## 2020-09-18 ENCOUNTER — APPOINTMENT (OUTPATIENT)
Dept: GENERAL RADIOLOGY | Facility: CLINIC | Age: 70
DRG: 291 | End: 2020-09-18
Attending: EMERGENCY MEDICINE
Payer: MEDICARE

## 2020-09-18 ENCOUNTER — HOSPITAL ENCOUNTER (INPATIENT)
Facility: CLINIC | Age: 70
LOS: 1 days | Discharge: HOME | DRG: 291 | End: 2020-09-19
Attending: EMERGENCY MEDICINE | Admitting: INTERNAL MEDICINE
Payer: MEDICARE

## 2020-09-18 ENCOUNTER — TELEPHONE (OUTPATIENT)
Dept: ONCOLOGY | Facility: CLINIC | Age: 70
End: 2020-09-18

## 2020-09-18 DIAGNOSIS — Z20.828 EXPOSURE TO SARS-ASSOCIATED CORONAVIRUS: ICD-10-CM

## 2020-09-18 DIAGNOSIS — R06.02 SOB (SHORTNESS OF BREATH): ICD-10-CM

## 2020-09-18 DIAGNOSIS — R06.00 ACUTE DYSPNEA: ICD-10-CM

## 2020-09-18 LAB
ALBUMIN SERPL-MCNC: 2.6 G/DL (ref 3.4–5)
ALP SERPL-CCNC: 119 U/L (ref 40–150)
ALT SERPL W P-5'-P-CCNC: 15 U/L (ref 0–70)
ANION GAP SERPL CALCULATED.3IONS-SCNC: 4 MMOL/L (ref 3–14)
AST SERPL W P-5'-P-CCNC: 13 U/L (ref 0–45)
BASOPHILS # BLD AUTO: 0 10E9/L (ref 0–0.2)
BASOPHILS NFR BLD AUTO: 0.4 %
BILIRUB SERPL-MCNC: 0.5 MG/DL (ref 0.2–1.3)
BUN SERPL-MCNC: 17 MG/DL (ref 7–30)
CALCIUM SERPL-MCNC: 8.8 MG/DL (ref 8.5–10.1)
CHLORIDE SERPL-SCNC: 104 MMOL/L (ref 94–109)
CO2 SERPL-SCNC: 28 MMOL/L (ref 20–32)
CREAT SERPL-MCNC: 1.14 MG/DL (ref 0.66–1.25)
DIFFERENTIAL METHOD BLD: ABNORMAL
EOSINOPHIL # BLD AUTO: 0.1 10E9/L (ref 0–0.7)
EOSINOPHIL NFR BLD AUTO: 1.1 %
ERYTHROCYTE [DISTWIDTH] IN BLOOD BY AUTOMATED COUNT: 13.2 % (ref 10–15)
GFR SERPL CREATININE-BSD FRML MDRD: 64 ML/MIN/{1.73_M2}
GLUCOSE SERPL-MCNC: 102 MG/DL (ref 70–99)
HCT VFR BLD AUTO: 35.9 % (ref 40–53)
HGB BLD-MCNC: 11.3 G/DL (ref 13.3–17.7)
IMM GRANULOCYTES # BLD: 0 10E9/L (ref 0–0.4)
IMM GRANULOCYTES NFR BLD: 0.3 %
LACTATE BLD-SCNC: 1.3 MMOL/L (ref 0.7–2)
LACTATE BLD-SCNC: 2 MMOL/L (ref 0.7–2)
LYMPHOCYTES # BLD AUTO: 1.2 10E9/L (ref 0.8–5.3)
LYMPHOCYTES NFR BLD AUTO: 13.7 %
MCH RBC QN AUTO: 29.1 PG (ref 26.5–33)
MCHC RBC AUTO-ENTMCNC: 31.5 G/DL (ref 31.5–36.5)
MCV RBC AUTO: 93 FL (ref 78–100)
MONOCYTES # BLD AUTO: 0.4 10E9/L (ref 0–1.3)
MONOCYTES NFR BLD AUTO: 4.7 %
NEUTROPHILS # BLD AUTO: 7.1 10E9/L (ref 1.6–8.3)
NEUTROPHILS NFR BLD AUTO: 79.8 %
NRBC # BLD AUTO: 0 10*3/UL
NRBC BLD AUTO-RTO: 0 /100
NT-PROBNP SERPL-MCNC: 1082 PG/ML (ref 0–900)
PLATELET # BLD AUTO: 297 10E9/L (ref 150–450)
POTASSIUM SERPL-SCNC: 4.8 MMOL/L (ref 3.4–5.3)
PROCALCITONIN SERPL-MCNC: <0.05 NG/ML
PROT SERPL-MCNC: 8.3 G/DL (ref 6.8–8.8)
RBC # BLD AUTO: 3.88 10E12/L (ref 4.4–5.9)
SARS-COV-2 RNA SPEC QL NAA+PROBE: NORMAL
SODIUM SERPL-SCNC: 136 MMOL/L (ref 133–144)
SPECIMEN SOURCE: NORMAL
TROPONIN I SERPL-MCNC: <0.015 UG/L (ref 0–0.04)
TROPONIN I SERPL-MCNC: <0.015 UG/L (ref 0–0.04)
WBC # BLD AUTO: 9 10E9/L (ref 4–11)

## 2020-09-18 PROCEDURE — C9803 HOPD COVID-19 SPEC COLLECT: HCPCS | Performed by: EMERGENCY MEDICINE

## 2020-09-18 PROCEDURE — 93005 ELECTROCARDIOGRAM TRACING: CPT | Performed by: EMERGENCY MEDICINE

## 2020-09-18 PROCEDURE — 85025 COMPLETE CBC W/AUTO DIFF WBC: CPT | Performed by: EMERGENCY MEDICINE

## 2020-09-18 PROCEDURE — 93010 ELECTROCARDIOGRAM REPORT: CPT | Mod: Z6 | Performed by: EMERGENCY MEDICINE

## 2020-09-18 PROCEDURE — 96374 THER/PROPH/DIAG INJ IV PUSH: CPT | Performed by: EMERGENCY MEDICINE

## 2020-09-18 PROCEDURE — 83605 ASSAY OF LACTIC ACID: CPT | Performed by: INTERNAL MEDICINE

## 2020-09-18 PROCEDURE — 25000132 ZZH RX MED GY IP 250 OP 250 PS 637: Mod: GY | Performed by: STUDENT IN AN ORGANIZED HEALTH CARE EDUCATION/TRAINING PROGRAM

## 2020-09-18 PROCEDURE — 99223 1ST HOSP IP/OBS HIGH 75: CPT | Mod: AI | Performed by: INTERNAL MEDICINE

## 2020-09-18 PROCEDURE — 99285 EMERGENCY DEPT VISIT HI MDM: CPT | Mod: 25 | Performed by: EMERGENCY MEDICINE

## 2020-09-18 PROCEDURE — 25000128 H RX IP 250 OP 636: Performed by: EMERGENCY MEDICINE

## 2020-09-18 PROCEDURE — 71045 X-RAY EXAM CHEST 1 VIEW: CPT

## 2020-09-18 PROCEDURE — 12000001 ZZH R&B MED SURG/OB UMMC

## 2020-09-18 PROCEDURE — 36415 COLL VENOUS BLD VENIPUNCTURE: CPT | Performed by: INTERNAL MEDICINE

## 2020-09-18 PROCEDURE — 84484 ASSAY OF TROPONIN QUANT: CPT | Performed by: EMERGENCY MEDICINE

## 2020-09-18 PROCEDURE — 83880 ASSAY OF NATRIURETIC PEPTIDE: CPT | Performed by: EMERGENCY MEDICINE

## 2020-09-18 PROCEDURE — 80053 COMPREHEN METABOLIC PANEL: CPT | Performed by: EMERGENCY MEDICINE

## 2020-09-18 PROCEDURE — 83605 ASSAY OF LACTIC ACID: CPT | Performed by: EMERGENCY MEDICINE

## 2020-09-18 PROCEDURE — 25000128 H RX IP 250 OP 636: Performed by: STUDENT IN AN ORGANIZED HEALTH CARE EDUCATION/TRAINING PROGRAM

## 2020-09-18 PROCEDURE — U0003 INFECTIOUS AGENT DETECTION BY NUCLEIC ACID (DNA OR RNA); SEVERE ACUTE RESPIRATORY SYNDROME CORONAVIRUS 2 (SARS-COV-2) (CORONAVIRUS DISEASE [COVID-19]), AMPLIFIED PROBE TECHNIQUE, MAKING USE OF HIGH THROUGHPUT TECHNOLOGIES AS DESCRIBED BY CMS-2020-01-R: HCPCS | Performed by: STUDENT IN AN ORGANIZED HEALTH CARE EDUCATION/TRAINING PROGRAM

## 2020-09-18 PROCEDURE — 84145 PROCALCITONIN (PCT): CPT | Performed by: EMERGENCY MEDICINE

## 2020-09-18 RX ORDER — FUROSEMIDE 10 MG/ML
20 INJECTION INTRAMUSCULAR; INTRAVENOUS ONCE
Status: COMPLETED | OUTPATIENT
Start: 2020-09-18 | End: 2020-09-18

## 2020-09-18 RX ORDER — PROCHLORPERAZINE MALEATE 5 MG
5 TABLET ORAL EVERY 6 HOURS PRN
Status: DISCONTINUED | OUTPATIENT
Start: 2020-09-18 | End: 2020-09-19 | Stop reason: HOSPADM

## 2020-09-18 RX ORDER — NALOXONE HYDROCHLORIDE 0.4 MG/ML
.1-.4 INJECTION, SOLUTION INTRAMUSCULAR; INTRAVENOUS; SUBCUTANEOUS
Status: DISCONTINUED | OUTPATIENT
Start: 2020-09-18 | End: 2020-09-19 | Stop reason: HOSPADM

## 2020-09-18 RX ORDER — ONDANSETRON 4 MG/1
4 TABLET, ORALLY DISINTEGRATING ORAL EVERY 6 HOURS PRN
Status: DISCONTINUED | OUTPATIENT
Start: 2020-09-18 | End: 2020-09-19 | Stop reason: HOSPADM

## 2020-09-18 RX ORDER — AMLODIPINE BESYLATE 10 MG/1
10 TABLET ORAL DAILY
Status: DISCONTINUED | OUTPATIENT
Start: 2020-09-19 | End: 2020-09-19 | Stop reason: HOSPADM

## 2020-09-18 RX ORDER — AMOXICILLIN 250 MG
1 CAPSULE ORAL 2 TIMES DAILY PRN
Status: DISCONTINUED | OUTPATIENT
Start: 2020-09-18 | End: 2020-09-19 | Stop reason: HOSPADM

## 2020-09-18 RX ORDER — ONDANSETRON 2 MG/ML
4 INJECTION INTRAMUSCULAR; INTRAVENOUS EVERY 6 HOURS PRN
Status: DISCONTINUED | OUTPATIENT
Start: 2020-09-18 | End: 2020-09-19 | Stop reason: HOSPADM

## 2020-09-18 RX ORDER — LIDOCAINE 40 MG/G
CREAM TOPICAL
Status: DISCONTINUED | OUTPATIENT
Start: 2020-09-18 | End: 2020-09-19 | Stop reason: HOSPADM

## 2020-09-18 RX ORDER — ACETAMINOPHEN 325 MG/1
650 TABLET ORAL EVERY 4 HOURS PRN
Status: DISCONTINUED | OUTPATIENT
Start: 2020-09-18 | End: 2020-09-19 | Stop reason: HOSPADM

## 2020-09-18 RX ORDER — OXYCODONE HYDROCHLORIDE 5 MG/1
5-10 TABLET ORAL EVERY 4 HOURS PRN
Status: DISCONTINUED | OUTPATIENT
Start: 2020-09-18 | End: 2020-09-19 | Stop reason: HOSPADM

## 2020-09-18 RX ORDER — PROCHLORPERAZINE 25 MG
12.5 SUPPOSITORY, RECTAL RECTAL EVERY 12 HOURS PRN
Status: DISCONTINUED | OUTPATIENT
Start: 2020-09-18 | End: 2020-09-19 | Stop reason: HOSPADM

## 2020-09-18 RX ORDER — POLYETHYLENE GLYCOL 3350 17 G/17G
17 POWDER, FOR SOLUTION ORAL DAILY PRN
Status: DISCONTINUED | OUTPATIENT
Start: 2020-09-18 | End: 2020-09-19 | Stop reason: HOSPADM

## 2020-09-18 RX ORDER — METOPROLOL TARTRATE 25 MG/1
25 TABLET, FILM COATED ORAL 2 TIMES DAILY
Status: DISCONTINUED | OUTPATIENT
Start: 2020-09-18 | End: 2020-09-19 | Stop reason: HOSPADM

## 2020-09-18 RX ADMIN — METOPROLOL TARTRATE 25 MG: 25 TABLET, FILM COATED ORAL at 21:58

## 2020-09-18 RX ADMIN — FUROSEMIDE 20 MG: 10 INJECTION, SOLUTION INTRAVENOUS at 20:08

## 2020-09-18 RX ADMIN — ACETAMINOPHEN 650 MG: 325 TABLET, FILM COATED ORAL at 23:44

## 2020-09-18 RX ADMIN — FUROSEMIDE 20 MG: 10 INJECTION, SOLUTION INTRAVENOUS at 23:17

## 2020-09-18 RX ADMIN — OXYCODONE HYDROCHLORIDE 5 MG: 5 TABLET ORAL at 23:45

## 2020-09-18 ASSESSMENT — ENCOUNTER SYMPTOMS
MYALGIAS: 0
COUGH: 0
SORE THROAT: 0
FEVER: 0
SHORTNESS OF BREATH: 1
ABDOMINAL PAIN: 0
DYSURIA: 0

## 2020-09-18 ASSESSMENT — ACTIVITIES OF DAILY LIVING (ADL)
AMBULATION: 0-->INDEPENDENT
BATHING: 0-->INDEPENDENT
RETIRED_EATING: 0-->INDEPENDENT
SWALLOWING: 0-->SWALLOWS FOODS/LIQUIDS WITHOUT DIFFICULTY
TRANSFERRING: 0-->INDEPENDENT
RETIRED_COMMUNICATION: 0-->UNDERSTANDS/COMMUNICATES WITHOUT DIFFICULTY
FALL_HISTORY_WITHIN_LAST_SIX_MONTHS: NO
COGNITION: 0 - NO COGNITION ISSUES REPORTED
TOILETING: 0-->INDEPENDENT
DRESS: 0-->INDEPENDENT

## 2020-09-18 NOTE — ED PROVIDER NOTES
ED Provider Note  [facility]      History     Chief Complaint   Patient presents with     Shortness of Breath     The history is provided by the patient and medical records. A  was used (Official [employer] ).     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  Per review of patient's chart, patient was recently hospitalized here from 8/28/2020 to 8/30/2020 after presenting to the Emergency Department with acute dyspnea.  Patient had no fever and his procalcitonin was negative while hospitalized.  Patient's exam suggested volume overload and he was given furosemide with improvement of symptoms.  Patient did have a CT chest PE that was negative.  Patient had an echocardiogram done that showed normal systolic function.  Patient was discharged with a prescription for furosemide 20 mg daily for 1 week with plan to have weight check and electrolyte check with PCP.  Patient was also diagnosed with a UTI while hospitalized and he was started on Augmentin twice daily for 5 days.    Patient returns to the Emergency Department today for evaluation of increasing shortness of breath since last night.  He states that it is exacerbated when he is lying flat.  He denies any associated chest pain, cough or fevers.  He also denies any leg swelling.  The patient states that he has never had shortness of breath similar to this in the past.  He denies any known exposure to COVID-19.  Patient reports that he is still taking the furosemide that he was prescribed during his last hospitalization and he states that this does help with the shortness of breath; (however, reviewing that hospitalization, he was only placed on the  furosemide for 5 days and I do not see any notes of this medication being extended). No difficulty swallowing.     Past Medical History  Past Medical History:   Diagnosis Date     Allergies      Anemia      BPH (benign prostatic hyperplasia)      Chronic pain      Constipation      Dyslipidemia      GERD (gastroesophageal reflux disease)      Hypertension      Mediastinal mass      Primary lung adenocarcinoma (H)      Stroke (H)      Tobacco use      Past Surgical History:   Procedure Laterality Date     ------------OTHER-------------      Bilateral iliac artery stent placement     robotic lobectomy  2020     THORACOSCOPIC THYMECTOMY Bilateral 2020    Procedure: Bilateral subxiphoid thoracoscopic thymectomy;  Surgeon: Tate Garcia MD;  Location:  OR     acetaminophen (TYLENOL) 325 MG tablet  amLODIPine (NORVASC) 10 MG tablet  aspirin (ASA) 81 MG EC tablet  cetirizine (ZYRTEC) 10 MG tablet  furosemide (LASIX) 20 MG tablet  methocarbamol (ROBAXIN) 500 MG tablet  metoprolol tartrate (LOPRESSOR) 25 MG tablet  mirtazapine (REMERON) 7.5 MG tablet  oxyCODONE (ROXICODONE) 5 MG tablet  polyethylene glycol (MIRALAX) 17 g packet  sennosides (SENOKOT) 8.6 MG tablet  umeclidinium-vilanterol (ANORO ELLIPTA) 62.5-25 MCG/INH oral inhaler      Allergies   Allergen Reactions     Flomax [Tamsulosin]      ITCHING     Family History  Family History   Problem Relation Age of Onset     Heart Disease Mother      Cerebrovascular Disease Father      Other - See Comments Brother         passed in [employer] war     Cerebrovascular Disease Sister      Cerebrovascular Disease Sister      Social History   Social History     Tobacco Use     Smoking status: Former Smoker     Packs/day: 0.50     Years: 60.00     Pack years: 30.00     Last attempt to quit: 2020     Years since quittin.1     Smokeless tobacco: Former User   Substance Use Topics     Alcohol use: No     Drug use: Not Currently     Types: Marijuana      Past  medical history, past surgical history, medications, allergies, family history, and social history were reviewed with the patient. No additional pertinent items.       Review of Systems   Constitutional: Negative for fever.   HENT: Negative for sore throat.    Respiratory: Positive for shortness of breath (worse with lying flat). Negative for cough.    Cardiovascular: Negative for chest pain and leg swelling.   Gastrointestinal: Negative for abdominal pain.   Genitourinary: Negative for dysuria.   Musculoskeletal: Negative for myalgias.   Neurological:        Negative for loss of taste/smell   All other systems reviewed and are negative.      Physical Exam   BP: (!) 143/86  Pulse: 118  Temp: 99.7  F (37.6  C)  Resp: 22  Weight: 58.8 kg (129 lb 11.2 oz)  SpO2: 98 %  Physical Exam  General: awake, alert, NAD  Head: normal cephalic  HEENT: pupils equal, conjugate gaze in tact  Neck: Supple  CV: Tachycardic rate.  No murmur  Lungs: clear to ascultation, no wheezing or crackles noted  Abd: soft, non-tender, no guarding, no peritoneal signs  EXT: Patient has mild swelling in bilateral lower extremities  Neuro: awake, answers questions appropriately. No focal deficits noted       ED Course      Procedures     1:37 PM  The patient was seen and examined by Wilfredo Hooker MD in Room ED18.                EKG Interpretation:      Interpreted by Wilfredo Hooker MD  Time reviewed: 0245  Symptoms at time of EKG: SOB   Rhythm: sinus tachycardia  Rate: 100-110  Axis: Right Axis Deviation  Ectopy: none  Conduction: normal  ST Segments/ T Waves: No acute ischemic changes  Q Waves: I, II, aVf and aVl  Comparison to prior: new Q waves.     Clinical Impression: New Q waves in I, II, aVL, aVF           Results for orders placed or performed during the hospital encounter of 09/18/20   XR Chest Port 1 View     Status: None    Narrative    Exam: Chest x-ray, 1 view 9/18/2020 2:17 PM    Indication: Shortness of breath    Comparison: X-ray  9/1/2020    Findings:   Single frontal radiograph of the chest. Bilateral pleural effusion,  left greater than right. No pneumothorax. The left heart border is  partially obscured. Mild interstitial streakiness. No acute osseous  abnormalities. Visualized upper abdomen is unremarkable.      Impression    Impression: Mild interstitial streakiness with bilateral pleural  effusions, left greater than right, suggestive of pulmonary edema.    I have personally reviewed the examination and initial interpretation  and I agree with the findings.    CHAO LOBO MD   CBC with platelets differential     Status: Abnormal   Result Value Ref Range    WBC 9.0 4.0 - 11.0 10e9/L    RBC Count 3.88 (L) 4.4 - 5.9 10e12/L    Hemoglobin 11.3 (L) 13.3 - 17.7 g/dL    Hematocrit 35.9 (L) 40.0 - 53.0 %    MCV 93 78 - 100 fl    MCH 29.1 26.5 - 33.0 pg    MCHC 31.5 31.5 - 36.5 g/dL    RDW 13.2 10.0 - 15.0 %    Platelet Count 297 150 - 450 10e9/L    Diff Method Automated Method     % Neutrophils 79.8 %    % Lymphocytes 13.7 %    % Monocytes 4.7 %    % Eosinophils 1.1 %    % Basophils 0.4 %    % Immature Granulocytes 0.3 %    Nucleated RBCs 0 0 /100    Absolute Neutrophil 7.1 1.6 - 8.3 10e9/L    Absolute Lymphocytes 1.2 0.8 - 5.3 10e9/L    Absolute Monocytes 0.4 0.0 - 1.3 10e9/L    Absolute Eosinophils 0.1 0.0 - 0.7 10e9/L    Absolute Basophils 0.0 0.0 - 0.2 10e9/L    Abs Immature Granulocytes 0.0 0 - 0.4 10e9/L    Absolute Nucleated RBC 0.0    Comprehensive metabolic panel     Status: Abnormal   Result Value Ref Range    Sodium 136 133 - 144 mmol/L    Potassium 4.8 3.4 - 5.3 mmol/L    Chloride 104 94 - 109 mmol/L    Carbon Dioxide 28 20 - 32 mmol/L    Anion Gap 4 3 - 14 mmol/L    Glucose 102 (H) 70 - 99 mg/dL    Urea Nitrogen 17 7 - 30 mg/dL    Creatinine 1.14 0.66 - 1.25 mg/dL    GFR Estimate 64 >60 mL/min/[1.73_m2]    GFR Estimate If Black 74 >60 mL/min/[1.73_m2]    Calcium 8.8 8.5 - 10.1 mg/dL    Bilirubin Total 0.5 0.2 - 1.3 mg/dL     Albumin 2.6 (L) 3.4 - 5.0 g/dL    Protein Total 8.3 6.8 - 8.8 g/dL    Alkaline Phosphatase 119 40 - 150 U/L    ALT 15 0 - 70 U/L    AST 13 0 - 45 U/L   Troponin I     Status: None   Result Value Ref Range    Troponin I ES <0.015 0.000 - 0.045 ug/L   Lactic acid     Status: None   Result Value Ref Range    Lactic Acid 2.0 0.7 - 2.0 mmol/L   Nt probnp inpatient     Status: Abnormal   Result Value Ref Range    N-Terminal Pro BNP Inpatient 1,082 (H) 0 - 900 pg/mL   EKG 12 lead     Status: None (Preliminary result)   Result Value Ref Range    Interpretation ECG Click View Image link to view waveform and result      Medications   furosemide (LASIX) injection 20 mg (has no administration in time range)        Assessments & Plan (with Medical Decision Making)   Mr. Chacon is a 70-year-old male who presents with shortness of breath.  Differential could include things such as infection, disease progression, anemia, ACS, CHF, covid infection, PE.    Patient has normal vital signs.  He is not hypoxic.  On exam patient does not appear in any respiratory distress on room air, mildly tachypneic.  When attempted to lay down patient last only several seconds, desats, and needs to sit back up.  Unable to tolerate lying flat even with oxygen on.    Patient is a normal white count, no left shift.  Patient's electrolytes are unremarkable.  He has a negative troponin, negative lactic acid.  Patient's hemoglobin is also unremarkable.    Chest x-ray with pleural effusions and interstitial streakiness concerning for pulmonary edema.  Gave IV Lasix.  Additionally patient has new Q waves, I ordered an ECHO which is pending at this time.     Reviewed previous CT scan which had a mediastinal adenopathy, wondering if this is contributing to his positional shortness of breath.  Attempted to repeat CT scan though patient cannot tolerate lying flat at this time.  Plan is to admit to the hospital, can diurese, reattempt imaging.  Of note patient is  still at risk for PE though orthopnea is less characteristic of this and PE study was negative a month ago, additionally patient now has an elevated BNP and this was not elevated as significantly during his last admission which also points to volume overload but would consider PE if patient does not improve with diuresis.    I have reviewed the nursing notes. I have reviewed the findings, diagnosis, plan and need for follow up with the patient.    New Prescriptions    No medications on file       Final diagnoses:   SOB (shortness of breath)       --  I, Pravin Thomson, am serving as a trained medical scribe to document services personally performed by Wilfredo Hooker MD, based on the provider's statements to me.     I, Wilfredo Hooker MD, was physically present and have reviewed and verified the accuracy of this note documented by Pravin Thomson.    Wilfredo Hooker MD  [facility], [place], EMERGENCY DEPARTMENT  9/18/2020     Wilfredo Hooker MD  09/18/20 [id number]

## 2020-09-18 NOTE — ED TRIAGE NOTES
Pt presents with sister from home. Per Pt sister Pt has had increased difficulty breathing without pain. Pt in august had surgery on lungs to remove lung cancer. Pt states since surgery has had difficulty breathing. Sister interpreting for Pt as  line not functioning. Pt does not appear in acute distress.

## 2020-09-18 NOTE — TELEPHONE ENCOUNTER
Sister Rosa and pt called in to triage stating he is having difficulty breathing. Rosa is translating for pt, stated this just started today. Surgery was on 8/19 with thoracic, sister stated he's had sob for awhile now but today seems much worse to her. She imitated how pt sounds, by making short gasping breaths on the phone. Writer asked to listen to pt breathing if she'd put the phone up to his mouth, writer can hear pt breathing in short breaths, no wheezing, does not sound labored. Asked if he was having chest pain or other pains, both stated no. Asked him to take deep breaths and if that caused more pain, again stated no. Asked about skin color, Rosa stated he is dark skinned and hard to tell if he looks any different. Asked if this is much worse than usual, she stated yes. He was supposed to have a follow-up virtual visit with Andree Myers yesterday but Rosa stated she cancelled it because she thought it was in the clinic and she couldn't drive him yesterday.    Advised he be seen in the ED for evaluation, the closest ED is fine. Rosa stated she wants to bring him to [facility] since that's where he had his surgery, she feels he is safe to drive that distance. Routed to care team.

## 2020-09-19 ENCOUNTER — APPOINTMENT (OUTPATIENT)
Dept: CARDIOLOGY | Facility: CLINIC | Age: 70
DRG: 291 | End: 2020-09-19
Attending: EMERGENCY MEDICINE
Payer: MEDICARE

## 2020-09-19 ENCOUNTER — NURSE TRIAGE (OUTPATIENT)
Dept: NURSING | Facility: CLINIC | Age: 70
End: 2020-09-19

## 2020-09-19 VITALS
WEIGHT: 126.3 LBS | RESPIRATION RATE: 18 BRPM | OXYGEN SATURATION: 99 % | DIASTOLIC BLOOD PRESSURE: 71 MMHG | HEART RATE: 72 BPM | BODY MASS INDEX: 20.4 KG/M2 | SYSTOLIC BLOOD PRESSURE: 127 MMHG | TEMPERATURE: 98.4 F

## 2020-09-19 LAB
ANION GAP SERPL CALCULATED.3IONS-SCNC: 7 MMOL/L (ref 3–14)
BUN SERPL-MCNC: 24 MG/DL (ref 7–30)
CALCIUM SERPL-MCNC: 9.4 MG/DL (ref 8.5–10.1)
CHLORIDE SERPL-SCNC: 102 MMOL/L (ref 94–109)
CO2 SERPL-SCNC: 26 MMOL/L (ref 20–32)
CREAT SERPL-MCNC: 1.32 MG/DL (ref 0.66–1.25)
ERYTHROCYTE [DISTWIDTH] IN BLOOD BY AUTOMATED COUNT: 13.4 % (ref 10–15)
FERRITIN SERPL-MCNC: 693 NG/ML (ref 26–388)
GFR SERPL CREATININE-BSD FRML MDRD: 53 ML/MIN/{1.73_M2}
GLUCOSE SERPL-MCNC: 94 MG/DL (ref 70–99)
HCT VFR BLD AUTO: 35.6 % (ref 40–53)
HGB BLD-MCNC: 11.1 G/DL (ref 13.3–17.7)
INTERPRETATION ECG - MUSE: NORMAL
IRON SATN MFR SERPL: 9 % (ref 15–46)
IRON SERPL-MCNC: 18 UG/DL (ref 35–180)
LABORATORY COMMENT REPORT: NORMAL
MAGNESIUM SERPL-MCNC: 2.2 MG/DL (ref 1.6–2.3)
MCH RBC QN AUTO: 28.9 PG (ref 26.5–33)
MCHC RBC AUTO-ENTMCNC: 31.2 G/DL (ref 31.5–36.5)
MCV RBC AUTO: 93 FL (ref 78–100)
PHOSPHATE SERPL-MCNC: 4.3 MG/DL (ref 2.5–4.5)
PLATELET # BLD AUTO: 278 10E9/L (ref 150–450)
POTASSIUM SERPL-SCNC: 4.5 MMOL/L (ref 3.4–5.3)
RBC # BLD AUTO: 3.84 10E12/L (ref 4.4–5.9)
SARS-COV-2 RNA SPEC QL NAA+PROBE: NEGATIVE
SODIUM SERPL-SCNC: 135 MMOL/L (ref 133–144)
SPECIMEN SOURCE: NORMAL
TIBC SERPL-MCNC: 194 UG/DL (ref 240–430)
TRANSFERRIN SERPL-MCNC: 153 MG/DL (ref 210–360)
WBC # BLD AUTO: 8.8 10E9/L (ref 4–11)

## 2020-09-19 PROCEDURE — 83540 ASSAY OF IRON: CPT | Performed by: STUDENT IN AN ORGANIZED HEALTH CARE EDUCATION/TRAINING PROGRAM

## 2020-09-19 PROCEDURE — 93306 TTE W/DOPPLER COMPLETE: CPT | Mod: 26 | Performed by: INTERNAL MEDICINE

## 2020-09-19 PROCEDURE — 36415 COLL VENOUS BLD VENIPUNCTURE: CPT | Performed by: STUDENT IN AN ORGANIZED HEALTH CARE EDUCATION/TRAINING PROGRAM

## 2020-09-19 PROCEDURE — 85027 COMPLETE CBC AUTOMATED: CPT | Performed by: STUDENT IN AN ORGANIZED HEALTH CARE EDUCATION/TRAINING PROGRAM

## 2020-09-19 PROCEDURE — 25000132 ZZH RX MED GY IP 250 OP 250 PS 637: Mod: GY | Performed by: STUDENT IN AN ORGANIZED HEALTH CARE EDUCATION/TRAINING PROGRAM

## 2020-09-19 PROCEDURE — 84466 ASSAY OF TRANSFERRIN: CPT | Performed by: STUDENT IN AN ORGANIZED HEALTH CARE EDUCATION/TRAINING PROGRAM

## 2020-09-19 PROCEDURE — 83735 ASSAY OF MAGNESIUM: CPT | Performed by: STUDENT IN AN ORGANIZED HEALTH CARE EDUCATION/TRAINING PROGRAM

## 2020-09-19 PROCEDURE — 83550 IRON BINDING TEST: CPT | Performed by: STUDENT IN AN ORGANIZED HEALTH CARE EDUCATION/TRAINING PROGRAM

## 2020-09-19 PROCEDURE — 82728 ASSAY OF FERRITIN: CPT | Performed by: STUDENT IN AN ORGANIZED HEALTH CARE EDUCATION/TRAINING PROGRAM

## 2020-09-19 PROCEDURE — 99239 HOSP IP/OBS DSCHRG MGMT >30: CPT | Performed by: PEDIATRICS

## 2020-09-19 PROCEDURE — 80048 BASIC METABOLIC PNL TOTAL CA: CPT | Performed by: STUDENT IN AN ORGANIZED HEALTH CARE EDUCATION/TRAINING PROGRAM

## 2020-09-19 PROCEDURE — 93306 TTE W/DOPPLER COMPLETE: CPT

## 2020-09-19 PROCEDURE — 84100 ASSAY OF PHOSPHORUS: CPT | Performed by: STUDENT IN AN ORGANIZED HEALTH CARE EDUCATION/TRAINING PROGRAM

## 2020-09-19 RX ORDER — FUROSEMIDE 20 MG
20 TABLET ORAL DAILY
Qty: 30 TABLET | Refills: 1 | Status: SHIPPED | OUTPATIENT
Start: 2020-09-19 | End: 2020-10-05

## 2020-09-19 RX ADMIN — METOPROLOL TARTRATE 25 MG: 25 TABLET, FILM COATED ORAL at 08:35

## 2020-09-19 RX ADMIN — AMLODIPINE BESYLATE 10 MG: 10 TABLET ORAL at 08:35

## 2020-09-19 ASSESSMENT — ACTIVITIES OF DAILY LIVING (ADL)
ADLS_ACUITY_SCORE: 14
ADLS_ACUITY_SCORE: 16

## 2020-09-19 NOTE — ED NOTES
[facility], [place]   ED Nurse to Floor Handoff     Moises Chacon is a 70 year old male who speaks Wolof and lives with family members,  in a home  They arrived in the ED by car from home    ED Chief Complaint: Shortness of Breath    ED Dx;   Final diagnoses:   SOB (shortness of breath)         Needed?: Yes    Allergies:   Allergies   Allergen Reactions     Flomax [Tamsulosin]      ITCHING   .  Past Medical Hx:   Past Medical History:   Diagnosis Date     Allergies      Anemia      BPH (benign prostatic hyperplasia)      Chronic pain      Constipation      Dyslipidemia      GERD (gastroesophageal reflux disease)      Hypertension      Mediastinal mass      Primary lung adenocarcinoma (H)      Stroke (H)      Tobacco use       Baseline Mental status: WDL  Current Mental Status changes: at basesline    Infection present or suspected this encounter: no  Sepsis suspected: No  Isolation type: Special Precautions-COVID-19     Activity level - Baseline/Home:  Independent  Activity Level - Current:   Unknown    Bariatric equipment needed?: No    In the ED these meds were given:   Medications   furosemide (LASIX) injection 20 mg (has no administration in time range)   lidocaine 1 % 0.1-1 mL (has no administration in time range)   lidocaine (LMX4) cream (has no administration in time range)   sodium chloride (PF) 0.9% PF flush 3 mL (has no administration in time range)   sodium chloride (PF) 0.9% PF flush 3 mL (has no administration in time range)   naloxone (NARCAN) injection 0.1-0.4 mg (has no administration in time range)   melatonin tablet 1 mg (has no administration in time range)   acetaminophen (TYLENOL) tablet 650 mg (has no administration in time range)   ondansetron (ZOFRAN-ODT) ODT tab 4 mg (has no administration in time range)     Or   ondansetron (ZOFRAN) injection 4 mg (has no administration in time range)   prochlorperazine (COMPAZINE) injection 5 mg (has no  administration in time range)     Or   prochlorperazine (COMPAZINE) tablet 5 mg (has no administration in time range)     Or   prochlorperazine (COMPAZINE) suppository 12.5 mg (has no administration in time range)   amLODIPine (NORVASC) tablet 10 mg (has no administration in time range)   metoprolol tartrate (LOPRESSOR) tablet 25 mg (has no administration in time range)   senna-docusate (SENOKOT-S/PERICOLACE) 8.6-50 MG per tablet 1 tablet (has no administration in time range)   polyethylene glycol (MIRALAX) Packet 17 g (has no administration in time range)       Drips running?  No    Home pump  No    Current LDAs  Peripheral IV 09/18/20 Left Upper forearm (Active)   Number of days: 0       Incision/Surgical Site 08/19/20 Bilateral Abdomen (Active)   Number of days: 30       Labs results:   Labs Ordered and Resulted from Time of ED Arrival Up to the Time of Departure from the ED   CBC WITH PLATELETS DIFFERENTIAL - Abnormal; Notable for the following components:       Result Value    RBC Count 3.88 (*)     Hemoglobin 11.3 (*)     Hematocrit 35.9 (*)     All other components within normal limits   COMPREHENSIVE METABOLIC PANEL - Abnormal; Notable for the following components:    Glucose 102 (*)     Albumin 2.6 (*)     All other components within normal limits   NT PROBNP INPATIENT - Abnormal; Notable for the following components:    N-Terminal Pro BNP Inpatient 1,082 (*)     All other components within normal limits   TROPONIN I   LACTIC ACID WHOLE BLOOD   TROPONIN I   COVID-19 VIRUS (CORONAVIRUS) BY PCR   IP ASSIGN PROVIDER TEAM TO TREATMENT TEAM   PERIPHERAL IV CATHETER   INTAKE AND OUTPUT   PULSE OXIMETRY NURSING   VITAL SIGNS   APPLY PNEUMATIC COMPRESSION DEVICE (PCD)       Imaging Studies:   Recent Results (from the past 24 hour(s))   XR Chest Port 1 View    Narrative    Exam: Chest x-ray, 1 view 9/18/2020 2:17 PM    Indication: Shortness of breath    Comparison: X-ray 9/1/2020    Findings:   Single frontal  radiograph of the chest. Bilateral pleural effusion,  left greater than right. No pneumothorax. The left heart border is  partially obscured. Mild interstitial streakiness. No acute osseous  abnormalities. Visualized upper abdomen is unremarkable.      Impression    Impression: Mild interstitial streakiness with bilateral pleural  effusions, left greater than right, suggestive of pulmonary edema.    I have personally reviewed the examination and initial interpretation  and I agree with the findings.    CHAO LOBO MD       Recent vital signs:   BP (!) 157/96   Pulse 95   Temp 99.7  F (37.6  C) (Oral)   Resp 22   Wt 58.8 kg (129 lb 11.2 oz)   SpO2 100%   BMI 20.94 kg/m      [place] Coma Scale Score: 15 (09/18/20 1941)       Cardiac Rhythm: Other  Pt needs tele? No  Skin/wound Issues: None    Code Status: Full Code    Pain control: pt had none    Nausea control: pt had none    Abnormal labs/tests/findings requiring intervention: see results    Family present during ED course? Yes   Family Comments/Social Situation comments: sister at bedside    Tasks needing completion: None    Mookie Boyd, RN  [phone] [facility]

## 2020-09-19 NOTE — PLAN OF CARE
Pt admitted from ED for dyspnea. A&Ox4. VSS on 2 L O2 NC. On O2 for comfort, O2 sats in the high 90s to 100%. SOB. Covid result pending. Up ind. Romanian speaking, Ipad  used. Complains of pain in L arm, prn tylenol and oxy given. Triggered sepsis, lactic 1.3. IV lasix given. Voiding. No BM. L PIV-SL. 2 gram NA diet. Tele-NSR. Plan for echo today.

## 2020-09-19 NOTE — TELEPHONE ENCOUNTER
Family would like to know when he is coming home so that they can pick him up. Transferred to the switchboard.    Ninoska Butt RN  [place] Nurse Advisor  1:26 PM  9/19/2020    Additional Information    General information question, no triage required and triager able to answer question    Protocols used: INFORMATION ONLY CALL-A-AH

## 2020-09-19 NOTE — H&P
[facility], [place]    History and Physical - [facility] Night Service        Date of Admission:  9/18/2020    Assessment & Plan   Moises Chacon is a 70 year old male admitted on 9/18/2020. 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 Admitted on 8/28 with similar situation. Resolved with Lasix. CT chest PE (8/28) was negative. Echo (8/28) showed EF 65-70%. In the ED (9/18), BNP 1082, lactate 2 and troponin negative. EKG showed new Q waves, Given IV Lasix 20 x1. Likely diagnosis is acute pulmonary edema 2/2 non-compliance, but other things to consider include to arrhythmia, PE, infection, infarct/ischemia, new valvular abnormalities.  - Echo pending  - Repeat dose of Lasix  - If SOB does not improve with Lasix, consider CT Chest to evaluate for PE and the widen mediastinal.     #Acute on Chronic Anemia  Baseline Hgb ~13-15. Likely due to anemia of chronic disease. Hx of  surgeries in the last year. Iron study in June was normal.   - Repeat iron study  - Trend Hgb    Chronic  #HTN - PTA amlodipine and metoprolol   #Chronic constipation - PTA  Miralax and Senokot-S   #BPH - NA  #Hx of CVA -  PTA aspirin       Diet: 2g Na Diet  Fluids: NA  DVT Prophylaxis: Pneumatic Compression  Melara Catheter: not present  Code Status: Full  Rule Out COVID-19 Handoff:  Moises is a LOW SUSPICION PUI.  Follow these instructions:    If COVID test positive -> continue isolation precautions    If COVID test negative -> discontinue COVID-specific isolation precautions    Disposition Plan   Expected discharge: 2 - 3  "days, recommended to prior living arrangement once safe disposition plan/ TCU bed available.  Entered: Amandeep Mauricio MD 09/18/2020, 7:05 PM       The patient's care was discussed with the Attending Physician, Dr. Barber.    Amandeep Mauricio MD  [facility], [place]  Pager: [phone]  Please see sticky note for cross cover information  ______________________________________________________________________    Chief Complaint   \"shortness of breath\"    History is obtained from the patient    History of Present Illness   Moises Chacon is a 70 year old male with a PMHx of history of lung cancer s/p LL lobectomy (5/2020, at [facility] in [place]), CVA (2009, without deficits), PAD s/p iliac stenting (2016), 30-year smoking history (pack per day) who presents with shortness of breath    Pt states that he started experiencing some SOB yesterday morning. He attempted to use his Lasix, but it didn't work for him. He states that its worse when lying down. He doesn't normally use oxygen at home. Pt has had a similar episode episode which he was admitted for at the end of August. Nothing came of his work up but the symptoms improved at that admission with Lasix. He was prescribed a 7 day supply. **Unclear if he got a refill or has been using the Lasix PRN.** Pt hasn't been around any sick contacts or had changes to his medications. Pt denies any fevers, chills, cough, CP, ABP, swelling, N/V, acute BM or urinary changes.     Review of Systems    The 10 point Review of Systems is negative other than noted in the HPI or here.       Past Medical History    I have reviewed this patient's medical history and updated it with pertinent information if needed.   Past Medical History:   Diagnosis Date     Allergies      Anemia      BPH (benign prostatic hyperplasia)      Chronic pain      Constipation      Dyslipidemia      GERD (gastroesophageal reflux disease)      Hypertension      " Mediastinal mass      Primary lung adenocarcinoma (H)      Stroke (H)      Tobacco use         Past Surgical History   I have reviewed this patient's surgical history and updated it with pertinent information if needed.  Past Surgical History:   Procedure Laterality Date     ------------OTHER-------------  2016    Bilateral iliac artery stent placement     robotic lobectomy  05/06/2020     THORACOSCOPIC THYMECTOMY Bilateral 8/19/2020    Procedure: Bilateral subxiphoid thoracoscopic thymectomy;  Surgeon: Tate Garcia MD;  Location:  OR        Social History   I have reviewed this patient's social history and updated it with pertinent information if needed. Moises Chacon  reports that he quit smoking about 7 weeks ago. He has a 30.00 pack-year smoking history. He has quit using smokeless tobacco. He reports previous drug use. Drug: Marijuana. He reports that he does not drink alcohol.    Family History   I have reviewed this patient's family history and updated it with pertinent information if needed.   Family History   Problem Relation Age of Onset     Heart Disease Mother      Cerebrovascular Disease Father      Other - See Comments Brother         passed in [place] war     Cerebrovascular Disease Sister      Cerebrovascular Disease Sister        Prior to Admission Medications   Prior to Admission Medications   Prescriptions Last Dose Informant Patient Reported? Taking?   acetaminophen (TYLENOL) 325 MG tablet Unknown at Unknown time  No No   Sig: Take 3 tablets (975 mg) by mouth every 6 hours   amLODIPine (NORVASC) 10 MG tablet Unknown at Unknown time  No No   Sig: TAKE 1 TABLET BY MOUTH DAILY.   aspirin (ASA) 81 MG EC tablet Unknown at Unknown time  No No   Sig: Take 1 tablet (81 mg) by mouth daily   Patient taking differently: Take 81 mg by mouth At Bedtime    cetirizine (ZYRTEC) 10 MG tablet Unknown at Unknown time  No No   Sig: Take 1 tablet (10 mg) by mouth daily   Patient taking differently: Take 10 mg by  mouth At Bedtime    furosemide (LASIX) 20 MG tablet   No No   Sig: Take 1 tablet (20 mg) by mouth daily for 7 days   methocarbamol (ROBAXIN) 500 MG tablet Unknown at Unknown time  No No   Sig: Take 1 tablet (500 mg) by mouth 4 times daily as needed for muscle spasms   metoprolol tartrate (LOPRESSOR) 25 MG tablet   Yes No   Sig: Take 25 mg by mouth 2 times daily    mirtazapine (REMERON) 7.5 MG tablet Unknown at Unknown time  No No   Sig: Take 1 tablet (7.5 mg) by mouth At Bedtime   oxyCODONE (ROXICODONE) 5 MG tablet Unknown at Unknown time  No No   Sig: Take 1-2 tablets (5-10 mg) by mouth every 4 hours as needed for moderate to severe pain   polyethylene glycol (MIRALAX) 17 g packet Unknown at Unknown time  No No   Sig: Take 17 g by mouth daily   sennosides (SENOKOT) 8.6 MG tablet Unknown at Unknown time  No No   Sig: Take 1 tablet by mouth 2 times daily   umeclidinium-vilanterol (ANORO ELLIPTA) 62.5-25 MCG/INH oral inhaler Unknown at Unknown time  No No   Sig: Inhale 1 puff into the lungs daily   Patient taking differently: Inhale 1 puff into the lungs as needed       Facility-Administered Medications: None     Allergies   Allergies   Allergen Reactions     Flomax [Tamsulosin]      ITCHING       Physical Exam   Vital Signs: Temp: 99.7  F (37.6  C) Temp src: Oral BP: (!) 158/92 Pulse: 110   Resp: 22 SpO2: 100 % O2 Device: None (Room air)    Weight: 129 lbs 11.2 oz    Constitutional: awake, alert, cooperative, no apparent distress, and appears stated age  Eyes: Lids and lashes normal, pupils equal, round and reactive to light, extra ocular muscles intact, sclera clear, conjunctiva normal  Respiratory: tachypneic, good air exchange, no retractions and no crackles or wheezing  Cardiovascular: normal apical pulses , tachycardic with regular rhythm, normal S1 and S2 and no edema  GI: No scars, normal bowel sounds, soft, non-distended, non-tender  Skin: no bruising or bleeding and no lesions  Musculoskeletal: no lower  extremity pitting edema present  there is no redness, warmth, or swelling of the joints  Neurologic: Awake, alert, oriented to name, place and time.      Data   Data reviewed today: I reviewed all medications, new labs and imaging results over the last 24 hours. I personally reviewed no images or EKG's today.    Recent Labs   Lab 09/18/20 1939 09/18/20  1234   WBC  --  9.0   HGB  --  11.3*   MCV  --  93   PLT  --  297   NA  --  136   POTASSIUM  --  4.8   CHLORIDE  --  104   CO2  --  28   BUN  --  17   CR  --  1.14   ANIONGAP  --  4   MERI  --  8.8   GLC  --  102*   ALBUMIN  --  2.6*   PROTTOTAL  --  8.3   BILITOTAL  --  0.5   ALKPHOS  --  119   ALT  --  15   AST  --  13   TROPI <0.015 <0.015

## 2020-09-19 NOTE — UTILIZATION REVIEW
[facility] Utilization Review  Admission Status; Secondary Review Determination     Admission Date: 9/18/2020 12:39 PM      Under the authority of the Utilization Management Committee, the utilization review process indicated a secondary review on the above patient.  The review outcome is based on review of the medical records, discussions with staff, and applying clinical experience noted on the date of the review.        (X)      Inpatient Status Appropriate - This patient's medical care is consistent with medical management for inpatient care and reasonable inpatient medical practice.          RATIONALE FOR DETERMINATION   70-year-old male with history of lung cancer status post left lower lobectomy, peripheral arterial disease status post cardiac stenting, hypertension thymoma status thymectomy, admitted for pulmonary edema.  Patient was recently discharged with 7 days of Lasix only, started on IV Lasix, elevated BNP, orthopnea, hypoxia.  Plan for echocardiogram today, CT chest PE, acute hypoxic respiratory failure requiring 2 L oxygen via nasal cannula.  Complex patient who needs ongoing monitoring in the hospital with interventions including IV Lasix, further work-up with echo and CT chest, agree with inpatient status      The severity of illness, intensity of service provided, expected LOS and risk for adverse outcome make the care complex, high risk and appropriate for hospital admission.The patient requires hospital based medical care which is anticipated to require a stay of 2 or more midnights; according to CMS guidelines the patient should be admitted as inpatient        The information on this document is developed by the utilization review team in order for the business office to ensure compliance.  This only denotes the appropriateness of proper admission status and does not reflect the quality of care rendered.         The definitions of Inpatient Status and Observation Status used in  making the determination above are those provided in the CMS Coverage Manual, Chapter 1 and Chapter 6, section 70.4.      Sincerely,       Liza Artis MD  Physician Advisor  Utilization Review-[place]    Phone: [phone]

## 2020-09-19 NOTE — PLAN OF CARE
/71 (BP Location: Left arm)   Pulse 72   Temp 98.4  F (36.9  C) (Oral)   Resp 18   Wt 57.3 kg (126 lb 4.8 oz)   SpO2 99%   BMI 20.40 kg/m      Time: 7868-7228     Reason for admission: SOB  Vitals: VSS on RA  Activity: Up ad phoebe  Pain: Denies  Neuro: A&Ox4  Cardiac: WDL  Respiratory: Diminished LS, c/o MOSELEY  GI/: Urinary hesitancy; history chronic constipation  Diet: 2g Na   Lines: R PIV SL  Skin/Wounds: CDI  Labs: Cr=1.32    New changes this shift: Weaned from 2L O2 to RA. Echo completed, pending results. Utilization Review switched pt to obs status. COVID negative, precautions d/c'ed. Medically ready for discharge. PIV removed. AVS reviewed and acknowledged by pt. Medications returned from security and picked up Furosemide at discharge pharmacy. Sister Rosa provided ride home for discharge. Left unit at 1445

## 2020-09-19 NOTE — PROGRESS NOTES
OBSERVATION GOALS:     1. Pt's SOB will be back to normal. -Met, weaned to RA   2. Alternative etiology will be pursued if breathign still abnl. -Not met, not necessary.   3.  Pt will receive lasix on 9/19. -Met, received x2 doses 9/18 but not 9/19 yet. But per MD, pt over diuresed yesterday so next dose will actually be tomorrow 9/20.     Pt medically ready for discharge.

## 2020-09-20 ENCOUNTER — PATIENT OUTREACH (OUTPATIENT)
Dept: CARE COORDINATION | Facility: CLINIC | Age: 70
End: 2020-09-20

## 2020-09-20 NOTE — DISCHARGE SUMMARY
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  The   following labs/tests are recommended: BMP.      Appointments on [place] and/or [facility] (with [facility] or [facility]   provider or service). Call [phone] if you haven't heard regarding   these appointments within 7 days of discharge.            RECOMMEND CONSIDERATION OF ADDITIONAL OF ACE-I BY PCP PRESUMING  NORMAL LABS AND BP WILL TOLERATE    Unresulted Labs Ordered in the Past 30 Days of this Admission     No orders found for last 31 day(s).          Discharge Disposition   Discharged to home  Condition at discharge: Stable       Hospital Course      HPI:  \"Moises Chacon is a 70 year old male with a PMHx of history of lung cancer s/p LL lobectomy (5/2020, at [facility] in [place]), CVA (2009, without deficits), PAD s/p iliac stenting (2016), 30-year smoking history (pack per day) who presents with shortness of breath     Pt states that he started experiencing some SOB yesterday morning. He attempted to use his Lasix, but it didn't work for him. He states that its worse when lying down. He doesn't normally use oxygen at home. Pt has had a similar episode episode which he was admitted for at the end of August. Nothing came of his work up but the " "symptoms improved at that admission with Lasix. He was prescribed a 7 day supply. **Unclear if he got a refill or has been using the Lasix PRN.** Pt hasn't been around any sick contacts or had changes to his medications. Pt denies any fevers, chills, cough, CP, ABP, swelling, N/V, acute BM or urinary changes. \"      ------  Radiography performed in the ED showed mild interstitial streakiness with bilateral pleural effusions left greater than right suggestive of pulmonary edema.  Patient received IV Lasix and was admitted to general internal medicine where he received a second dose.  Noted that the patient's BNP was greater than 1000, pro-Matteo low, troponin low and there was no leukocytosis nor fevers.  COVID 19 was negative.  Patient given a second dose of Lasix.  T    The following morning the patient noted that he felt completely back to normal.  He was not hypoxic. It was suspected   We conducted his interview lying completely flat for over 10 minutes.  There is no peripheral edema.  He noted that this presentation seemed almost identical to the last.  And voiced that he is better now and want to go home.  Echocardiogram was performed (see below for full report), which showed numerous findings supportive of heart failure with preserved ejection fraction.   Acute diastolic heart failure suspect to explain presentation.  Patient was discharged on diuretics with instructions to follow-up in a week with his primary care provider.  Recommend consideration of the addition of an ACE inhibitor.          Consultations This Hospital Stay   MEDICATION HISTORY IP PHARMACY CONSULT    Code Status   Prior    Time Spent on this Encounter   I, Adam Solis MD, personally saw the patient today and spent greater than 30 minutes discharging this patient.       Adam Solis MD  [facility], " [place]  ______________________________________________________________________    Physical Exam   Vital Signs: Temp: 98.4  F (36.9  C) Temp src: Oral BP: 127/71 Pulse: 72   Resp: 18 SpO2: 99 % O2 Device: Nasal cannula Oxygen Delivery: 2 LPM  Weight: 126 lbs 4.8 oz  Constitutional: awake, alert, cooperative, no apparent distress, and appears stated age.   Very peasant  Eyes: Lids and lashes normal, pupils equal, round and reactive to light, extra ocular muscles intact, sclera clear, conjunctiva normal  Respiratory: tachypneic, good air exchange, no retractions and no crackles or wheezing  Cardiovascular: normal apical pulses , tachycardic with regular rhythm, normal S1 and S2 and no edema  GI: No scars, normal bowel sounds, soft, non-distended, non-tender  Skin: no bruising or bleeding and no lesions  Neurologic: Awake, alert, oriented to name, place and time.         Primary Care Physician   Serge Botello    Discharge Orders      Reason for your hospital stay    You were admitted with difficulty breathing.  This improved with lasix, and is now back to normal     Adult [facility] Follow-up and recommended labs and tests    Follow up with primary care provider, Serge Botello, within 7 days to evaluate medication change and to evaluate treatment change.  The following labs/tests are recommended: BMP.      Appointments on [place] and/or [facility] (with [facility] or [facility] provider or service). Call [phone] if you haven't heard regarding these appointments within 7 days of discharge.     Activity    Your activity upon discharge: activity as tolerated     Diet    Follow this diet upon discharge: Orders Placed This Encounter      2 Gram Sodium Diet       Significant Results and Procedures   Results for orders placed or performed during the hospital encounter of 09/18/20   XR Chest Port 1 View    Narrative    Exam: Chest x-ray, 1 view 9/18/2020 2:17 PM    Indication: Shortness of breath    Comparison: X-ray  2020    Findings:   Single frontal radiograph of the chest. Bilateral pleural effusion,  left greater than right. No pneumothorax. The left heart border is  partially obscured. Mild interstitial streakiness. No acute osseous  abnormalities. Visualized upper abdomen is unremarkable.      Impression    Impression: Mild interstitial streakiness with bilateral pleural  effusions, left greater than right, suggestive of pulmonary edema.    I have personally reviewed the examination and initial interpretation  and I agree with the findings.    CHAO LOBO MD   Echocardiogram Complete    Narrative    [id number]  [id number]  [id number]  [id number]^AD^MAGNOLIA^LETICIA           [facility],[place]  Echocardiography Laboratory  [address] [id number]     Name: FINA SALOMON  MRN: [id number]  : 1950  Study Date: 2020 11:24 AM  Age: 70 yrs  Gender: Male  Patient Location: [facility]  Reason For Study: Dyspnea  Ordering Physician: MAGNOLIA DUONG  Performed By: Ying Bai RDCS     BSA: 1.7 m2  Height: 66 in  Weight: 129 lb  HR: 75  BP: 161/95 mmHg  _____________________________________________________________________________  __        Procedure  Echocardiogram with two-dimensional, color and spectral Doppler performed.  _____________________________________________________________________________  __        Interpretation Summary  Global and regional left ventricular function is normal with an EF of 55-60%.  Moderate concentric wall thickening consistent with left ventricular  hypertrophy is present.  The right ventricle is normal size.  Global right ventricular function is normal. There is mild right ventricular  hypertrophy.  Small circumferential pericardial effusion is present without any hemodynamic  significance.  Dilation of the inferior vena cava is present with abnormal respiratory  variation in diameter suggests a high RA pressure estimated at 15 mmHg or  greater.  No  significant valvular abnormalities were noted.        This study was compared with the study from 2020 .There has been no  change. Consider cardiac MRI to rule out infiltrattive disease.  _____________________________________________________________________________  __        Left Ventricle  Left ventricular size is normal. Global and regional left ventricular function  is normal with an EF of 55-60%. Moderate concentric wall thickening consistent  with left ventricular hypertrophy is present. Left ventricular diastolic  function is indeterminate.     Right Ventricle  The right ventricle is normal size. Global right ventricular function is  normal. There is mild right ventricular hypertrophy.     Mitral Valve  The mitral valve is normal. Trace mitral insufficiency is present.     Aortic Valve  The aortic valve is tricuspid.        Tricuspid Valve  The tricuspid valve is normal. Trace tricuspid insufficiency is present. The  right ventricular systolic pressure is approximated at 20.1 mmHg plus the  right atrial pressure.     Pulmonic Valve  The pulmonic valve is normal. Trace pulmonic insufficiency is present.     Vessels  The aorta root is normal. Dilation of the inferior vena cava is present with  abnormal respiratory variation in diameter. IVC diameter >2.1 cm collapsing  <50% with sniff suggests a high RA pressure estimated at 15 mmHg or greater.     Pericardium  Small circumferential pericardial effusion is present without any hemodynamic  significance.     Compared to Previous Study  This study was compared with the study from 2020 . There has been no  change.     _____________________________________________________________________________  __        Doppler Measurements & Calculations     PA acc time: 0.12 sec  TR max yessy: 224.0 cm/sec  TR max P.1 mmHg     _____________________________________________________________________________  __           Report approved by: Paul SHANNON 2020  12:27 PM            Discharge Medications   Discharge Medication List as of 9/19/2020 12:34 PM      CONTINUE these medications which have CHANGED    Details   furosemide (LASIX) 20 MG tablet Take 1 tablet (20 mg) by mouth daily, Disp-30 tablet,R-1, E-Prescribe         CONTINUE these medications which have NOT CHANGED    Details   acetaminophen (TYLENOL) 325 MG tablet Take 3 tablets (975 mg) by mouth every 6 hours, OTC      amLODIPine (NORVASC) 10 MG tablet TAKE 1 TABLET BY MOUTH DAILY., Disp-90 tablet,R-3, E-Prescribe      aspirin (ASA) 81 MG EC tablet Take 1 tablet (81 mg) by mouth daily, Disp-90 tablet,R-1, E-Prescribe      cetirizine (ZYRTEC) 10 MG tablet Take 1 tablet (10 mg) by mouth daily, Disp-30 tablet,R-1, E-Prescribe      methocarbamol (ROBAXIN) 500 MG tablet Take 1 tablet (500 mg) by mouth 4 times daily as needed for muscle spasms, Disp-20 tablet,R-0, E-Prescribe      metoprolol tartrate (LOPRESSOR) 25 MG tablet Take 25 mg by mouth 2 times daily , Historical      mirtazapine (REMERON) 7.5 MG tablet Take 1 tablet (7.5 mg) by mouth At Bedtime, Disp-30 tablet,R-0, E-Prescribe      oxyCODONE (ROXICODONE) 5 MG tablet Take 1-2 tablets (5-10 mg) by mouth every 4 hours as needed for moderate to severe pain, Disp-30 tablet,R-0, E-Prescribe      polyethylene glycol (MIRALAX) 17 g packet Take 17 g by mouth daily, Disp-7 packet,R-0, E-Prescribe      sennosides (SENOKOT) 8.6 MG tablet Take 1 tablet by mouth 2 times daily, Disp-14 tablet,R-0, E-Prescribe      umeclidinium-vilanterol (ANORO ELLIPTA) 62.5-25 MCG/INH oral inhaler Inhale 1 puff into the lungs daily, Disp-1 Inhaler,R-1, E-Prescribe           Allergies   Allergies   Allergen Reactions     Flomax [Tamsulosin]      ITCHING

## 2020-09-22 ENCOUNTER — PATIENT OUTREACH (OUTPATIENT)
Dept: NURSING | Facility: CLINIC | Age: 70
End: 2020-09-22
Attending: FAMILY MEDICINE
Payer: MEDICARE

## 2020-09-22 SDOH — HEALTH STABILITY: PHYSICAL HEALTH: ON AVERAGE, HOW MANY MINUTES DO YOU ENGAGE IN EXERCISE AT THIS LEVEL?: 10 MIN

## 2020-09-22 SDOH — SOCIAL STABILITY: SOCIAL INSECURITY: WITHIN THE LAST YEAR, HAVE YOU BEEN HUMILIATED OR EMOTIONALLY ABUSED IN OTHER WAYS BY YOUR PARTNER OR EX-PARTNER?: NO

## 2020-09-22 SDOH — ECONOMIC STABILITY: TRANSPORTATION INSECURITY
IN THE PAST 12 MONTHS, HAS LACK OF TRANSPORTATION KEPT YOU FROM MEETINGS, WORK, OR FROM GETTING THINGS NEEDED FOR DAILY LIVING?: NO

## 2020-09-22 SDOH — ECONOMIC STABILITY: TRANSPORTATION INSECURITY
IN THE PAST 12 MONTHS, HAS THE LACK OF TRANSPORTATION KEPT YOU FROM MEDICAL APPOINTMENTS OR FROM GETTING MEDICATIONS?: NO

## 2020-09-22 SDOH — SOCIAL STABILITY: SOCIAL INSECURITY: WITHIN THE LAST YEAR, HAVE YOU BEEN AFRAID OF YOUR PARTNER OR EX-PARTNER?: NO

## 2020-09-22 SDOH — HEALTH STABILITY: PHYSICAL HEALTH: ON AVERAGE, HOW MANY DAYS PER WEEK DO YOU ENGAGE IN MODERATE TO STRENUOUS EXERCISE (LIKE A BRISK WALK)?: 7 DAYS

## 2020-09-22 SDOH — SOCIAL STABILITY: SOCIAL INSECURITY
WITHIN THE LAST YEAR, HAVE YOU BEEN KICKED, HIT, SLAPPED, OR OTHERWISE PHYSICALLY HURT BY YOUR PARTNER OR EX-PARTNER?: NO

## 2020-09-22 SDOH — HEALTH STABILITY: MENTAL HEALTH
STRESS IS WHEN SOMEONE FEELS TENSE, NERVOUS, ANXIOUS, OR CAN'T SLEEP AT NIGHT BECAUSE THEIR MIND IS TROUBLED. HOW STRESSED ARE YOU?: ONLY A LITTLE

## 2020-09-22 SDOH — ECONOMIC STABILITY: INCOME INSECURITY: HOW HARD IS IT FOR YOU TO PAY FOR THE VERY BASICS LIKE FOOD, HOUSING, MEDICAL CARE, AND HEATING?: HARD

## 2020-09-22 SDOH — SOCIAL STABILITY: SOCIAL INSECURITY
WITHIN THE LAST YEAR, HAVE TO BEEN RAPED OR FORCED TO HAVE ANY KIND OF SEXUAL ACTIVITY BY YOUR PARTNER OR EX-PARTNER?: NO

## 2020-09-22 SDOH — SOCIAL STABILITY: SOCIAL NETWORK: IN A TYPICAL WEEK, HOW MANY TIMES DO YOU TALK ON THE PHONE WITH FAMILY, FRIENDS, OR NEIGHBORS?: NOT ASKED

## 2020-09-22 ASSESSMENT — ACTIVITIES OF DAILY LIVING (ADL): DEPENDENT_IADLS:: COOKING;CLEANING;LAUNDRY;SHOPPING;MEDICATION MANAGEMENT;TRANSPORTATION

## 2020-09-22 NOTE — LETTER
[place] CARE COORDINATION  [facility]  September 22, 2020    Moises Chacon  [address] [address]      Dear Moises,    I am a clinic care coordinator who works with Serge Botello MD at [facility]. I wanted to thank you for spending the time to talk with me.  Below is a description of clinic care coordination and how I can further assist you.      The clinic care coordination team is made up of a registered nurse,  and community health worker who understand the health care system. The goal of clinic care coordination is to help you manage your health and improve access to the health care system in the most efficient manner. The team can assist you in meeting your health care goals by providing education, coordinating services, strengthening the communication among your providers and supporting you with any resource needs.    Please feel free to contact me at [phone] with any questions or concerns. We are focused on providing you with the highest-quality healthcare experience possible and that all starts with you.     Sincerely,     DODIE Spence   Care Coordination Team  [phone]      Enclosed: I have enclosed a copy of the Complex Care Plan. This has helpful information and goals that we have talked about. Please keep this in an easy to access place to use as needed.

## 2020-09-22 NOTE — PROGRESS NOTES
Clinic Care Coordination Contact    Clinic Care Coordination Contact  OUTREACH    Referral Information:  Referral Source: IP Report    Primary Diagnosis: CHF    Chief Complaint   Patient presents with     Clinic Care Coordination - Initial     [facility]        Universal Utilization: 35% Admission or ED Risk  Clinic Utilization  Difficulty keeping appointments:: No  Compliance Concerns: No  No-Show Concerns: No  No PCP office visit in Past Year: No  Utilization    Last refreshed: 9/22/2020 12:49 PM:  Hospital Admissions 3           Last refreshed: 9/22/2020 12:49 PM:  ED Visits 2           Last refreshed: 9/22/2020 12:49 PM:  No Show Count (past year) 4              Current as of: 9/22/2020 12:49 PM              Clinical Concerns:  Current Medical Concerns:  Moises was hospitalized at [facility] from 9/18/20 - 9/19/20 after presenting with shortness of breath. Pt has had a similar episode episode which he was admitted for at the end of August.   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  Continue medications without change.  Patients sister is currently helping him manage his medications.  No affordability concerns at this time.    Functional Status:  Dependent ADLs:: Independent  Dependent IADLs:: Cooking, Cleaning, Laundry, Shopping, Medication Management, Transportation  Bed or  wheelchair confined:: No  Mobility Status: Independent  Fallen 2 or more times in the past year?: No  Any fall with injury in the past year?: No    Living Situation:  Current living arrangement:: I live in a private home with family(With sister)  Type of residence:: Private home - stairs    Lifestyle & Psychosocial Needs:    ARIANA LA spoke by phone with Yoakosuas sister with him present.  He lives with her in a one bedroom basement apartment and she helps him with cooking, cleaning, shopping, laundry and medication management.  They have discussed his moving in to an FDC and are requesting assistance with that process.  She is aware of an FDC in [place] that they know people at and she will find out the name of that FDC. ARIANA LA suggested that [place] have [place] to see if he qualifies for the EW and they are requesitng assistance getting that set up.        Lifestyle     Physical activity     Days per week: 7 days     Minutes per session: 10 min     Stress: Only a little     Social Needs     Financial resource strain: Hard     Food insecurity     Worry: Not on file     Inability: Not on file     Transportation needs     Medical: No     Non-medical: No     Inadequate nutrition (GOAL):: No  Tube Feeding: No  Inadequate activity/exercise (GOAL):: No  Significant changes in sleep pattern (GOAL): No  Transportation means:: Accessible car     Religion or spiritual beliefs that impact treatment:: No  Mental health DX:: No  Mental health management concern (GOAL):: No  Informal Support system:: Family   Socioeconomic History     Marital status:      Spouse name: Not on file     Number of children: Not on file     Years of education: Not on file     Highest education level: Not on file   Relationships     Social connections     Talks on phone: Not on file     Gets together: Not on file     Attends Rastafari service: Not on file     Active member of club or organization: Not on file     Attends  meetings of clubs or organizations: Not on file     Relationship status: Not on file     Intimate partner violence     Fear of current or ex partner: No     Emotionally abused: No     Physically abused: No     Forced sexual activity: No     Tobacco Use     Smoking status: Former Smoker     Packs/day: 0.50     Years: 60.00     Pack years: 30.00     Last attempt to quit: 2020     Years since quittin.1     Smokeless tobacco: Former User   Substance and Sexual Activity     Alcohol use: No     Drug use: Not Currently     Types: Marijuana     Sexual activity: Not Currently           Resources and Interventions:  Current Resources:      Community Resources: None  Supplies used at home:: None  Equipment Currently Used at Home: cane, straight    Advance Care Plan/Directive  Advanced Care Plans/Directives on file:: No  Advanced Care Plan/Directive Status: Declined Further Information    Referrals Placed: Community Resources, Senior Linkage Line, [facility] Resources, Assisted Living     Goals:   Goals        General    1.  Find Housing (pt-stated)     Notes - Note created  2020 11:50 AM by Emma Hernandez LSW    Goal Statement: In the next three months I will move in to an assisted living  Date Goal set: 20  Barriers: My sister is not able to meet my care needs.  Strengths: I have a safe place to live until I can find an Assisted Living.  Date to Achieve By: 20  Patient expressed understanding of goal: Yes  Action steps to achieve this goal:  1. Care Coordinator will assist me with getting connected to [facility] to have a [place].  2. [place] and [place] for [place]-LTC   3. Care Coordination team will assist me as needed with this process.              Patient/Caregiver understanding: Yes    Outreach Frequency: monthly  Future Appointments              In 3 days Serge Botello MD [facility]    In 1 week UC[facility]2  [facility], [facility]    In 1 week Michael Ross MD [facility], [facility]    In 1 week Dylan Villasenor MD [facility]          Plan: Moises has PCP visit scheduled on 9/24/20. He has an Oncology appointment scheduled on 9/29/20.   CC will assist [place] if [place] qual[place] for the Elderly Waiver.    CC will outreach again in 5 - 7 days.    DODIE Spence   Care Coordination Team  [phone]

## 2020-09-22 NOTE — LETTER
[facility]  Complex Care Plan  About Me:    Patient Name:  Moises Chacon    YOB: 1950  Age:         70 year old   [place] MRN:    [id number] Telephone Information:  Home Phone [phone]   Mobile [phone]       Address:  [address] [address] Email address:  No e-mail address on record      Emergency Contact(s)    Name Relationship Lgl Grd Work Phone Home Phone Mobile Phone   1. TAWNYA CAHCON Sister   [phone]   2. TAWNYA BELTRAN Sister  [phone]            Primary language:  Divehi     needed? Yes   [facility] [employer]:  [phone] op. 1  Other communication barriers: Language barrier  Preferred Method of Communication:     Current living arrangement: I live in a private home with family(With sister)  Mobility Status/ Medical Equipment: Independent    Health Maintenance  Health Maintenance Reviewed: Due/Overdue   LIPID  HEPATITIS C SCREENING  ADVANCE CARE PLANNING  COLORECTAL CANCER SCREENING   MEDICARE ANNUAL WELLNESS VISIT      My Access Plan  Medical Emergency 911   Primary Clinic Line [facility] [phone]   24 Hour Appointment Line [phone] or  [phone]) (toll-free)   24 Hour Nurse Line [phone] (toll-free)   Preferred Urgent Care     [facility]  [phone]   Preferred Pharmacy [facility] [address] [place] [facility] [address] [address] [facility]     Behavioral Health Crisis Line The National Suicide Prevention Lifeline at 1-452.860.3590 or 911             My Care Team Members  Patient Care Team       Relationship Specialty Notifications Start End    Serge Botello MD PCP - General Family Practice  5/27/20     Phone: [phone] Fax: [phone] [address] [facility] [address]    Serge Botello MD Assigned PCP   6/14/20     Phone: [phone] Fax: [phone]          [address] [facility] [address]    Emma Hernandez LSW Lead Care Coordinator Primary Care - CC  9/22/20     Phone: [phone]         Ange Atkins MA Community Health Worker   9/22/20             My Care Plans  Self Management and Treatment Plan  Goals and (Comments)  Goals        General    1.  Find Housing (pt-stated)     Notes - Note created  9/22/2020 11:50 AM by Emma Hernandez LSW    Goal Statement: In the next three months I will move in to an assisted living  Date Goal set: 9/22/20  Barriers: My sister is not able to meet my care needs.  Strengths: I have a safe place to live until I can find an Assisted Living.  Date to Achieve By: 12/30/20  Patient expressed understanding of goal: Yes  Action steps to achieve this goal:  1. Care Coordinator will assist me with getting connected to [facility] [place] have a [place].  2. [place] and [place] for [place]-L[place]   3. Care Coordination team will assist me as needed with this process.               Action Plans on File:                       Advance Care Plans/Directives Type:   Honoring Choices    Advance Care Planning and Health Care Directives  When it comes to decisions about your health care, it s important that your voice is heard. You may not always be able to speak for yourself.    We encourage you to have discussions with your loved ones, cultural or spiritual leaders and health care providers about your goals, values, beliefs and choices.    We are a part of [employer] Choices [place] , supporting and promoting the benefits of advance care planning conversations.    Our goals are to:    Help you make informed decisions about your healthcare choices and ensure that those choices are honored    Offer advance care planning discussions with trained staff    Ensure your choices are clearly defined, documented and available in your medical record    Translate your choices into  medical orders as needed    Why is Advance Care Planning important?    Know what your health care choices are and decide what is right for you    An unexpected illness or injury could leave you unable to participate in important treatment decisions    When choices are left to others to decide that responsibility can be difficult and stressful    By discussing and outlining your choices, your voice is heard in the care you want to receive    How can I learn more?  We offer free classes at multiple locations, days and times. Our trained facilitators will provide information and guide you through a Health Care Directive document. They can also review, notarize and add your document to your medical record.    Call [facility] at [phone] or toll free at [phone] for assistance.       My Medical and Care Information  Problem List   Patient Active Problem List   Diagnosis     Dyslipidemia     Hypertension     Cerebrovascular accident (H)     Smoker     GERD (gastroesophageal reflux disease)     Cardiomegaly     HYPERLIPIDEMIA LDL GOAL <130     s/p Bilateral subxiphoid VATS thymectomy, 8/19     Chest wall pain following surgery     Seasonal allergies     Benign prostatic hyperplasia with post-void dribbling     Malignant neoplasm of left lung, unspecified part of lung (H)     Acute dyspnea     SOB (shortness of breath)      Current Medications and Allergies:  See printed Medication Report.    Care Coordination Start Date: 9/22/2020   Frequency of Care Coordination: monthly   Form Last Updated: 09/22/2020

## 2020-09-29 ENCOUNTER — ONCOLOGY VISIT (OUTPATIENT)
Dept: ONCOLOGY | Facility: CLINIC | Age: 70
End: 2020-09-29
Attending: INTERNAL MEDICINE
Payer: MEDICARE

## 2020-09-29 ENCOUNTER — ANCILLARY PROCEDURE (OUTPATIENT)
Dept: CT IMAGING | Facility: CLINIC | Age: 70
End: 2020-09-29
Attending: CLINICAL NURSE SPECIALIST
Payer: MEDICARE

## 2020-09-29 VITALS
DIASTOLIC BLOOD PRESSURE: 83 MMHG | BODY MASS INDEX: 20.15 KG/M2 | HEIGHT: 66 IN | WEIGHT: 125.4 LBS | OXYGEN SATURATION: 98 % | SYSTOLIC BLOOD PRESSURE: 188 MMHG | HEART RATE: 72 BPM | RESPIRATION RATE: 16 BRPM | TEMPERATURE: 98.2 F

## 2020-09-29 DIAGNOSIS — I50.9 ACUTE ON CHRONIC CONGESTIVE HEART FAILURE, UNSPECIFIED HEART FAILURE TYPE (H): ICD-10-CM

## 2020-09-29 DIAGNOSIS — C34.92 MALIGNANT NEOPLASM OF LEFT LUNG, UNSPECIFIED PART OF LUNG (H): Primary | ICD-10-CM

## 2020-09-29 DIAGNOSIS — J98.59 MEDIASTINAL MASS: ICD-10-CM

## 2020-09-29 DIAGNOSIS — C37 MALIGNANT THYMOMA (H): ICD-10-CM

## 2020-09-29 LAB
ALBUMIN SERPL-MCNC: 3 G/DL (ref 3.4–5)
ALP SERPL-CCNC: 109 U/L (ref 40–150)
ALT SERPL W P-5'-P-CCNC: 16 U/L (ref 0–70)
ANION GAP SERPL CALCULATED.3IONS-SCNC: 8 MMOL/L (ref 3–14)
AST SERPL W P-5'-P-CCNC: 11 U/L (ref 0–45)
BASOPHILS # BLD AUTO: 0.1 10E9/L (ref 0–0.2)
BASOPHILS NFR BLD AUTO: 0.9 %
BILIRUB SERPL-MCNC: 0.4 MG/DL (ref 0.2–1.3)
BUN SERPL-MCNC: 10 MG/DL (ref 7–30)
CALCIUM SERPL-MCNC: 9.5 MG/DL (ref 8.5–10.1)
CHLORIDE SERPL-SCNC: 101 MMOL/L (ref 94–109)
CO2 SERPL-SCNC: 28 MMOL/L (ref 20–32)
CREAT SERPL-MCNC: 1.08 MG/DL (ref 0.66–1.25)
DIFFERENTIAL METHOD BLD: ABNORMAL
EOSINOPHIL # BLD AUTO: 0.3 10E9/L (ref 0–0.7)
EOSINOPHIL NFR BLD AUTO: 4.7 %
ERYTHROCYTE [DISTWIDTH] IN BLOOD BY AUTOMATED COUNT: 13.3 % (ref 10–15)
GFR SERPL CREATININE-BSD FRML MDRD: 68 ML/MIN/{1.73_M2}
GLUCOSE SERPL-MCNC: 92 MG/DL (ref 70–99)
HCT VFR BLD AUTO: 38.9 % (ref 40–53)
HGB BLD-MCNC: 12.6 G/DL (ref 13.3–17.7)
IMM GRANULOCYTES # BLD: 0 10E9/L (ref 0–0.4)
IMM GRANULOCYTES NFR BLD: 0.3 %
LYMPHOCYTES # BLD AUTO: 1.7 10E9/L (ref 0.8–5.3)
LYMPHOCYTES NFR BLD AUTO: 26.6 %
MCH RBC QN AUTO: 29.2 PG (ref 26.5–33)
MCHC RBC AUTO-ENTMCNC: 32.4 G/DL (ref 31.5–36.5)
MCV RBC AUTO: 90 FL (ref 78–100)
MONOCYTES # BLD AUTO: 0.3 10E9/L (ref 0–1.3)
MONOCYTES NFR BLD AUTO: 4.2 %
NEUTROPHILS # BLD AUTO: 4.1 10E9/L (ref 1.6–8.3)
NEUTROPHILS NFR BLD AUTO: 63.3 %
NRBC # BLD AUTO: 0 10*3/UL
NRBC BLD AUTO-RTO: 0 /100
NT-PROBNP SERPL-MCNC: 641 PG/ML (ref 0–125)
PLATELET # BLD AUTO: 404 10E9/L (ref 150–450)
POTASSIUM SERPL-SCNC: 4.1 MMOL/L (ref 3.4–5.3)
PROT SERPL-MCNC: 9 G/DL (ref 6.8–8.8)
RBC # BLD AUTO: 4.31 10E12/L (ref 4.4–5.9)
SODIUM SERPL-SCNC: 137 MMOL/L (ref 133–144)
WBC # BLD AUTO: 6.4 10E9/L (ref 4–11)

## 2020-09-29 PROCEDURE — 99495 TRANSJ CARE MGMT MOD F2F 14D: CPT | Mod: ZP | Performed by: INTERNAL MEDICINE

## 2020-09-29 PROCEDURE — G0463 HOSPITAL OUTPT CLINIC VISIT: HCPCS | Mod: ZF

## 2020-09-29 PROCEDURE — 83880 ASSAY OF NATRIURETIC PEPTIDE: CPT | Performed by: INTERNAL MEDICINE

## 2020-09-29 PROCEDURE — 85025 COMPLETE CBC W/AUTO DIFF WBC: CPT | Performed by: INTERNAL MEDICINE

## 2020-09-29 PROCEDURE — 80053 COMPREHEN METABOLIC PANEL: CPT | Performed by: INTERNAL MEDICINE

## 2020-09-29 ASSESSMENT — MIFFLIN-ST. JEOR: SCORE: 1261.24

## 2020-09-29 ASSESSMENT — PAIN SCALES - GENERAL: PAINLEVEL: NO PAIN (0)

## 2020-09-29 NOTE — NURSING NOTE
"Oncology Rooming Note    September 29, 2020 11:04 AM   Moises Chacon is a 70 year old male who presents for:    Chief Complaint   Patient presents with     Oncology Clinic Visit     Return: Malignant neoplasm of left lung, unspecified part of lung      Initial Vitals: BP (!) 188/83 (BP Location: Right arm, Patient Position: Sitting, Cuff Size: Adult Regular)   Pulse 72   Temp 98.2  F (36.8  C) (Oral)   Resp 16   Ht 1.676 m (5' 5.98\")   Wt 56.9 kg (125 lb 6.4 oz)   SpO2 98%   BMI 20.25 kg/m   Estimated body mass index is 20.25 kg/m  as calculated from the following:    Height as of this encounter: 1.676 m (5' 5.98\").    Weight as of this encounter: 56.9 kg (125 lb 6.4 oz). Body surface area is 1.63 meters squared.  No Pain (0) Comment: Data Unavailable   No LMP for male patient.  Allergies reviewed: Yes  Medications reviewed: Yes    Medications: Medication refills not needed today.  Pharmacy name entered into [facility]:    [id number] [place], [place] - [address] [id number] [place], [place] - [address] AT [facility] [address] [facility] [id number] - [place], [place] - [address] AT [facility] OF [facility] & [facility] [address]    Clinical concerns: N/A      Jo-Ann Blackburn CMA              "

## 2020-09-29 NOTE — LETTER
9/29/2020         RE: Moises Chacon  [address] [facility] [address] [facility] [address]        Dear Colleague,    Thank you for referring your patient, Moises Chacon, to the [facility]. Please see a copy of my visit note below.    [facility]  HEMATOLOGY AND ONCOLOGY    FOLLOW-UP VISIT NOTE    PATIENT NAME: Moises Chacon MRN # [id number]  DATE OF VISIT: Sep 29, 2020 YOB: 1950    REFERRING PROVIDER: Serge Botello MD  [id number] [place] [address]    CANCER TYPE: Adenocarcinoma from left lower lobe of lung; PDL 1+60%, K-zena mutation +  STAGE: IIB vs IIIA     TREATMENT SUMMARY:  -Patient has a 50+ pack year smoking history and was noted to have a left lower lobe mass in 2018.  This was not followed initially.  He was worked up for abdominal pain which revealed growth in his left lower lobe mass.  He underwent left lower lobectomy on 5/6/2020 at [facility] in [place].  He has moved to [place] after his surgery and established care with [facility].  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     Mr. Chacon was admitted with acute shortness of breath on 9/18 and is being seen for post hospital discharge follow up.     Since last visit he also had resection of his mediastinal mass done by Dr. Garcia and has revealed thymoma.         PAST MEDICAL HISTORY     Past Medical History:   Diagnosis Date     Allergies      Anemia      BPH (benign prostatic hyperplasia)      Chronic pain      Constipation      Dyslipidemia      GERD (gastroesophageal reflux disease)      Hypertension      Mediastinal mass      Primary lung adenocarcinoma (H)      Stroke (H)      Tobacco use          CURRENT  OUTPATIENT MEDICATIONS     Current Outpatient Medications   Medication Sig     acetaminophen (TYLENOL) 325 MG tablet Take 3 tablets (975 mg) by mouth every 6 hours     amLODIPine (NORVASC) 10 MG tablet TAKE 1 TABLET BY MOUTH DAILY.     aspirin (ASA) 81 MG EC tablet Take 1 tablet (81 mg) by mouth daily (Patient taking differently: Take 81 mg by mouth At Bedtime )     cetirizine (ZYRTEC) 10 MG tablet Take 1 tablet (10 mg) by mouth daily (Patient taking differently: Take 10 mg by mouth At Bedtime )     furosemide (LASIX) 20 MG tablet Take 1 tablet (20 mg) by mouth daily     methocarbamol (ROBAXIN) 500 MG tablet Take 1 tablet (500 mg) by mouth 4 times daily as needed for muscle spasms     metoprolol tartrate (LOPRESSOR) 25 MG tablet Take 25 mg by mouth 2 times daily      mirtazapine (REMERON) 7.5 MG tablet Take 1 tablet (7.5 mg) by mouth At Bedtime     oxyCODONE (ROXICODONE) 5 MG tablet Take 1-2 tablets (5-10 mg) by mouth every 4 hours as needed for moderate to severe pain     polyethylene glycol (MIRALAX) 17 g packet Take 17 g by mouth daily     sennosides (SENOKOT) 8.6 MG tablet Take 1 tablet by mouth 2 times daily     umeclidinium-vilanterol (ANORO ELLIPTA) 62.5-25 MCG/INH oral inhaler Inhale 1 puff into the lungs daily (Patient taking differently: Inhale 1 puff into the lungs as needed )     No current facility-administered medications for this visit.         ALLERGIES      Allergies   Allergen Reactions     Flomax [Tamsulosin]      ITCHING        REVIEW OF SYSTEMS   As above in the HPI, o/w complete 12-point ROS was negative.     PHYSICAL EXAM   There were no vitals taken for this visit.  GEN: NAD  HEENT: PERRL, EOMI, no icterus, injection or pallor. Oropharynx is clear.  LYMPHATICs: no cervical or supraclavicular lymphadenopathy; no other abn lymphadenopathy  PULMONARY: clear with good air entry bilaterally  CARDIOVASCULAR: regular, no murmurs, rubs, or gallops  GASTROINTESTINAL: soft, non-tender, non-distended,  normal bowel sounds, no hepatosplenomegaly by percussion or palpation  MUSCULOSKELTAL: warm, well perfused, no edema  NEURO: awake, alert and oriented to time place and person, cranial nerves intact - II - XII, no focal neurologic deficits  SKIN: no rashes     LABORATORY AND IMAGING STUDIES     Recent Labs   Lab Test 09/19/20  0740 09/18/20  1234 09/01/20  1123 08/30/20  0710 08/29/20  0725    136 137 135 140   POTASSIUM 4.5 4.8 4.1 4.5 4.0   CHLORIDE 102 104 103 107 111*   CO2 26 28 27 17* 22   ANIONGAP 7 4 7 10 7   BUN 24 17 20 15 18   CR 1.32* 1.14 0.97 0.76 0.88   GLC 94 102* 96 79 88   MERI 9.4 8.8 10.0 8.8 8.2*     Recent Labs   Lab Test 09/19/20  0740 08/29/20  0725 08/20/20  1524 06/19/20  1523   MAG 2.2 1.9 2.0 2.1   PHOS 4.3  --   --   --      Recent Labs   Lab Test 09/19/20  0740 09/18/20  1234 09/01/20  1123 08/30/20  0710 08/29/20  0725 08/28/20  0441  06/19/20  1523   WBC 8.8 9.0 8.2 13.2* 12.3* 13.5*   < > 6.2   HGB 11.1* 11.3* 13.4 12.1* 10.9* 12.7*   < > 12.7*    297 470* 336 291 354   < > 198   MCV 93 93 90 91 93 93   < > 96   NEUTROPHIL  --  79.8  --   --   --  84.5  --  55.0    < > = values in this interval not displayed.     Recent Labs   Lab Test 09/18/20  1234 09/01/20  1123 08/28/20  0441 06/19/20  1523   BILITOTAL 0.5 0.3 0.7 0.3   ALKPHOS 119 122 111 101   ALT 15 22 24 15   AST 13 18 Canceled, Test credited 10   ALBUMIN 2.6* 2.9* 2.7* 3.1*   LDH  --   --   --  168     TSH   Date Value Ref Range Status   11/09/2009 1.63 0.4 - 5.0 mU/L Final     No results for input(s): CEA in the last 18758 hours.  Results for orders placed or performed during the hospital encounter of 09/18/20   XR Chest Port 1 View    Narrative    Exam: Chest x-ray, 1 view 9/18/2020 2:17 PM    Indication: Shortness of breath    Comparison: X-ray 9/1/2020    Findings:   Single frontal radiograph of the chest. Bilateral pleural effusion,  left greater than right. No pneumothorax. The left heart border  is  partially obscured. Mild interstitial streakiness. No acute osseous  abnormalities. Visualized upper abdomen is unremarkable.      Impression    Impression: Mild interstitial streakiness with bilateral pleural  effusions, left greater than right, suggestive of pulmonary edema.    I have personally reviewed the examination and initial interpretation  and I agree with the findings.    CHAO LOBO MD   Echocardiogram Complete    Narrative    [id number]  [id number]  [id number]  [id number]^AD^MAGNOLIA^LETICIA           [facility],[place]  Echocardiography Laboratory  [address] [place] [address]     Name: FINA SALOMON  MRN: [id number]  : 1950  Study Date: 2020 11:24 AM  Age: 70 yrs  Gender: Male  Patient Location: [facility]  Reason For Study: Dyspnea  Ordering Physician: MAGNOLIA DUONG  Performed By: Ying Bai RDCS     BSA: 1.7 m2  Height: 66 in  Weight: 129 lb  HR: 75  BP: 161/95 mmHg  _____________________________________________________________________________  __        Procedure  Echocardiogram with two-dimensional, color and spectral Doppler performed.  _____________________________________________________________________________  __        Interpretation Summary  Global and regional left ventricular function is normal with an EF of 55-60%.  Moderate concentric wall thickening consistent with left ventricular  hypertrophy is present.  The right ventricle is normal size.  Global right ventricular function is normal. There is mild right ventricular  hypertrophy.  Small circumferential pericardial effusion is present without any hemodynamic  significance.  Dilation of the inferior vena cava is present with abnormal respiratory  variation in diameter suggests a high RA pressure estimated at 15 mmHg or  greater.  No significant valvular abnormalities were noted.        This study was compared with the study from 2020 .There has been no  change. Consider cardiac MRI  to rule out infiltrattive disease.  _____________________________________________________________________________  __        Left Ventricle  Left ventricular size is normal. Global and regional left ventricular function  is normal with an EF of 55-60%. Moderate concentric wall thickening consistent  with left ventricular hypertrophy is present. Left ventricular diastolic  function is indeterminate.     Right Ventricle  The right ventricle is normal size. Global right ventricular function is  normal. There is mild right ventricular hypertrophy.     Mitral Valve  The mitral valve is normal. Trace mitral insufficiency is present.     Aortic Valve  The aortic valve is tricuspid.        Tricuspid Valve  The tricuspid valve is normal. Trace tricuspid insufficiency is present. The  right ventricular systolic pressure is approximated at 20.1 mmHg plus the  right atrial pressure.     Pulmonic Valve  The pulmonic valve is normal. Trace pulmonic insufficiency is present.     Vessels  The aorta root is normal. Dilation of the inferior vena cava is present with  abnormal respiratory variation in diameter. IVC diameter >2.1 cm collapsing  <50% with sniff suggests a high RA pressure estimated at 15 mmHg or greater.     Pericardium  Small circumferential pericardial effusion is present without any hemodynamic  significance.     Compared to Previous Study  This study was compared with the study from 2020 . There has been no  change.     _____________________________________________________________________________  __        Doppler Measurements & Calculations     PA acc time: 0.12 sec  TR max yessy: 224.0 cm/sec  TR max P.1 mmHg     _____________________________________________________________________________  __           Report approved by: Paul SHANNON 2020 12:27 PM            ASSESSMENT AND PLAN     1. Postsurgical pain from recent left lower lobectomy  2. Stage IIb non-small cell adenocarcinoma of lung from  left lower lobe s/p left lower lobectomy on 5/6/2020  3. 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 Dyspnea on minimal exertion  5. Dysphagia  6. Multiple medical comorbidities including hypertension, dyslipidemia  7. Limited to poor understanding of disease and health      Patient presents for Hospital Followup Visit:    Hospital:  [facility]      Date of Admission: 09/18/2020  Date of Discharge: 09/19/2020  Transitional Care Management Phone Call: Done  Reason(s) for Admission: Acute hypoxic respiratory failure            Problems taking medications regularly:  None       Medication changes since discharge: Started on Lasix 20 mg po daily       Problems adhering to non-medication therapy:  None    Summary of hospitalization:  [facility] discharge summary reviewed  Diagnostic Tests/Treatments reviewed.  Follow up needed: Cardiology  Other Healthcare Providers Involved in Patient s Care:         None  Update since discharge: improved. Patient continues to have shortness of breath and can barely walk 3 min before he has chest pain.     ROS: Detailed ROS negative except for persistent pain at the surgical site, shortness of breath with minimal exertion, food sticking in mid chest           1. Malignant neoplasm of left lung, unspecified part of lung (H)  - CBC with platelets differential  - Comprehensive metabolic panel  - His CT chest was reviewed in his presence. Official read remains pending.   New effusion bilateral likely secondary to his recent CHF.   No obvious new lung mets  Persistent stable lymph nodes though exam limited by lack of intravenous contrast    Patient unable to walk 3 min before having to wait for chest pain. He is not a candidate for adjuvant chemotherapy.  We will continue to monitor closely  - Plant to follow up in 2-3 months with CT Chest/Abdomen/Pelvis w Contrast and following labs   - CBC with platelets  differential; Future   - Comprehensive metabolic panel; Future   - Lactate Dehydrogenase; Future    2. Malignant thymoma (H)  Reviewed pathology findings with patient  Negative margins and stage I disease  He does not need any additional surveillance or interventions for this.     3. Acute on chronic congestive heart failure, unspecified heart failure type (H)  - Patient started on lasix during hospitalization   He denies feeling dizzy, having dry mouth, feeling dehydrated   Will repeat labs today including    - CBC with platelets differential   - Comprehensive metabolic panel   - N terminal pro BNP outpatient  - CARDIOLOGY EVAL ADULT REFERRAL; Future    4. Dysphagia:  - Patient feels that the food sticks in his mid chest  - It was not clear if this was primarily due to shortness of breath and related anxiety of choking or mechanical dysphagia  - Will refer to speech/swallow therapy for further evaluation     Post Discharge Medication Reconciliation: discharge medications reconciled, continue medications without change.  Issues to address: Issues identified were:   condition status changes and persistent SOB and dysphagia as noted above.  Plan of care communicated with patient and family    Over 45 min of direct face to face time spent with patient with more than 50% time spent in counseling and coordinating care.              Again, thank you for allowing me to participate in the care of your patient.        Sincerely,        Michael Ross MD

## 2020-09-29 NOTE — PROGRESS NOTES
[facility]  HEMATOLOGY AND ONCOLOGY    FOLLOW-UP VISIT NOTE    PATIENT NAME: Moises Chacon MRN # [id number]  DATE OF VISIT: Sep 29, 2020 YOB: 1950    REFERRING PROVIDER: Serge Botello MD  [id number] [place] [address]    CANCER TYPE: Adenocarcinoma from left lower lobe of lung; PDL 1+60%, K-zena mutation +  STAGE: IIB vs IIIA     TREATMENT SUMMARY:  -Patient has a 50+ pack year smoking history and was noted to have a left lower lobe mass in 2018.  This was not followed initially.  He was worked up for abdominal pain which revealed growth in his left lower lobe mass.  He underwent left lower lobectomy on 5/6/2020 at [facility] in [place].  He has moved to [place] after his surgery and established care with [facility].  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     Mr. Chacon was admitted with acute shortness of breath on 9/18 and is being seen for post hospital discharge follow up.     Since last visit he also had resection of his mediastinal mass done by Dr. Garcia and has revealed thymoma.         PAST MEDICAL HISTORY     Past Medical History:   Diagnosis Date     Allergies      Anemia      BPH (benign prostatic hyperplasia)      Chronic pain      Constipation      Dyslipidemia      GERD (gastroesophageal reflux disease)      Hypertension      Mediastinal mass      Primary lung adenocarcinoma (H)      Stroke (H)      Tobacco use          CURRENT OUTPATIENT MEDICATIONS     Current Outpatient Medications   Medication Sig     acetaminophen (TYLENOL) 325 MG tablet Take 3 tablets (975 mg) by mouth every 6 hours     amLODIPine (NORVASC) 10 MG tablet TAKE 1 TABLET BY MOUTH DAILY.     aspirin (ASA) 81 MG EC  tablet Take 1 tablet (81 mg) by mouth daily (Patient taking differently: Take 81 mg by mouth At Bedtime )     cetirizine (ZYRTEC) 10 MG tablet Take 1 tablet (10 mg) by mouth daily (Patient taking differently: Take 10 mg by mouth At Bedtime )     furosemide (LASIX) 20 MG tablet Take 1 tablet (20 mg) by mouth daily     methocarbamol (ROBAXIN) 500 MG tablet Take 1 tablet (500 mg) by mouth 4 times daily as needed for muscle spasms     metoprolol tartrate (LOPRESSOR) 25 MG tablet Take 25 mg by mouth 2 times daily      mirtazapine (REMERON) 7.5 MG tablet Take 1 tablet (7.5 mg) by mouth At Bedtime     oxyCODONE (ROXICODONE) 5 MG tablet Take 1-2 tablets (5-10 mg) by mouth every 4 hours as needed for moderate to severe pain     polyethylene glycol (MIRALAX) 17 g packet Take 17 g by mouth daily     sennosides (SENOKOT) 8.6 MG tablet Take 1 tablet by mouth 2 times daily     umeclidinium-vilanterol (ANORO ELLIPTA) 62.5-25 MCG/INH oral inhaler Inhale 1 puff into the lungs daily (Patient taking differently: Inhale 1 puff into the lungs as needed )     No current facility-administered medications for this visit.         ALLERGIES      Allergies   Allergen Reactions     Flomax [Tamsulosin]      ITCHING        REVIEW OF SYSTEMS   As above in the HPI, o/w complete 12-point ROS was negative.     PHYSICAL EXAM   There were no vitals taken for this visit.  GEN: NAD  HEENT: PERRL, EOMI, no icterus, injection or pallor. Oropharynx is clear.  LYMPHATICs: no cervical or supraclavicular lymphadenopathy; no other abn lymphadenopathy  PULMONARY: clear with good air entry bilaterally  CARDIOVASCULAR: regular, no murmurs, rubs, or gallops  GASTROINTESTINAL: soft, non-tender, non-distended, normal bowel sounds, no hepatosplenomegaly by percussion or palpation  MUSCULOSKELTAL: warm, well perfused, no edema  NEURO: awake, alert and oriented to time place and person, cranial nerves intact - II - XII, no focal neurologic deficits  SKIN: no rashes      LABORATORY AND IMAGING STUDIES     Recent Labs   Lab Test 09/19/20  0740 09/18/20  1234 09/01/20  1123 08/30/20  0710 08/29/20  0725    136 137 135 140   POTASSIUM 4.5 4.8 4.1 4.5 4.0   CHLORIDE 102 104 103 107 111*   CO2 26 28 27 17* 22   ANIONGAP 7 4 7 10 7   BUN 24 17 20 15 18   CR 1.32* 1.14 0.97 0.76 0.88   GLC 94 102* 96 79 88   MERI 9.4 8.8 10.0 8.8 8.2*     Recent Labs   Lab Test 09/19/20  0740 08/29/20  0725 08/20/20  1524 06/19/20  1523   MAG 2.2 1.9 2.0 2.1   PHOS 4.3  --   --   --      Recent Labs   Lab Test 09/19/20  0740 09/18/20  1234 09/01/20  1123 08/30/20  0710 08/29/20  0725 08/28/20  0441  06/19/20  1523   WBC 8.8 9.0 8.2 13.2* 12.3* 13.5*   < > 6.2   HGB 11.1* 11.3* 13.4 12.1* 10.9* 12.7*   < > 12.7*    297 470* 336 291 354   < > 198   MCV 93 93 90 91 93 93   < > 96   NEUTROPHIL  --  79.8  --   --   --  84.5  --  55.0    < > = values in this interval not displayed.     Recent Labs   Lab Test 09/18/20  1234 09/01/20  1123 08/28/20  0441 06/19/20  1523   BILITOTAL 0.5 0.3 0.7 0.3   ALKPHOS 119 122 111 101   ALT 15 22 24 15   AST 13 18 Canceled, Test credited 10   ALBUMIN 2.6* 2.9* 2.7* 3.1*   LDH  --   --   --  168     TSH   Date Value Ref Range Status   11/09/2009 1.63 0.4 - 5.0 mU/L Final     No results for input(s): CEA in the last 75515 hours.  Results for orders placed or performed during the hospital encounter of 09/18/20   XR Chest Port 1 View    Narrative    Exam: Chest x-ray, 1 view 9/18/2020 2:17 PM    Indication: Shortness of breath    Comparison: X-ray 9/1/2020    Findings:   Single frontal radiograph of the chest. Bilateral pleural effusion,  left greater than right. No pneumothorax. The left heart border is  partially obscured. Mild interstitial streakiness. No acute osseous  abnormalities. Visualized upper abdomen is unremarkable.      Impression    Impression: Mild interstitial streakiness with bilateral pleural  effusions, left greater than right, suggestive of  pulmonary edema.    I have personally reviewed the examination and initial interpretation  and I agree with the findings.    CHAO LOBO MD   Echocardiogram Complete    Narrative    [id number]  [id number]  [id number]  [id number]^AD^MAGNOLIA^LETICIA           [facility]  Echocardiography Laboratory  [address] [place] [id number]     Name: FINA SALOMON  MRN: [id number]  : 1950  Study Date: 2020 11:24 AM  Age: 70 yrs  Gender: Male  Patient Location: [facility]  Reason For Study: Dyspnea  Ordering Physician: MAGNOLIA DUONG  Performed By: Ying Bai RDCS     BSA: 1.7 m2  Height: 66 in  Weight: 129 lb  HR: 75  BP: 161/95 mmHg  _____________________________________________________________________________  __        Procedure  Echocardiogram with two-dimensional, color and spectral Doppler performed.  _____________________________________________________________________________  __        Interpretation Summary  Global and regional left ventricular function is normal with an EF of 55-60%.  Moderate concentric wall thickening consistent with left ventricular  hypertrophy is present.  The right ventricle is normal size.  Global right ventricular function is normal. There is mild right ventricular  hypertrophy.  Small circumferential pericardial effusion is present without any hemodynamic  significance.  Dilation of the inferior vena cava is present with abnormal respiratory  variation in diameter suggests a high RA pressure estimated at 15 mmHg or  greater.  No significant valvular abnormalities were noted.        This study was compared with the study from 2020 .There has been no  change. Consider cardiac MRI to rule out infiltrattive disease.  _____________________________________________________________________________  __        Left Ventricle  Left ventricular size is normal. Global and regional left ventricular function  is normal with an EF of 55-60%. Moderate  concentric wall thickening consistent  with left ventricular hypertrophy is present. Left ventricular diastolic  function is indeterminate.     Right Ventricle  The right ventricle is normal size. Global right ventricular function is  normal. There is mild right ventricular hypertrophy.     Mitral Valve  The mitral valve is normal. Trace mitral insufficiency is present.     Aortic Valve  The aortic valve is tricuspid.        Tricuspid Valve  The tricuspid valve is normal. Trace tricuspid insufficiency is present. The  right ventricular systolic pressure is approximated at 20.1 mmHg plus the  right atrial pressure.     Pulmonic Valve  The pulmonic valve is normal. Trace pulmonic insufficiency is present.     Vessels  The aorta root is normal. Dilation of the inferior vena cava is present with  abnormal respiratory variation in diameter. IVC diameter >2.1 cm collapsing  <50% with sniff suggests a high RA pressure estimated at 15 mmHg or greater.     Pericardium  Small circumferential pericardial effusion is present without any hemodynamic  significance.     Compared to Previous Study  This study was compared with the study from 2020 . There has been no  change.     _____________________________________________________________________________  __        Doppler Measurements & Calculations     PA acc time: 0.12 sec  TR max yessy: 224.0 cm/sec  TR max P.1 mmHg     _____________________________________________________________________________  __           Report approved by: Paul SHANNON 2020 12:27 PM            ASSESSMENT AND PLAN     1. Postsurgical pain from recent left lower lobectomy  2. Stage IIb non-small cell adenocarcinoma of lung from left lower lobe s/p left lower lobectomy on 2020  3. 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 Dyspnea on minimal exertion  5. Dysphagia  6. Multiple medical comorbidities including hypertension,  dyslipidemia  7. Limited to poor understanding of disease and health      Patient presents for Hospital Followup Visit:    Hospital:  [facility]      Date of Admission: 09/18/2020  Date of Discharge: 09/19/2020  Transitional Care Management Phone Call: Done  Reason(s) for Admission: Acute hypoxic respiratory failure            Problems taking medications regularly:  None       Medication changes since discharge: Started on Lasix 20 mg po daily       Problems adhering to non-medication therapy:  None    Summary of hospitalization:  [facility] discharge summary reviewed  Diagnostic Tests/Treatments reviewed.  Follow up needed: Cardiology  Other Healthcare Providers Involved in Patient s Care:         None  Update since discharge: improved. Patient continues to have shortness of breath and can barely walk 3 min before he has chest pain.     ROS: Detailed ROS negative except for persistent pain at the surgical site, shortness of breath with minimal exertion, food sticking in mid chest           1. Malignant neoplasm of left lung, unspecified part of lung (H)  - CBC with platelets differential  - Comprehensive metabolic panel  - His CT chest was reviewed in his presence. Official read remains pending.   New effusion bilateral likely secondary to his recent CHF.   No obvious new lung mets  Persistent stable lymph nodes though exam limited by lack of intravenous contrast    Patient unable to walk 3 min before having to wait for chest pain. He is not a candidate for adjuvant chemotherapy.  We will continue to monitor closely  - Plant to follow up in 2-3 months with CT Chest/Abdomen/Pelvis w Contrast and following labs   - CBC with platelets differential; Future   - Comprehensive metabolic panel; Future   - Lactate Dehydrogenase; Future    2. Malignant thymoma (H)  Reviewed pathology findings with patient  Negative margins and stage I disease  He does not need any additional surveillance or  interventions for this.     3. Acute on chronic congestive heart failure, unspecified heart failure type (H)  - Patient started on lasix during hospitalization   He denies feeling dizzy, having dry mouth, feeling dehydrated   Will repeat labs today including    - CBC with platelets differential   - Comprehensive metabolic panel   - N terminal pro BNP outpatient  - CARDIOLOGY EVAL ADULT REFERRAL; Future    4. Dysphagia:  - Patient feels that the food sticks in his mid chest  - It was not clear if this was primarily due to shortness of breath and related anxiety of choking or mechanical dysphagia  - Will refer to speech/swallow therapy for further evaluation     Post Discharge Medication Reconciliation: discharge medications reconciled, continue medications without change.  Issues to address: Issues identified were:   condition status changes and persistent SOB and dysphagia as noted above.  Plan of care communicated with patient and family    Over 45 min of direct face to face time spent with patient with more than 50% time spent in counseling and coordinating care.

## 2020-10-02 NOTE — PROGRESS NOTES
"Pre-Visit Planning     Future Appointments   Date Time Provider Department Center   10/5/2020  1:30 PM Serge Botello MD [facility]   10/16/2020 11:00 AM [id number] [facility]   10/16/2020 11:00 AM RH SLP OP [facility]     Arrival Time for this Appointment:  1:10 PM   Appointment Notes for this encounter:    reviewed JQ / post hosp 08/19/2020 [facility] lung cancer surgery  please use ipad or phones to reach  [phone] option 0    Called via interp to confirm 1 pm appointment on 10/5-  Spoke with pt's sister as she is his care giver.     Due for medicare wellness, PSA and colonoscopy    Patient preferred phone number: [phone]    Questionnaires Reviewed/Assigned  None     Health Maintenance Due   Topic Date Due     HF ACTION PLAN  1950     LIPID  1950     HEPATITIS C SCREENING  1950     ANNUAL REVIEW OF HM ORDERS  1950     ADVANCE CARE PLANNING  1950     COLORECTAL CANCER SCREENING  08/11/1958     DTAP/TDAP/TD IMMUNIZATION (1 - Tdap) 08/11/1973     ZOSTER IMMUNIZATION (1 of 2) 08/11/1998     PSA  11/09/2010     MEDICARE ANNUAL WELLNESS VISIT  08/11/2013     Pneumococcal Vaccine: 65+ Years (1 of 1 - PPSV23) 08/11/2013     INFLUENZA VACCINE (1) 09/01/2020          Labwork- see print out from oncology visit     Imaging- Most recent CT 9/29                                                                IMPRESSION:   1. No acute findings in the chest in this patient status post thymoma  resection, superior mediastinal lymph node excision and left lower  lobe wedge resection.  2. Chronic pulmonary alveolar edema from congestive heart failure.  Bilateral pleural effusions, the left with a loculated component which  is stable. Stable enlargement of aortopulmonary window lymph node  node.  3. Severe coronary disease.      ER/IP-9-18 to 9/19   HPI:  \"Moises Chacon is a 70 year old male with a PMHx of history of lung cancer s/p LL lobectomy (5/2020, at [facility] " [facility] in [place]), CVA (2009, without deficits), PAD s/p iliac stenting (2016), 30-year smoking history (pack per day) who presents with shortness of breath     Pt states that he started experiencing some SOB yesterday morning. He attempted to use his Lasix, but it didn't work for him. He states that its worse when lying down. He doesn't normally use oxygen at home. Pt has had a similar episode episode which he was admitted for at the end of August. Nothing came of his work up but the symptoms improved at that admission with Lasix. He was prescribed a 7 day supply. **Unclear if he got a refill or has been using the Lasix PRN.** Pt hasn't been around any sick contacts or had changes to his medications. Pt denies any fevers, chills, cough, CP, ABP, swelling, N/V, acute BM or urinary changes. \"     ------  Radiography performed in the ED showed mild interstitial streakiness with bilateral pleural effusions left greater than right suggestive of pulmonary edema.  Patient received IV Lasix and was admitted to general internal medicine where he received a second dose.  Noted that the patient's BNP was greater than 1000, pro-Matteo low, troponin low and there was no leukocytosis nor fevers.  COVID 19 was negative.  Patient given a second dose of Lasix.  T     The following morning the patient noted that he felt completely back to normal.  He was not hypoxic. It was suspected   We conducted his interview lying completely flat for over 10 minutes.  There is no peripheral edema.  He noted that this presentation seemed almost identical to the last.  And voiced that he is better now and want to go home.  Echocardiogram was performed (see below for full report), which showed numerous findings supportive of heart failure with preserved ejection fraction.   Acute diastolic heart failure suspect to explain presentation.  Patient was discharged on diuretics with instructions to follow-up in a week with his primary care provider.  " "Recommend consideration of the addition of an ACE inhibitor.       Specialty visits - 9/29/20 Dr Cody COPELAND notes printed -on desk       Spoke to patient via phone. Are there any additional questions or concerns you'd like to review with your provider during your visit? Yes: Pt is not taking medications.  \"He does not want too\"  Only taking Lasix and inhaler.      Patient does not have additional questions or concerns.        Visit is not preventive.    Meds  Is there anything on your medication list that needs to be updated? Yes: PT IS ONLY TAKING LASIX 20 MG DAILY AND ANORO DAILY.      Current Outpatient Medications   Medication     acetaminophen (TYLENOL) 325 MG tablet     amLODIPine (NORVASC) 10 MG tablet     aspirin (ASA) 81 MG EC tablet     cetirizine (ZYRTEC) 10 MG tablet     furosemide (LASIX) 20 MG tablet     methocarbamol (ROBAXIN) 500 MG tablet     metoprolol tartrate (LOPRESSOR) 25 MG tablet     mirtazapine (REMERON) 7.5 MG tablet     oxyCODONE (ROXICODONE) 5 MG tablet     polyethylene glycol (MIRALAX) 17 g packet     sennosides (SENOKOT) 8.6 MG tablet     umeclidinium-vilanterol (ANORO ELLIPTA) 62.5-25 MCG/INH oral inhaler     No current facility-administered medications for this visit.      Which pharmacy do you prefer to use for medications during this visit if needed?     Do you need refills on any of your medications? Yes: if he is to continue all medication s    Health Maintenance Due   Topic Date Due     HF ACTION PLAN  1950     LIPID  1950     HEPATITIS C SCREENING  1950     ANNUAL REVIEW OF HM ORDERS  1950     ADVANCE CARE PLANNING  1950     COLORECTAL CANCER SCREENING  08/11/1958     DTAP/TDAP/TD IMMUNIZATION (1 - Tdap) 08/11/1973     ZOSTER IMMUNIZATION (1 of 2) 08/11/1998     PSA  11/09/2010     MEDICARE ANNUAL WELLNESS VISIT  08/11/2013     Pneumococcal Vaccine: 65+ Years (1 of 1 - PPSV23) 08/11/2013     INFLUENZA VACCINE (1) 09/01/2020     Patient is due " "for:  preventive care visit  Patient declined appointment.    MyChart  Patient is not active on MyChart. Encouraged MyChart activation.  declined     Questionnaire Review   NA    Call Summary  \"Thank you for your time today.  If anything comes up before your appointment, please feel free to contact us at [phone].\"     Agnes Dowd RN       "

## 2020-10-05 ENCOUNTER — OFFICE VISIT (OUTPATIENT)
Dept: FAMILY MEDICINE | Facility: CLINIC | Age: 70
End: 2020-10-05
Payer: MEDICARE

## 2020-10-05 ENCOUNTER — PATIENT OUTREACH (OUTPATIENT)
Dept: NURSING | Facility: CLINIC | Age: 70
End: 2020-10-05
Payer: MEDICARE

## 2020-10-05 VITALS
WEIGHT: 122.6 LBS | HEART RATE: 85 BPM | DIASTOLIC BLOOD PRESSURE: 80 MMHG | BODY MASS INDEX: 19.7 KG/M2 | SYSTOLIC BLOOD PRESSURE: 130 MMHG | TEMPERATURE: 98.3 F | HEIGHT: 66 IN | OXYGEN SATURATION: 96 %

## 2020-10-05 DIAGNOSIS — C34.92 MALIGNANT NEOPLASM OF LEFT LUNG, UNSPECIFIED PART OF LUNG (H): Primary | ICD-10-CM

## 2020-10-05 DIAGNOSIS — I10 ESSENTIAL HYPERTENSION: ICD-10-CM

## 2020-10-05 DIAGNOSIS — F43.23 ADJUSTMENT DISORDER WITH MIXED ANXIETY AND DEPRESSED MOOD: ICD-10-CM

## 2020-10-05 DIAGNOSIS — R13.19 ESOPHAGEAL DYSPHAGIA: ICD-10-CM

## 2020-10-05 DIAGNOSIS — Z23 NEED FOR PROPHYLACTIC VACCINATION AND INOCULATION AGAINST INFLUENZA: ICD-10-CM

## 2020-10-05 DIAGNOSIS — N39.43 BENIGN PROSTATIC HYPERPLASIA WITH POST-VOID DRIBBLING: ICD-10-CM

## 2020-10-05 DIAGNOSIS — N40.1 BENIGN PROSTATIC HYPERPLASIA WITH POST-VOID DRIBBLING: ICD-10-CM

## 2020-10-05 DIAGNOSIS — J98.59 MEDIASTINAL MASS: ICD-10-CM

## 2020-10-05 DIAGNOSIS — R06.00 ACUTE DYSPNEA: ICD-10-CM

## 2020-10-05 DIAGNOSIS — E44.1 MILD PROTEIN-CALORIE MALNUTRITION (H): ICD-10-CM

## 2020-10-05 PROBLEM — E46 PROTEIN-CALORIE MALNUTRITION (H): Status: ACTIVE | Noted: 2020-10-05

## 2020-10-05 PROCEDURE — G0008 ADMIN INFLUENZA VIRUS VAC: HCPCS | Performed by: FAMILY MEDICINE

## 2020-10-05 PROCEDURE — 99495 TRANSJ CARE MGMT MOD F2F 14D: CPT | Mod: 25 | Performed by: FAMILY MEDICINE

## 2020-10-05 PROCEDURE — 90662 IIV NO PRSV INCREASED AG IM: CPT | Performed by: FAMILY MEDICINE

## 2020-10-05 RX ORDER — OMEPRAZOLE 40 MG/1
40 CAPSULE, DELAYED RELEASE ORAL DAILY
Qty: 30 CAPSULE | Refills: 1 | Status: SHIPPED | OUTPATIENT
Start: 2020-10-05 | End: 2020-11-11

## 2020-10-05 RX ORDER — METOPROLOL TARTRATE 25 MG/1
25 TABLET, FILM COATED ORAL 2 TIMES DAILY
Qty: 60 TABLET | Refills: 1 | Status: SHIPPED | OUTPATIENT
Start: 2020-10-05 | End: 2020-11-11

## 2020-10-05 RX ORDER — METOPROLOL TARTRATE 25 MG/1
TABLET, FILM COATED ORAL
Qty: 180 TABLET | OUTPATIENT
Start: 2020-10-05

## 2020-10-05 RX ORDER — OMEPRAZOLE 40 MG/1
CAPSULE, DELAYED RELEASE ORAL
Qty: 90 CAPSULE | OUTPATIENT
Start: 2020-10-05

## 2020-10-05 RX ORDER — FUROSEMIDE 20 MG
20 TABLET ORAL DAILY
Qty: 30 TABLET | Refills: 1 | Status: SHIPPED | OUTPATIENT
Start: 2020-10-05 | End: 2020-10-12

## 2020-10-05 RX ORDER — SENNOSIDES 8.6 MG
1 TABLET ORAL 2 TIMES DAILY
Qty: 60 TABLET | Refills: 0 | Status: SHIPPED | OUTPATIENT
Start: 2020-10-05

## 2020-10-05 RX ORDER — MIRTAZAPINE 7.5 MG/1
7.5 TABLET, FILM COATED ORAL AT BEDTIME
Qty: 30 TABLET | Refills: 1 | Status: SHIPPED | OUTPATIENT
Start: 2020-10-05 | End: 2020-11-11

## 2020-10-05 RX ORDER — OXYCODONE HYDROCHLORIDE 5 MG/1
5 TABLET ORAL EVERY 6 HOURS PRN
Qty: 30 TABLET | Refills: 0 | Status: SHIPPED | OUTPATIENT
Start: 2020-10-05 | End: 2020-11-11

## 2020-10-05 SDOH — ECONOMIC STABILITY: FOOD INSECURITY: WITHIN THE PAST 12 MONTHS, THE FOOD YOU BOUGHT JUST DIDN'T LAST AND YOU DIDN'T HAVE MONEY TO GET MORE.: NEVER TRUE

## 2020-10-05 SDOH — ECONOMIC STABILITY: FOOD INSECURITY: WITHIN THE PAST 12 MONTHS, YOU WORRIED THAT YOUR FOOD WOULD RUN OUT BEFORE YOU GOT MONEY TO BUY MORE.: NEVER TRUE

## 2020-10-05 ASSESSMENT — MIFFLIN-ST. JEOR: SCORE: 1248.86

## 2020-10-05 ASSESSMENT — ACTIVITIES OF DAILY LIVING (ADL): DEPENDENT_IADLS:: COOKING;CLEANING;LAUNDRY;SHOPPING;MEDICATION MANAGEMENT;TRANSPORTATION

## 2020-10-05 NOTE — NURSING NOTE
"Spoke with pt and sister via virtual interp    Reviewed need to take all medications as directed.  Per pt's sister pt has not been taking medications because 'he does not think they work for him\"    Pt has not been taking metoprolol for several months..  He would like not take this medication.      Per PCP ok to hold metoprolol for now as BP is in normal range.       Pt's and pt's sister will taking all other medications as advised.  Reviewed new medications called in as well.     Sister will call to schedule f/u as she does not have calendar with her.     Sister expressed desire to speak with  PAL will call to f/u on this     Pt and sister expressed understanding and acceptance of the plan. had no further questions at this time.  Advised can call back to clinic at any time with concerns.     Agnes Dowd RN         "

## 2020-10-05 NOTE — LETTER
Thank you for choosing [facility] today for your health care needs.     [facility] is transforming primary care  At [facility], we re dedicated to constantly improve how we serve the health care needs of our patients and communities. We re currently making changes to the way we deliver care.     Changes you ll notice include:    An emphasis on building a relationship with a primary care provider    Access to a PAL (personal advocate and liaison) to help guide you with your care needs    Appointment lengths tailored to your specific needs and greater access to a care team to help you and your provider improve and maintain your health and well-being    Improved online access to your care team    Benefits of a primary care provider  If you don t have a designated primary care provider, we encourage you to get to know our care team online and find a provider you d like to see. Most of our providers have a short video on their online provider page. Visit [email or web address] to explore our providers and locations.    Benefits of having a primary care provider include:      They get to know you - your health history, family history and goals, making it easier to make a health plan together.     You get to know them - making health-related conversations and decisions easier      Primary care doctors help you when you re sick or hurt - but also focus on keeping you healthy with preventive care and screenings.      A doctor who sees you regularly is more likely to notice changes in your health.     You ll be connected to a broad care team who partners with your provider to support you.    Patient Advocate Liaison (PAL)   To help make sure you get the right care, at the right time, we include PALs, or Patient Advocate Liaisons, as part of your care team. Your PAL will be your first line of contact. They ll advocate for your needs and help you navigate our services, connecting you with care team members and community resources to ensure  your care is well coordinated. You ll be introduced to a PAL in an upcoming visit.     Expanded care team access with tailored appointment lengths  Depending on your health care needs, you may have longer or shorter appointments and see additional care team providers - including Medication Therapy Management (MTM) pharmacists, diabetes educators, behavioral health clinicians, or social workers. At times, they may be included in your visit with your provider, or you may see them individually.     Online access to your health care records and care team  [employer] is our online tool that makes it easy to see your health care information and communicate with your care team.     [employer] allows you to:     View your health maintenance plan so you know when you re due for a preventive screening    Send secure messages to your care team    View your health history and visit summaries     Schedule appointments     Complete questionnaires and eCheck-in before appointments      Get care from your provider with an e-visit      View and pay your bill     Sign up at [employer]. Once you have an account, you also can download the mobile faustino.     Agnes Dowd RN, [facility]   Phone [phone]   Fax [phone]

## 2020-10-05 NOTE — PROGRESS NOTES
Clinic Care Coordination Contact    Follow Up Progress Note      Assessment: ARIANA LA confirmed with [facility] that [place] on Oct 8 at 1 pm with Cassie. Cassie states that Moises needs to complete a MA application (EDOC 3531) and will need to close out of his [place] MA.  She will place referral for   to assist with the application.      ARIANA RAMIREZ spoke with Rosa and Moises  By phone with [employer] .  Explained the above and they are in agreement with having the [place] and having [facility]  client relations assist with a MA application.   Moises 's sister Rosa expressed that she is 75 and has her own health issues and it is  too difficult for her to care for Moises as well.  She states that his wife is  him and his son dropped him off to live with her in May  after he received a cancer diagnoses.      Goals addressed this encounter:   Goals Addressed                 This Visit's Progress       Patient Stated      1.  Find Housing (pt-stated)   20%     Goal Statement: In the next three months I will move in to an assisted living  Date Goal set: 9/22/20  Barriers: My sister is not able to meet my care needs.  Strengths: I have a safe place to live until I can find an Assisted Living.  Date to Achieve By: 12/30/20  Patient expressed understanding of goal: Yes  Action steps to achieve this goal:  1. Care Coordinator will assist me with getting connected to [facility] to have a [place].  2. [place] and [place] for [place]-LTC   3. Care Coordination team will assist me as needed with this process.               Intervention/Education provided during outreach: Contacted [facility] regarding [place]     Outreach Frequency: monthly    Plan:   Moises plans [place] 10/8/20 and he and Rosa will  work on completing a MA application with the help of [facility]  client relations.  Care Coordinator will follow up in 5 business days.    DODIE Spence   Care Coordination Team  [phone]

## 2020-10-05 NOTE — PATIENT INSTRUCTIONS
Take omeprazole every day - this may help with swallowing if due to stomach issues    Follow with the swallowing specialist on October 16th    Take mirtazapine for sleep and mood    Consider counseling - referral placed    Take the metoprolol and furosemide for your heart    Oxycodone for pain as needed

## 2020-10-05 NOTE — PROGRESS NOTES
Subjective     Moises Chacon is a 70 year old male who presents to clinic today for the following health issues:    HPI           Hospital Follow-up Visit:    Hospital/Nursing Home/IP Rehab Facility: [facility]  Date of Admission: Sept 18  Date of Discharge: Sept 19  Reason(s) for Admission: SOB      Was your hospitalization related to COVID-19? No   Problems taking medications regularly:  furosemide - was having chest pain - now taking tramadol, morphine, stimulant    Medication changes since discharge: None  Problems adhering to non-medication therapy:  None    Summary of hospitalization:  [facility] discharge summary reviewed  Diagnostic Tests/Treatments reviewed.  Follow up needed: none  Other Healthcare Providers Involved in Patient s Care:         None  Update since discharge: improved.       Post Discharge Medication Reconciliation: discharge medications reconciled, continue medications without change.  Plan of care communicated with patient and sister              History through video .    History of left-sided lung cancer after having syncopal episode on 5/6/2020 admitted in [place] to [facility] [place].  See scanned records for detail.  He states he continues to have left-sided lower chest pain from his surgery.    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  Following with oncology, Dr. Ross.    Patient with history of hypertension currently prescribed amlodipine 10 mg daily metoprolol 25 mg twice daily but not taking them consistently.    Patient with history of distant stroke though I do not have any further information on this history.  Currently taking aspirin.    Having swallowing difficulty with issue swallowing solid foods.  Has pain when swallowing as well.  Has follow-up with speech therapy for evaluation.    Patient taking furosemide for presumed diastolic dysfunction causing acute dyspnea and  "shortness of breath leading to hospitalization in September.    Review of Systems   RESP: Denies cough, shortness of breath with exertion  CV: Chest wall pain that is constant through the day      Objective    /80 (BP Location: Right arm, Patient Position: Chair, Cuff Size: Adult Large)   Pulse 85   Temp 98.3  F (36.8  C) (Oral)   Ht 1.676 m (5' 6\")   Wt 55.6 kg (122 lb 9.6 oz)   SpO2 96%   BMI 19.79 kg/m    Body mass index is 19.79 kg/m .  Physical Exam   GENERAL: healthy, alert and no distress  HENT: oropharynx clear and oral mucous membranes moist  RESP: lungs clear to auscultation - no rales, rhonchi or wheezes  CV: regular rate and rhythm, normal S1 S2, no S3 or S4, no murmur, click or rub, no peripheral edema and peripheral pulses strong  ABDOMEN: soft, nontender, without hepatosplenomegaly or masses            Assessment & Plan     Malignant neoplasm of left lung, unspecified part of lung (H)  Continue follow-up with oncology.  - umeclidinium-vilanterol (ANORO ELLIPTA) 62.5-25 MCG/INH oral inhaler; Inhale 1 puff into the lungs daily    Benign prostatic hyperplasia with post-void dribbling    Essential hypertension  Patient declines to take blood pressure medications at this time, continue to monitor.  - metoprolol tartrate (LOPRESSOR) 25 MG tablet; Take 1 tablet (25 mg) by mouth 2 times daily    Mild protein-calorie malnutrition (H)    s/p Bilateral subxiphoid VATS thymectomy, 8/19  - sennosides (SENOKOT) 8.6 MG tablet; Take 1 tablet by mouth 2 times daily  - oxyCODONE (ROXICODONE) 5 MG tablet; Take 1 tablet (5 mg) by mouth every 6 hours as needed for moderate to severe pain    Adjustment disorder with mixed anxiety and depressed mood  Recommend continue to take mirtazapine daily at night with sleep dysfunction adjustment disorder/depression.  Patient with some cognitive difficulty it appears clear difficulty with translation.  Unclear how much of this may be depression versus dementia versus " other.  Recommend follow-up in 3 weeks for recheck.  - mirtazapine (REMERON) 7.5 MG tablet; Take 1 tablet (7.5 mg) by mouth At Bedtime  - MENTAL HEALTH REFERRAL  - Adult; Outpatient Treatment; Individual/Couples/Family/Group Therapy/Health Psychology; [facility] [phone]; We will contact you to schedule the appointment or please call with any questions    Acute dyspnea  - furosemide (LASIX) 20 MG tablet; Take 1 tablet (20 mg) by mouth daily    Esophageal dysphagia  Recommend trial of PPI.  Follow-up with speech evaluation.  Consider upper endoscopy if not improving.  - omeprazole (PRILOSEC) 40 MG DR capsule; Take 1 capsule (40 mg) by mouth daily    Need for prophylactic vaccination and inoculation against influenza  - FLUZONE HIGH DOSE 65+  [47615]  - Vaccine Administration, Initial [07649]          Return in about 3 weeks (around 10/26/2020) for medication recheck.    Serge Botello MD  [facility]

## 2020-10-06 ENCOUNTER — TELEPHONE (OUTPATIENT)
Dept: FAMILY MEDICINE | Facility: CLINIC | Age: 70
End: 2020-10-06

## 2020-10-06 ENCOUNTER — CARE COORDINATION (OUTPATIENT)
Dept: CARDIOLOGY | Facility: CLINIC | Age: 70
End: 2020-10-06

## 2020-10-06 NOTE — TELEPHONE ENCOUNTER
We did not reach Moises Chacon, we left a message with our contact number [phone].  If we do not hear from them within the next 3 business days we will mail a letter offering our scheduling services.    If they do call to schedule, in the future, we will inform you of the outcome.    Thank you for your referral,  [facility]

## 2020-10-06 NOTE — PROGRESS NOTES
Referral- Heart Failure    REFERRING CLINIC INFORMATION:    Referring Clinic: Oncology   Referring Provider: Michael Ross MD  Contact Information:     REFERRING PATIENT INFORMATION:    Patient Phone Number:   Home Phone [phone]   Mobile [phone]      Consent to Communicate: none   Insurance Obtained: yes      PATIENT/REFERRAL Hx:     October 6, 2020  Received a call from oncology. 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     Adolph Hernandez CMA  Heart Failure, Advanced Heart Failure & CORE  Referral Specialist &     [facility]  Cardiology  Office: [phone]  [phone]

## 2020-10-08 ENCOUNTER — PATIENT OUTREACH (OUTPATIENT)
Dept: NURSING | Facility: CLINIC | Age: 70
End: 2020-10-08
Payer: MEDICARE

## 2020-10-08 ENCOUNTER — TELEPHONE (OUTPATIENT)
Dept: FAMILY MEDICINE | Facility: CLINIC | Age: 70
End: 2020-10-08

## 2020-10-08 VITALS
TEMPERATURE: 96.8 F | RESPIRATION RATE: 18 BRPM | HEIGHT: 64 IN | BODY MASS INDEX: 24.16 KG/M2 | DIASTOLIC BLOOD PRESSURE: 70 MMHG | WEIGHT: 141.54 LBS | SYSTOLIC BLOOD PRESSURE: 106 MMHG | HEART RATE: 89 BPM

## 2020-10-08 VITALS
BODY MASS INDEX: 23.73 KG/M2 | RESPIRATION RATE: 16 BRPM | SYSTOLIC BLOOD PRESSURE: 114 MMHG | HEIGHT: 64 IN | HEART RATE: 94 BPM | DIASTOLIC BLOOD PRESSURE: 72 MMHG | WEIGHT: 139 LBS | TEMPERATURE: 97.9 F

## 2020-10-08 SDOH — SOCIAL STABILITY: SOCIAL NETWORK: IN A TYPICAL WEEK, HOW MANY TIMES DO YOU TALK ON THE PHONE WITH FAMILY, FRIENDS, OR NEIGHBORS?: NEVER

## 2020-10-08 ASSESSMENT — ACTIVITIES OF DAILY LIVING (ADL): DEPENDENT_IADLS:: COOKING;CLEANING;LAUNDRY;SHOPPING;MEDICATION MANAGEMENT;TRANSPORTATION

## 2020-10-08 NOTE — PROGRESS NOTES
"Clinic Care Coordination Contact    Follow Up Progress Note      Assessment: Spoke by phone with Rosa today and asked if she can sign a BEBETO in order for clinic to release health summary to the [facility] [place]  shantel [place].  Rosa will stop by the [facility] this morning to sign the BEBETO.  Rosa reports today that she believes that  Moises has \"given up\" and he has not eaten in the past three days.     Goals addressed this encounter:   Goals Addressed                 This Visit's Progress       Patient Stated      1.  Find Housing (pt-stated)   30%     Goal Statement: In the next three months I will move in to an assisted living  Date Goal set: 9/22/20  Barriers: My sister is not able to meet my care needs.  Strengths: I have a safe place to live until I can find an Assisted Living.  Date to Achieve By: 12/30/20  Patient expressed understanding of goal: Yes  Action steps to achieve this goal:  1. Care Coordinator will assist me with getting connected to [facility] to have a [place].  2. [place] will [place] and [place] for [place]-LTC   3. Care Coordination team will assist me as needed with this process.               Intervention/Education provided during outreach: ARIANA LA will huddle with the clinic RN and PC about what Rosa reported today. ARIANA LA contacted [employer] and they do have an opening in their longterm and they do take the Elderly Waiver.    Outreach Frequency: monthly    Plan:   Moises [place].  ARIANA RAMIREZ will consult with PCP about concerns the sister Rosa expressed today.     Care Coordinator will follow up in 3-5 days    DODIE Spence Care Coordination Team  [phone]    "

## 2020-10-08 NOTE — TELEPHONE ENCOUNTER
Continue with trial of Remeron as we discussed at visit.  Make follow-up for me in 1 month for recheck.  Can consider hospice referral if he would like.    If unable to eat he should undergo upper endoscopy.

## 2020-10-08 NOTE — TELEPHONE ENCOUNTER
See CC encounter     BEBETO was signed by pt -   LOV notes faxed to Cassie Flores with [facility] at [phone]    Agnes Dowd RN

## 2020-10-09 ENCOUNTER — TELEPHONE (OUTPATIENT)
Dept: FAMILY MEDICINE | Facility: CLINIC | Age: 70
End: 2020-10-09

## 2020-10-09 ENCOUNTER — HOSPITAL ENCOUNTER (EMERGENCY)
Facility: CLINIC | Age: 70
Discharge: HOME | End: 2020-10-09
Attending: NURSE PRACTITIONER | Admitting: NURSE PRACTITIONER
Payer: MEDICARE

## 2020-10-09 VITALS
OXYGEN SATURATION: 99 % | SYSTOLIC BLOOD PRESSURE: 157 MMHG | TEMPERATURE: 98.3 F | RESPIRATION RATE: 16 BRPM | HEART RATE: 78 BPM | DIASTOLIC BLOOD PRESSURE: 94 MMHG

## 2020-10-09 DIAGNOSIS — K21.00 GASTROESOPHAGEAL REFLUX DISEASE WITH ESOPHAGITIS: ICD-10-CM

## 2020-10-09 LAB
ALBUMIN SERPL-MCNC: 2.6 G/DL (ref 3.4–5)
ALP SERPL-CCNC: 113 U/L (ref 40–150)
ALT SERPL W P-5'-P-CCNC: 18 U/L (ref 0–70)
ANION GAP SERPL CALCULATED.3IONS-SCNC: 7 MMOL/L (ref 3–14)
AST SERPL W P-5'-P-CCNC: 20 U/L (ref 0–45)
BILIRUB SERPL-MCNC: 0.4 MG/DL (ref 0.2–1.3)
BUN SERPL-MCNC: 14 MG/DL (ref 7–30)
CALCIUM SERPL-MCNC: 8.7 MG/DL (ref 8.5–10.1)
CHLORIDE SERPL-SCNC: 100 MMOL/L (ref 94–109)
CO2 SERPL-SCNC: 29 MMOL/L (ref 20–32)
CREAT SERPL-MCNC: 1.11 MG/DL (ref 0.66–1.25)
ERYTHROCYTE [DISTWIDTH] IN BLOOD BY AUTOMATED COUNT: 13.6 % (ref 10–15)
GFR SERPL CREATININE-BSD FRML MDRD: 66 ML/MIN/{1.73_M2}
GLUCOSE SERPL-MCNC: 94 MG/DL (ref 70–99)
HCT VFR BLD AUTO: 36.5 % (ref 40–53)
HGB BLD-MCNC: 11.1 G/DL (ref 13.3–17.7)
LIPASE SERPL-CCNC: 57 U/L (ref 73–393)
MCH RBC QN AUTO: 28.9 PG (ref 26.5–33)
MCHC RBC AUTO-ENTMCNC: 30.4 G/DL (ref 31.5–36.5)
MCV RBC AUTO: 95 FL (ref 78–100)
PLATELET # BLD AUTO: 245 10E9/L (ref 150–450)
POTASSIUM SERPL-SCNC: 4.4 MMOL/L (ref 3.4–5.3)
PROT SERPL-MCNC: 8.3 G/DL (ref 6.8–8.8)
RBC # BLD AUTO: 3.84 10E12/L (ref 4.4–5.9)
SODIUM SERPL-SCNC: 136 MMOL/L (ref 133–144)
TROPONIN I SERPL-MCNC: <0.015 UG/L (ref 0–0.04)
WBC # BLD AUTO: 5.3 10E9/L (ref 4–11)

## 2020-10-09 PROCEDURE — 85027 COMPLETE CBC AUTOMATED: CPT | Performed by: NURSE PRACTITIONER

## 2020-10-09 PROCEDURE — 83690 ASSAY OF LIPASE: CPT | Performed by: NURSE PRACTITIONER

## 2020-10-09 PROCEDURE — 250N000013 HC RX MED GY IP 250 OP 250 PS 637: Mod: GY | Performed by: NURSE PRACTITIONER

## 2020-10-09 PROCEDURE — 80053 COMPREHEN METABOLIC PANEL: CPT | Performed by: NURSE PRACTITIONER

## 2020-10-09 PROCEDURE — 84484 ASSAY OF TROPONIN QUANT: CPT | Performed by: NURSE PRACTITIONER

## 2020-10-09 PROCEDURE — 99284 EMERGENCY DEPT VISIT MOD MDM: CPT | Mod: 25

## 2020-10-09 PROCEDURE — 96374 THER/PROPH/DIAG INJ IV PUSH: CPT

## 2020-10-09 PROCEDURE — 93005 ELECTROCARDIOGRAM TRACING: CPT

## 2020-10-09 PROCEDURE — 250N000009 HC RX 250: Performed by: NURSE PRACTITIONER

## 2020-10-09 RX ORDER — SUCRALFATE ORAL 1 G/10ML
1 SUSPENSION ORAL 4 TIMES DAILY
Qty: 280 ML | Refills: 0 | Status: SHIPPED | OUTPATIENT
Start: 2020-10-09 | End: 2020-10-16

## 2020-10-09 RX ORDER — OMEPRAZOLE 40 MG/1
40 CAPSULE, DELAYED RELEASE ORAL DAILY
Qty: 30 CAPSULE | Refills: 0 | Status: SHIPPED | OUTPATIENT
Start: 2020-10-09 | End: 2020-11-11

## 2020-10-09 RX ADMIN — LIDOCAINE HYDROCHLORIDE 30 ML: 20 SOLUTION ORAL; TOPICAL at 19:32

## 2020-10-09 RX ADMIN — FAMOTIDINE 20 MG: 10 INJECTION, SOLUTION INTRAVENOUS at 19:32

## 2020-10-09 ASSESSMENT — ENCOUNTER SYMPTOMS
CHEST TIGHTNESS: 0
CHILLS: 0
HEADACHES: 0
TROUBLE SWALLOWING: 1
ABDOMINAL PAIN: 0
FEVER: 0

## 2020-10-09 NOTE — ED AVS SNAPSHOT
[facility]  [address]  [facility] [address]  Phone: [phone]  Fax: [phone]                                    Moises Chacon   MRN: [id number]    Department: [facility]   Date of Visit: 10/9/2020           After Visit Summary Signature Page    I have received my discharge instructions, and my questions have been answered. I have discussed any challenges I see with this plan with the nurse or doctor.    ..........................................................................................................................................  Patient/Patient Representative Signature      ..........................................................................................................................................  Patient Representative Print Name and Relationship to Patient    ..................................................               ................................................  Date                                   Time    ..........................................................................................................................................  Reviewed by Signature/Title    ...................................................              ..............................................  Date                                               Time          22EPIC Rev 08/18

## 2020-10-09 NOTE — PROGRESS NOTES
Referral from Oncology came to Advanced Heart Failure  for acute on chronic heart failure.  Patient has h/o lobectomy for lung cancer earlier this year. He was hospitalized during September with shortness of breath and chest x-ray was suggestive of pulmonary edema. Echo at that time showed LV hypertrophy and mild RV hypertrophy but normal EF 55-60%.  We will have him follow up with General Cardiology to start and if needed in future have him see an advanced heart failure provider.    Oncology history  Patient has a 50+ pack year smoking history and was noted to have a left lower lobe mass in 2018.  This was not followed initially.  He was worked up for abdominal pain which revealed growth in his left lower lobe mass. Adenocarcinoma left lower lobe of lung; PDL 1+60%, K-zena mutation + STAGE: IIB vs IIIA.  He underwent left lower lobectomy on 5/6/2020 at [facility] in [place].  He has moved to [place] after his surgery and established care with [facility].    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

## 2020-10-09 NOTE — TELEPHONE ENCOUNTER
"Pt's sister is calling he is not eating \"it hurts and I don't feel good he tells me\"    She states pt he sits down for a min then he goes outside.   She states he just walked outside and she has yelled for him and he is not answering.      Writer advised that is seems pt is anxious and feeling well, recommend ER now.  If she is not able to locate him should call 911.    Called back to sister she did find pt \"he was a block from my house\"  Writer advised pt should be taken to ER.    Sister expressed understanding and acceptance of the plan. had no further questions at this time.  Advised can call back to clinic at any time with concerns.       FYI to PCP     Agnes Dowd RN             "

## 2020-10-09 NOTE — ED TRIAGE NOTES
Patient is here with his sister who reports patient has a history of lung cancer and had surgery in May, and August, had chest pain and feeling like something is in his esophagus since, and shortness of breath that has been worsening over weeks. EKG in triage.

## 2020-10-10 NOTE — ED PROVIDER NOTES
History     Chief Complaint:  Chest discomfort     HPI   Moises Chacon is a 70 year old male with a history of lung cancer who presents with chest discomfort. The patient reports that every time after he eats he feels intense pain in his chest area. He states that because of this he has not been eating well for a month.  He states that he has had no energy since he has been unable to eat anything. He feels that the food gets stuck but he does not vomit.  He states that this is his reflux.  He has not tried taking any medications.     used for HPI and ROS.    Allergies:  Flomax    Medications:    Lasix  Lopressor  Remeron  Prilosec  Roxicodone  Sennosides  Anoro ellipta  Norvasc  Aspirin 81 mg    Past Medical History:    Anemia  Benign prostatic hyperplasia  Dyslipidemia  Gastroesophageal reflux disease   Hypertension   Hyperlipidemia   Mediastinal mass  Primary lung adenocarcinoma  Stroke    Past Surgical History:    Bilateral iliac artery stent placement  Thoracoscopic thymectomy    Family History:    Mother- heart disease  Father- cerebrovascular disease  Sister- cerebrovascular disease    Social History:  Smoking status: Former, quit July 2020  Alcohol use: No  Drug use: Not currently, past marijuana  PCP: Serge Botello  Presents to the ED by himself  Marital Status:   [2]    Review of Systems   Constitutional: Negative for chills and fever.   HENT: Positive for trouble swallowing.    Respiratory: Negative for chest tightness.    Cardiovascular: Positive for chest pain.   Gastrointestinal: Negative for abdominal pain.   Neurological: Negative for headaches.   All other systems reviewed and are negative.      Physical Exam     Patient Vitals for the past 24 hrs:   BP Temp Temp src Pulse Resp SpO2   10/09/20 1930 (!) 156/86 -- -- 71 -- 100 %   10/09/20 1915 (!) 151/87 -- -- 79 -- 99 %   10/09/20 1628 (!) 142/95 98.3  F (36.8  C) Oral 88 16 99 %        Physical Exam  General: Alert, No obvious  discomfort, well kept  Eyes: PERRL, conjunctivae pink no scleral icterus or conjunctival injection  ENT:   Moist mucus membranes, posterior oropharynx clear without erythema or exudates, No lymphadenopathy, Normal voice  Resp:  Lungs clear to auscultation bilaterally, no crackles/rubs/wheezes. Good air movement  CV:  Normal rate and rhythm, no murmurs/rubs/gallops  GI:  Abdomen soft and non-distended.  Normoactive BS.  No tenderness, guarding or rebound, No masses  Skin:  Warm, dry.  No rashes or petechiae  Musculoskeletal: No peripheral edema or calf tenderness, Normal gross ROM   Neuro: Alert and oriented to person/place/time, normal sensation  Psychiatric: Normal affect, cooperative, good eye contact      Emergency Department Course     Laboratory:  CBC: WBC 5.3, HGB 11.1 (L),   CMP: albumin 2.6 (L) o/w WNL (Creatinine 1.11)  Lipase: 57 (L)    Troponin I (Collected 1914): <0.015      Interventions:  1932: GI Cocktail - Maalox 15 mL, Viscous Lidocaine 15 mL, 30 mL suspension PO   1932: Pepcid 20 mg IV    Emergency Department Course:  Past medical records, nursing notes, and vitals reviewed.  1902: I performed an exam of the patient and obtained history, as documented above.     IV inserted and blood drawn.     2057: I rechecked the patient. Findings and plan explained to the Patient. Patient discharged home with instructions regarding supportive care, medications, and reasons to return. The importance of close follow-up was reviewed.      Impression & Plan      Medical Decision Making:  Moises Chacon is a 70 year old male who presents today for evaluation of epigastric/midsternal chest discomfort.  Patient stated that he felt like his reflux was acting up and as a result he was having a difficult time eating and swallowing.  He denies any other shortness of breath chest pain or fevers.  His evaluation today is most consistent with reflux.  Laboratory studies showed negative troponin.  He had a nonacute EKG.   The remainder his laboratory studies were noncontributory.  He had good relief of his symptoms with the above medications and was able to eat and drink without difficulty.  He has not followed up with GI and understands that he should follow-up with GI for a upper endoscopy.  He is given a prescription for Carafate and advised to take omeprazole.  Prescription for this was given as well.  There was no indication for further testing or imagery.  He appeared to be safe and appropriate for outpatient management follow-up and was discharged home.      Diagnosis:    ICD-10-CM    1. Gastroesophageal reflux disease with esophagitis  K21.00        Disposition:  discharged to home    Discharge Medications:  New Prescriptions    OMEPRAZOLE (PRILOSEC) 40 MG DR CAPSULE    Take 1 capsule (40 mg) by mouth daily    SUCRALFATE (CARAFATE) 1 GM/10ML SUSPENSION    Take 10 mLs (1 g) by mouth 4 times daily for 7 days       Janny Hutchinson  10/9/2020   [facility] EMERGENCY DEPT    I, Janny Hutchinson, am serving as a scribe at 7:15 PM on 10/9/2020 to document services personally performed by Demar Lin APRN based on my observations and the provider's statements to me.         Demar Lin APRN CNP  10/09/20 3204

## 2020-10-12 ENCOUNTER — PATIENT OUTREACH (OUTPATIENT)
Dept: NURSING | Facility: CLINIC | Age: 70
End: 2020-10-12
Payer: MEDICARE

## 2020-10-12 DIAGNOSIS — I63.9 CEREBROVASCULAR ACCIDENT (CVA), UNSPECIFIED MECHANISM (H): Primary | ICD-10-CM

## 2020-10-12 DIAGNOSIS — C34.92 MALIGNANT NEOPLASM OF LEFT LUNG, UNSPECIFIED PART OF LUNG (H): ICD-10-CM

## 2020-10-12 DIAGNOSIS — R06.00 ACUTE DYSPNEA: ICD-10-CM

## 2020-10-12 LAB — INTERPRETATION ECG - MUSE: NORMAL

## 2020-10-12 RX ORDER — FUROSEMIDE 20 MG
TABLET ORAL
Qty: 90 TABLET | Refills: 0 | Status: SHIPPED | OUTPATIENT
Start: 2020-10-12 | End: 2021-03-19

## 2020-10-12 ASSESSMENT — ACTIVITIES OF DAILY LIVING (ADL): DEPENDENT_IADLS:: COOKING;CLEANING;LAUNDRY;SHOPPING;MEDICATION MANAGEMENT;TRANSPORTATION

## 2020-10-12 NOTE — PROGRESS NOTES
Clinic Care Coordination Contact    Clinic Care Coordination Contact  OUTREACH    Referral Information:  Referral Source: IP Report    Primary Diagnosis: CHF    Chief Complaint   Patient presents with     Clinic Care Coordination - Post Hospital     Reflux        Universal Utilization: 42% Admission or ED Risk  Clinic Utilization  Difficulty keeping appointments:: No  Compliance Concerns: No  No-Show Concerns: No  No PCP office visit in Past Year: No  Utilization    Last refreshed: 10/12/2020  7:09 AM: Hospital Admissions 3           Last refreshed: 10/12/2020  7:09 AM: ED Visits 3           Last refreshed: 10/12/2020  7:09 AM: No Show Count (past year) 7              Current as of: 10/12/2020  7:09 AM              Clinical Concerns:  Current Medical Concerns:  Presented at ED on 10/9/20  for evaluation of epigastric/midsternal chest discomfort.  Patient stated that he felt like his reflux was acting up and as a result he was having a difficult time eating and swallowing.   Diagnosis:      ICD-10-CM     1. Gastroesophageal reflux disease with esophagitis  K21.00      Past Medical History:    Anemia  Benign prostatic hyperplasia  Dyslipidemia  Gastroesophageal reflux disease   Hypertension   Hyperlipidemia   Mediastinal mass  Primary lung adenocarcinoma  Stroke     Current Behavioral Concerns: Patient had not been able to eat due to discomfort    Education Provided to patient: CC role, reviewed upcoming appointments  Pain  Pain (GOAL):: No  Health Maintenance Reviewed:   Due/Overdue   HF ACTION PLAN  LIPID  HEPATITIS C SCREENING  ADVANCE CARE PLANNING  COLONOSCOPY  MEDICARE ANNUAL WELLNESS EXAM    Clinical Pathway: None    Medication Management:  Rosa reports that Moises is only taking 1 - 2 of his medications and neither of them seem clear on what to take and what it is for.  SW CC routing this concern to PCP and that they are open to having SNV.      Functional Status:  Dependent ADLs:: Independent  Dependent  IADLs:: Cooking, Cleaning, Laundry, Shopping, Medication Management, Transportation  Bed or wheelchair confined:: No  Mobility Status: Independent  Fallen 2 or more times in the past year?: No    Living Situation:  Current living arrangement:: I live in a private home with family(With sister)  Type of residence:: Private home - stairs    Lifestyle & Psychosocial Needs:    [place] and they ae pursuing having him open up his MA in [place] and then get on Elderly Waiver. Once this is completed the plan is to have him move to Assisted Living and they have a place picked out that works with [place] patients. Moises has the MA application paperwork and Rosa's daughter is helping him complete it.       completed on 10.8/20  Lifestyle     Physical activity     Days per week: 7 days     Minutes per session: 10 min     Stress: Only a little     Social Needs     Financial resource strain: Hard     Food insecurity     Worry: Never true     Inability: Never true     Transportation needs     Medical: No     Non-medical: No     Inadequate nutrition (GOAL):: No  Tube Feeding: No  Inadequate activity/exercise (GOAL):: No  Significant changes in sleep pattern (GOAL): No  Transportation means:: Accessible car     Taoism or spiritual beliefs that impact treatment:: No  Mental health DX:: No  Mental health management concern (GOAL):: No  Informal Support system:: Family   Socioeconomic History     Marital status:      Spouse name: Not on file     Number of children: Not on file     Years of education: Not on file     Highest education level: Not on file   Relationships     Social connections     Talks on phone: Never     Gets together: Not on file     Attends [employer] service: Not on file     Active member of club or organization: Not on file     Attends meetings of clubs or organizations: Not on file     Relationship status: Not on file     Intimate partner violence     Fear of current or ex partner:  No     Emotionally abused: No     Physically abused: No     Forced sexual activity: No     Tobacco Use     Smoking status: Former Smoker     Packs/day: 0.50     Years: 60.00     Pack years: 30.00     Quit date: 2020     Years since quittin.2     Smokeless tobacco: Former User   Substance and Sexual Activity     Alcohol use: No     Drug use: Not Currently     Types: Marijuana     Sexual activity: Not Currently             Resources and Interventions:  Current Resources:      Community Resources: None  Supplies used at home:: None  Equipment Currently Used at Home: cane, straight    Advance Care Plan/Directive  Advanced Care Plans/Directives on file:: No  Advanced Care Plan/Directive Status: Declined Further Information    Referrals Placed: Community Resources, Senior Linkage Line, [facility] Resources, Assisted Living     Goals:   Goals        General    1.  Find Housing (pt-stated)     Notes - Note edited  10/12/2020 10:53 AM by Emma Hernandez LSW    Goal Statement: In the next three months I will move in to an assisted living  Date Goal set: 20  Barriers: My sister is not able to meet my care needs long term.  Strengths: I have a safe place to live until I can find an Assisted Living.  Date to Achieve By: 20  Patient expressed understanding of goal: Yes  Action steps to achieve this goal:  1. Care Coordinator will assist me with getting connected to [facility] to have [place]. Completed on 10/8/20  2. [place]   3. Care Coordination team will assist me as needed with this process.        2.  Work with my PCP and other specialists (pt-stated)     Notes - Note edited  10/12/2020 11:02 AM by Emma Hernandez LSW    Goal Statement: In the next six months I will work with my medical care team to meet my needs.  Date Goal set: 10/12/20  Barriers: Language barriers  Strengths: My sister has been helping  me.  Date to Achieve By: 4/30/2020  Patient expressed understanding of goal: Yes  Action steps to achieve this goal:  1. I will attend my upcoming appointments including:  10/16 - swallow study at 11 am  11/11/20 - In office with Dr Diggs at 10:40 am  11/23/20 - Cardiology at 2:45 pm  12/1/20 - Counseling at 12:30 pm  2. I will schedule a Gastroenterology appointment              Patient/Caregiver understanding: Yes    Outreach Frequency: monthly  Future Appointments              In 4 days [facility] RAD; RSCCXR1 [facility] Imaging, [facility]    In 4 days RH SLP OP VFSS [facility] [place] [facility] Rehabilitation [facility] RID    In 1 month Serge Botello MD [facility]    In 1 month Rea Morse MD [facility] [place] [facility] Heart [facility]    In 1 month Adelfo Fung, LICSW [facility] [place] [facility] Mental Health & Addiction [facility] Clinic, [facility] NB    In 2 months RH LAB DRAW 1 [facility] RID    In 2 months RSCCCT1 [facility] Imaging, RS[facility]    In 2 months Michael Ross MD [facility] RID          Plan: Plan to complete and submit MA in order to get on Elderly Waiver and once that is in place move to an Assisted living that will meet his cultural and physical needs.  SW CC will offer assistance as needed.  SW CC will outreach again in 3 - 4 weeks.    DODIE Spence   Care Coordination Team  [phone]

## 2020-10-12 NOTE — LETTER
[facility] CARE COORDINATION  [facility]  October 14, 2020        Moises Chacon  [address] [place] [address]          Dear Mallorie De Leon is an updated Complex Care Plan for your continued enrollment in Care Coordination. Please let us know if you have additional questions, concerns, or goals that we can assist with.    Sincerely,    Emma Hernandez, DODIE      [facility]  Complex Care Plan  About Me:    Patient Name:  Moises Chacon    YOB: 1950  Age:         70 year old   [place] MRN:    [id number] Telephone Information:  Home Phone [phone]   Mobile [phone]       Address:  [address] [address] Email address:  No e-mail address on record      Emergency Contact(s)    Name Relationship Lgl Grd Work Phone Home Phone Mobile Phone   1. TAWNYA CHACON Sister   [phone]   2. TAWNYA BELTRAN Sister  [phone]            Primary language:  Lao     needed? Yes   [place] [facility]:  [phone] op. 1  Other communication barriers: Language barrier  Preferred Method of Communication:     Current living arrangement: I live in a private home with family(With sister)  Mobility Status/ Medical Equipment: Independent    Health Maintenance  Health Maintenance Reviewed: Due/Overdue   HF ACTION PLAN  LIPID  HEPATITIS C SCREENING  ADVANCE CARE PLANNING  COLONOSCOPY  MEDICARE ANNUAL WELLNESS VISIT      My Access Plan  Medical Emergency 911   Primary Clinic Line [facility] - [phone]   24 Hour Appointment Line [phone] or  [phone] ([phone]) (toll-free)   24 Hour Nurse Line [phone] (toll-free)   Preferred Urgent Care     Preferred Hospital [facility]  [phone]   Preferred Pharmacy [facility] [address] [place] [facility] [address] [facility]     Behavioral Health Crisis Line  The National Suicide Prevention Lifeline at 3-898-453-5981 or 911             My Care Team Members  Patient Care Team       Relationship Specialty Notifications Start End    Serge Botello MD PCP - General [facility] Practice  5/27/20     Phone: [phone] Fax: [phone] [address] [place] [address]    Serge Botello MD Assigned PCP   6/14/20     Phone: [phone] Fax: [phone] [address] [place] [address]    Emma Hernandez LSW Lead Care Coordinator Primary Care - CC  9/22/20     Phone: [phone]         Ange Atkins MA Community Health Worker   9/22/20     Agnes Dowd RN Personal Advocate & Liaison (PAL) Family Practice Admissions 9/23/20     [phone]            My Care Plans  Self Management and Treatment Plan  Goals and (Comments)  Goals        General    1.  Find Housing (pt-stated)     Notes - Note edited  10/12/2020 10:53 AM by Emma Hernandez LSW    Goal Statement: In the next three months I will move in to an assisted living  Date Goal set: 9/22/20  Barriers: My sister is not able to meet my care needs long term.  Strengths: I have a safe place to live until I can find an Assisted Living.  Date to Achieve By: 12/30/20  Patient expressed understanding of goal: Yes  Action steps to achieve this goal:  1. Care Coordinator will assist me with getting connected to [facility] [place]. Completed on 10[place]8/20  2. [place]   3. Care Coordination team will assist me as needed with this process.        2.  Work with my PCP and other specialists (pt-stated)     Notes - Note edited  10/12/2020 11:02 AM by Emma Hernandez LSW    Goal Statement: In the next six months I will work with my medical care team to meet my needs.  Date Goal set: 10/12/20  Barriers: Language barriers  Strengths: My sister has been helping me.  Date to Achieve By:  4/30/2020  Patient expressed understanding of goal: Yes  Action steps to achieve this goal:  1. I will attend my upcoming appointments including:  10/16 - swallow study at 11 am  11/11/20 - In office with Dr Diggs at 10:40 am  11/23/20 - Cardiology at 2:45 pm  12/1/20 - Counseling at 12:30 pm  2. I will schedule a Gastroenterology appointment               Action Plans on File:                       Advance Care Plans/Directives Type:   Honoring Choices    Advance Care Planning and Health Care Directives  When it comes to decisions about your health care, it s important that your voice is heard. You may not always be able to speak for yourself.    We encourage you to have discussions with your loved ones, cultural or spiritual leaders and health care providers about your goals, values, beliefs and choices.    We are a part of [employer] Choices [place] , supporting and promoting the benefits of advance care planning conversations.    Our goals are to:    Help you make informed decisions about your healthcare choices and ensure that those choices are honored    Offer advance care planning discussions with trained staff    Ensure your choices are clearly defined, documented and available in your medical record    Translate your choices into medical orders as needed    Why is Advance Care Planning important?    Know what your health care choices are and decide what is right for you    An unexpected illness or injury could leave you unable to participate in important treatment decisions    When choices are left to others to decide that responsibility can be difficult and stressful    By discussing and outlining your choices, your voice is heard in the care you want to receive    How can I learn more?  We offer free classes at multiple locations, days and times. Our trained facilitators will provide information and guide you through a Health Care Directive document. They can also review, notarize and add your document  to your medical record.    Call [employer] at [phone] or toll free at [phone] for assistance.     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    Care Coordination Start Date: 9/22/2020   Frequency of Care Coordination: monthly   Form Last Updated: 10/12/2020

## 2020-10-12 NOTE — TELEPHONE ENCOUNTER
Routing refill request to provider for review/approval because:  Labs out of range:  BP  Sammy Adams RN, BSN

## 2020-10-12 NOTE — TELEPHONE ENCOUNTER
Appears he has in the past but has been discharged     Ok for new order?  Please complete order and sign     Agnes Dowd RN

## 2020-10-13 ENCOUNTER — HOME CARE/HOSPICE - HEALTHEAST (OUTPATIENT)
Dept: HOME HEALTH SERVICES | Facility: HOME HEALTH | Age: 70
End: 2020-10-13

## 2020-10-15 ENCOUNTER — TELEPHONE (OUTPATIENT)
Dept: FAMILY MEDICINE | Facility: CLINIC | Age: 70
End: 2020-10-15

## 2020-10-15 NOTE — TELEPHONE ENCOUNTER
RECORDS RECEIVED FROM:   DATE RECEIVED:   NOTES STATUS DETAILS   OFFICE NOTE from referring provider    Internal 9-29-20 Dr. Cody Beltran   OFFICE NOTE from other cardiologist    N/A    DISCHARGE SUMMARY from hospital    Internal 9-18-20   DISCHARGE REPORT from the ER   Care Everywhere 5-16-20   OPERATIVE REPORT    N/A    MEDICATION LIST   Internal    LABS     BMP   Internal 9-19-20   CBC   Internal 10-9-20   CMP   Internal 10-9-20   Lipids   N/A    TSH   N/A    DIAGNOSTIC PROCEDURES     EKG   Internal 10-9-20   Monitor Reports   N/A    IMAGING (DISC & REPORT)      Echo   Internal 9-19-20   Stress Tests   N/A    Cath   N/A    MRI/MRA   N/A    CT/CTA   Internal 9-29-20     Action    Action Taken 10-15: requested from [facility]:    CTA Chest    POC US Basic cardiac   5-16-20 5-16-20   10-15: resolved images from [facility]

## 2020-10-15 NOTE — TELEPHONE ENCOUNTER
"Call to pt's sister to f/u on pt and make sure they are going to swallow study tomorrow     Per sister they will be going to appt tomorrow    Home care RN was in to see pt this week.   He refused to take any medication on a regular basis \"and he got mean to her\"   Per Rosa, Pt's sister.   Home care RN states if pt is refusing medications and care they will not be able to see him.      They are working with Emma Hernandez to get pt moved into assisted living facility.  Sounds like this may happen soon.       FYI to PCP     Agnes Dowd RN     "

## 2020-10-16 ENCOUNTER — HOSPITAL ENCOUNTER (OUTPATIENT)
Dept: GENERAL RADIOLOGY | Facility: CLINIC | Age: 70
End: 2020-10-16
Attending: INTERNAL MEDICINE
Payer: MEDICARE

## 2020-10-16 ENCOUNTER — HOSPITAL ENCOUNTER (OUTPATIENT)
Dept: SPEECH THERAPY | Facility: CLINIC | Age: 70
End: 2020-10-16
Attending: INTERNAL MEDICINE
Payer: MEDICARE

## 2020-10-16 DIAGNOSIS — R13.10 DYSPHAGIA: ICD-10-CM

## 2020-10-16 DIAGNOSIS — I50.9 ACUTE ON CHRONIC CONGESTIVE HEART FAILURE, UNSPECIFIED HEART FAILURE TYPE (H): ICD-10-CM

## 2020-10-16 DIAGNOSIS — C34.92 MALIGNANT NEOPLASM OF LEFT LUNG, UNSPECIFIED PART OF LUNG (H): ICD-10-CM

## 2020-10-16 DIAGNOSIS — C37 MALIGNANT THYMOMA (H): ICD-10-CM

## 2020-10-16 PROCEDURE — 92611 MOTION FLUOROSCOPY/SWALLOW: CPT | Mod: GN

## 2020-10-16 PROCEDURE — 74230 X-RAY XM SWLNG FUNCJ C+: CPT

## 2020-10-16 RX ORDER — BARIUM SULFATE 400 MG/ML
SUSPENSION ORAL ONCE
Status: DISCONTINUED | OUTPATIENT
Start: 2020-10-16 | End: 2020-10-16

## 2020-10-16 NOTE — PROGRESS NOTES
"Video Fluoroscopic Swallow Study:      10/16/20 1100       Present Yes   Language   (Chinese)   Comments Phone    General Information   Type Of Visit Initial   Start Of Care Date 10/16/20   Referring Physician Michael Ross MD   Orders Evaluate And Treat   Medical Diagnosis Dysphagia (R13.10)   Onset Of Illness/injury Or Date Of Surgery 09/30/20  (date evaluation was ordered by MD)   Patient/family Goals To figure out why it hurts when he swallows   General Information Comments Video swallow study orders received from pt's oncologist, Dr. Ezio Ross. 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 Pt moved in between these two events, and he currently resides with his sister.     Pt reports developing dysphagia within the last month, notable characteristics: odynophagia, globus sensation at mid-sternal level, a \"tight\" feeling when food is going down. Per order comments, pt told MD he has only been able to tolerate porridge consistencies. He reports one of his doctors started him on a reflux medication, which has helped a little but did not resolve his dysphagia symptoms. Pt demonstrated significant left sided chest pain/grabbing his chest during today's assessment, which he reports occurs across the day, but also when eating.      VFSS Evaluation   VFSS Additional Documentation Yes   VFSS Eval: Radiology   Radiologist Dr. Vazquez   Views Taken left lateral;A/P   Physical Location of Procedure [facility]   VFSS Eval: Thin Liquid Texture Trial   Mode of Presentation, Thin Liquid cup;straw;self-fed   Order of Presentation 1, 2, 6   Preparatory Phase WFL   Oral Phase, Thin Liquid WFL   Pharyngeal Phase, Thin Liquid WFL   Rosenbek's Penetration " Aspiration Scale: Thin Liquid Trial Results 1 - no aspiration, contrast does not enter airway   Diagnostic Statement Good oral control, timely swallow, no laryngeal penetration or aspiration   VFSS Eval: Puree Solid Texture Trial   Mode of Presentation, Puree spoon;self-fed   Order of Presentation 3   Preparatory Phase WFL   Oral Phase, Puree WFL   Pharyngeal Phase, Puree Residue in valleculae;Residue in pyriform sinus  (mild)   Rosenbek's Penetration Aspiration Scale: Puree Food Trial Results 1 - no aspiration, contrast does not enter airway   Diagnostic Statement Mild vallecular/pyriform sinuses with solids noted, clearing with double swallows.    VFSS Eval: Solid Food Texture Trial   Mode of Presentation, Solid self-fed   Order of Presentation 4, 5   Preparatory Phase WFL   Oral Phase, Solid WFL   Pharyngeal Phase, Solid Residue in valleculae;Residue in pyriform sinus   Rosenbek's Penetration Aspiration Scale: Solid Food Trial Results 1 - no aspiration, contrast does not enter airway   Diagnostic Statement Mild vallecular/pyriform sinuses with solids noted, clearing with double swallows.    Swallow Compensations   Swallow Compensations Alternate viscosity of consistencies;Multiple swallow   Educational Assessment   Barriers to Learning Cognitive   Preferred Learning Style Listening;Demonstration;Pictures/video  (Sister helpful with recall/carryover of education)   Esophageal Phase of Swallow   Patient reports or presents with symptoms of esophageal dysphagia Yes   Esophageal sweep performed during today s vidofluoroscopic exam  Yes;Please refer to radiologist's report for details   Esophageal comments . Esophageal sweep completed, saved as images and not videos by radiologist - esophagus appeared to have some retention/slow movement of bolus; liquid wash clearing some of retention.    Swallow Eval: Clinical Impressions   Skilled Criteria for Therapy Intervention Skilled criteria met.  Treatment indicated.    Functional Assessment Scale (FAS) 6   Dysphagia Outcome Severity Scale (DIEUDONNE) Level 6 - DIEUDONNE   Treatment Diagnosis minimal oropharyngeal dysphagia   Diet texture recommendations Regular diet;Thin liquids   Recommended Feeding/Eating Techniques alternate between small bites and sips of food/liquid  (swallow 2x every bite)   Demonstrates Need for Referral to Another Service   (GI)   Anticipated Discharge Disposition home   Patient, family and/or staff in agreement with Plan of Care Yes   Clinical Impression Comments Video Fluoroscopic Swallow Study completed. Pt, sister, phone  services and radiologist present. Assessment completed with thin liquids, puree solids, regular solids is lateral and A/P positioning. Pt currently presents with a functional oropharyngeal swallow, minimal oropharyngeal dysphagia. Oral phase WNL with adequate oral control and timely pharyngeal swallow initiation. Hyolaryngeal elevation/excursion functional, complete epiglottic inversion. Mild vallecular/pyriform sinuses with solids noted, clearing with double swallows. No laryngeal penetration or aspiration noted.    Esophageal sweep completed, saved as images and not videos by radiologist - esophagus appeared to have some retention/slow movement of bolus; liquid wash clearing some of retention.    Recommendations:   - OK to continue a regular solid diet, thin liquids with double swallows and alternation of solids/liquids every 1-2 bites.   - Consider further esophageal work up/GI consult, if dysphagia symptoms persist.     Educated pt/sister on the findings (including video review) and recommendations. Pt with questionable recall of education, however sister demonstrated excellent understanding.     This was an evaluation only; no treatment provided and no oropharyngeal dysphagia treatment indicated at this time.      Total Session Time   SLP Eval: VideoFluoroscopic Swallow function Minutes (22018) 29   Total Evaluation Time 29

## 2020-10-21 ENCOUNTER — DOCUMENTATION ONLY (OUTPATIENT)
Dept: CARE COORDINATION | Facility: CLINIC | Age: 70
End: 2020-10-21

## 2020-10-22 ENCOUNTER — TELEPHONE (OUTPATIENT)
Dept: FAMILY MEDICINE | Facility: CLINIC | Age: 70
End: 2020-10-22

## 2020-10-22 NOTE — TELEPHONE ENCOUNTER
Form to provider for completion and then mail to pt's home     Sister notified will be done next week     Agnes Dowd RN

## 2020-10-22 NOTE — TELEPHONE ENCOUNTER
10/22/2020        General Call:     Who is calling:  Patients Sister Rosa    Reason for Call:  Handicap Parking Sign    What are your questions or concerns:  Rosa stated she believes patient needs a Handicap Parking Sign and wants to know what needs to be done to receive this for patient    Date of last appointment with provider: 10/05/2020    Okay to leave a detailed message:Yes at Cell number on file:    Telephone Information:   Mobile [phone]

## 2020-11-04 ENCOUNTER — PATIENT OUTREACH (OUTPATIENT)
Dept: NURSING | Facility: CLINIC | Age: 70
End: 2020-11-04
Payer: MEDICARE

## 2020-11-04 ENCOUNTER — PATIENT OUTREACH (OUTPATIENT)
Dept: CARE COORDINATION | Facility: CLINIC | Age: 70
End: 2020-11-04

## 2020-11-04 ASSESSMENT — ACTIVITIES OF DAILY LIVING (ADL): DEPENDENT_IADLS:: COOKING;CLEANING;LAUNDRY;SHOPPING;MEDICATION MANAGEMENT;TRANSPORTATION

## 2020-11-04 NOTE — PROGRESS NOTES
Clinic Care Coordination Contact  Program: None     FRW Outreach:   11/4/20: There is no balance on file, [facility] billed pt's insurance. FRW called daughter and left detailed VM. She can call FRW if she has any further questions at [phone].     Health Insurance:  [place] through [place]     Referral/Screening:  Moises's sister/caregiver is interested in applying for [employer].  He  currently has a medical bill in the amount of $105.61. His monthly income is $150.00.  Thanks     Goals Addressed:   No goals addressed

## 2020-11-05 NOTE — PROGRESS NOTES
Clinic Care Coordination Contact    Follow Up Progress Note      Assessment: Rosa reports today that Moises is eating better now and seems to be doing ok.  He has an outstanding medical bill of !05.61 and is interested in applying for [facility] to help with that.  She is working with Cassie from [facility] services on getting Moises in to assisted living and she will follow up with her today on where that is at.       Goals addressed this encounter:   Goals Addressed                 This Visit's Progress       Patient Stated      1.  Find Housing (pt-stated)   50%     Goal Statement: In the next three months I will move in to an assisted living  Date Goal set: 9/22/20  Barriers: My sister is not able to meet my care needs long term.  Strengths: I have a safe place to live until I can find an Assisted Living.  Date to Achieve By: 12/30/20  Patient expressed understanding of goal: Yes  Action steps to achieve this goal:  1. Care Coordinator will assist me with getting connected to [facility] [place] Completed on 10/8/20  2. [place]-L[place]   3. Care Coordination team will assist me as needed with this process.          2.  Work with my PCP and other specialists (pt-stated)   40%     Goal Statement: In the next six months I will work with my medical care team to meet my needs.  Date Goal set: 10/12/20  Barriers: Language barriers  Strengths: My sister has been helping me.  Date to Achieve By: 4/30/2020  Patient expressed understanding of goal: Yes  Action steps to achieve this goal:  1. I will attend my upcoming appointments including:  10/16 - swallow study at 11 am  11/11/20 - In office with Dr Diggs at 10:40 am  11/23/20 - Cardiology at 2:45 pm  12/1/20 - Counseling at 12:30 pm  2. I will schedule a Gastroenterology appointment               Intervention/Education provided during outreach: RHONA      Outreach Frequency: monthly    Plan:   Moises has a PCP appointment on 11/23/20 as well as several other medical appointments coming up in the next month that he plans to attend.  Rosa will follow up with shantel at [facility] regarding his EW status and going to an assisted living.    Care Coordinator will follow up in one month.    DODIE Spence   Care Coordination Team  [phone]

## 2020-11-10 NOTE — PROGRESS NOTES
Pre-Visit Planning   Next 5 appointments (look out 90 days)    Dec 01, 2020 12:30 PM  Telephone Visit with NITHYA Sanchez  [facility] ([facility]) [address] [address] [phone]   Jan 08, 2021  1:30 PM  Return Visit with Michael Ross MD  [facility] ([facility]) [address]  [address]  [facility] [id number] [phone]        Appointment Notes for this encounter: reviewed JQ // f/u ok per JQ Pt is not taking medications per sister  please use ipad or phones to reach  [phone] option 0    Questionnaires Reviewed/AssignedNo additional questionnaires are needed    Spoke to sister  via phone. Are there any additional questions or concerns you'd like to review with your provider during your visit? No    Patient does not have additional questions or concerns.     Visit is not preventive.    Meds  Is there anything on your medication list that needs to be updated? No    Current Outpatient Medications   Medication     acetaminophen (TYLENOL) 325 MG tablet     amLODIPine (NORVASC) 10 MG tablet     aspirin (ASA) 81 MG EC tablet     cetirizine (ZYRTEC) 10 MG tablet     furosemide (LASIX) 20 MG tablet     metoprolol tartrate (LOPRESSOR) 25 MG tablet     mirtazapine (REMERON) 7.5 MG tablet     omeprazole (PRILOSEC) 40 MG DR capsule     oxyCODONE (ROXICODONE) 5 MG tablet     sennosides (SENOKOT) 8.6 MG tablet     umeclidinium-vilanterol (ANORO ELLIPTA) 62.5-25 MCG/INH oral inhaler     No current facility-administered medications for this visit.      Which pharmacy do you prefer to use for medications during this visit if needed?     Do you need refills on any of your medications? No    Health Maintenance Due   Topic Date Due     HF ACTION PLAN  1950     LIPID  1950     ANNUAL REVIEW OF HM ORDERS   "1950     ADVANCE CARE PLANNING  1950     COLORECTAL CANCER SCREENING  08/11/1958     HEPATITIS C SCREENING  08/11/1966     DTAP/TDAP/TD IMMUNIZATION (1 - Tdap) 08/11/1973     ZOSTER IMMUNIZATION (1 of 2) 08/11/1998     PSA  11/09/2010     MEDICARE ANNUAL WELLNESS VISIT  08/11/2013     Pneumococcal Vaccine: 65+ Years (1 of 1 - PPSV23) 08/11/2013     Patient is due for:  preventive care visit and colonoscopy   will set up while in clinic     RECUPYL  Patient is not active on RECUPYL.     Questionnaire Review   NA    Call Summary  \"Thank you for your time today.  If anything comes up before your appointment, please feel free to contact us at [phone]    Agnes Dowd RN                                                                                             "

## 2020-11-11 ENCOUNTER — OFFICE VISIT (OUTPATIENT)
Dept: FAMILY MEDICINE | Facility: CLINIC | Age: 70
End: 2020-11-11
Payer: MEDICARE

## 2020-11-11 VITALS
BODY MASS INDEX: 20.73 KG/M2 | HEIGHT: 66 IN | RESPIRATION RATE: 14 BRPM | HEART RATE: 78 BPM | TEMPERATURE: 98.6 F | SYSTOLIC BLOOD PRESSURE: 138 MMHG | WEIGHT: 129 LBS | DIASTOLIC BLOOD PRESSURE: 72 MMHG

## 2020-11-11 DIAGNOSIS — I10 ESSENTIAL HYPERTENSION: ICD-10-CM

## 2020-11-11 DIAGNOSIS — R06.02 SOB (SHORTNESS OF BREATH): ICD-10-CM

## 2020-11-11 DIAGNOSIS — K21.9 GASTROESOPHAGEAL REFLUX DISEASE WITHOUT ESOPHAGITIS: ICD-10-CM

## 2020-11-11 DIAGNOSIS — R13.19 ESOPHAGEAL DYSPHAGIA: ICD-10-CM

## 2020-11-11 DIAGNOSIS — F51.04 PSYCHOPHYSIOLOGICAL INSOMNIA: Primary | ICD-10-CM

## 2020-11-11 DIAGNOSIS — R07.89 CHEST WALL PAIN FOLLOWING SURGERY: ICD-10-CM

## 2020-11-11 DIAGNOSIS — I63.9 CEREBROVASCULAR ACCIDENT (CVA), UNSPECIFIED MECHANISM (H): ICD-10-CM

## 2020-11-11 DIAGNOSIS — G89.18 CHEST WALL PAIN FOLLOWING SURGERY: ICD-10-CM

## 2020-11-11 DIAGNOSIS — J98.59 MEDIASTINAL MASS: ICD-10-CM

## 2020-11-11 PROCEDURE — 99214 OFFICE O/P EST MOD 30 MIN: CPT | Mod: 25 | Performed by: FAMILY MEDICINE

## 2020-11-11 PROCEDURE — 90732 PPSV23 VACC 2 YRS+ SUBQ/IM: CPT | Performed by: FAMILY MEDICINE

## 2020-11-11 PROCEDURE — 90471 IMMUNIZATION ADMIN: CPT | Performed by: FAMILY MEDICINE

## 2020-11-11 PROCEDURE — G0009 ADMIN PNEUMOCOCCAL VACCINE: HCPCS | Mod: 59 | Performed by: FAMILY MEDICINE

## 2020-11-11 PROCEDURE — 90715 TDAP VACCINE 7 YRS/> IM: CPT | Performed by: FAMILY MEDICINE

## 2020-11-11 RX ORDER — OMEPRAZOLE 40 MG/1
CAPSULE, DELAYED RELEASE ORAL
Qty: 90 CAPSULE | Refills: 0 | Status: SHIPPED | OUTPATIENT
Start: 2020-11-11 | End: 2021-01-15

## 2020-11-11 RX ORDER — OMEPRAZOLE 40 MG/1
40 CAPSULE, DELAYED RELEASE ORAL DAILY
Qty: 30 CAPSULE | Refills: 1 | Status: SHIPPED | OUTPATIENT
Start: 2020-11-11 | End: 2020-11-11

## 2020-11-11 RX ORDER — METOPROLOL TARTRATE 25 MG/1
TABLET, FILM COATED ORAL
Qty: 180 TABLET | Refills: 0 | Status: SHIPPED | OUTPATIENT
Start: 2020-11-11 | End: 2021-03-26 | Stop reason: DRUGHIGH

## 2020-11-11 RX ORDER — OXYCODONE HYDROCHLORIDE 5 MG/1
5 TABLET ORAL EVERY 6 HOURS PRN
Qty: 30 TABLET | Refills: 0 | Status: SHIPPED | OUTPATIENT
Start: 2020-11-11 | End: 2021-03-19

## 2020-11-11 RX ORDER — METOPROLOL TARTRATE 25 MG/1
25 TABLET, FILM COATED ORAL 2 TIMES DAILY
Qty: 60 TABLET | Refills: 1 | Status: SHIPPED | OUTPATIENT
Start: 2020-11-11 | End: 2020-11-11

## 2020-11-11 RX ORDER — GABAPENTIN 300 MG/1
300 CAPSULE ORAL AT BEDTIME
Qty: 30 CAPSULE | Refills: 0 | Status: SHIPPED | OUTPATIENT
Start: 2020-11-11 | End: 2021-03-09

## 2020-11-11 ASSESSMENT — MIFFLIN-ST. JEOR: SCORE: 1277.89

## 2020-11-11 NOTE — LETTER
"My Heart Failure Action Plan   Name: Moises Chacon    YOB: 1950   Date: 11/11/2020    My doctor: Serge Botello [address] [address] [phone]  My Diagnosis: {HF STAGES:068216}   My Exercise Goal: 30 minutes daily  .     My Weight Plan:   Wt Readings from Last 2 Encounters:   10/05/20 55.6 kg (122 lb 9.6 oz)   09/29/20 56.9 kg (125 lb 6.4 oz)     Weigh yourself daily using the same scale. If you gain more than 2 pounds in 24 hours or 5 pounds in a week {HF WEIGHT GOAL:461797}    My Diet Goal: { :542504::\"No added salt\"}    Emergency Room Visits:    Our goal is to improve your quality of life and help you avoid a visit to the emergency room or hospital.  If we work together, we can achieve this goal. But, if you feel you need to call 911 or go to the emergency room, please do so.  If you go to the emergency room, please bring your list of medicines and your daily weight chart with you.       GREEN ZONE     Doing well today    Weight gained is no more than 2 pounds a day or 5 pounds a week.    No swelling in feet, ankles, legs or stomach.    No more swelling than usual.    No more trouble breathing than usual.    No change in my sleep.    No other problems. Actions:    I am doing fine.  I will take my medicine, follow my diet, see my doctor, exercise, and watch for symptoms.           YELLOW ZONE         Having a bad day or flare up    Weight gain of more than 2 pounds in one day or 5 pounds in one week.    New swelling in ankle, leg, knee or thigh.    Bloating in belly, pants feel tighter.    Swelling in hands or face.    Coughing or trouble breathing while walking or talking.    Harder to breathe last night.    Have trouble sleeping, wake up short of breath.    Much more tired than usual.    Not eating.    Pain in my chest or bad leg cramps.    Feel weak or dizzy. Actions:    I need to take action and call my doctor or nurse " today.                 RED ZONE         Need medical care now    Weight gain of 5 pounds overnight.    Chest pain or pressure that does not go away.    Feel less alert.    Wheezing or have trouble breathing when at rest.    Cannot sleep lying down.    Cannot take my water pill.    Pass out or faint. Actions:    I need to call my doctor or nurse now!    Call 911 if I have chest pain or cannot breathe.

## 2020-11-11 NOTE — PROGRESS NOTES
Subjective     Moises Chacon is a 70 year old male who presents to clinic today for the following health issues:    HPI         Hypertension Follow-up      Do you check your blood pressure regularly outside of the clinic? Yes     Are you following a low salt diet? Yes    Are your blood pressures ever more than 140 on the top number (systolic) OR more   than 90 on the bottom number (diastolic), for example 140/90? Yes when forgets to take medication      How many servings of fruits and vegetables do you eat daily?  0-1    On average, how many sweetened beverages do you drink each day (Examples: soda, juice, sweet tea, etc.  Do NOT count diet or artificially sweetened beverages)?   0    How many days per week do you exercise enough to make your heart beat faster? 3 or less    How many minutes a day do you exercise enough to make your heart beat faster? 9 or less  How many days per week do you miss taking your medication? 3    What makes it hard for you to take your medications?  remembering to take     Application for disability parking    History of left-sided lung cancer after having syncopal episode on 5/6/2020 admitted in [place] to [facility].  See scanned records for detail.  He states he continues to have left-sided lower chest pain from his surgery.  Following with oncology.    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  Following with oncology, Dr. Ross.    Patient with history of hypertension currently prescribed amlodipine 10 mg daily metoprolol 25 mg twice daily.  Has been more consistent with taking his medication.    Patient with history of distant stroke though I do not have any further information on this history.  Currently taking aspirin.    Swallowing issues have improved.  Swallow study was normal.  Had improvement with PPI.  He has since stopped medication.    Patient taking furosemide for presumed diastolic dysfunction causing  "acute dyspnea and shortness of breath leading to hospitalization in September.  He is also taking Anoro daily for his shortness of breath which seems to help.    Review of Systems   RESP: Shortness of breath with activity as noted  CV: No exertional chest pain, no palpitations      Objective    /72 (BP Location: Right arm, Patient Position: Sitting, Cuff Size: Adult Regular)   Pulse 78   Temp 98.6  F (37  C) (Oral)   Resp 14   Ht 1.676 m (5' 6\")   Wt 58.5 kg (129 lb)   BMI 20.82 kg/m    Body mass index is 20.82 kg/m .  Physical Exam   GENERAL: healthy, alert and no distress  RESP: lungs clear to auscultation - no rales, rhonchi or wheezes  CV: regular rate and rhythm, normal S1 S2, no S3 or S4, no murmur, click or rub, no peripheral edema and peripheral pulses strong        Assessment & Plan     Psychophysiological insomnia  Trial of medication below for sleep and may help with ongoing chest pain after thoracic surgery.  May have improvements with anxiety/mood as well.  States mirtazapine was not helpful.  - gabapentin (NEURONTIN) 300 MG capsule; Take 1 capsule (300 mg) by mouth At Bedtime    Chest wall pain following surgery  Continue current medication for pain with oxycodone and trial of Neurontin.  Has chest CT and oncology follow-up planned.  - gabapentin (NEURONTIN) 300 MG capsule; Take 1 capsule (300 mg) by mouth At Bedtime    Essential hypertension  Continue current medication with amlodipine and metoprolol, currently controlled.  - metoprolol tartrate (LOPRESSOR) 25 MG tablet; Take 1 tablet (25 mg) by mouth 2 times daily    Gastroesophageal reflux disease without esophagitis  Recommend ongoing PPI with his prior symptoms.    Esophageal dysphagia  - omeprazole (PRILOSEC) 40 MG DR capsule; Take 1 capsule (40 mg) by mouth daily    Cerebrovascular accident (CVA), unspecified mechanism (H)  - aspirin (ASA) 81 MG EC tablet; Take 1 tablet (81 mg) by mouth daily    s/p Bilateral subxiphoid VATS " thymectomy, 8/19  - oxyCODONE (ROXICODONE) 5 MG tablet; Take 1 tablet (5 mg) by mouth every 6 hours as needed for moderate to severe pain    SOB (shortness of breath)  May be COPD versus diastolic dysfunction with his prior fluid overload after surgery.  Off Lasix now.  Recommend future spirometry but we are not doing that if it can be avoided currently with COVID-19.  Continue Anoro inhaler.            Return in about 3 months (around 2/11/2021) for medicare wellness check, medication recheck.    Serge Botello MD  [facility]

## 2020-11-17 ENCOUNTER — TELEPHONE (OUTPATIENT)
Dept: FAMILY MEDICINE | Facility: CLINIC | Age: 70
End: 2020-11-17

## 2020-11-17 NOTE — TELEPHONE ENCOUNTER
Pt's sister Rosa ZBIGNIEW on [facility]     Has questions about medications     LM for Rosa to return call     Agnes Dowd RN

## 2020-11-20 NOTE — TELEPHONE ENCOUNTER
LM again for Rosa to return call.     Will close encounter and await call back     Agnes Dowd RN

## 2020-11-23 ENCOUNTER — PRE VISIT (OUTPATIENT)
Dept: CARDIOLOGY | Facility: CLINIC | Age: 70
End: 2020-11-23

## 2020-12-01 ENCOUNTER — CARE COORDINATION (OUTPATIENT)
Dept: CARDIOLOGY | Facility: CLINIC | Age: 70
End: 2020-12-01

## 2020-12-01 ENCOUNTER — VIRTUAL VISIT (OUTPATIENT)
Dept: BEHAVIORAL HEALTH | Facility: CLINIC | Age: 70
End: 2020-12-01
Attending: FAMILY MEDICINE
Payer: MEDICARE

## 2020-12-01 DIAGNOSIS — Z53.9 ERRONEOUS ENCOUNTER--DISREGARD: Primary | ICD-10-CM

## 2020-12-01 NOTE — PROGRESS NOTES
Referral came back to advanced HF.  Patient does not have heart failure at present.  She has already been scheduled with Dr. Rea Morse but did not show up for video visit.

## 2020-12-11 ENCOUNTER — PATIENT OUTREACH (OUTPATIENT)
Dept: NURSING | Facility: CLINIC | Age: 70
End: 2020-12-11
Payer: MEDICARE

## 2020-12-11 ASSESSMENT — ACTIVITIES OF DAILY LIVING (ADL): DEPENDENT_IADLS:: COOKING;CLEANING;LAUNDRY;SHOPPING;MEDICATION MANAGEMENT;TRANSPORTATION

## 2020-12-11 NOTE — PROGRESS NOTES
Clinic Care Coordination Contact    Follow Up Progress Note      Assessment: Rosa reports today that patient is doing much better.  They are still working on putting pieces in place so that he can move to [facility]. No other needs identified at this time.    Goals addressed this encounter:   Goals Addressed                 This Visit's Progress       Patient Stated      1.  Find Housing (pt-stated)   80%     Goal Statement: In the next three months I will move in to an assisted living  Date Goal set: 9/22/20  Barriers: My sister is not able to meet my care needs long term.  Strengths: I have a safe place to live until I can find an Assisted Living.  Date to Achieve By: Extended to 1/30/21  Patient expressed understanding of goal: Yes  Action steps to achieve this goal:  1. Care Coordinator will assist me with getting connected to [facility] to have a [place]. Completed on 10/8/20  2. [place] and [place]-L[place]   3. Care Coordination team will assist me as needed with this process.          2.  Work with my PCP and other specialists (pt-stated)   50%     Goal Statement: In the next six months I will work with my medical care team to meet my needs.  Date Goal set: 10/12/20  Barriers: Language barriers  Strengths: My sister has been helping me.  Date to Achieve By: 4/30/2020  Patient expressed understanding of goal: Yes  Action steps to achieve this goal:  1. I will attend my upcoming appointments including:  10/16 - swallow study at 11 am  11/11/20 - In office with Dr Diggs at 10:40 am  11/23/20 - Cardiology at 2:45 pm  12/1/20 - Counseling at 12:30 pm  2. I will schedule a Gastroenterology appointment               Intervention/Education provided during outreach: NA     Outreach Frequency: monthly    Plan:   Moises plans to attend his upcoming medical appointments.  [facility]  [facility] to assisted living most likely the beginning of the new year.  [facility] CC will outreach again in one month.    Emma Hernandez MAINOR   Care Coordination Team  [phone]

## 2020-12-16 ENCOUNTER — EXTERNAL ORDER RESULTS (OUTPATIENT)
Dept: FAMILY MEDICINE | Facility: CLINIC | Age: 70
End: 2020-12-16

## 2020-12-16 DIAGNOSIS — Z53.9 DIAGNOSIS NOT YET DEFINED: Primary | ICD-10-CM

## 2020-12-21 ENCOUNTER — TELEPHONE (OUTPATIENT)
Dept: FAMILY MEDICINE | Facility: CLINIC | Age: 70
End: 2020-12-21

## 2020-12-21 DIAGNOSIS — C34.92 MALIGNANT NEOPLASM OF LEFT LUNG, UNSPECIFIED PART OF LUNG (H): ICD-10-CM

## 2020-12-21 DIAGNOSIS — J30.2 SEASONAL ALLERGIES: ICD-10-CM

## 2020-12-21 DIAGNOSIS — G89.3 CANCER ASSOCIATED PAIN: ICD-10-CM

## 2020-12-21 RX ORDER — TRAMADOL HYDROCHLORIDE 50 MG/1
TABLET ORAL
Qty: 60 TABLET | Refills: 3 | Status: SHIPPED | OUTPATIENT
Start: 2020-12-21 | End: 2021-05-12

## 2020-12-21 NOTE — TELEPHONE ENCOUNTER
Routing refill request to provider for review/approval because:  Drug not on the [facility] refill protocol due to pt age  Sammy Adams RN, BSN

## 2020-12-21 NOTE — TELEPHONE ENCOUNTER
Reason for Call:  Medication or medication refill:    Do you use a [facility]?  Name of the pharmacy and phone number for the current request:  [facility]     Name of the medication requested: cetirizine (ZYRTEC) 10 MG tablet, tramadol     Other request: refills    Can we leave a detailed message on this number? YES    Phone number patient can be reached at: Cell number on file:    Telephone Information:   Mobile [phone]       Best Time: any    Call taken on 12/21/2020 at 1:30 PM by Randi Nance

## 2020-12-22 RX ORDER — CETIRIZINE HYDROCHLORIDE 10 MG/1
10 TABLET ORAL AT BEDTIME
Qty: 30 TABLET | Refills: 1 | Status: SHIPPED | OUTPATIENT
Start: 2020-12-22 | End: 2021-03-29

## 2021-01-14 NOTE — PROGRESS NOTES
Pre-Visit Planning   Next 5 appointments (look out 90 days)    Jake 15, 2021 10:30 AM  (Arrive by 10:10 AM)  Office Visit with Serge Botello MD, PHONE,   [facility] ([facility]) [address] [id number] [phone]   Jan 29, 2021  4:00 PM  Return Visit with Michael Ross MD, PHONE,   [facility] ([facility]) [address]  [address]  [facility] [address]  [phone]   Feb 11, 2021  9:50 AM  (Arrive by 9:30 AM)  Office Visit with Serge Botello MD  [facility] ([facility]) [address] [address] [phone]        Appointment Notes for this encounter: pain on left side of stomach  Please use ipad or phone for this appt. [phone] then option 0    Questionnaires Reviewed/Assigned  Additional questionnaires assigned  PHq2    Unable to reach patient and unable to leave voicemail.  Full     Agnes Dowd RN

## 2021-01-15 ENCOUNTER — VIRTUAL VISIT (OUTPATIENT)
Dept: FAMILY MEDICINE | Facility: CLINIC | Age: 71
End: 2021-01-15
Payer: COMMERCIAL

## 2021-01-15 VITALS — HEIGHT: 66 IN | BODY MASS INDEX: 20.89 KG/M2 | WEIGHT: 130 LBS

## 2021-01-15 DIAGNOSIS — C34.92 MALIGNANT NEOPLASM OF LEFT LUNG, UNSPECIFIED PART OF LUNG (H): ICD-10-CM

## 2021-01-15 DIAGNOSIS — Z11.59 NEED FOR HEPATITIS C SCREENING TEST: ICD-10-CM

## 2021-01-15 DIAGNOSIS — I50.9 ACUTE ON CHRONIC CONGESTIVE HEART FAILURE, UNSPECIFIED HEART FAILURE TYPE (H): ICD-10-CM

## 2021-01-15 DIAGNOSIS — E44.1 MILD PROTEIN-CALORIE MALNUTRITION (H): ICD-10-CM

## 2021-01-15 DIAGNOSIS — R10.84 ABDOMINAL PAIN, GENERALIZED: Primary | ICD-10-CM

## 2021-01-15 DIAGNOSIS — R13.19 ESOPHAGEAL DYSPHAGIA: ICD-10-CM

## 2021-01-15 PROCEDURE — 99214 OFFICE O/P EST MOD 30 MIN: CPT | Mod: GT | Performed by: FAMILY MEDICINE

## 2021-01-15 RX ORDER — OMEPRAZOLE 40 MG/1
CAPSULE, DELAYED RELEASE ORAL
Qty: 90 CAPSULE | Refills: 0 | Status: SHIPPED | OUTPATIENT
Start: 2021-01-15 | End: 2021-03-09

## 2021-01-15 ASSESSMENT — MIFFLIN-ST. JEOR: SCORE: 1282.43

## 2021-01-15 NOTE — PROGRESS NOTES
Moises is a 70 year old who is being evaluated via a billable telephone visit.      What phone number would you like to be contacted at? [phone] -   How would you like to obtain your AVS? Mail a copy  Assessment & Plan     Abdominal pain, generalized  Uncertain diagnosis with ongoing abdominal pain.  Certainly with history of cancer we would have him consider imaging.  He has CT of the chest and abdomen set for less than 2 weeks from now so considering the abdominal symptom is ongoing over at least a month we can plan for imaging at that time.  Recommend labs below to rule out other cause.  Discussed difficulty in diagnosing abdominal pain over the phone but due to whether they do not want to come to the clinic today as offered.  Discussed coming into clinic next week or urgent care if worsening symptoms occur.  Continue current medication for pain as needed.  - UA reflex to Microscopic; Future  - Comprehensive metabolic panel (BMP + Alb, Alk Phos, ALT, AST, Total. Bili, TP); Future  - CBC with platelets and differential; Future    Esophageal dysphagia  Recommend restarting PPI.  - omeprazole (PRILOSEC) 40 MG DR capsule; TAKE 1 CAPSULE(40 MG) BY MOUTH DAILY    Malignant neoplasm of left lung, unspecified part of lung (H)  Following with oncology.    Need for hepatitis C screening test  - Hepatitis C Screen Reflex to HCV RNA Quant and Genotype; Future    Mild protein-calorie malnutrition (H)  Continue to monitor with improvement after initial cancer treatments.    Acute on chronic congestive heart failure, unspecified heart failure type (H)  Prior issue noted with hospitalization and following to prevent recurrence.      Assessment requiring an independent historian(s) - family - sister                         Return in about 3 days (around 1/18/2021) for lab-only visit.    Serge Botello MD  [facility]    Lizette De Leon is a 70 year old who presents to clinic today for  the following health issues     HPI       Abdominal/Flank Pain  Onset/Duration: been awhile - about a month  Description:   Character: Sharp  Location: left lower quadrant  Radiation:  Back  Intensity: moderate  Progression of Symptoms:  intermittent  Accompanying Signs & Symptoms:  Fever/Chills: no  Gas/Bloating: no  Nausea: no  Vomitting: no  Diarrhea: no  Constipation: no  Dysuria or Hematuria: no  History:   Trauma: no  Previous similar pain: YES  Previous tests done: none  Precipitating factors:   Does the pain change with:     Food: no    Bowel Movement: no    Urination: no   Other factors:  none  Therapies tried and outcome: Tramadol    History of left-sided lung cancer after having syncopal episode on 5/6/2020 admitted in [place] to [facility].  See scanned records for detail.  He states he continues to have left-sided lower chest pain from his surgery.  Following with oncology.    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  Following with oncology, Dr. Ross.    Review of Systems   CONSTITUTIONAL: no fever  GI: As noted above.  Denies diarrhea.  No nausea or vomiting.  No hematochezia or melena.  : Denies urinary symptoms      Objective         Vitals:  No vitals were obtained today due to virtual visit.    Physical Exam   healthy, alert and no distress  PSYCH: Alert and oriented times 3; coherent speech, normal   rate and volume, able to articulate logical thoughts, able   to abstract reason, no tangential thoughts, no hallucinations   or delusions  His affect is normal  RESP: No cough, no audible wheezing, able to talk in full sentences  Remainder of exam unable to be completed due to telephone visits                Phone call duration: 15 minutes

## 2021-01-27 ENCOUNTER — ANCILLARY PROCEDURE (OUTPATIENT)
Dept: CT IMAGING | Facility: CLINIC | Age: 71
End: 2021-01-27
Attending: INTERNAL MEDICINE
Payer: COMMERCIAL

## 2021-01-27 DIAGNOSIS — R10.84 ABDOMINAL PAIN, GENERALIZED: ICD-10-CM

## 2021-01-27 DIAGNOSIS — Z11.59 NEED FOR HEPATITIS C SCREENING TEST: ICD-10-CM

## 2021-01-27 DIAGNOSIS — I50.9 ACUTE ON CHRONIC CONGESTIVE HEART FAILURE, UNSPECIFIED HEART FAILURE TYPE (H): ICD-10-CM

## 2021-01-27 DIAGNOSIS — C37 MALIGNANT THYMOMA (H): ICD-10-CM

## 2021-01-27 DIAGNOSIS — C34.92 MALIGNANT NEOPLASM OF LEFT LUNG, UNSPECIFIED PART OF LUNG (H): ICD-10-CM

## 2021-01-27 LAB
ALBUMIN SERPL-MCNC: 3.2 G/DL (ref 3.4–5)
ALBUMIN UR-MCNC: NEGATIVE MG/DL
ALP SERPL-CCNC: 114 U/L (ref 40–150)
ALT SERPL W P-5'-P-CCNC: 26 U/L (ref 0–70)
ANION GAP SERPL CALCULATED.3IONS-SCNC: 5 MMOL/L (ref 3–14)
APPEARANCE UR: CLEAR
AST SERPL W P-5'-P-CCNC: <3 U/L (ref 0–45)
BASOPHILS # BLD AUTO: 0 10E9/L (ref 0–0.2)
BASOPHILS NFR BLD AUTO: 0.5 %
BILIRUB SERPL-MCNC: 0.2 MG/DL (ref 0.2–1.3)
BILIRUB UR QL STRIP: NEGATIVE
BUN SERPL-MCNC: 14 MG/DL (ref 7–30)
CALCIUM SERPL-MCNC: 8.9 MG/DL (ref 8.5–10.1)
CHLORIDE SERPL-SCNC: 104 MMOL/L (ref 94–109)
CO2 SERPL-SCNC: 29 MMOL/L (ref 20–32)
COLOR UR AUTO: NORMAL
CREAT BLD-MCNC: 1 MG/DL (ref 0.66–1.25)
CREAT SERPL-MCNC: 0.92 MG/DL (ref 0.66–1.25)
DIFFERENTIAL METHOD BLD: NORMAL
EOSINOPHIL # BLD AUTO: 0.3 10E9/L (ref 0–0.7)
EOSINOPHIL NFR BLD AUTO: 3.6 %
ERYTHROCYTE [DISTWIDTH] IN BLOOD BY AUTOMATED COUNT: 14 % (ref 10–15)
GFR SERPL CREATININE-BSD FRML MDRD: 73 ML/MIN/{1.73_M2}
GFR SERPL CREATININE-BSD FRML MDRD: 83 ML/MIN/{1.73_M2}
GLUCOSE SERPL-MCNC: 89 MG/DL (ref 70–99)
GLUCOSE UR STRIP-MCNC: NEGATIVE MG/DL
HCT VFR BLD AUTO: 40.6 % (ref 40–53)
HCV AB SERPL QL IA: NONREACTIVE
HGB BLD-MCNC: 13.6 G/DL (ref 13.3–17.7)
HGB UR QL STRIP: NEGATIVE
IMM GRANULOCYTES # BLD: 0 10E9/L (ref 0–0.4)
IMM GRANULOCYTES NFR BLD: 0.3 %
KETONES UR STRIP-MCNC: NEGATIVE MG/DL
LEUKOCYTE ESTERASE UR QL STRIP: NEGATIVE
LYMPHOCYTES # BLD AUTO: 2.3 10E9/L (ref 0.8–5.3)
LYMPHOCYTES NFR BLD AUTO: 25.6 %
MCH RBC QN AUTO: 29.4 PG (ref 26.5–33)
MCHC RBC AUTO-ENTMCNC: 33.5 G/DL (ref 31.5–36.5)
MCV RBC AUTO: 88 FL (ref 78–100)
MONOCYTES # BLD AUTO: 0.4 10E9/L (ref 0–1.3)
MONOCYTES NFR BLD AUTO: 4.9 %
NEUTROPHILS # BLD AUTO: 5.8 10E9/L (ref 1.6–8.3)
NEUTROPHILS NFR BLD AUTO: 65.1 %
NITRATE UR QL: NEGATIVE
NRBC # BLD AUTO: 0 10*3/UL
NRBC BLD AUTO-RTO: 0 /100
PH UR STRIP: 6 PH (ref 5–7)
PLATELET # BLD AUTO: 268 10E9/L (ref 150–450)
POTASSIUM SERPL-SCNC: 3.8 MMOL/L (ref 3.4–5.3)
PROT SERPL-MCNC: 8 G/DL (ref 6.8–8.8)
RBC # BLD AUTO: 4.63 10E12/L (ref 4.4–5.9)
SODIUM SERPL-SCNC: 139 MMOL/L (ref 133–144)
SOURCE: NORMAL
SP GR UR STRIP: 1.01 (ref 1–1.03)
UROBILINOGEN UR STRIP-MCNC: 0 MG/DL (ref 0–2)
WBC # BLD AUTO: 8.9 10E9/L (ref 4–11)

## 2021-01-27 PROCEDURE — 81003 URINALYSIS AUTO W/O SCOPE: CPT | Performed by: PATHOLOGY

## 2021-01-27 PROCEDURE — 36415 COLL VENOUS BLD VENIPUNCTURE: CPT | Performed by: PATHOLOGY

## 2021-01-27 PROCEDURE — 74177 CT ABD & PELVIS W/CONTRAST: CPT | Performed by: RADIOLOGY

## 2021-01-27 PROCEDURE — 80053 COMPREHEN METABOLIC PANEL: CPT | Performed by: FAMILY MEDICINE

## 2021-01-27 PROCEDURE — 85025 COMPLETE CBC W/AUTO DIFF WBC: CPT | Performed by: FAMILY MEDICINE

## 2021-01-27 PROCEDURE — 71260 CT THORAX DX C+: CPT | Performed by: RADIOLOGY

## 2021-01-27 PROCEDURE — 82565 ASSAY OF CREATININE: CPT | Performed by: PATHOLOGY

## 2021-01-27 PROCEDURE — 86803 HEPATITIS C AB TEST: CPT | Performed by: FAMILY MEDICINE

## 2021-01-27 RX ORDER — IOPAMIDOL 755 MG/ML
80 INJECTION, SOLUTION INTRAVASCULAR ONCE
Status: COMPLETED | OUTPATIENT
Start: 2021-01-27 | End: 2021-01-27

## 2021-01-27 RX ADMIN — IOPAMIDOL 80 ML: 755 INJECTION, SOLUTION INTRAVASCULAR at 14:55

## 2021-01-27 NOTE — NURSING NOTE
Chief Complaint   Patient presents with     Blood Draw     labs drawn via PIV by RN in lab.        20 g PIV left in place for CT to follow. French  was used via phone. Pt. States he is unable to provide urine at this time and agrees to return to lab with urine specimen once collected.     Marilia Hebert RN

## 2021-01-27 NOTE — LETTER
January 27, 2021      Moises RILEY [facility]  [address]   [place] [address]        Dear ,    We are writing to inform you of your test results.    Your basic metabolic panel indicates normal kidney function, normal electrolyte levels, and normal blood sugar level.     You have normal red blood cell (hemoglobin) levels, no anemia, normal white blood cell count and normal platelet levels.     The result of your recent urine tests was normal.  No infection, bleeding, or other abnormality was noted.     This all looks very good.     It was a pleasure to see you at your recent visit.  Please call if you have any questions.     Resulted Orders   CBC with platelets and differential   Result Value Ref Range    WBC 8.9 4.0 - 11.0 10e9/L    RBC Count 4.63 4.4 - 5.9 10e12/L    Hemoglobin 13.6 13.3 - 17.7 g/dL    Hematocrit 40.6 40.0 - 53.0 %    MCV 88 78 - 100 fl    MCH 29.4 26.5 - 33.0 pg    MCHC 33.5 31.5 - 36.5 g/dL    RDW 14.0 10.0 - 15.0 %    Platelet Count 268 150 - 450 10e9/L    Diff Method Automated Method     % Neutrophils 65.1 %    % Lymphocytes 25.6 %    % Monocytes 4.9 %    % Eosinophils 3.6 %    % Basophils 0.5 %    % Immature Granulocytes 0.3 %    Nucleated RBCs 0 0 /100    Absolute Neutrophil 5.8 1.6 - 8.3 10e9/L    Absolute Lymphocytes 2.3 0.8 - 5.3 10e9/L    Absolute Monocytes 0.4 0.0 - 1.3 10e9/L    Absolute Eosinophils 0.3 0.0 - 0.7 10e9/L    Absolute Basophils 0.0 0.0 - 0.2 10e9/L    Abs Immature Granulocytes 0.0 0 - 0.4 10e9/L    Absolute Nucleated RBC 0.0    Comprehensive metabolic panel (BMP + Alb, Alk Phos, ALT, AST, Total. Bili, TP)   Result Value Ref Range    Sodium 139 133 - 144 mmol/L    Potassium 3.8 3.4 - 5.3 mmol/L    Chloride 104 94 - 109 mmol/L    Carbon Dioxide 29 20 - 32 mmol/L    Anion Gap 5 3 - 14 mmol/L    Glucose 89 70 - 99 mg/dL    Urea Nitrogen 14 7 - 30 mg/dL    Creatinine 0.92 0.66 - 1.25 mg/dL    GFR Estimate 83 >60 mL/min/[1.73_m2]       Comment:      Non  GFR Calc  Starting 12/18/2018, serum creatinine based estimated GFR (eGFR) will be   calculated using the Chronic Kidney Disease Epidemiology Collaboration   (CKD-EPI) equation.      GFR Estimate If Black >90 >60 mL/min/[1.73_m2]      Comment:       GFR Calc  Starting 12/18/2018, serum creatinine based estimated GFR (eGFR) will be   calculated using the Chronic Kidney Disease Epidemiology Collaboration   (CKD-EPI) equation.      Calcium 8.9 8.5 - 10.1 mg/dL    Bilirubin Total 0.2 0.2 - 1.3 mg/dL    Albumin 3.2 (L) 3.4 - 5.0 g/dL    Protein Total 8.0 6.8 - 8.8 g/dL    Alkaline Phosphatase 114 40 - 150 U/L    ALT 26 0 - 70 U/L    AST <3 0 - 45 U/L       If you have any questions or concerns, please call the clinic at the number listed above.       Sincerely,      Serge Botello MD

## 2021-01-29 ENCOUNTER — ONCOLOGY VISIT (OUTPATIENT)
Dept: ONCOLOGY | Facility: CLINIC | Age: 71
End: 2021-01-29
Attending: INTERNAL MEDICINE
Payer: COMMERCIAL

## 2021-01-29 ENCOUNTER — PATIENT OUTREACH (OUTPATIENT)
Dept: NURSING | Facility: CLINIC | Age: 71
End: 2021-01-29
Payer: COMMERCIAL

## 2021-01-29 VITALS
RESPIRATION RATE: 22 BRPM | HEART RATE: 85 BPM | BODY MASS INDEX: 23.39 KG/M2 | WEIGHT: 144.9 LBS | SYSTOLIC BLOOD PRESSURE: 151 MMHG | TEMPERATURE: 97.9 F | OXYGEN SATURATION: 97 % | DIASTOLIC BLOOD PRESSURE: 77 MMHG

## 2021-01-29 DIAGNOSIS — R06.02 SOB (SHORTNESS OF BREATH): ICD-10-CM

## 2021-01-29 DIAGNOSIS — I71.40 ABDOMINAL AORTIC ANEURYSM (AAA) WITHOUT RUPTURE (H): ICD-10-CM

## 2021-01-29 DIAGNOSIS — C37 MALIGNANT THYMOMA (H): ICD-10-CM

## 2021-01-29 DIAGNOSIS — C34.92 MALIGNANT NEOPLASM OF LEFT LUNG, UNSPECIFIED PART OF LUNG (H): Primary | ICD-10-CM

## 2021-01-29 DIAGNOSIS — G89.3 CANCER ASSOCIATED PAIN: ICD-10-CM

## 2021-01-29 PROCEDURE — 99215 OFFICE O/P EST HI 40 MIN: CPT | Performed by: INTERNAL MEDICINE

## 2021-01-29 PROCEDURE — G0463 HOSPITAL OUTPT CLINIC VISIT: HCPCS

## 2021-01-29 RX ORDER — MIRTAZAPINE 7.5 MG/1
7.5 TABLET, FILM COATED ORAL AT BEDTIME
COMMUNITY
End: 2021-03-26

## 2021-01-29 RX ORDER — ZOLPIDEM TARTRATE 10 MG/1
10 TABLET ORAL
Qty: 30 TABLET | Refills: 3 | Status: SHIPPED | OUTPATIENT
Start: 2021-01-29 | End: 2021-03-26

## 2021-01-29 ASSESSMENT — ACTIVITIES OF DAILY LIVING (ADL): DEPENDENT_IADLS:: COOKING;CLEANING;LAUNDRY;SHOPPING;MEDICATION MANAGEMENT;TRANSPORTATION

## 2021-01-29 NOTE — LETTER
1/29/2021         RE: Moises Chacon  [facility]  [address]  [facility] [address]        Dear Colleague,    Thank you for referring your patient, Moises Chacon, to the [facility] [place]. Please see a copy of my visit note below.    [facility]  HEMATOLOGY AND ONCOLOGY    FOLLOW-UP VISIT NOTE    PATIENT NAME: Moises Chacon MRN # [id number]  DATE OF VISIT: Jan 29, 2021 YOB: 1950    REFERRING PROVIDER: Serge Botello MD  [id number] [place] [address]    CANCER TYPE: Adenocarcinoma from left lower lobe of lung; PDL 1+60%, K-zena mutation +  STAGE: IIB vs IIIA     TREATMENT SUMMARY:  -Patient has a 50+ pack year smoking history and was noted to have a left lower lobe mass in 2018.  This was not followed initially.  He was worked up for abdominal pain which revealed growth in his left lower lobe mass.  He underwent left lower lobectomy on 5/6/2020 at [facility] in [place].  He has moved to [place] after his surgery and established care with [facility].  - He had resection of mediastinal mass on 8/21/20 which revealed a thymoma    CURRENT INTERVENTIONS:  Surveillance post surgical resection for locally advanced non-small cell lung cancer    SUBJECTIVE   Moises Chacon is being followed for non small cell lung cancer.     Mr. Chacon was admitted with acute shortness of breath on 9/18/20. Mr. Chacon comes to a scheduled follow up visit.     He still has a lot of shortness of breath which has not improved at all. He cannot walk even a 100 feet before getting short of breath.        PAST MEDICAL HISTORY     Past Medical History:   Diagnosis Date     Allergies      Anemia      BPH (benign prostatic hyperplasia)      Chronic pain      Constipation      Dyslipidemia      GERD (gastroesophageal reflux disease)      Hypertension      Mediastinal mass      Primary lung  adenocarcinoma (H)      Stroke (H)      Tobacco use          CURRENT OUTPATIENT MEDICATIONS     Current Outpatient Medications   Medication Sig     acetaminophen (TYLENOL) 325 MG tablet Take 3 tablets (975 mg) by mouth every 6 hours     amLODIPine (NORVASC) 10 MG tablet TAKE 1 TABLET BY MOUTH DAILY.     cetirizine (ZYRTEC) 10 MG tablet Take 1 tablet (10 mg) by mouth At Bedtime     furosemide (LASIX) 20 MG tablet TAKE 1 TABLET(20 MG) BY MOUTH DAILY     gabapentin (NEURONTIN) 300 MG capsule Take 1 capsule (300 mg) by mouth At Bedtime     metoprolol tartrate (LOPRESSOR) 25 MG tablet TAKE 1 TABLET(25 MG) BY MOUTH TWICE DAILY     mirtazapine (REMERON) 7.5 MG tablet Take 7.5 mg by mouth At Bedtime     omeprazole (PRILOSEC) 40 MG DR capsule TAKE 1 CAPSULE(40 MG) BY MOUTH DAILY     oxyCODONE (ROXICODONE) 5 MG tablet Take 1 tablet (5 mg) by mouth every 6 hours as needed for moderate to severe pain     sennosides (SENOKOT) 8.6 MG tablet Take 1 tablet by mouth 2 times daily     traMADol (ULTRAM) 50 MG tablet TAKE 1 TABLET(50 MG) BY MOUTH EVERY 6 HOURS AS NEEDED FOR SEVERE PAIN     umeclidinium-vilanterol (ANORO ELLIPTA) 62.5-25 MCG/INH oral inhaler Inhale 1 puff into the lungs daily     aspirin (ASA) 81 MG EC tablet Take 1 tablet (81 mg) by mouth daily (Patient not taking: Reported on 1/29/2021)     No current facility-administered medications for this visit.         ALLERGIES      Allergies   Allergen Reactions     Flomax [Tamsulosin]      ITCHING        REVIEW OF SYSTEMS   As above in the HPI, o/w complete 12-point ROS was negative.     PHYSICAL EXAM   BP (!) 151/77   Pulse 85   Temp 97.9  F (36.6  C) (Tympanic)   Resp 22   Wt 65.7 kg (144 lb 14.4 oz)   SpO2 97%   BMI 23.39 kg/m    GEN: NAD  HEENT: PERRL, EOMI, no icterus, injection or pallor. Oropharynx is clear.  LYMPHATICs: no cervical or supraclavicular lymphadenopathy; no other abn lymphadenopathy  PULMONARY: clear with good air entry bilaterally  CARDIOVASCULAR:  regular, no murmurs, rubs, or gallops  GASTROINTESTINAL: soft, non-tender, non-distended, normal bowel sounds, no hepatosplenomegaly by percussion or palpation  MUSCULOSKELTAL: warm, well perfused, no edema  NEURO: awake, alert and oriented to time place and person, cranial nerves intact - II - XII, no focal neurologic deficits  SKIN: no rashes     LABORATORY AND IMAGING STUDIES     Recent Labs   Lab Test 01/27/21  1426 10/09/20  1914 09/29/20  1200 09/19/20  0740 09/18/20  1234    136 137 135 136   POTASSIUM 3.8 4.4 4.1 4.5 4.8   CHLORIDE 104 100 101 102 104   CO2 29 29 28 26 28   ANIONGAP 5 7 8 7 4   BUN 14 14 10 24 17   CR 0.92 1.11 1.08 1.32* 1.14   GLC 89 94 92 94 102*   MERI 8.9 8.7 9.5 9.4 8.8     Recent Labs   Lab Test 09/19/20 0740 08/29/20  0725 08/20/20  1524 06/19/20  1523   MAG 2.2 1.9 2.0 2.1   PHOS 4.3  --   --   --      Lab Test 01/27/21  1426 10/09/20  1914 09/29/20  1200 09/19/20  0740 09/18/20  1234 08/28/20  0441 08/28/20  0441   WBC 8.9 5.3 6.4 8.8 9.0   < > 13.5*   HGB 13.6 11.1* 12.6* 11.1* 11.3*   < > 12.7*    245 404 278 297   < > 354   MCV 88 95 90 93 93   < > 93   NEUTROPHIL 65.1  --  63.3  --  79.8  --  84.5     Recent Labs   Lab Test 01/27/21  1426 10/09/20  1914 09/29/20  1200 06/19/20  1523 06/19/20  1523   BILITOTAL 0.2 0.4 0.4   < > 0.3   ALKPHOS 114 113 109   < > 101   ALT 26 18 16   < > 15   AST <3 20 11   < > 10   ALBUMIN 3.2* 2.6* 3.0*   < > 3.1*   LDH  --   --   --   --  168    < > = values in this interval not displayed.     TSH   Date Value Ref Range Status   11/09/2009 1.63 0.4 - 5.0 mU/L Final     No results for input(s): CEA in the last 94768 hours.  Results for orders placed or performed in visit on 01/27/21   CT Chest/Abdomen/Pelvis w Contrast    Narrative    EXAM: CT CHEST/ABDOMEN/PELVIS W CONTRAST  LOCATION: [facility]  DATE/TIME: 1/27/2021 2:39 PM    INDICATION: Follow-up non-small cell lung cancer.  COMPARISON: 09/29/2020.  TECHNIQUE: CT  scan of the chest, abdomen, and pelvis was performed following injection of IV contrast. Multiplanar reformats were obtained. Dose reduction techniques were used.   CONTRAST: Isovue 370 80 ml.    FINDINGS:   LUNGS AND PLEURA: Postop left lower lobectomy with additional wedge resection left upper lobe. Mild emphysema. Previously seen pulmonary edema and pleural effusions have resolved. No pulmonary nodules or infiltrate.    MEDIASTINUM/AXILLAE: Mildly prominent mediastinal lymph nodes are smaller than previous. For example, an AP window lymph node measures 11 mm short axis compared to previous 1.4 cm. Numerous left axillary lymph nodes have increased in size when compared   to previous, the largest measuring 8 mm short axis.    HEPATOBILIARY: Gallstones in the gallbladder fundus again seen.    PANCREAS: Normal.    SPLEEN: Normal.    ADRENAL GLANDS: Normal.    KIDNEYS/BLADDER: Normal.    BOWEL: Normal.    LYMPH NODES: Normal.    VASCULATURE: Distal abdominal aortic aneurysm measures 5.6 x 4.2 cm, not appreciably changed. Stents in both common iliac and right external iliac arteries.    PELVIC ORGANS: Normal.    MUSCULOSKELETAL: Right inguinal herniorrhaphy. Stable compression fracture of L1.      Impression    IMPRESSION:  1.  New mildly prominent left axillary lymph nodes, indeterminate but most likely reactive.  2.  Previously seen pulmonary edema and pleural effusions have resolved.  3.  Stable 5.6 cm distal abdominal aortic aneurysm.          ASSESSMENT AND PLAN     1. Shortness of breath    2. Abdominal aortic aneurysm  3. Stage IIb non-small cell adenocarcinoma of lung from left lower lobe s/p left lower lobectomy on 5/6/2020  4. 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 Dyspnea on minimal exertion  6. Dysphagia  7. Multiple medical comorbidities including hypertension, dyslipidemia  8. Limited to poor understanding of disease and health       1. Malignant  neoplasm of left lung, unspecified part of lung (H)  - CBC with platelets differential  - Comprehensive metabolic panel  - His CT chest was reviewed, No obvious new lung mets  Persistent stable mediastinal lymph nodes with some growth in left axillary nodes (these are unlikely to be malignant)    Patient unable to walk 3 min before having to wait for shortness of breath . He is not a candidate for adjuvant chemotherapy.  We will continue to monitor closely  - Plan to follow up in 3 months with CT Chest/Abdomen/Pelvis w Contrast and following labs   - CBC with platelets differential; Future   - Comprehensive metabolic panel; Future   - Lactate Dehydrogenase; Future    2. Malignant thymoma (H)  Reviewed pathology findings with patient  Negative margins and stage I disease  He does not need any additional surveillance or interventions for this.     3. Acute on chronic congestive heart failure, unspecified heart failure type (H)  - Patient started on lasix during hospitalization   He denies feeling dizzy, having dry mouth, feeling dehydrated   Continues to complain of shortness of breath with minimal activity.     CT chest did not suggest any cause for this    He should follow his PCP and cardiology for his shortness of breath       4. Abdominal aortic aneurysm:  - I have pasted representative image from his CT scan showing AAA.   - I will refer him to vascular surgery for their input    45 minutes spent on the date of the encounter doing chart review, history and exam, documentation and further activities as noted above       Again, thank you for allowing me to participate in the care of your patient.        Sincerely,        Michael Ross MD

## 2021-01-29 NOTE — PROGRESS NOTES
Clinic Care Coordination Contact    Follow Up Progress Note      Assessment: Call placed to Pt's sister, Rosa. She reports that Pt moved into the [facility] 2 weeks ago. She states that the [facility] van is unable to provide transportation to/from the clinic, so Pt schedules transportation with his health insurance and the support of [facility] staff.     Goals addressed this encounter:   Goals Addressed                 This Visit's Progress       Patient Stated      COMPLETED: 1.  Find Housing (pt-stated)   100%     Goal Statement: In the next three months I will move in to an assisted living  Date Goal set: 9/22/20  Barriers: My sister is not able to meet my care needs long term.  Strengths: I have a safe place to live until I can find an Assisted Living.  Date to Achieve By: Extended to 1/30/21  Patient expressed understanding of goal: Yes  Action steps to achieve this goal:  1. Care Coordinator will assist me with getting connected to [facility] to have a [place]. Completed on 10/8/20  2. [place] and [place] for [place]-LTC   3. Care Coordination team will assist me as needed with this process.          COMPLETED: 2.  Work with my PCP and other specialists (pt-stated)   100%     Goal Statement: In the next six months I will work with my medical care team to meet my needs.  Date Goal set: 10/12/20  Barriers: Language barriers  Strengths: My sister has been helping me.  Date to Achieve By: 4/30/2020  Patient expressed understanding of goal: Yes  Action steps to achieve this goal:  1. I will attend my upcoming appointments including:  10/16 - swallow study at 11 am  11/11/20 - In office with Dr Diggs at 10:40 am  11/23/20 - Cardiology at 2:45 pm  12/1/20 - Counseling at 12:30 pm  2. I will schedule a Gastroenterology appointment               Intervention/Education provided during outreach: Rosa reports that both goals are completed at this time. No new care  coordination needs reported at this time. Patient is transitioned to maintenance.      Outreach Frequency: monthly    Plan:   ARIANA LA will outreach to Rosa in 2 months to discuss Graduation from Care Coordination.    Melvin Dunn, [facility]  Clinic Care Coordinator  Ph. [phone]  [email or web address]

## 2021-01-29 NOTE — PROGRESS NOTES
[facility]  HEMATOLOGY AND ONCOLOGY    FOLLOW-UP VISIT NOTE    PATIENT NAME: Moises Chacon MRN # [id number]  DATE OF VISIT: Jan 29, 2021 YOB: 1950    REFERRING PROVIDER: Serge Botello MD  [id number] [place] [address]    CANCER TYPE: Adenocarcinoma from left lower lobe of lung; PDL 1+60%, K-zena mutation +  STAGE: IIB vs IIIA     TREATMENT SUMMARY:  -Patient has a 50+ pack year smoking history and was noted to have a left lower lobe mass in 2018.  This was not followed initially.  He was worked up for abdominal pain which revealed growth in his left lower lobe mass.  He underwent left lower lobectomy on 5/6/2020 at [facility] in [place].  He has moved to [place] after his surgery and established care with [facility].  - He had resection of mediastinal mass on 8/21/20 which revealed a thymoma    CURRENT INTERVENTIONS:  Surveillance post surgical resection for locally advanced non-small cell lung cancer    SUBJECTIVE   Moises Chacon is being followed for non small cell lung cancer.     Mr. Chacon was admitted with acute shortness of breath on 9/18/20. Mr. Chacon comes to a scheduled follow up visit.     He still has a lot of shortness of breath which has not improved at all. He cannot walk even a 100 feet before getting short of breath.        PAST MEDICAL HISTORY     Past Medical History:   Diagnosis Date     Allergies      Anemia      BPH (benign prostatic hyperplasia)      Chronic pain      Constipation      Dyslipidemia      GERD (gastroesophageal reflux disease)      Hypertension      Mediastinal mass      Primary lung adenocarcinoma (H)      Stroke (H)      Tobacco use          CURRENT OUTPATIENT MEDICATIONS     Current Outpatient Medications   Medication Sig     acetaminophen (TYLENOL) 325 MG tablet Take 3 tablets (975 mg) by mouth every 6 hours     amLODIPine (NORVASC) 10 MG tablet TAKE 1 TABLET BY MOUTH  DAILY.     cetirizine (ZYRTEC) 10 MG tablet Take 1 tablet (10 mg) by mouth At Bedtime     furosemide (LASIX) 20 MG tablet TAKE 1 TABLET(20 MG) BY MOUTH DAILY     gabapentin (NEURONTIN) 300 MG capsule Take 1 capsule (300 mg) by mouth At Bedtime     metoprolol tartrate (LOPRESSOR) 25 MG tablet TAKE 1 TABLET(25 MG) BY MOUTH TWICE DAILY     mirtazapine (REMERON) 7.5 MG tablet Take 7.5 mg by mouth At Bedtime     omeprazole (PRILOSEC) 40 MG DR capsule TAKE 1 CAPSULE(40 MG) BY MOUTH DAILY     oxyCODONE (ROXICODONE) 5 MG tablet Take 1 tablet (5 mg) by mouth every 6 hours as needed for moderate to severe pain     sennosides (SENOKOT) 8.6 MG tablet Take 1 tablet by mouth 2 times daily     traMADol (ULTRAM) 50 MG tablet TAKE 1 TABLET(50 MG) BY MOUTH EVERY 6 HOURS AS NEEDED FOR SEVERE PAIN     umeclidinium-vilanterol (ANORO ELLIPTA) 62.5-25 MCG/INH oral inhaler Inhale 1 puff into the lungs daily     aspirin (ASA) 81 MG EC tablet Take 1 tablet (81 mg) by mouth daily (Patient not taking: Reported on 1/29/2021)     No current facility-administered medications for this visit.         ALLERGIES      Allergies   Allergen Reactions     Flomax [Tamsulosin]      ITCHING        REVIEW OF SYSTEMS   As above in the HPI, o/w complete 12-point ROS was negative.     PHYSICAL EXAM   BP (!) 151/77   Pulse 85   Temp 97.9  F (36.6  C) (Tympanic)   Resp 22   Wt 65.7 kg (144 lb 14.4 oz)   SpO2 97%   BMI 23.39 kg/m    GEN: NAD  HEENT: PERRL, EOMI, no icterus, injection or pallor. Oropharynx is clear.  LYMPHATICs: no cervical or supraclavicular lymphadenopathy; no other abn lymphadenopathy  PULMONARY: clear with good air entry bilaterally  CARDIOVASCULAR: regular, no murmurs, rubs, or gallops  GASTROINTESTINAL: soft, non-tender, non-distended, normal bowel sounds, no hepatosplenomegaly by percussion or palpation  MUSCULOSKELTAL: warm, well perfused, no edema  NEURO: awake, alert and oriented to time place and person, cranial nerves intact - II  - XII, no focal neurologic deficits  SKIN: no rashes     LABORATORY AND IMAGING STUDIES     Recent Labs   Lab Test 01/27/21  1426 10/09/20  1914 09/29/20  1200 09/19/20  0740 09/18/20  1234    136 137 135 136   POTASSIUM 3.8 4.4 4.1 4.5 4.8   CHLORIDE 104 100 101 102 104   CO2 29 29 28 26 28   ANIONGAP 5 7 8 7 4   BUN 14 14 10 24 17   CR 0.92 1.11 1.08 1.32* 1.14   GLC 89 94 92 94 102*   MERI 8.9 8.7 9.5 9.4 8.8     Recent Labs   Lab Test 09/19/20  0740 08/29/20  0725 08/20/20  1524 06/19/20  1523   MAG 2.2 1.9 2.0 2.1   PHOS 4.3  --   --   --      Lab Test 01/27/21  1426 10/09/20  1914 09/29/20  1200 09/19/20  0740 09/18/20  1234 08/28/20  0441 08/28/20  0441   WBC 8.9 5.3 6.4 8.8 9.0   < > 13.5*   HGB 13.6 11.1* 12.6* 11.1* 11.3*   < > 12.7*    245 404 278 297   < > 354   MCV 88 95 90 93 93   < > 93   NEUTROPHIL 65.1  --  63.3  --  79.8  --  84.5     Recent Labs   Lab Test 01/27/21  1426 10/09/20  1914 09/29/20  1200 06/19/20  1523 06/19/20  1523   BILITOTAL 0.2 0.4 0.4   < > 0.3   ALKPHOS 114 113 109   < > 101   ALT 26 18 16   < > 15   AST <3 20 11   < > 10   ALBUMIN 3.2* 2.6* 3.0*   < > 3.1*   LDH  --   --   --   --  168    < > = values in this interval not displayed.     TSH   Date Value Ref Range Status   11/09/2009 1.63 0.4 - 5.0 mU/L Final     No results for input(s): CEA in the last 95425 hours.  Results for orders placed or performed in visit on 01/27/21   CT Chest/Abdomen/Pelvis w Contrast    Narrative    EXAM: CT CHEST/ABDOMEN/PELVIS W CONTRAST  LOCATION: [facility]  DATE/TIME: 1/27/2021 2:39 PM    INDICATION: Follow-up non-small cell lung cancer.  COMPARISON: 09/29/2020.  TECHNIQUE: CT scan of the chest, abdomen, and pelvis was performed following injection of IV contrast. Multiplanar reformats were obtained. Dose reduction techniques were used.   CONTRAST: Isovue 370 80 ml.    FINDINGS:   LUNGS AND PLEURA: Postop left lower lobectomy with additional wedge resection left upper  lobe. Mild emphysema. Previously seen pulmonary edema and pleural effusions have resolved. No pulmonary nodules or infiltrate.    MEDIASTINUM/AXILLAE: Mildly prominent mediastinal lymph nodes are smaller than previous. For example, an AP window lymph node measures 11 mm short axis compared to previous 1.4 cm. Numerous left axillary lymph nodes have increased in size when compared   to previous, the largest measuring 8 mm short axis.    HEPATOBILIARY: Gallstones in the gallbladder fundus again seen.    PANCREAS: Normal.    SPLEEN: Normal.    ADRENAL GLANDS: Normal.    KIDNEYS/BLADDER: Normal.    BOWEL: Normal.    LYMPH NODES: Normal.    VASCULATURE: Distal abdominal aortic aneurysm measures 5.6 x 4.2 cm, not appreciably changed. Stents in both common iliac and right external iliac arteries.    PELVIC ORGANS: Normal.    MUSCULOSKELETAL: Right inguinal herniorrhaphy. Stable compression fracture of L1.      Impression    IMPRESSION:  1.  New mildly prominent left axillary lymph nodes, indeterminate but most likely reactive.  2.  Previously seen pulmonary edema and pleural effusions have resolved.  3.  Stable 5.6 cm distal abdominal aortic aneurysm.          ASSESSMENT AND PLAN     1. Shortness of breath    2. Abdominal aortic aneurysm  3. Stage IIb non-small cell adenocarcinoma of lung from left lower lobe s/p left lower lobectomy on 5/6/2020  4. 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 Dyspnea on minimal exertion  6. Dysphagia  7. Multiple medical comorbidities including hypertension, dyslipidemia  8. Limited to poor understanding of disease and health       1. Malignant neoplasm of left lung, unspecified part of lung (H)  - CBC with platelets differential  - Comprehensive metabolic panel  - His CT chest was reviewed, No obvious new lung mets  Persistent stable mediastinal lymph nodes with some growth in left axillary nodes (these are unlikely to be  malignant)    Patient unable to walk 3 min before having to wait for shortness of breath . He is not a candidate for adjuvant chemotherapy.  We will continue to monitor closely  - Plan to follow up in 3 months with CT Chest/Abdomen/Pelvis w Contrast and following labs   - CBC with platelets differential; Future   - Comprehensive metabolic panel; Future   - Lactate Dehydrogenase; Future    2. Malignant thymoma (H)  Reviewed pathology findings with patient  Negative margins and stage I disease  He does not need any additional surveillance or interventions for this.     3. Acute on chronic congestive heart failure, unspecified heart failure type (H)  - Patient started on lasix during hospitalization   He denies feeling dizzy, having dry mouth, feeling dehydrated   Continues to complain of shortness of breath with minimal activity.     CT chest did not suggest any cause for this    He should follow his PCP and cardiology for his shortness of breath       4. Abdominal aortic aneurysm:  - I have pasted representative image from his CT scan showing AAA.   - I will refer him to vascular surgery for their input    45 minutes spent on the date of the encounter doing chart review, history and exam, documentation and further activities as noted above

## 2021-01-29 NOTE — NURSING NOTE
"Oncology Rooming Note    January 29, 2021 3:57 PM   Moises Chacon is a 70 year old male who presents for:    Chief Complaint   Patient presents with     Oncology Clinic Visit     Malignant neoplasm of left lung, unspecified part of lung      Initial Vitals: BP (!) 151/77   Pulse 85   Temp 97.9  F (36.6  C) (Tympanic)   Resp 22   Wt 65.7 kg (144 lb 14.4 oz)   SpO2 97%   BMI 23.39 kg/m   Estimated body mass index is 23.39 kg/m  as calculated from the following:    Height as of 1/15/21: 1.676 m (5' 6\").    Weight as of this encounter: 65.7 kg (144 lb 14.4 oz). Body surface area is 1.75 meters squared.  Data Unavailable Comment: Data Unavailable   No LMP for male patient.  Allergies reviewed: Yes  Medications reviewed: Yes    Medications: Medication refills not needed today.  Pharmacy name entered into [employer]:    [id number] [place], [place] - [address] [id number] [place], [place] - [address] [facility] [id number] - [place], [place] - [address] AT [facility] OF [facility] & [address]    Clinical concerns: c/o itching all over body, when pt scratches, he breaks out in welts, c/lo swelling in legs and SOB X 3 weeks      Claudette Perez, [facility]              "

## 2021-02-01 ENCOUNTER — TELEPHONE (OUTPATIENT)
Dept: OTHER | Facility: CLINIC | Age: 71
End: 2021-02-01

## 2021-02-01 NOTE — TELEPHONE ENCOUNTER
Pt referred to [facility] by Michael Ross MD for AAA    Patient has CT chest abdomen pelvis in [facility] on 1/27/21.     Pt needs to be scheduled for in-person consult with vascular surgery.  Will route to scheduling to coordinate an appointment ASAP.    Bhumika MCKEON, RN    [facility]  Office: [phone]  Fax: [phone]

## 2021-02-01 NOTE — TELEPHONE ENCOUNTER
02/01/2021 LM with sister, she will call assisted living and have them call vascular clinic back.      Birdie GORDON

## 2021-02-02 NOTE — TELEPHONE ENCOUNTER
Moises scheduled to see Dr. Arizmendi on 2/5/2021 in [place].     Scheduled by assisted living facility.     Phone interpretor will be needed.     Elaine MEIER

## 2021-02-05 ENCOUNTER — VIRTUAL VISIT (OUTPATIENT)
Dept: FAMILY MEDICINE | Facility: CLINIC | Age: 71
End: 2021-02-05
Payer: COMMERCIAL

## 2021-02-05 ENCOUNTER — TELEPHONE (OUTPATIENT)
Dept: FAMILY MEDICINE | Facility: CLINIC | Age: 71
End: 2021-02-05

## 2021-02-05 DIAGNOSIS — M79.674 PAIN OF TOE OF RIGHT FOOT: Primary | ICD-10-CM

## 2021-02-05 PROCEDURE — 99213 OFFICE O/P EST LOW 20 MIN: CPT | Mod: TEL | Performed by: FAMILY MEDICINE

## 2021-02-05 RX ORDER — INDOMETHACIN 50 MG/1
50 CAPSULE ORAL 2 TIMES DAILY WITH MEALS
Qty: 6 CAPSULE | Refills: 0 | Status: SHIPPED | OUTPATIENT
Start: 2021-02-05 | End: 2021-03-09

## 2021-02-05 ASSESSMENT — ENCOUNTER SYMPTOMS
COLOR CHANGE: 1
WOUND: 0

## 2021-02-05 NOTE — TELEPHONE ENCOUNTER
Called patients home, let them know provider wants him to do labs, can go to any [place] clinic as labs are ordered in [facility], needs labs done today.     Assisted Living facility understood and will take patient to [place] by them     Karla Triplett/

## 2021-02-05 NOTE — PROGRESS NOTES
Moises is a 70 year old who is being evaluated via a billable telephone visit.      What phone number would you like to be contacted at? [phone]  How would you like to obtain your AVS? Mail a copy  Assessment & Plan     Pain of toe of right foot  Acute problem.  Patient had an acute visit for concerns of possible podagra.  This is my first visit with the patient, this is a telephone visit encounter.  Sounds like he had medical care in [place] and he may have had longstanding gout however talking with him today sounds like he discontinued this therapy as he has had infrequent flares.  He does have good kidney function on chart review, recommend he continues his chronic pain management with tramadol and to also start indomethacin management of the gout.  He is to have his uric acid level rechecked today; urged to be evaluated face-to-face in the next 2 to 3 days if symptoms do not improve or get worse.   - indomethacin (INDOCIN) 50 MG capsule; Take 1 capsule (50 mg) by mouth 2 times daily (with meals) for 3 days  - Uric acid; Future      Return today (on 2/5/2021) for Lab Work.    Dylan Villasenor MD  [facility]    Lizette De Leon is a 70 year old who presents to clinic today for the following health issues     HPI       Musculoskeletal problem/pain  Onset/Duration: 2 days  Description  Location: big toe - right  Joint Swelling: YES  Redness: YES  Pain: YES  Warmth: YES  Intensity:  mild  Progression of Symptoms:  same  Accompanying signs and symptoms:   Fevers: no  Numbness/tingling/weakness: no  History  Trauma to the area: no  Recent illness:  no  Previous similar problem: NO  Previous evaluation:  Yes- a few years ago  Precipitating or alleviating factors:  Aggravating factors include: none  Therapies tried and outcome: Tramadol, and took an old prescription of 15 mg morphine.  Symptoms refractory.        Review of Systems   Skin: Positive for color change. Negative for wound.         Objective           Vitals:  No vitals were obtained today due to virtual visit.    Physical Exam     PSYCH: Alert; coherent speech, normal   rate and volume, able to articulate logical thoughts, able   to abstract reason, no tangential thoughts, no hallucinations   or delusions  His affect is normal and pleasant  RESP: No cough, no audible wheezing, able to talk in full sentences  Remainder of exam unable to be completed due to telephone visits      Last Comprehensive Metabolic Panel:  Sodium   Date Value Ref Range Status   01/27/2021 139 133 - 144 mmol/L Final     Potassium   Date Value Ref Range Status   01/27/2021 3.8 3.4 - 5.3 mmol/L Final     Chloride   Date Value Ref Range Status   01/27/2021 104 94 - 109 mmol/L Final     Carbon Dioxide   Date Value Ref Range Status   01/27/2021 29 20 - 32 mmol/L Final     Anion Gap   Date Value Ref Range Status   01/27/2021 5 3 - 14 mmol/L Final     Glucose   Date Value Ref Range Status   01/27/2021 89 70 - 99 mg/dL Final     Urea Nitrogen   Date Value Ref Range Status   01/27/2021 14 7 - 30 mg/dL Final     Creatinine   Date Value Ref Range Status   01/27/2021 0.92 0.66 - 1.25 mg/dL Final     GFR Estimate   Date Value Ref Range Status   01/27/2021 73 >60 mL/min/[1.73_m2] Final     Calcium   Date Value Ref Range Status   01/27/2021 8.9 8.5 - 10.1 mg/dL Final     Bilirubin Total   Date Value Ref Range Status   01/27/2021 0.2 0.2 - 1.3 mg/dL Final     Alkaline Phosphatase   Date Value Ref Range Status   01/27/2021 114 40 - 150 U/L Final     ALT   Date Value Ref Range Status   01/27/2021 26 0 - 70 U/L Final     AST   Date Value Ref Range Status   01/27/2021 <3 0 - 45 U/L Final                     Phone call duration: 10 minutes

## 2021-02-08 ENCOUNTER — TELEPHONE (OUTPATIENT)
Dept: FAMILY MEDICINE | Facility: CLINIC | Age: 71
End: 2021-02-08

## 2021-02-08 DIAGNOSIS — C34.92 MALIGNANT NEOPLASM OF LEFT LUNG, UNSPECIFIED PART OF LUNG (H): ICD-10-CM

## 2021-02-08 DIAGNOSIS — C37 MALIGNANT THYMOMA (H): ICD-10-CM

## 2021-02-08 DIAGNOSIS — M79.674 PAIN OF TOE OF RIGHT FOOT: ICD-10-CM

## 2021-02-08 DIAGNOSIS — R06.02 SOB (SHORTNESS OF BREATH): ICD-10-CM

## 2021-02-08 DIAGNOSIS — G89.3 CANCER ASSOCIATED PAIN: ICD-10-CM

## 2021-02-08 DIAGNOSIS — I71.40 ABDOMINAL AORTIC ANEURYSM (AAA) WITHOUT RUPTURE (H): ICD-10-CM

## 2021-02-08 LAB
ALBUMIN SERPL-MCNC: 3.2 G/DL (ref 3.4–5)
ALP SERPL-CCNC: 141 U/L (ref 40–150)
ALT SERPL W P-5'-P-CCNC: 22 U/L (ref 0–70)
ANION GAP SERPL CALCULATED.3IONS-SCNC: 6 MMOL/L (ref 3–14)
AST SERPL W P-5'-P-CCNC: 4 U/L (ref 0–45)
BASOPHILS # BLD AUTO: 0.1 10E9/L (ref 0–0.2)
BASOPHILS NFR BLD AUTO: 1.1 %
BILIRUB SERPL-MCNC: 0.2 MG/DL (ref 0.2–1.3)
BUN SERPL-MCNC: 11 MG/DL (ref 7–30)
CALCIUM SERPL-MCNC: 8.5 MG/DL (ref 8.5–10.1)
CHLORIDE SERPL-SCNC: 106 MMOL/L (ref 94–109)
CO2 SERPL-SCNC: 28 MMOL/L (ref 20–32)
CREAT SERPL-MCNC: 1.03 MG/DL (ref 0.66–1.25)
DIFFERENTIAL METHOD BLD: ABNORMAL
EOSINOPHIL # BLD AUTO: 0.3 10E9/L (ref 0–0.7)
EOSINOPHIL NFR BLD AUTO: 4.8 %
ERYTHROCYTE [DISTWIDTH] IN BLOOD BY AUTOMATED COUNT: 13.6 % (ref 10–15)
GFR SERPL CREATININE-BSD FRML MDRD: 72 ML/MIN/{1.73_M2}
GLUCOSE SERPL-MCNC: 110 MG/DL (ref 70–99)
HCT VFR BLD AUTO: 38 % (ref 40–53)
HGB BLD-MCNC: 13.1 G/DL (ref 13.3–17.7)
IMM GRANULOCYTES # BLD: 0 10E9/L (ref 0–0.4)
IMM GRANULOCYTES NFR BLD: 0.2 %
LDH SERPL L TO P-CCNC: 159 U/L (ref 85–227)
LYMPHOCYTES # BLD AUTO: 1.7 10E9/L (ref 0.8–5.3)
LYMPHOCYTES NFR BLD AUTO: 27.1 %
MCH RBC QN AUTO: 30.2 PG (ref 26.5–33)
MCHC RBC AUTO-ENTMCNC: 34.5 G/DL (ref 31.5–36.5)
MCV RBC AUTO: 88 FL (ref 78–100)
MONOCYTES # BLD AUTO: 0.3 10E9/L (ref 0–1.3)
MONOCYTES NFR BLD AUTO: 4.6 %
NEUTROPHILS # BLD AUTO: 3.9 10E9/L (ref 1.6–8.3)
NEUTROPHILS NFR BLD AUTO: 62.2 %
NRBC # BLD AUTO: 0 10*3/UL
NRBC BLD AUTO-RTO: 0 /100
NT-PROBNP SERPL-MCNC: 243 PG/ML (ref 0–125)
PLATELET # BLD AUTO: 235 10E9/L (ref 150–450)
POTASSIUM SERPL-SCNC: 4 MMOL/L (ref 3.4–5.3)
PROT SERPL-MCNC: 8 G/DL (ref 6.8–8.8)
RBC # BLD AUTO: 4.34 10E12/L (ref 4.4–5.9)
SODIUM SERPL-SCNC: 140 MMOL/L (ref 133–144)
URATE SERPL-MCNC: 7.9 MG/DL (ref 3.5–7.2)
WBC # BLD AUTO: 6.3 10E9/L (ref 4–11)

## 2021-02-08 PROCEDURE — 36415 COLL VENOUS BLD VENIPUNCTURE: CPT | Performed by: PATHOLOGY

## 2021-02-08 PROCEDURE — 85025 COMPLETE CBC W/AUTO DIFF WBC: CPT | Performed by: PATHOLOGY

## 2021-02-08 PROCEDURE — 83880 ASSAY OF NATRIURETIC PEPTIDE: CPT | Performed by: PATHOLOGY

## 2021-02-08 PROCEDURE — 80053 COMPREHEN METABOLIC PANEL: CPT | Performed by: PATHOLOGY

## 2021-02-08 PROCEDURE — 83615 LACTATE (LD) (LDH) ENZYME: CPT | Performed by: PATHOLOGY

## 2021-02-08 PROCEDURE — 84550 ASSAY OF BLOOD/URIC ACID: CPT | Performed by: PATHOLOGY

## 2021-02-08 NOTE — TELEPHONE ENCOUNTER
Patient was evaluated by me for suspected podagra, uric acid level elevated at 7.9.  Please have him follow-up with his primary to see if chronic uric acid lowering therapies are appropriate.      Dylan Villasenor MD

## 2021-02-12 ENCOUNTER — OFFICE VISIT (OUTPATIENT)
Dept: OTHER | Facility: CLINIC | Age: 71
End: 2021-02-12
Attending: INTERNAL MEDICINE
Payer: COMMERCIAL

## 2021-02-12 VITALS — TEMPERATURE: 97.1 F | SYSTOLIC BLOOD PRESSURE: 152 MMHG | DIASTOLIC BLOOD PRESSURE: 84 MMHG | HEART RATE: 81 BPM

## 2021-02-12 DIAGNOSIS — C37 MALIGNANT THYMOMA (H): ICD-10-CM

## 2021-02-12 DIAGNOSIS — I71.40 ABDOMINAL AORTIC ANEURYSM (AAA) WITHOUT RUPTURE (H): Primary | ICD-10-CM

## 2021-02-12 DIAGNOSIS — G89.3 CANCER ASSOCIATED PAIN: ICD-10-CM

## 2021-02-12 DIAGNOSIS — C34.92 MALIGNANT NEOPLASM OF LEFT LUNG, UNSPECIFIED PART OF LUNG (H): ICD-10-CM

## 2021-02-12 PROCEDURE — G0463 HOSPITAL OUTPT CLINIC VISIT: HCPCS

## 2021-02-12 PROCEDURE — 99417 PROLNG OP E/M EACH 15 MIN: CPT | Performed by: SURGERY

## 2021-02-12 PROCEDURE — 99205 OFFICE O/P NEW HI 60 MIN: CPT | Performed by: SURGERY

## 2021-02-12 NOTE — PROGRESS NOTES
"Moises Chacon is a 70 year old male who presents for:  Chief Complaint   Patient presents with     Consult     Pt referred to [facility] by Michael Ross MD for AAA. Patient has CT chest abdomen pelvis in [facility] on 1/27/21.        Vitals:    Vitals:    02/12/21 1150 02/12/21 1151   BP: (!) 159/85 (!) 152/84   BP Location: Left arm Right arm   Patient Position: Chair Chair   Cuff Size: Adult Regular Adult Regular   Pulse: 80 81   Temp: 97.1  F (36.2  C)    TempSrc: Temporal        BMI:  Estimated body mass index is 23.39 kg/m  as calculated from the following:    Height as of 1/15/21: 5' 6\" (1.676 m).    Weight as of 1/29/21: 144 lb 14.4 oz (65.7 kg).    Pain Score:  Data Unavailable        Elizabeth Sanford MA    "

## 2021-02-12 NOTE — PROGRESS NOTES
"[facility]     Moises Chacon MRN# [id number]   YOB: 1950 Age: 70 year old        Reason for Clinic Visit: Abdominal Aortic Aneurysm             History of Present Illness:   Moises Chacon is a 70 year old male who presents for evaluation of abdominal aortic aneurysm.     He has history most notably for lung cancer. 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  Adjuvant chemotherapy was recommended when he presented for transfer of care to [facility] in 06/2020; it appears that he ultimately refused this.   His post-operative recovery has been complicated by dysphagia, chronic pain, weight loss, fluid overload and new diagnosis of CHF pEF.     He states that he was aware of the AAA, but it has been asymptomatic for him. He believes his iliac stents were placed for \"blocked arteries\" that seemed to have been causing leg pain. He denies any history of cardiac disease. He has noticed edema of bilateral legs and also complains of insomnia. He has dyspnea on exertion after a few steps, and says that his shortness of breath is the primary limiting factor in his ambulation. He cannot walk a block and has not attempted stairs in about a year. He does sleep propped up on pillows, and does not believe he could sleep lying down flat. He does not get pains in his legs.     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 Diagnosed initially 2020. Underwent left lower lobectomy 2020.     Stroke (H)      Thymoma, malignant (H)     s/p resection 2020     Tobacco use              Past Surgical History:   I have personally reviewed the following:   Past Surgical History:   Procedure Laterality Date     ENDOVASCULAR ILIAC STENT PLACEMENT  2016    Bilateral common iliac artery stent placement; right external iliac stent placement     THORACOSCOPIC LOBECTOMY LUNG  2020    Robotic-assisted left lower lung lobectomy with mediastinal lymph node dissection and cervical mediastinoscopy     THORACOSCOPIC THYMECTOMY Bilateral 2020    Procedure: Bilateral subxiphoid thoracoscopic thymectomy;  Surgeon: Tate Garcia MD;  Location:  OR            Social History:   I have personally reviewed the following:   Social History     Tobacco Use     Smoking status: Former Smoker     Packs/day: 0.50     Years: 60.00     Pack years: 30.00     Quit date: 2020     Years since quittin.5     Smokeless tobacco: Former User   Substance Use Topics     Alcohol use: No   Currently smoking; he says that he is attempting to quit, and estimates that he is still smoking a cigarette every few days. No significant alcohol intake, no illicit drug use. Previously a dalal. He is unclear what his current address is, but states that he lives in an apartment; he has a paper from [facility], which is an assisted living facility.           Family History:     Family History   Problem Relation Age of Onset     Heart Disease Mother      Cerebrovascular Disease Father      Other - See Comments Brother         passed in [place] war     Cerebrovascular Disease Sister      Cerebrovascular Disease Sister              Allergies:     Allergies   Allergen Reactions     Flomax [Tamsulosin]      ITCHING   He believes he broke out into a rash with itching after taking  cholesterol medications, but it is unclear when this was or which medication.           Medications:     Current Outpatient Medications Ordered in Epic   Medication     acetaminophen (TYLENOL) 325 MG tablet     amLODIPine (NORVASC) 10 MG tablet     aspirin (ASA) 81 MG EC tablet     cetirizine (ZYRTEC) 10 MG tablet     furosemide (LASIX) 20 MG tablet     gabapentin (NEURONTIN) 300 MG capsule     indomethacin (INDOCIN) 50 MG capsule     metoprolol tartrate (LOPRESSOR) 25 MG tablet     mirtazapine (REMERON) 7.5 MG tablet     omeprazole (PRILOSEC) 40 MG DR capsule     oxyCODONE (ROXICODONE) 5 MG tablet     sennosides (SENOKOT) 8.6 MG tablet     traMADol (ULTRAM) 50 MG tablet     umeclidinium-vilanterol (ANORO ELLIPTA) 62.5-25 MCG/INH oral inhaler     zolpidem (AMBIEN) 10 MG tablet     No current Epic-ordered facility-administered medications on file.              Review of Systems:   The 10 point Review of Systems is negative other than noted in the HPI          Physical Exam:   Vitals were reviewed  Temp: 97.1  F (36.2  C) Temp src: Temporal BP: (!) 152/84 Pulse: 81              General: sitting comfortably on exam table, NAD.  Neuro/Psych: A&O to self and location, somewhat confused about situation. Pleasantly conversant.   HENT: EOMI and conjugate. Moist mucous membranes.   Cardiac: Regular rate and rhythm, no m/g appreciated.   Pulm: Lungs CTAB, no w/r/r.  Abd: Soft, non-distended, no tenderness to palpation. No abdominal scars. Well-healed chest tube/thoracoscopy incisions.   Extrem: Grossly normal and symmetric ROM in all four extremities. BL 1-2+ pitting edema.   Skin: Clean, dry, no rashes or wounds. Warm feet.   Vasc: bilateral radial pulses palpable, right stronger. BL DP are quiet monophasic signals, BL PT are bi-triphasic signals (I am unable to palpate the pedal pulses).           Data:   Labs:       Lab Results   Component Value Date     02/08/2021    Lab Results   Component Value Date    CHLORIDE  "106 02/08/2021    Lab Results   Component Value Date    BUN 11 02/08/2021      Lab Results   Component Value Date    POTASSIUM 4.0 02/08/2021    Lab Results   Component Value Date    CO2 28 02/08/2021    Lab Results   Component Value Date    CR 1.03 02/08/2021        Lab Results   Component Value Date    WBC 6.3 02/08/2021    HGB 13.1 (L) 02/08/2021    HCT 38.0 (L) 02/08/2021    MCV 88 02/08/2021     02/08/2021       Echocardiogram (9/18/20): \"Global and regional left ventricular function is normal with an EF of 55-60%. Moderate concentric wall thickening consistent with left ventricular hypertrophy is present. The right ventricle is normal size. Global right ventricular function is normal. There is mild right ventricular Hypertrophy. Small circumferential pericardial effusion is present without any hemodynamic Significance. Dilation of the inferior vena cava is present with abnormal respiratory variation in diameter suggests a high RA pressure estimated at 15 mmHg or greater. No significant valvular abnormalities were noted.\"    I have reviewed the following images:  CT chest/abd/pelvis (1/27/21): \"1.  New mildly prominent left axillary lymph nodes, indeterminate but most likely  reactive. 2.  Previously seen pulmonary edema and pleural effusions have resolved. 3.  Stable 5.6 cm distal abdominal aortic aneurysm.\" There is additionally mild distal descending thoracic aneurysmal degeneration, 4.3 x 3.8 cm; a low left renal accessory artery involved in the neck of the aneurysm; left common iliac ectasia, 1.8 x 1.7 cm; possible stenosis vs occlusion of the right internal iliac artery; somewhat saccular appearance of the distal aneurysm.     CT chest/abd/pelvis (6/23/20): \"1.  Resection of the left lower lobe mass. There is some adjacent scarring and small volume loculated left pleural fluid. 2.  No change of an indeterminate mass at the anterior mediastinum worrisome for neoplasm. 3.  A few tiny pulmonary " "nodules noted. One of these is new at the left upper lobe. Recommend surveillance imaging for follow up. 4.  Mild biliary ductal ectasia is newly seen. Stable calcification suggesting choledocholithiasis of the distal common bile duct with a potential stone measuring 0.6 cm. 5.  Infrarenal abdominal aortic aneurysm is stable measuring 5.5 cm. 6.  A few mildly larger indeterminate lymph nodes seen in the chest.\"             Assessment and Plan:   Mr. 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       Reviewed the imaging with Mr. Chacon, along with the location of the aneurysm    Encouraged smoking cessation. He states he is trying to quit but it is unclear if he is willing to do so completely.     Despite his history of PAD and DL, he has not had a lipid panel done while in our system. He is not currently on a statin. Will order a lipid panel; I believe it is worthwhile to try to initiate a statin again. Will forward to his PCP to review with him during the next visit.     I discussed the implications of the AAA with Mr. Chacon, including the potential for ongoing growth and the risk of rupture (which I estimate to be 5-10% annually at the current size). I also discussed that we typically repair these at the current size of > 5.5 cm in males, but in his case, I would like to ensure he is in complete remission from his lung cancer, is able to quit smoking, and has an improved functional status.     He may be a candidate for endovascular repair but even this will not be completely straightforward. He is not a candidate for open repair due to his HF and COPD.    Will have him return to clinic in 6 months. I will most likely be able to use the Oncology surveillance CT imaging to evaluate size progression; if no CTs coincide with my follow-up, will " order imaging (duplex ultrasound vs CTA) at that time.      Dai Arizmendi MD    Entire visit was conducted with the assistance of a Slov[place]  via telephone.

## 2021-02-16 NOTE — PROGRESS NOTES
LOV 2/12/21,   Per Dr. Arizmendi, if Oncology obtained CTA chest/a/p then pt does not need another image prior to 6 month f/u with Dr. Arizmendi. If no CTA then will order one.     SERA PalmN, RN  [facility]  Office:  [phone] Fax: [phone]

## 2021-02-24 DIAGNOSIS — I10 ESSENTIAL HYPERTENSION: ICD-10-CM

## 2021-02-24 RX ORDER — MIRTAZAPINE 7.5 MG/1
TABLET, FILM COATED ORAL
Qty: 30 TABLET | OUTPATIENT
Start: 2021-02-24

## 2021-02-24 RX ORDER — METOPROLOL TARTRATE 25 MG/1
TABLET, FILM COATED ORAL
Qty: 60 TABLET | OUTPATIENT
Start: 2021-02-24

## 2021-02-25 ENCOUNTER — VIRTUAL VISIT (OUTPATIENT)
Dept: INTERNAL MEDICINE | Facility: CLINIC | Age: 71
End: 2021-02-25
Payer: COMMERCIAL

## 2021-02-25 DIAGNOSIS — M1A.00X0 IDIOPATHIC CHRONIC GOUT WITHOUT TOPHUS, UNSPECIFIED SITE: ICD-10-CM

## 2021-02-25 DIAGNOSIS — M10.072 ACUTE IDIOPATHIC GOUT OF LEFT FOOT: Primary | ICD-10-CM

## 2021-02-25 PROCEDURE — 99202 OFFICE O/P NEW SF 15 MIN: CPT | Mod: 95 | Performed by: PEDIATRICS

## 2021-02-25 RX ORDER — COLCHICINE 0.6 MG/1
TABLET ORAL
Qty: 8 TABLET | Refills: 0 | Status: SHIPPED | OUTPATIENT
Start: 2021-02-25 | End: 2021-03-09

## 2021-02-25 NOTE — PROGRESS NOTES
Dear patient. I use the medical record to document (to the best of my ability) my understanding of:   - What you told me,  - Relevant details from my exam, records review, and/or test results, and  - My assessment and plan  If you have questions, you are welcome to contact me; I will reply as soon as time allows.      Virtual Visit Details    Type of service:  Video Visit    Start Time: 3:11 PM    End Time:3:1 PM    Originating Location (pt. Location): Home    Distant Location (provider location):  [facility]     Platform used for Visit: Doximity    Status: new patient visit in my panel  Visit type: evaluation & management of one or more problems  Time note ((n2, 20'): The total time (on the date of service) for this service was 25 minutes, including discussion/face-to-face, chart review, interpretation not otherwise reported, documentation, and updating of the computerized record.    This visit was conducted with the assistance of an  - video, language      Context    Moises Chacon is a 70 year old man, with concerns including:  Chief Complaint   Patient presents with     Arthritis     Pt has a gout flare up.        History, update, and/or problems    This gentleman is set to establish with me on 3/9. Unfortunately he has developed recurrent gout. In the past he took what sounds like allopurinol, but didn't have that medicine when he came to the new facility.     This is the second gout flare since arriving at the [facility] assisted living program. Today is the left foot - base of the big toe, swollen and hard to touch. Last time was right foot.  Before these 2 episodes he has had gout many times before.   With the first recent episode he saw a family medicine provider DR. Villasenor, who ordered indomethacin and checked some blood tests, but they never heard about the results. That note and those results are in the system - reviewed from 2/8/21.    Video exam was limited, but I  saw erythema and the area of pain at MTP head #1. Appears more consistent with gout than cellulitis, and I value Mr. Chacon's historical perspective.   Steroids seem difficult without more knowledge of his medical history. The creatinine is fine, so I ordered a course of colchicine.    In assisted living program, he arrived without some medicines. They were told he has lung cancer, BPH, and high blood pressure. They were not told about him having diabetes.  The nurse will fax me his med list, we can do reconciliation.

## 2021-02-25 NOTE — NURSING NOTE
Chief Complaint   Patient presents with     Arthritis     Pt has a gout flare up.      Video Visit Technology for this patient: Stuart not working, patient has smart device, please try Doximity Video with patient     Sandra Agudelo LPN at 2:21 PM on 2/25/2021.

## 2021-02-25 NOTE — ASSESSMENT & PLAN NOTE
Acute gout flare  This gentleman is set to establish with me on 3/9. Unfortunately he has developed recurrent gout. In the past he took what sounds like allopurinol, but didn't have that medicine when he came to the new facility.     This is the second gout flare since arriving at the [facility] assisted living program. Today is the left foot - base of the big toe, swollen and hard to touch. Last time was right foot.  Before these 2 episodes he has had gout many times before.   With the first recent episode he saw a family medicine provider DR. Villasenor, who ordered indomethacin and checked some blood tests, but they never heard about the results. That note and those results are in the system - reviewed from 2/8/21.    Video exam was limited, but I saw erythema and the area of pain at MTP head #1. Appears more consistent with gout than cellulitis, and I value Mr. Chacon's historical perspective.   Steroids seem difficult without more knowledge of his medical history. The creatinine is fine, so I ordered a course of colchicine.

## 2021-02-26 DIAGNOSIS — C34.92 MALIGNANT NEOPLASM OF LEFT LUNG, UNSPECIFIED PART OF LUNG (H): ICD-10-CM

## 2021-03-05 DIAGNOSIS — I71.40 ABDOMINAL AORTIC ANEURYSM (AAA) WITHOUT RUPTURE (H): Primary | ICD-10-CM

## 2021-03-08 RX ORDER — MIRTAZAPINE 7.5 MG/1
7.5 TABLET, FILM COATED ORAL AT BEDTIME
Qty: 30 TABLET | Refills: 0 | OUTPATIENT
Start: 2021-03-08

## 2021-03-09 ENCOUNTER — OFFICE VISIT (OUTPATIENT)
Dept: INTERNAL MEDICINE | Facility: CLINIC | Age: 71
End: 2021-03-09
Payer: COMMERCIAL

## 2021-03-09 ENCOUNTER — MEDICAL CORRESPONDENCE (OUTPATIENT)
Dept: HEALTH INFORMATION MANAGEMENT | Facility: CLINIC | Age: 71
End: 2021-03-09

## 2021-03-09 VITALS
SYSTOLIC BLOOD PRESSURE: 166 MMHG | BODY MASS INDEX: 23.24 KG/M2 | OXYGEN SATURATION: 99 % | HEART RATE: 90 BPM | DIASTOLIC BLOOD PRESSURE: 80 MMHG | WEIGHT: 144 LBS

## 2021-03-09 DIAGNOSIS — I71.40 ABDOMINAL AORTIC ANEURYSM (AAA) WITHOUT RUPTURE (H): ICD-10-CM

## 2021-03-09 DIAGNOSIS — C37 MALIGNANT THYMOMA (H): ICD-10-CM

## 2021-03-09 DIAGNOSIS — M10.071 ACUTE IDIOPATHIC GOUT OF RIGHT FOOT: ICD-10-CM

## 2021-03-09 DIAGNOSIS — M1A.00X0 IDIOPATHIC CHRONIC GOUT WITHOUT TOPHUS, UNSPECIFIED SITE: ICD-10-CM

## 2021-03-09 DIAGNOSIS — F40.240 CLAUSTROPHOBIA: ICD-10-CM

## 2021-03-09 DIAGNOSIS — M10.072 ACUTE IDIOPATHIC GOUT OF LEFT FOOT: ICD-10-CM

## 2021-03-09 DIAGNOSIS — Z72.0 TOBACCO ABUSE: ICD-10-CM

## 2021-03-09 DIAGNOSIS — F34.1 DYSTHYMIA: ICD-10-CM

## 2021-03-09 DIAGNOSIS — G89.3 CANCER ASSOCIATED PAIN: ICD-10-CM

## 2021-03-09 DIAGNOSIS — C34.92 MALIGNANT NEOPLASM OF LEFT LUNG, UNSPECIFIED PART OF LUNG (H): ICD-10-CM

## 2021-03-09 DIAGNOSIS — K21.9 GASTROESOPHAGEAL REFLUX DISEASE WITHOUT ESOPHAGITIS: ICD-10-CM

## 2021-03-09 DIAGNOSIS — R41.89 SUBJECTIVE MEMORY COMPLAINTS: ICD-10-CM

## 2021-03-09 DIAGNOSIS — F32.9 REACTIVE DEPRESSION: ICD-10-CM

## 2021-03-09 DIAGNOSIS — M10.072 ACUTE IDIOPATHIC GOUT OF LEFT FOOT: Primary | ICD-10-CM

## 2021-03-09 DIAGNOSIS — R06.02 SOB (SHORTNESS OF BREATH): ICD-10-CM

## 2021-03-09 LAB
ALBUMIN SERPL-MCNC: 3.4 G/DL (ref 3.4–5)
ALP SERPL-CCNC: 118 U/L (ref 40–150)
ALT SERPL W P-5'-P-CCNC: 27 U/L (ref 0–70)
ANION GAP SERPL CALCULATED.3IONS-SCNC: 6 MMOL/L (ref 3–14)
AST SERPL W P-5'-P-CCNC: 16 U/L (ref 0–45)
BASOPHILS # BLD AUTO: 0.1 10E9/L (ref 0–0.2)
BASOPHILS NFR BLD AUTO: 0.6 %
BILIRUB SERPL-MCNC: 0.2 MG/DL (ref 0.2–1.3)
BUN SERPL-MCNC: 24 MG/DL (ref 7–30)
CALCIUM SERPL-MCNC: 8.4 MG/DL (ref 8.5–10.1)
CHLORIDE SERPL-SCNC: 106 MMOL/L (ref 94–109)
CHOLEST SERPL-MCNC: 215 MG/DL
CO2 SERPL-SCNC: 26 MMOL/L (ref 20–32)
CREAT SERPL-MCNC: 1.14 MG/DL (ref 0.66–1.25)
DIFFERENTIAL METHOD BLD: ABNORMAL
EOSINOPHIL # BLD AUTO: 0.3 10E9/L (ref 0–0.7)
EOSINOPHIL NFR BLD AUTO: 2.3 %
ERYTHROCYTE [DISTWIDTH] IN BLOOD BY AUTOMATED COUNT: 13.6 % (ref 10–15)
GFR SERPL CREATININE-BSD FRML MDRD: 64 ML/MIN/{1.73_M2}
GLUCOSE SERPL-MCNC: 95 MG/DL (ref 70–99)
HCT VFR BLD AUTO: 39.7 % (ref 40–53)
HDLC SERPL-MCNC: 29 MG/DL
HGB BLD-MCNC: 13.5 G/DL (ref 13.3–17.7)
IMM GRANULOCYTES # BLD: 0 10E9/L (ref 0–0.4)
IMM GRANULOCYTES NFR BLD: 0.3 %
LDH SERPL L TO P-CCNC: 138 U/L (ref 85–227)
LDLC SERPL CALC-MCNC: ABNORMAL MG/DL
LDLC SERPL DIRECT ASSAY-MCNC: 86 MG/DL
LYMPHOCYTES # BLD AUTO: 2.4 10E9/L (ref 0.8–5.3)
LYMPHOCYTES NFR BLD AUTO: 20.6 %
MCH RBC QN AUTO: 30.3 PG (ref 26.5–33)
MCHC RBC AUTO-ENTMCNC: 34 G/DL (ref 31.5–36.5)
MCV RBC AUTO: 89 FL (ref 78–100)
MONOCYTES # BLD AUTO: 0.7 10E9/L (ref 0–1.3)
MONOCYTES NFR BLD AUTO: 5.6 %
NEUTROPHILS # BLD AUTO: 8.2 10E9/L (ref 1.6–8.3)
NEUTROPHILS NFR BLD AUTO: 70.6 %
NONHDLC SERPL-MCNC: 186 MG/DL
NRBC # BLD AUTO: 0 10*3/UL
NRBC BLD AUTO-RTO: 0 /100
NT-PROBNP SERPL-MCNC: 192 PG/ML (ref 0–125)
PLATELET # BLD AUTO: 227 10E9/L (ref 150–450)
POTASSIUM SERPL-SCNC: 4 MMOL/L (ref 3.4–5.3)
PROT SERPL-MCNC: 8.5 G/DL (ref 6.8–8.8)
RBC # BLD AUTO: 4.46 10E12/L (ref 4.4–5.9)
SODIUM SERPL-SCNC: 139 MMOL/L (ref 133–144)
TRIGL SERPL-MCNC: 745 MG/DL
URATE SERPL-MCNC: 8.7 MG/DL (ref 3.5–7.2)
WBC # BLD AUTO: 11.7 10E9/L (ref 4–11)

## 2021-03-09 PROCEDURE — 83615 LACTATE (LD) (LDH) ENZYME: CPT | Performed by: PATHOLOGY

## 2021-03-09 PROCEDURE — 80061 LIPID PANEL: CPT | Performed by: PATHOLOGY

## 2021-03-09 PROCEDURE — 85025 COMPLETE CBC W/AUTO DIFF WBC: CPT | Performed by: PATHOLOGY

## 2021-03-09 PROCEDURE — 84550 ASSAY OF BLOOD/URIC ACID: CPT | Performed by: PATHOLOGY

## 2021-03-09 PROCEDURE — 83880 ASSAY OF NATRIURETIC PEPTIDE: CPT | Performed by: PATHOLOGY

## 2021-03-09 PROCEDURE — 80053 COMPREHEN METABOLIC PANEL: CPT | Performed by: PATHOLOGY

## 2021-03-09 PROCEDURE — 99215 OFFICE O/P EST HI 40 MIN: CPT | Performed by: PEDIATRICS

## 2021-03-09 PROCEDURE — 83721 ASSAY OF BLOOD LIPOPROTEIN: CPT | Performed by: PATHOLOGY

## 2021-03-09 PROCEDURE — 36415 COLL VENOUS BLD VENIPUNCTURE: CPT | Performed by: PATHOLOGY

## 2021-03-09 RX ORDER — FAMOTIDINE 20 MG/1
20 TABLET, FILM COATED ORAL 2 TIMES DAILY PRN
Qty: 30 TABLET | Refills: 3 | Status: SHIPPED | OUTPATIENT
Start: 2021-03-09 | End: 2021-05-05

## 2021-03-09 RX ORDER — NITROGLYCERIN 0.4 MG/1
0.4 TABLET SUBLINGUAL EVERY 5 MIN PRN
COMMUNITY

## 2021-03-09 RX ORDER — COLCHICINE 0.6 MG/1
TABLET ORAL
Qty: 30 TABLET | Refills: 0 | Status: SHIPPED | OUTPATIENT
Start: 2021-03-09 | End: 2021-03-19

## 2021-03-09 ASSESSMENT — ANXIETY QUESTIONNAIRES
3. WORRYING TOO MUCH ABOUT DIFFERENT THINGS: NOT AT ALL
5. BEING SO RESTLESS THAT IT IS HARD TO SIT STILL: NOT AT ALL
7. FEELING AFRAID AS IF SOMETHING AWFUL MIGHT HAPPEN: NEARLY EVERY DAY
GAD7 TOTAL SCORE: 12
1. FEELING NERVOUS, ANXIOUS, OR ON EDGE: NEARLY EVERY DAY
6. BECOMING EASILY ANNOYED OR IRRITABLE: MORE THAN HALF THE DAYS
2. NOT BEING ABLE TO STOP OR CONTROL WORRYING: SEVERAL DAYS

## 2021-03-09 ASSESSMENT — PATIENT HEALTH QUESTIONNAIRE - PHQ9
5. POOR APPETITE OR OVEREATING: NEARLY EVERY DAY
SUM OF ALL RESPONSES TO PHQ QUESTIONS 1-9: 19

## 2021-03-09 NOTE — NURSING NOTE
Chief Complaint   Patient presents with     Establish Care     est care - gout (it goes back and forth) up to ankle, trouble staying asleep (med is not helping), burning sensation around incision LLQ. possible medical cannibis program       Ivelisse De La Paz MA, at 4:23 PM on 3/9/2021.

## 2021-03-09 NOTE — PROGRESS NOTES
Dear patient. I use the medical record to document (to the best of my ability) my understanding of:   - What you told me,  - Relevant details from my exam, records review, and/or test results, and  - My assessment and plan  If you have questions, you are welcome to contact me; I will reply as soon as time allows.      About this visit:  Format: Problem-oriented note (brief context, then details for each problem, followed by general exam and other documentation as necessary)  PCP: Junaid Hernandez  Visit type: problem-oriented  Time note (e5, 40'): The total time (on the date of service) for this service was 55 minutes, including discussion/face-to-face, chart review, interpretation not otherwise reported, documentation, and updating of the computerized record.    This visit was conducted with the assistance of an  - video, language. Also Mr. Chacon's care worker was present and sometimes contributed.      Context    Moises Chacon is a 70 year old man, with concerns including:  Chief Complaint   Patient presents with     [facility]     est care - gout (it goes back and forth) up to ankle, trouble staying asleep (med is not helping), burning sensation around incision LLQ. possible medical cannibis program, help with stop smoking (tried patch and gum), depression screening, allergies all year round       BP (!) 166/80 (BP Location: Left arm, Patient Position: Sitting, Cuff Size: Adult Regular)   Pulse 90   Wt 65.3 kg (144 lb)   SpO2 99%   BMI 23.24 kg/m      Problems and progress      I met this gentleman on 2/25/2021, via telemedicine. There are a lot of concerns and issues on his agenda. He will need to return for more assessment, in some cases.    Also, we could really use some records from [place]. He was brought here to be in a facility closer to his family. I asked him several questions about his care facilities in [place], but he stated he didn't remember anything.      Gout  flare  SUBJECTIVE: this is his main problem. On our video visit 2/25/2021, I prescribed colchicine. He had just previously been given indomethacin with temporary benefit. Unfortunately the pain continued and I had only prescribed a short course of pills, since I didn't know much about his history.  OBJECTIVE: foot has nonspecific pain and erythema around ankle, with some other nonspecific pain along the dorsum of the foot.  ASSESSMENT and PLAN: Gout, recurrent. Since the colchicine seemed to work and the indomethacin did not for long, I restarted a longer course of colchicine for now. I will check his blood to ensure this is safe. If the labs show a kidney problem (etc) then I might have to switch to prednisone. Earlier I didn't think this safe since I didn't know him, but this would be a next step now.    Allopurinol should NOT be started at this time.    Recommended aggressive hydration - goal 50 oz of noncaffeinated fluids, even more if able. No apparent contraindiation.     ADDENDUM:  Patients of  descent are at increased risk for severe cutaneous adverse reactions (SCAR) with allopurinol, but this seems unlikely given that he has been on allopurinol successfully before. If needed, we can try the HLA-[id number] genetic test, which doesn't seem to be available via our lab.    Depression  UPDATE: He asks about taking an antidepressant. He has not done this before despite feeling depression on/off for years, but he feels that he is more depressed now.   ASSESSMENT & PLAN: I agree there are several reasons why Mr. Chacon would have an uptick in depression. From our brief discussion now, I am fine with his request to start an ongoing medicine. I chose sertraline because of low side effect profile.       Subjective memory/cognitive complaints  SUBJECTIVE: At several points in this conversation, the patient complained that he didn't remember something about what I was asking. He asked if we have a medication for that. He  doesn't believe he has been on a medicine for dementia before.  I asked his staff person about evidence of understanding and cognition. She said he often needs reminders about something previously discussed, but his day to day activity ok.  OBJECTIVE: Unable to more fully assess his cognitive status now, with time and .  ASSESSMENT and PLAN: DDx could include depression, especially with the distraction of his gout. Unclear if he has mild cognitive impairment, dementia, or something else.    Plan to reassess after sertraline makes it up to 50 mg.    We need records from [place]. Maybe he has already had this assessed. Care person said she would check with family.    He says he is claustrophobic, which will be relevant for MRI.              Request for medical cannabis  UPDATE: He used this in [place] with some benefit, and would like to do so here.  ASSESSMENT & PLAN: Honestly, there wasn't enough time with the above issues, to explore his thoughts about medical cannabis. We should do this

## 2021-03-10 ENCOUNTER — TELEPHONE (OUTPATIENT)
Dept: INTERNAL MEDICINE | Facility: CLINIC | Age: 71
End: 2021-03-10

## 2021-03-10 DIAGNOSIS — M10.9 GOUT: Primary | ICD-10-CM

## 2021-03-10 PROBLEM — F34.1 DYSTHYMIA: Status: ACTIVE | Noted: 2021-03-10

## 2021-03-10 PROBLEM — F40.240 CLAUSTROPHOBIA: Status: ACTIVE | Noted: 2021-03-10

## 2021-03-10 PROBLEM — R41.89 SUBJECTIVE MEMORY COMPLAINTS: Status: ACTIVE | Noted: 2021-03-10

## 2021-03-10 PROBLEM — M10.071 ACUTE IDIOPATHIC GOUT OF RIGHT FOOT: Status: ACTIVE | Noted: 2021-03-10

## 2021-03-10 PROBLEM — F32.9 REACTIVE DEPRESSION: Status: ACTIVE | Noted: 2021-03-10

## 2021-03-10 RX ORDER — MIRTAZAPINE 7.5 MG/1
TABLET, FILM COATED ORAL
Qty: 30 TABLET | OUTPATIENT
Start: 2021-03-10

## 2021-03-10 ASSESSMENT — ANXIETY QUESTIONNAIRES: GAD7 TOTAL SCORE: 12

## 2021-03-10 NOTE — ASSESSMENT & PLAN NOTE
Gout flare  SUBJECTIVE: this is his main problem. On our video visit 2/25/2021, I prescribed colchicine. He had just previously been given indomethacin with temporary benefit. Unfortunately the pain continued and I had only prescribed a short course of pills, since I didn't know much about his history.  OBJECTIVE: foot has nonspecific pain and erythema around ankle, with some other nonspecific pain along the dorsum of the foot.  ASSESSMENT and PLAN: Gout, recurrent. Since the colchicine seemed to work and the indomethacin did not for long, I restarted a longer course of colchicine for now. I will check his blood to ensure this is safe. If the labs show a kidney problem (etc) then I might have to switch to prednisone. Earlier I didn't think this safe since I didn't know him, but this would be a next step now.    Allopurinol should NOT be started at this time.    Recommended aggressive hydration - goal 50 oz of noncaffeinated fluids, even more if able. No apparent contraindiation.

## 2021-03-10 NOTE — ASSESSMENT & PLAN NOTE
Subjective memory/cognitive complaints  SUBJECTIVE: At several points in this conversation, the patient complained that he didn't remember something about what I was asking. He asked if we have a medication for that. He doesn't believe he has been on a medicine for dementia before.  I asked his staff person about evidence of understanding and cognition. She said he often needs reminders about something previously discussed, but his day to day activity ok.  OBJECTIVE: Unable to more fully assess his cognitive status now, with time and .  ASSESSMENT and PLAN: DDx could include depression, especially with the distraction of his gout. Unclear if he has mild cognitive impairment, dementia, or something else.    Plan to reassess after sertraline makes it up to 50 mg.    We need records from [place]. Maybe he has already had this assessed. Care person said she would check with family.    He says he is claustrophobic, which will be relevant for MRI.

## 2021-03-10 NOTE — TELEPHONE ENCOUNTER
Patient's facility, [facility] was called @ [phone].  RN relayed Dr. Hernandez's message about results and medication.  RN gave staff direct number, so they can call RN back to give update about how patient is doing with gout symptoms.    Destini Molina RN on 3/10/2021 at 9:57 AM

## 2021-03-10 NOTE — ASSESSMENT & PLAN NOTE
Depression  UPDATE: He asks about taking an antidepressant. He has not done this before despite feeling depression on/off for years, but he feels that he is more depressed now.   ASSESSMENT & PLAN: I agree there are several reasons why Mr. Chacon would have an uptick in depression. From our brief discussion now, I am fine with his request to start an ongoing medicine. I chose sertraline because of low side effect profile.

## 2021-03-10 NOTE — TELEPHONE ENCOUNTER
Destini please call the staff at Mr. Chacon's facility.   Uric acid is even somewhat higher than before. They should NOT be giving him allopurinol yet.  Kidney function is good.     Instructions to the staff:  1. If the colchicine doesn't eliminate his gout by Friday morning, then please call us - the next step (now that I have met him) will be prednisone (15 mg BID until flare resolution begins, then taper down over 7 days)  2. Keep drinking water liberally - goal = 50 oz per day.  3. Important: when the gout has been gone for a few days, please contact us about starting allopurinol. This patient seems to know when timing is right about allopurinol, he will refuse to start it until his foot is all better.    They may want a paper order with the above? Not sure.

## 2021-03-12 RX ORDER — PREDNISONE 10 MG/1
TABLET ORAL
Qty: 40 TABLET | Refills: 0 | Status: SHIPPED | OUTPATIENT
Start: 2021-03-12 | End: 2021-03-26

## 2021-03-12 NOTE — TELEPHONE ENCOUNTER
Caregiver from patient's facility called RN back, and relayed that patient's gout symptoms are not improved after taking colchicine (COLCYRS) 0.6 MG tablet.  Encounter will be routed to Dr. Hernandez for prednisone Rx.  Caregiver is hoping this can be sent before noon today, so pharmacy can deliver before the weekend.    Destini Molina RN on 3/12/2021 at 10:17 AM     CHIEF COMPLAINT/DIAGNOSIS: Fall, dizziness, vertigo    HPI: 77 y/o female w/ PMHx AFIB (eliquis), CAD s/p PCI 1998, HTN, RA, R renal CA s/p partial nephrectomy 2006, Takotsubo syndrome, prior syncope 10/19, UTI 10/19, presents from [facility] after sustaining a fall and c/o neck pain. history is obtained from the patient, daughter (on the phone) and the chart. she was found by the staff at [facility] after hearing patient screaming for help. pt reports that she was walking and fell down. denies any presyncopal symptoms. no chest pain, dyspnea, palpitations, focal weakness, lightheadedness or dizziness preceding the fall. denies any focal weakness, numbness or tingling. she denies any LOC but fall was unwitnessed. she struck her head on c/o "lump" on the back of her head.   in ER, CT head showed scalp hematoma. no other acute findings.     SUBJECTIVE: +dizziness, vertigo, denies cp or palp, sob or cough    REVIEW OF SYSTEMS:  All other review of systems is negative unless indicated above    Gen - Pleasant, cooperative, in no acute distress  HEENT- PERRL, moist mucus membranes, OP clear  CV - RRR, No m/r/g, +pulses, no edema  Lungs - Good effort, Clear to auscultation bilaterally  Abdomen - Soft, nontender, nondistended, +BS, No rebound/rigidity/guarding  Ext - No cyanosis/clubbing.   Skin - No rashes, No jaundice  Psych- Alert & oriented x 2-3  Neuro- fluent speech, no facial droop, EOMI. moves all ext      Vital Signs Last 24 Hrs  T(C): 36.3 (22 May 2020 05:15), Max: 36.9 (21 May 2020 16:31)  T(F): 97.4 (22 May 2020 05:15), Max: 98.4 (21 May 2020 16:31)  HR: 64 (22 May 2020 05:15) (64 - 75)  BP: 141/87 (22 May 2020 05:15) (140/75 - 141/87)  BP(mean): --  RR: 18 (22 May 2020 05:15) (18 - 18)  SpO2: 96% (22 May 2020 05:15) (94% - 96%)        LABS: All Labs Reviewed:                        12.3   6.90  )-----------( 198      ( 21 May 2020 08:28 )             38.1     05-22    143  |  111<H>  |  13  ----------------------------<  99  4.1   |  25  |  0.93    Ca    9.3      22 May 2020 08:23  Mg     2.1     05-22    TPro  6.6  /  Alb  3.1<L>  /  TBili  0.5  /  DBili  x   /  AST  19  /  ALT  22  /  AlkPhos  90  05-22        Blood Culture: 05-20 @ 15:14  Organism --  Gram Stain Blood -- Gram Stain --  Specimen Source .Urine Clean Catch (Midstream)  Urine Culture -- sensitivities pending*      RADIOLOGY:  < from: CT Head No Cont (05.20.20 @ 10:47) >  IMPRESSION:     No evidence of acute intracranial abnormality.  No evidence of hemorrhage.      MRI brain: No acute infarct. Generalized cerebral volume loss and chronic microvascular ischemic changes as above.  MRA brain: No hemodynamically significant stenosis.  MRA neck:  No hemodynamically significant stenosis.      MEDICATIONS  (STANDING):  amLODIPine   Tablet 5 milliGRAM(s) Oral daily  apixaban 2.5 milliGRAM(s) Oral every 12 hours  carvedilol 3.125 milliGRAM(s) Oral every 12 hours  cefTRIAXone Injectable. 1000 milliGRAM(s) IV Push every 24 hours  meclizine 25 milliGRAM(s) Oral three times a day  pantoprazole    Tablet 40 milliGRAM(s) Oral before breakfast  sertraline 50 milliGRAM(s) Oral daily  sodium chloride 0.9%. 1000 milliLiter(s) (83 mL/Hr) IV Continuous <Continuous>    MEDICATIONS  (PRN):  acetaminophen   Tablet .. 650 milliGRAM(s) Oral every 4 hours PRN Mild Pain (1 - 3)  cyclobenzaprine 5 milliGRAM(s) Oral three times a day PRN Muscle Spasm  ondansetron Injectable 4 milliGRAM(s) IV Push every 6 hours PRN Nausea          A/P:     77 y/o female w/ PMHx AFIB (eliquis), CAD s/p PCI 1998, HTN, RA, R renal CA s/p partial nephrectomy 2006, Takotsubo syndrome, prior syncope 10/19, UTI 10/19, presents from [facility] after sustaining a fall and c/o neck pain    1.	s/p unwitnessed fall due to BPPV, no CVA   2.	MK  -creat 1.6 on admission, (baseline 1.07) which is 1.5x greater than baseline ~ RESOLVED   3.	GNR UTI   4.	scalp hematoma - cbc stable   5.	PAF on eliquis  6.	HTN    ·	check MRI to r/o CVA -- normal no acute infarct. chronic changes noted.   ·	BPPV - meclizine increased to 25mg TID. f/u with vestibular rehab, EBT    ·	tele, monitor for arrhythmia -- 4 beats APCs overnight -- NSR 60-70s   ·	check orthostatic vital signs -- negative   ·	PT eval  -- final eval pending -- d/c rehab  ·	IV fluids - creat improving -- PO fluid encouraged   ·	started ceftriaxone. await urine culture sens. -- old culture in 2019 pansensitive, ecoli. will d/c on PO ceftin    ·	resume other home meds  ·	neuro checks - NORMAL   ·	CT findings as above   ·	avoid nephrotoxic agents -- PO fluid encouraged   ·	consult cardiology -- Continue carvedilol.  Would benefit from outpatient event monitor. f/u outpatient   ·	full code - confirmed w/ patient and daughter  ·	dvt proph: scd's, Eliquis - for PAF    d/w pt and daughter. all questions answered    Dispo: d/c tm. patient d/c to rehab and needs 3 night stay. Ucx sensitivities pending. CHIEF COMPLAINT/DIAGNOSIS: Fall, dizziness, vertigo    HPI: 77 y/o female w/ PMHx AFIB (eliquis), CAD s/p PCI 1998, HTN, RA, R renal CA s/p partial nephrectomy 2006, Takotsubo syndrome, prior syncope 10/19, UTI 10/19, presents from [facility] after sustaining a fall and c/o neck pain. history is obtained from the patient, daughter (on the phone) and the chart. she was found by the staff at [facility] after hearing patient screaming for help. pt reports that she was walking and fell down. denies any presyncopal symptoms. no chest pain, dyspnea, palpitations, focal weakness, lightheadedness or dizziness preceding the fall. denies any focal weakness, numbness or tingling. she denies any LOC but fall was unwitnessed. she struck her head on c/o "lump" on the back of her head.   in ER, CT head showed scalp hematoma. no other acute findings.     SUBJECTIVE: +dizziness, vertigo, denies cp or palp, sob or cough    REVIEW OF SYSTEMS:  All other review of systems is negative unless indicated above    Gen - Pleasant, cooperative, in no acute distress  HEENT- PERRL, moist mucus membranes, OP clear  CV - RRR, No m/r/g, +pulses, no edema  Lungs - Good effort, Clear to auscultation bilaterally  Abdomen - Soft, nontender, nondistended, +BS, No rebound/rigidity/guarding  Ext - No cyanosis/clubbing.   Skin - No rashes, No jaundice  Psych- Alert & oriented x 2-3  Neuro- fluent speech, no facial droop, EOMI. moves all ext      Vital Signs Last 24 Hrs  T(C): 36.3 (22 May 2020 05:15), Max: 36.9 (21 May 2020 16:31)  T(F): 97.4 (22 May 2020 05:15), Max: 98.4 (21 May 2020 16:31)  HR: 64 (22 May 2020 05:15) (64 - 75)  BP: 141/87 (22 May 2020 05:15) (140/75 - 141/87)  BP(mean): --  RR: 18 (22 May 2020 05:15) (18 - 18)  SpO2: 96% (22 May 2020 05:15) (94% - 96%)        LABS: All Labs Reviewed:                        12.3   6.90  )-----------( 198      ( 21 May 2020 08:28 )             38.1     05-22    143  |  111<H>  |  13  ----------------------------<  99  4.1   |  25  |  0.93    Ca    9.3      22 May 2020 08:23  Mg     2.1     05-22    TPro  6.6  /  Alb  3.1<L>  /  TBili  0.5  /  DBili  x   /  AST  19  /  ALT  22  /  AlkPhos  90  05-22        Blood Culture: 05-20 @ 15:14  Organism --  Gram Stain Blood -- Gram Stain --  Specimen Source .Urine Clean Catch (Midstream)  Urine Culture -- sensitivities pending*      RADIOLOGY:  < from: CT Head No Cont (05.20.20 @ 10:47) >  IMPRESSION:     No evidence of acute intracranial abnormality.  No evidence of hemorrhage.      MRI brain: No acute infarct. Generalized cerebral volume loss and chronic microvascular ischemic changes as above.  MRA brain: No hemodynamically significant stenosis.  MRA neck:  No hemodynamically significant stenosis.      MEDICATIONS  (STANDING):  amLODIPine   Tablet 5 milliGRAM(s) Oral daily  apixaban 2.5 milliGRAM(s) Oral every 12 hours  carvedilol 3.125 milliGRAM(s) Oral every 12 hours  cefTRIAXone Injectable. 1000 milliGRAM(s) IV Push every 24 hours  meclizine 25 milliGRAM(s) Oral three times a day  pantoprazole    Tablet 40 milliGRAM(s) Oral before breakfast  sertraline 50 milliGRAM(s) Oral daily  sodium chloride 0.9%. 1000 milliLiter(s) (83 mL/Hr) IV Continuous <Continuous>    MEDICATIONS  (PRN):  acetaminophen   Tablet .. 650 milliGRAM(s) Oral every 4 hours PRN Mild Pain (1 - 3)  cyclobenzaprine 5 milliGRAM(s) Oral three times a day PRN Muscle Spasm  ondansetron Injectable 4 milliGRAM(s) IV Push every 6 hours PRN Nausea          A/P:     77 y/o female w/ PMHx AFIB (eliquis), CAD s/p PCI 1998, HTN, RA, R renal CA s/p partial nephrectomy 2006, Takotsubo syndrome, prior syncope 10/19, UTI 10/19, presents from [facility] after sustaining a fall and c/o neck pain    1.	s/p unwitnessed fall due to BPPV, no CVA   2.	MK  -creat 1.6 on admission, (baseline 1.07) which is 1.5x greater than baseline ~ RESOLVED   3.	GNR UTI   4.	scalp hematoma - cbc stable   5.	PAF on eliquis  6.	HTN    ·	check MRI to r/o CVA -- normal no acute infarct. chronic changes noted. d/w cardio start asa 79  ·	BPPV - meclizine increased to 25mg TID. f/u with vestibular rehab, EBT    ·	tele, monitor for arrhythmia -- 4 beats APCs overnight -- NSR 60-70s   ·	check orthostatic vital signs -- negative   ·	PT eval  -- final eval pending -- d/c rehab  ·	IV fluids - creat improving -- PO fluid encouraged   ·	started ceftriaxone. await urine culture sens. -- old culture in 2019 pansensitive, ecoli. will d/c on PO ceftin    ·	resume other home meds  ·	neuro checks - NORMAL   ·	CT findings as above   ·	avoid nephrotoxic agents -- PO fluid encouraged   ·	consult cardiology -- Continue carvedilol.  Would benefit from outpatient event monitor. f/u outpatient   ·	full code - confirmed w/ patient and daughter  ·	dvt proph: scd's, Eliquis - for PAF    d/w pt and daughter. all questions answered    Dispo: d/c tm. patient d/c to rehab and needs 3 night stay. Ucx sensitivities pending. CHIEF COMPLAINT/DIAGNOSIS: Fall, dizziness, vertigo    HPI: 77 y/o female w/ PMHx AFIB (eliquis), CAD s/p PCI 1998, HTN, RA, R renal CA s/p partial nephrectomy 2006, Takotsubo syndrome, prior syncope 10/19, UTI 10/19, presents from [facility] after sustaining a fall and c/o neck pain. history is obtained from the patient, daughter (on the phone) and the chart. she was found by the staff at [facility] after hearing patient screaming for help. pt reports that she was walking and fell down. denies any presyncopal symptoms. no chest pain, dyspnea, palpitations, focal weakness, lightheadedness or dizziness preceding the fall. denies any focal weakness, numbness or tingling. she denies any LOC but fall was unwitnessed. she struck her head on c/o "lump" on the back of her head.   in ER, CT head showed scalp hematoma. no other acute findings.     SUBJECTIVE: +dizziness/vertigo when turning head fast but none now. denies cp or palp, sob or cough    REVIEW OF SYSTEMS:  All other review of systems is negative unless indicated above    Gen - Pleasant, cooperative, in no acute distress  HEENT- PERRL, moist mucus membranes, OP clear  CV - RRR, No m/r/g, +pulses, no edema  Lungs - Good effort, Clear to auscultation bilaterally  Abdomen - Soft, nontender, nondistended, +BS, No rebound/rigidity/guarding  Ext - No cyanosis/clubbing.   Skin - No rashes, No jaundice  Psych- Alert & oriented x 2-3  Neuro- fluent speech, no facial droop, EOMI. moves all ext      Vital Signs Last 24 Hrs  T(C): 36.3 (22 May 2020 05:15), Max: 36.9 (21 May 2020 16:31)  T(F): 97.4 (22 May 2020 05:15), Max: 98.4 (21 May 2020 16:31)  HR: 64 (22 May 2020 05:15) (64 - 75)  BP: 141/87 (22 May 2020 05:15) (140/75 - 141/87)  BP(mean): --  RR: 18 (22 May 2020 05:15) (18 - 18)  SpO2: 96% (22 May 2020 05:15) (94% - 96%)        LABS: All Labs Reviewed:                        12.3   6.90  )-----------( 198      ( 21 May 2020 08:28 )             38.1     05-22    143  |  111<H>  |  13  ----------------------------<  99  4.1   |  25  |  0.93    Ca    9.3      22 May 2020 08:23  Mg     2.1     05-22    TPro  6.6  /  Alb  3.1<L>  /  TBili  0.5  /  DBili  x   /  AST  19  /  ALT  22  /  AlkPhos  90  05-22        Blood Culture: 05-20 @ 15:14  Organism --  Gram Stain Blood -- Gram Stain --  Specimen Source .Urine Clean Catch (Midstream)  Urine Culture -- sensitivities pending*      RADIOLOGY:  < from: CT Head No Cont (05.20.20 @ 10:47) >  IMPRESSION:     No evidence of acute intracranial abnormality.  No evidence of hemorrhage.      MRI brain: No acute infarct. Generalized cerebral volume loss and chronic microvascular ischemic changes as above.  MRA brain: No hemodynamically significant stenosis.  MRA neck:  No hemodynamically significant stenosis.      MEDICATIONS  (STANDING):  amLODIPine   Tablet 5 milliGRAM(s) Oral daily  apixaban 2.5 milliGRAM(s) Oral every 12 hours  carvedilol 3.125 milliGRAM(s) Oral every 12 hours  cefTRIAXone Injectable. 1000 milliGRAM(s) IV Push every 24 hours  meclizine 25 milliGRAM(s) Oral three times a day  pantoprazole    Tablet 40 milliGRAM(s) Oral before breakfast  sertraline 50 milliGRAM(s) Oral daily  sodium chloride 0.9%. 1000 milliLiter(s) (83 mL/Hr) IV Continuous <Continuous>    MEDICATIONS  (PRN):  acetaminophen   Tablet .. 650 milliGRAM(s) Oral every 4 hours PRN Mild Pain (1 - 3)  cyclobenzaprine 5 milliGRAM(s) Oral three times a day PRN Muscle Spasm  ondansetron Injectable 4 milliGRAM(s) IV Push every 6 hours PRN Nausea          A/P:     77 y/o female w/ PMHx AFIB (eliquis), CAD s/p PCI 1998, HTN, RA, R renal CA s/p partial nephrectomy 2006, Takotsubo syndrome, prior syncope 10/19, UTI 10/19, presents from [facility] after sustaining a fall and c/o neck pain    1.	s/p unwitnessed fall, likely mechanical. doubt syncope. no stroke  2.	vertigo/BPPV, no CVA   3.	MK  -creat 1.6 on admission, (baseline 1.07) which is 1.5x greater than baseline ~ RESOLVED   4.	GNR UTI   5.	scalp hematoma - cbc stable   6.	PAF on eliquis  7.	HTN    ·	MRI brain-- normal no acute infarct. chronic changes noted. d/w cardio start asa 79  ·	BPPV - meclizine increased to 25mg TID. f/u with vestibular rehab, ENT    ·	tele, monitor for arrhythmia -- 4 beats APCs overnight -- NSR 60-70s   ·	orthostatic vital signs -- negative   ·	PT eval  - rehab recommended  ·	IV fluids - creat improving -- PO fluid encouraged   ·	started ceftriaxone. await urine culture sens. -- old culture in 2019 pansensitive, ecoli. will d/c on PO ceftin    ·	resume other home meds  ·	neuro checks - NORMAL   ·	CT findings as above   ·	avoid nephrotoxic agents -- PO fluid encouraged   ·	consult cardiology -- Continue carvedilol.  Would benefit from outpatient event monitor. f/u outpatient   ·	full code - confirmed w/ patient and daughter  ·	dvt proph: scd's, Eliquis - for PAF    d/w pt and daughter. all questions answered    Dispo: d/c tm. patient d/c to rehab and needs 3 night stay. Ucx sensitivities pending.

## 2021-03-15 ENCOUNTER — PATIENT OUTREACH (OUTPATIENT)
Dept: GERIATRIC MEDICINE | Facility: CLINIC | Age: 71
End: 2021-03-15

## 2021-03-15 NOTE — PROGRESS NOTES
[facility] Care Coordination Contact    Member became effective with  [facility] on 3/1/21 with [facility].  Previous Health Plan: [employer] [facility]  Previous Care System: [employer]  Previous care coordinators name and number: MACIEJ TOLEDO  Lazaro Type: EW  Last MMIS Entry: Date 10/8/20 and Type 02  MMIS visit date (and type) if different from above: n/a  Services Listed in MMIS: A3 F MNCHSE-CSP A1 F IND KALEE SP 33 F CASE MGMT  04 F HOME MEALS 06 O HOMEMAKER 03 F MA TRANS  24 O MH SERV 07 I MONEY MGT 11 F NURSE  50 O EMERG RSP  UTF received: Yes: Received and saved to member file     Bibiana Alvarado  [facility]  Case Management Specialist  [phone]

## 2021-03-15 NOTE — LETTER
March 15, 2021    Important [employer] Information    [facility]  [address]   [place] [address]    Your New Care Coordinator  Dear Moises,  My name is Rubi Moreno RN and I am your new Care Coordinator. You may reach me by calling [phone]. I will be in touch with you shortly to address any questions you may have.   I have also been in contact with MACIEJ TOLEDO, your previous care coordinator, to ensure a smooth transition.  Questions?  Call me at [phone] Monday-Friday between 8am and 5pm. TTY/TDD: 711. I look forward to working with you as a [employer]  member.  Sincerely,    Rubi Jensen RN  [phone]  [email or web address]        cc: member records                                      Civil Rights Notice  Discrimination is against the law. [employer] does not discriminate on the basis of any of the following:    Race    Color    National Origin    Creed    Mandaen    Age    Public Assistance Status    Receipt of Health Care Services    Disability (including physical or mental impairment)    Sex (including sex stereotypes and gender identity)    Marital Status    Political Beliefs    Medical Condition    Genetic Information    Sexual Orientation    Claims Experience    Medical History    Health Status    Auxiliary Aids and Services:  [employer] provides auxiliary aids and services, like qualified interpreters or information in accessible formats, free of charge and in a timely manner, to ensure an equal opportunity to participate in our health care programs. Contact [employer] Customer Service at [employer]/contact medicaid or call [phone] (toll free); TTY:711 or at [email or web address].    Language Assistance Services:  [employer] provides translated documents and spoken language interpreting, free of charge and in a timely manner, when language assistance services are necessary to ensure limited English speakers have  meaningful access to our information and services. Contact [employer] at -[phone] (toll free); TTY: 711 or [email or web address].     Civil Rights Complaints  You have the right to file a discrimination complaint if you believe you were treated in a discriminatory way by [employer]. You may contact any of the following four agencies directly to file a discrimination complaint.    U.S. Department of Health and Human Services  Office for Civil Rights (OCR)  You have the right to file a complaint with the OCR, a federal agency, if you believe you have been discriminated against because of any of the following:    Race    Disability    Color    Sex    National Origin    Age      Contact the OCR directly to file a complaint:         Director         U.S. Department of Health and Human Services  Office for Civil Rights         [address] [place] [address] [address] [address]         [phone] (Voice)         [phone] (TDD)         Complaint Portal - [email or web address]     [place] Department of Human Rights (MDHR)  In [place], you have the right to file a complaint with the MD if you believe you have been discriminated against because of any of the following:      Race    Color    National Origin    Scientologist    Creed    Sex    Sexual Orientation    Marital Status    Public Assistance Status    Disability    Contact the MD directly to file a complaint:         [place] Department of Human Rights         [address] [address]         [phone] (voice)          [phone] (toll free)         711 or [phone] ([place] Relay)         [phone] (Fax)         [email or web address] (Email)     [place] Department of Human Services (DHS)  You have the right to file a complaint with DHS if you believe you have been discriminated against in our health care programs because of any of the  following:    Race    Color    National Origin    Creed    Yarsanism    Age    Public Assistance Status    Receipt of Health Care Services    Disability (including physical or mental impairment)    Sex (including sex stereotypes and gender identity)    Marital Status    Political Beliefs    Medical Condition    Genetic Information    Sexual Orientation    Claims Experience    Medical History    Health Status    Complaints must be in writing and filed within 180 days of the date you discovered the alleged discrimination. The complaint must contain your name and address and describe the discrimination you are complaining about. After we get your complaint, we will review it and notify you in writing about whether we have authority to investigate. If we do, we will investigate the complaint.      [facility] will notify you in writing of the investigation s outcome. You have a right to appeal the outcome if you disagree with the decision. To appeal, you must send a written request to have [facility] review the investigation outcome period. Be brief and state why you disagree with the decision. Include additional information you think is important.      If you file a complaint in this way, the people who work for the agency named in the complaint cannot retaliate against you. This means they cannot punish you in any way for filing a complaint. Filing a complaint in this way does not stop you from seeking out other legal or administration actions.     Contact [facility] directly to file a discrimination complaint:        Civil Rights Coordinator        [place] Department of Human Services        Equal Opportunity and Access Division        [address] [address]        [place] [address] [phone] (voice) or use your preferred relay service     [employer] Complaint Notice   You have the right to file a complaint with [employer] if you believe you have been discriminated against because of any of the following:       Medical  condition    Health status    Receipt of health care services    Claims experience    Medical history    Genetic information    Disability (including mental or physical impairment)    Marital status    Age    Sex (including sex stereotypes and gender identity)    Sexual orientation    National origin    Race    Color    Pentecostal    Creed    Public assistance status    Political beliefs    You can file a complaint and ask for help in filing a complaint in person or by mail, phone, fax, or email at:     [employer] Civil Rights Coordinator  [employer]  [address], [place] [address] [phone] (voice and fax) or TTY:767  Email: [email or web address]    American Indians can continue to use [place] and [facility] ([facility]) clinics. We will not require prior approval or impose any conditions for you to get services at these clinics. For elders age 65 years and older this includes Elderly Waiver (EW) services accessed through the [place]. If a doctor or other provider in a [place] or [facility] clinic refers you to a provider in our network, we will not require you to see your primary care provider prior to the referral.    For accessible formats of this publication or assistance with equal or access to our services, visit [employer]/contactmedicaid, or call [phone] (toll free) or use your preferred relay service.

## 2021-03-16 ENCOUNTER — PATIENT OUTREACH (OUTPATIENT)
Dept: GERIATRIC MEDICINE | Facility: CLINIC | Age: 71
End: 2021-03-16

## 2021-03-16 ENCOUNTER — PATIENT OUTREACH (OUTPATIENT)
Dept: CARE COORDINATION | Facility: CLINIC | Age: 71
End: 2021-03-16

## 2021-03-16 NOTE — PROGRESS NOTES
Clinic Care Coordination Contact    Assessment: Per chart review, Patient has transitioned to Care Coordination with [facility]. New CC is listed in Pt's Care Team. As established goals with Clinic Care Coordination, have been completed, Patient will be graduated and enrolled in  [employer].     Enrollment status: Graduated.      Plan: No further Clinic Care Coordination outreaches at this time. No Graduation letter sent, as care plan will be reviewed with new [facility] CC. Patient will continue to follow the plan of care.  If new needs arise a new Care Coordination referral may be placed.  FYI to PCP    АЛЕКСАНДР Loya  Clinic Care Coordinator  Ph. [phone]  [email or web address]

## 2021-03-16 NOTE — PROGRESS NOTES
[facility] Care Coordination Contact    Called member on 3/16/21 to schedule annual HRA home visit. Left a message requesting a return call to schedule HRA.   Rubi Moreno RN  [facility]  [phone]

## 2021-03-18 NOTE — PROGRESS NOTES
[facility] Care Coordination Contact    Called member 3/17/21 to schedule annual HRA home visit. Left a message requesting a return call to schedule HRA.   Rubi Moreno RN  [facility]  [phone]

## 2021-03-19 ENCOUNTER — TELEPHONE (OUTPATIENT)
Dept: INTERNAL MEDICINE | Facility: CLINIC | Age: 71
End: 2021-03-19

## 2021-03-19 DIAGNOSIS — J98.59 MEDIASTINAL MASS: ICD-10-CM

## 2021-03-19 DIAGNOSIS — E78.5 DYSLIPIDEMIA: Primary | ICD-10-CM

## 2021-03-19 DIAGNOSIS — J44.9 CHRONIC OBSTRUCTIVE PULMONARY DISEASE, UNSPECIFIED COPD TYPE (H): ICD-10-CM

## 2021-03-19 RX ORDER — ATORVASTATIN CALCIUM 40 MG/1
40 TABLET, FILM COATED ORAL DAILY
Qty: 90 TABLET | Refills: 1 | Status: SHIPPED | OUTPATIENT
Start: 2021-04-18 | End: 2021-03-26 | Stop reason: SINTOL

## 2021-03-19 RX ORDER — ATORVASTATIN CALCIUM 20 MG/1
20 TABLET, FILM COATED ORAL DAILY
Qty: 30 TABLET | Refills: 0 | Status: SHIPPED | OUTPATIENT
Start: 2021-03-19 | End: 2021-03-26 | Stop reason: SINTOL

## 2021-03-19 NOTE — TELEPHONE ENCOUNTER
Destini can you please call staff at Mr. Chacon's facility.  1. Can you and they please work to get records from [place]. Maybe even just determine WHERE he was seen there - we might be able to get CareEverywhere. We are guessing some things about his history.  2. New prescription. Can you please clarify which pharmacy they want to use, and remove the stars from the other pharmacies. I think they get meds delivered to the facility from one of them.  3. Statin. One thing we are guessing is that he should be on a medicine for cholesterol/atherosclerosis. I pended Rx for 20 mg daily of atorvastatin. He should take this for a month, and then switch to the 40 mg prescription (also pended).

## 2021-03-19 NOTE — TELEPHONE ENCOUNTER
Nonfasting lipid panel back, and a request from vascular med to have Mr. Chacon on a high-dose statin. This may have been present before (when he was receiving care in [place], about which we have no records).     Here are the most recent results:  Recent Labs   Lab Test 03/09/21  1754   CHOL 215*   HDL 29*   LDL Cannot estimate LDL when triglyceride exceeds 400 mg/dL  86   TRIG 745*      The above numbers were done nonfasting  Direct LDL 86, but benefit from high intensity statin will accrue regardless.    Plan to start and increase gradually given the potential for side effects      Also we still need to get his records from [place] via the family, and I need to do an overview visit, as we still know very little about this man's history or how he should be treated.

## 2021-03-19 NOTE — TELEPHONE ENCOUNTER
[facility] called, and relayed that patient took Prednisone as prescribed starting on Sat 3/13/21, but only took this for 2 days.  Then he started taking only 1.5 tabs daily instead of twice a day and he has been taking this now for the past 5 days.  Staff are asking for Dr. Hernandez's advice for what he should be taking now.  Dr. Hernandez's instructions were:    1.5 pills BID until pain is gone, then decrease to 1 pill BID for 3 days, then 0.5 pills BID for 3 days, then skip a day, then 0.5 mg next day, then skip, then 0.5 mg and stop    Destini Molina RN on 3/19/2021 at 1:15 PM

## 2021-03-19 NOTE — TELEPHONE ENCOUNTER
[facility] was called, and RN relayed Dr. Hernandez's message.  Rx's were sent to [facility] pharmacy.  Other pharmacies were deleted in demographics.  [facility] staff asked for fax to be sent with new Rx's, and fax was sent to [phone].  Staff will ask about records from [place], and will update Dr. Hernandez at next office visit on 3/26/21.      Destini Molina RN on 3/19/2021 at 10:31 AM

## 2021-03-20 DIAGNOSIS — I10 ESSENTIAL HYPERTENSION: ICD-10-CM

## 2021-03-20 NOTE — TELEPHONE ENCOUNTER
Hmm. Two comments:  1. Please ask the staff, in the future, please help supervise his medication use. He is on a lot of medicines, and it will not be safe or effective to confuse medication regimens. In addition, the patient himself suspects he has dementia, which is a problem for medication use. We haven't had time to investigate his dementia, but he will need more help and supervision regardless. For now, I request the staff make a Persian-language paper document with instructions.     2. New instructions about gout. Since staff are asking instructions, I am assuming his toe still hurts. In that case, my instructions are to start over ... 1.5 pills BID until pain is gone, then decrease to 1 pill BID for 3 days, then 0.5 pills BID for 3 days, then skip a day, then 0.5 mg next day, then skip, then 0.5 mg and stop. If he needs more pills, they should ask us now.  If his toe feels better, then he should take his current dose (whatever the heck it is) every other day for 2 more doses.    Please reinforce with staff about the above on their own. I need to use the upcoming appointment for overview (catch-up) care of his overall problems, rather than gout.    3. Suggestion. Please see if the staff can be allowed to use myChart on patient's behalf. This patient is active enough that I would like to message him on the weekend if able.

## 2021-03-22 RX ORDER — METOPROLOL TARTRATE 25 MG/1
TABLET, FILM COATED ORAL
Qty: 180 TABLET | Refills: 0 | OUTPATIENT
Start: 2021-03-22

## 2021-03-22 NOTE — TELEPHONE ENCOUNTER
RN called staff at [facility], and relayed Dr. Hernandez's message.  Staff let RN know that patient's gout symptoms have improved, so he will take 1.5 tabs of Prednisone every other day for 2 more doses and then stop.  Staff also let RN know that they have assisted with medication set up so each day's meds are organized and will be ready for patient to take.  Staff will ask patient if he is agreeable to having them access My Chart, so Dr. Hernandez can send messages and receive replies from patient.    Destini Molina RN on 3/22/2021 at 1:01 PM

## 2021-03-26 ENCOUNTER — MEDICAL CORRESPONDENCE (OUTPATIENT)
Dept: HEALTH INFORMATION MANAGEMENT | Facility: CLINIC | Age: 71
End: 2021-03-26

## 2021-03-26 ENCOUNTER — OFFICE VISIT (OUTPATIENT)
Dept: INTERNAL MEDICINE | Facility: CLINIC | Age: 71
End: 2021-03-26
Payer: COMMERCIAL

## 2021-03-26 VITALS — SYSTOLIC BLOOD PRESSURE: 141 MMHG | HEART RATE: 67 BPM | DIASTOLIC BLOOD PRESSURE: 75 MMHG | OXYGEN SATURATION: 98 %

## 2021-03-26 DIAGNOSIS — M1A.00X0 IDIOPATHIC CHRONIC GOUT WITHOUT TOPHUS, UNSPECIFIED SITE: ICD-10-CM

## 2021-03-26 DIAGNOSIS — G89.3 CANCER ASSOCIATED PAIN: ICD-10-CM

## 2021-03-26 DIAGNOSIS — I10 ESSENTIAL HYPERTENSION: ICD-10-CM

## 2021-03-26 DIAGNOSIS — C80.1 CHRONIC PAIN AFTER SURGICAL PROCEDURE FOR MALIGNANT NEOPLASM (H): Primary | ICD-10-CM

## 2021-03-26 DIAGNOSIS — E78.5 DYSLIPIDEMIA: ICD-10-CM

## 2021-03-26 DIAGNOSIS — C34.92 MALIGNANT NEOPLASM OF LEFT LUNG, UNSPECIFIED PART OF LUNG (H): ICD-10-CM

## 2021-03-26 DIAGNOSIS — G47.01 INSOMNIA DUE TO MEDICAL CONDITION: ICD-10-CM

## 2021-03-26 DIAGNOSIS — F32.9 REACTIVE DEPRESSION: ICD-10-CM

## 2021-03-26 DIAGNOSIS — G89.28 CHRONIC PAIN AFTER SURGICAL PROCEDURE FOR MALIGNANT NEOPLASM (H): Primary | ICD-10-CM

## 2021-03-26 PROCEDURE — 99215 OFFICE O/P EST HI 40 MIN: CPT | Performed by: PEDIATRICS

## 2021-03-26 RX ORDER — METOPROLOL SUCCINATE 25 MG/1
25 TABLET, EXTENDED RELEASE ORAL AT BEDTIME
Qty: 90 TABLET | Refills: 3 | Status: SHIPPED | OUTPATIENT
Start: 2021-03-26 | End: 2021-08-11

## 2021-03-26 RX ORDER — ZOLPIDEM TARTRATE 10 MG/1
10 TABLET ORAL
Qty: 30 TABLET | Refills: 5 | Status: SHIPPED | OUTPATIENT
Start: 2021-03-26 | End: 2021-05-05

## 2021-03-26 RX ORDER — ALLOPURINOL 300 MG/1
300 TABLET ORAL DAILY
Qty: 90 TABLET | Refills: 3 | Status: SHIPPED | OUTPATIENT
Start: 2021-05-26 | End: 2021-08-13

## 2021-03-26 RX ORDER — ALLOPURINOL 100 MG/1
100 TABLET ORAL DAILY
Qty: 90 TABLET | Refills: 0 | Status: SHIPPED | OUTPATIENT
Start: 2021-03-26 | End: 2021-05-25

## 2021-03-26 RX ORDER — LIDOCAINE HYDROCHLORIDE 20 MG/ML
JELLY TOPICAL PRN
Qty: 30 ML | Refills: 11 | Status: SHIPPED | OUTPATIENT
Start: 2021-03-26

## 2021-03-26 RX ORDER — MIRTAZAPINE 7.5 MG/1
7.5 TABLET, FILM COATED ORAL AT BEDTIME
Qty: 90 TABLET | Refills: 3 | Status: SHIPPED | OUTPATIENT
Start: 2021-03-26 | End: 2021-05-05

## 2021-03-26 NOTE — PROGRESS NOTES
Dear patient. I use the medical record to document (to the best of my ability) my understanding of:   - What you told me,  - Relevant details from my exam, records review, and/or test results, and  - My assessment and plan  If you have questions, you are welcome to contact me; I will reply as soon as time allows.      About this visit:  Format: Problem-oriented note (brief context, then details for each problem, followed by general exam and other documentation as necessary)  PCP: Junaid Hernandez  Visit type: problem-oriented  Time note (e5, 40'): The total time (on the date of service) for this service was 45 minutes, including discussion/face-to-face, chart review, interpretation not otherwise reported, documentation, and updating of the computerized record.          Context    Moises Chacon is a 70 year old man, here with his staff member from [facility], with concerns including:  Chief Complaint   Patient presents with     Recheck Medication     follow up       BP (!) 141/75   Pulse 67   SpO2 98%     Problems and progress      Here to discuss several issues.   Family and staff have not obtained his [place] records yet. I wrote another request for this on his paperwork.      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 He had difficulty with MS contin and gabapentin due to side effects, reportedly. Requests medical marijuana (this helped him in [place], along with depression and sleep).  OBJECTIVE: Several scars. Complains of pain with light touch/stroking.   Lungs have diffuse nonspecific rhonchi with better air movement than my previous exam.  ASSESSMENT and PLAN: Neuralgia type pain at site of treatment and/or pleura. I don't foresee this improving, unfortunately. I'm not entirely sure about his life expectancy, but I believe he is a reasonable candidate for medical marijuana.    Will try for topical lidocaine in  the meantime.    Has used tramadol with success.    NEW OVERVIEW: Tramadol PRN, topical lidocaine PRN. Working on certifying for medical marijuana by patient request (he reports being on this before in [place]). History of disliking gabapentin and MS contin because of mood/alertness side effects.       Gout  His gout improved with the prednisone, and he has weaned off. He wishes to restart the allopurinol (wrote this Rx for gradual increase).    Chronic heart failure  Hypertension  UPDATE: SBP remains up, but he is off his metoprolol because of switching over to my prescription from previous PCP.  ASSESSMENT & PLAN: Will change to night-time long acting. After that, and after improvement in pain, can work on other options.           Depression  UPDATE: Still feeling depressed. Didn't seem interested about talking about why/how. Sertraline was started (modest dose) 3/9/21. Patient has asked several times for medical marijuana for his depression - I think part of this is about his sleep and pain.  ASSESSMENT & PLAN: Plan to increase sertraline if needed in April. Also working on the medical marijuana he has requested.        Insomnia  Has trouble sleeping because of pain and depression. Still a problem despite mirtaz.  Would like medical marijuana for the above.           Dyslipidemia  History of CVA (unclear circumstances)  UPDATE: He stopped his atorvastatin because of itching. He is aware the cholesterol is a risk for his heart. He states he doesn't wish to take a statin.  ASSESSMENT & PLAN: Given my understanding of his cancer and pain, I think it is understandable that he would want forego statin treatment. The main concern is that CVD is not necessarily catastrophic. I will address this on an ongoing basis. There are other medications he could take, even though less effective.  NEW OVERVIEW: total chol 215, HDL 29, Direct LDL 86 (3/9/21). 10-year risk > 30%. Patient has declined statins. Recheck fasting lipids  RE triglycerides.             Other comments      Other physical exam  Physical Exam  Constitutional:       Appearance: Normal appearance.   HENT:      Head: Normocephalic.      Right Ear: External ear normal.      Left Ear: External ear normal.      Nose: Nose normal.   Eyes:      General: No scleral icterus.        Right eye: No discharge.         Left eye: No discharge.      Extraocular Movements: Extraocular movements intact.   Pulmonary:      Effort: Pulmonary effort is normal. No respiratory distress.      Breath sounds: No stridor. No wheezing.   Skin:     Findings: No rash.   Neurological:      Mental Status: He is alert.   Psychiatric:         Mood and Affect: Mood normal.         Behavior: Behavior normal.         Thought Content: Thought content normal.         Judgment: Judgment normal.

## 2021-03-26 NOTE — NURSING NOTE
Chief Complaint   Patient presents with     Recheck Medication     follow up     Kimberly Nissen, EMT at 1:47 PM on 3/26/2021

## 2021-03-27 ENCOUNTER — TELEPHONE (OUTPATIENT)
Dept: INTERNAL MEDICINE | Facility: CLINIC | Age: 71
End: 2021-03-27

## 2021-03-27 DIAGNOSIS — C34.92 MALIGNANT NEOPLASM OF LEFT LUNG, UNSPECIFIED PART OF LUNG (H): ICD-10-CM

## 2021-03-27 PROBLEM — R06.00 ACUTE DYSPNEA: Status: RESOLVED | Noted: 2020-08-28 | Resolved: 2021-03-27

## 2021-03-27 PROBLEM — G47.01 INSOMNIA DUE TO MEDICAL CONDITION: Status: ACTIVE | Noted: 2021-03-27

## 2021-03-27 PROBLEM — C80.1: Status: ACTIVE | Noted: 2020-06-11

## 2021-03-27 PROBLEM — G89.28: Status: ACTIVE | Noted: 2020-06-11

## 2021-03-27 NOTE — LETTER
[facility]  [address]   [facility] [address]     RE: Moises Chacon,  1950    To whom it may concern:    I am working on the medical marijuana paperwork for Moises Chacon. The state program says that they will be able to telephone you and Mr. Chacon, rather than using an e-mail.  As part of my paperwork, I need to document his answers to the following questions. Could you please read them aloud to him, in Yakut? Ted down his reply (the 3 numbers), and then please call my nurse with the 3 numbers. I apologize for the artificial situation!    Sincerely    Riley Hernandez MD  Internal Medicine & Pediatrics  Complex primary care  [facility]       Question 1.  What number best describes your pain on average in the past week, on a scale from 0 to 10 where 0 is  no pain  and 10 is  pain as bad as you can imagine ?  (Please reply, Question 1 = your number)    The following two questions ask you to describe how, during the past week, pain has interfered with your life on a  0 to 10  scale, where 0 is  does not interfere at all  and 10 is  completely interferes.     Question 2.   What number best describes how, during the past week, pain has interfered with your enjoyment of life? (Please reply, Question 2 = your number)    Question 3.   What number best describes how, during the past week, pain has interfered with your general activity? (Please reply, Question 3 = your number)

## 2021-03-28 NOTE — ASSESSMENT & PLAN NOTE
Dyslipidemia  UPDATE: He stopped his atorvastatin because of itching. He is aware the cholesterol is a risk for his heart. He states he doesn't wish to take a statin.  ASSESSMENT & PLAN: Given my understanding of his cancer and pain, I think it is understandable that he would want forego statin treatment. The main concern is that CVD is not necessarily catastrophic. I will address this on an ongoing basis. There are other medications he could take, even though less effective.  NEW OVERVIEW: total chol 215, HDL 29, Direct LDL 86 (3/9/21). 10-year risk > 30%. Patient has declined statins. Recheck fasting lipids RE triglycerides.

## 2021-03-28 NOTE — ASSESSMENT & PLAN NOTE
Insomnia  Has trouble sleeping because of pain and depression. Still a problem despite mirtaz.  Would like medical marijuana for the above.

## 2021-03-28 NOTE — ASSESSMENT & PLAN NOTE
Hypertension  UPDATE: SBP remains up, but he is off his metoprolol because of switching over to my prescription from previous PCP.  ASSESSMENT & PLAN: Will change to night-time long acting. After that, and after improvement in pain, can work on other options.

## 2021-03-28 NOTE — ASSESSMENT & PLAN NOTE
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 He had difficulty with MS contin and gabapentin due to side effects, reportedly. Requests medical marijuana (this helped him in [place], along with depression and sleep).  OBJECTIVE: Several scars. Complains of pain with light touch/stroking.   Lungs have diffuse nonspecific rhonchi with better air movement than my previous exam.  ASSESSMENT and PLAN: Neuralgia type pain at site of treatment and/or pleura. I don't foresee this improving, unfortunately. I'm not entirely sure about his life expectancy, but I believe he is a reasonable candidate for medical marijuana.    Will try for topical lidocaine in the meantime.    Has used tramadol with success.    NEW OVERVIEW: Tramadol PRN, topical lidocaine PRN. Working on certifying for medical marijuana by patient request (he reports being on this before in [place]). History of disliking gabapentin and MS contin because of mood/alertness side effects.

## 2021-03-28 NOTE — ASSESSMENT & PLAN NOTE
Depression  UPDATE: Still feeling depressed. Didn't seem interested about talking about why/how. Sertraline was started (modest dose) 3/9/21. Patient has asked several times for medical marijuana for his depression - I think part of this is about his sleep and pain.  ASSESSMENT & PLAN: Plan to increase sertraline if needed in April. Also working on the medical marijuana he has requested.

## 2021-03-28 NOTE — TELEPHONE ENCOUNTER
Hello,  Could one of you please call the state (see website below), and ask for advice about this procedure.  I am registered to certify patients.  The problem is this patient speaks only Bengali and doesn't have an e-mail. He isn't very independent, either (he lives in an assisted living facility).  If I certify him, can the process be handled entirely over the phone?  Thanks!  Mf    [email or web address]

## 2021-03-28 NOTE — ASSESSMENT & PLAN NOTE
Gout  His gout improved with the prednisone, and he has weaned off. He wishes to restart the allopurinol.

## 2021-03-29 DIAGNOSIS — J30.2 SEASONAL ALLERGIES: ICD-10-CM

## 2021-03-29 RX ORDER — CETIRIZINE HYDROCHLORIDE 10 MG/1
TABLET ORAL
Qty: 30 TABLET | Refills: 11 | Status: SHIPPED | OUTPATIENT
Start: 2021-03-29

## 2021-03-29 NOTE — TELEPHONE ENCOUNTER
Routing refill request to provider for review/approval because:  Patient is 3-64 years of age    Rusty HERNANDEZ RN

## 2021-03-31 ENCOUNTER — PATIENT OUTREACH (OUTPATIENT)
Dept: GERIATRIC MEDICINE | Facility: CLINIC | Age: 71
End: 2021-03-31

## 2021-03-31 ASSESSMENT — ACTIVITIES OF DAILY LIVING (ADL): DEPENDENT_IADLS:: MONEY MANAGEMENT;TRANSPORTATION;SHOPPING

## 2021-03-31 NOTE — PROGRESS NOTES
[facility] Care Coordination Contact    [facility] Care System Change (Transfer)    Member is new enrollee to [facility] effective 3/1/[facility] [employer] [facility]. Member transferred from [facility] [employer].    No home visit required because this care coordinator (CC) has received all required documentation from the previous CC.    Writer t/c to member, introduced self as member's new CC. Confirmed with member that the welcome letter with writer's name and contact information has been received.  Reviewed LTCC/Health Risk Assessment (HRA) and POC with member. No changes noted.  Transitional HRA completed. Care Plan Summary updated and reflects current services.  Required referral authorization information communicated to CMS: Yes    Writer reviewed the following with member:    ER visits: No  Hospitalizations: No  TCU stays: No  Significant health status changes: denies  Falls/Injuries: No  ADL/IADL changes: No  Changes in services: Yes: Opened to EW, PERS, ?DELON    Follow-Up Plan: Member informed of future contact, plan to f/u with member with at next regularly scheduled contact.  Contact information shared with member and family, encouraged member to call with any questions or concerns.    Rubi Moreno RN  [facility]  [phone]

## 2021-03-31 NOTE — TELEPHONE ENCOUNTER
I called marijuana office and asked about this.   They are able to call patient using  line and speak to him on the phone and he does not need email, however he would still need to fill out paper application that they would mail. The catch is that they do not have this application in Bulgarian, only english. Perhaps needs social work help to get this completed due to their lack of multi language services.       Lluvia Sequeira, EMT at 11:05 AM on 3/31/2021.

## 2021-04-02 NOTE — TELEPHONE ENCOUNTER
[phone]  at [facility].     I called and asked about help with paperwork. Person I spoke to said yes they have these services and should be able to help the patient fill out the forms when they arrive. I let them know that [place] dept [place] have access to inter service so phone communication should not be that bad.     No current questions or concerns.   Lluvia Sequeira, EMT at 12:10 PM on 4/2/2021.

## 2021-04-02 NOTE — TELEPHONE ENCOUNTER
"Thanks, Rhiannon!  Could you please call his facility (\"[facility]\") and ask them if they can help him with paperwork when it comes. That facility caters to elderly [place]ans, so they probably have a mechanism already for this.  Thanks again  "

## 2021-04-09 NOTE — TELEPHONE ENCOUNTER
Sent the following letter to [facility]      Staff member  [facility]  [address]   [facility] [address]     RE: Moises Chacon,  1950      To whom it may concern:    I am working on the medical marijuana paperwork for Moises Chacno. The state program says that they will be able to telephone you and Mr. Chacon, rather than using an e-mail.  As part of my paperwork, I need to document his answers to the following questions. Could you please read them aloud to him, in Indonesian?  I apologize for the artificial situation!    Sincerely    Riley Hernandez MD  Internal Medicine & Pediatrics  [facility] primary care  [facility] [place]       Question 1.  What number best describes your pain on average in the past week, on a scale from 0 to 10 where 0 is  no pain  and 10 is  pain as bad as you can imagine ?  (Please reply, Question 1 = your number)    The following two questions ask you to describe how, during the past week, pain has interfered with your life on a  0 to 10  scale, where 0 is  does not interfere at all  and 10 is  completely interferes.     Question 2.   What number best describes how, during the past week, pain has interfered with your enjoyment of life? (Please reply, Question 2 = your number)    Question 3.   What number best describes how, during the past week, pain has interfered with your general activity? (Please reply, Question 3 = your number)

## 2021-04-14 ENCOUNTER — PATIENT OUTREACH (OUTPATIENT)
Dept: GERIATRIC MEDICINE | Facility: CLINIC | Age: 71
End: 2021-04-14

## 2021-04-14 NOTE — PROGRESS NOTES
[facility] Care Coordination Contact    Completed following tasks: Updated services in access and Submitted referrals/auths for 24 hr CL     Bibiana Alvarado  [facility]  Case Management Specialist  [phone]

## 2021-04-15 DIAGNOSIS — Z72.0 TOBACCO ABUSE: ICD-10-CM

## 2021-04-15 RX ORDER — VARENICLINE TARTRATE 1 MG/1
1 TABLET, FILM COATED ORAL 2 TIMES DAILY
Qty: 154 TABLET | Refills: 0 | Status: SHIPPED | OUTPATIENT
Start: 2021-04-15 | End: 2021-07-01

## 2021-04-15 NOTE — TELEPHONE ENCOUNTER
[facility] Call Center    Phone Message    May a detailed message be left on voicemail: no     Reason for Call: Medication Refill Request    Has the patient contacted the pharmacy for the refill? Yes   Name of medication being requested: varenicline (CHANTIX IVONNE) 0.5 MG X 11 & 1 MG X 42 tablet     Provider who prescribed the medication: Dr. Hernandez  Pharmacy: [facility]  Date medication is needed: 4/15/21         Action Taken: Message routed to:  [facility] ([facility]): med refill team

## 2021-04-15 NOTE — TELEPHONE ENCOUNTER
varenicline (CHANTIX IVONNE) 0.5 MG X 11 & 1 MG X 42 tablet  Last Written Prescription Date:  3/9/2021  Last Fill Quantity: 53,   # refills: 0  Last Office Visit : 3/26/2021  Future Office visit:  5/5/2021  Routing refill request to provider for review/approval because:  Medication was tapered last order.   What dose would Provider like for Pt care?  Please advise       Agnes Clinton RN  Central Triage Red Flags/Med Refills

## 2021-04-15 NOTE — TELEPHONE ENCOUNTER
RN called and spoke to the nurse at [facility]. (Mayito - [phone]) She relayed that patient is doing well with quitting smoking, and is now taking the varenicline 1 mg tab once daily, but only has a few tabs left.    RN also asked about the 3 questions for the medical cannabis enrollment, but Mayito said she has not seen the letter that was sent.  Mayito asked for this to be faxed, and it was faxed to [facility] @ [phone].    Destini Molina RN on 4/15/2021 at 3:34 PM

## 2021-04-22 NOTE — TELEPHONE ENCOUNTER
Voice message was left at [facility] to have staff call RN back at direct number.  Answers are needed for three questions (from letter mailed by Dr. Hernandez).  Answers need to be relayed to Dr. Hernandez so he can complete application for medical cannabis.    Destini Molina RN on 4/22/2021 at 12:26 PM

## 2021-04-23 NOTE — TELEPHONE ENCOUNTER
Mayito with [facility] called back and gave RN the following answers:    1)  5  2)  0  3)  0    Mayito relayed that patient states he has pain at level 5, but it is not so bad as to affect his enjoyment of life or ability to do activities.    Destini Molina RN on 4/23/2021 at 10:26 AM

## 2021-04-25 NOTE — TELEPHONE ENCOUNTER
Thanks!  Hmm. [place]ractable [place], necessary for medical marijuana.  He has an appointment on 5/5/21, I can talk with him then.

## 2021-05-03 ENCOUNTER — ANCILLARY PROCEDURE (OUTPATIENT)
Dept: CT IMAGING | Facility: CLINIC | Age: 71
End: 2021-05-03
Attending: INTERNAL MEDICINE
Payer: COMMERCIAL

## 2021-05-03 DIAGNOSIS — I71.40 ABDOMINAL AORTIC ANEURYSM (AAA) WITHOUT RUPTURE (H): ICD-10-CM

## 2021-05-03 DIAGNOSIS — C34.92 MALIGNANT NEOPLASM OF LEFT LUNG, UNSPECIFIED PART OF LUNG (H): ICD-10-CM

## 2021-05-03 DIAGNOSIS — C37 MALIGNANT THYMOMA (H): ICD-10-CM

## 2021-05-03 DIAGNOSIS — G89.3 CANCER ASSOCIATED PAIN: ICD-10-CM

## 2021-05-03 DIAGNOSIS — I50.9 ACUTE ON CHRONIC CONGESTIVE HEART FAILURE, UNSPECIFIED HEART FAILURE TYPE (H): ICD-10-CM

## 2021-05-03 DIAGNOSIS — R06.02 SOB (SHORTNESS OF BREATH): ICD-10-CM

## 2021-05-03 LAB
CREAT BLD-MCNC: 1.2 MG/DL (ref 0.66–1.25)
GFR SERPL CREATININE-BSD FRML MDRD: 60 ML/MIN/{1.73_M2}

## 2021-05-03 PROCEDURE — 36415 COLL VENOUS BLD VENIPUNCTURE: CPT | Performed by: PATHOLOGY

## 2021-05-03 PROCEDURE — 82565 ASSAY OF CREATININE: CPT | Performed by: PATHOLOGY

## 2021-05-03 PROCEDURE — 74177 CT ABD & PELVIS W/CONTRAST: CPT | Performed by: RADIOLOGY

## 2021-05-03 PROCEDURE — 71260 CT THORAX DX C+: CPT | Performed by: RADIOLOGY

## 2021-05-03 RX ORDER — IOPAMIDOL 755 MG/ML
88 INJECTION, SOLUTION INTRAVASCULAR ONCE
Status: COMPLETED | OUTPATIENT
Start: 2021-05-03 | End: 2021-05-03

## 2021-05-03 RX ADMIN — IOPAMIDOL 88 ML: 755 INJECTION, SOLUTION INTRAVASCULAR at 10:21

## 2021-05-05 ENCOUNTER — MEDICAL CORRESPONDENCE (OUTPATIENT)
Dept: HEALTH INFORMATION MANAGEMENT | Facility: CLINIC | Age: 71
End: 2021-05-05

## 2021-05-05 ENCOUNTER — ANCILLARY PROCEDURE (OUTPATIENT)
Dept: GENERAL RADIOLOGY | Facility: CLINIC | Age: 71
End: 2021-05-05
Attending: PEDIATRICS
Payer: COMMERCIAL

## 2021-05-05 ENCOUNTER — OFFICE VISIT (OUTPATIENT)
Dept: INTERNAL MEDICINE | Facility: CLINIC | Age: 71
End: 2021-05-05
Payer: COMMERCIAL

## 2021-05-05 VITALS
DIASTOLIC BLOOD PRESSURE: 77 MMHG | HEART RATE: 87 BPM | OXYGEN SATURATION: 98 % | WEIGHT: 153 LBS | BODY MASS INDEX: 24.69 KG/M2 | SYSTOLIC BLOOD PRESSURE: 138 MMHG

## 2021-05-05 DIAGNOSIS — R60.9 EDEMA, UNSPECIFIED TYPE: ICD-10-CM

## 2021-05-05 DIAGNOSIS — R06.00 DYSPNEA, UNSPECIFIED TYPE: ICD-10-CM

## 2021-05-05 DIAGNOSIS — R60.9 EDEMA, UNSPECIFIED TYPE: Primary | ICD-10-CM

## 2021-05-05 DIAGNOSIS — G89.3 CANCER ASSOCIATED PAIN: ICD-10-CM

## 2021-05-05 DIAGNOSIS — K21.9 GASTROESOPHAGEAL REFLUX DISEASE WITHOUT ESOPHAGITIS: ICD-10-CM

## 2021-05-05 DIAGNOSIS — C34.92 MALIGNANT NEOPLASM OF LEFT LUNG, UNSPECIFIED PART OF LUNG (H): ICD-10-CM

## 2021-05-05 DIAGNOSIS — F32.9 REACTIVE DEPRESSION: ICD-10-CM

## 2021-05-05 DIAGNOSIS — R14.0 ABDOMINAL BLOATING: ICD-10-CM

## 2021-05-05 LAB
ALBUMIN SERPL-MCNC: 3.5 G/DL (ref 3.4–5)
ALBUMIN UR-MCNC: 100 MG/DL
ALP SERPL-CCNC: 136 U/L (ref 40–150)
ALT SERPL W P-5'-P-CCNC: 40 U/L (ref 0–70)
ANION GAP SERPL CALCULATED.3IONS-SCNC: 6 MMOL/L (ref 3–14)
APPEARANCE UR: ABNORMAL
AST SERPL W P-5'-P-CCNC: <3 U/L (ref 0–45)
BILIRUB SERPL-MCNC: 0.3 MG/DL (ref 0.2–1.3)
BILIRUB UR QL STRIP: NEGATIVE
BUN SERPL-MCNC: 19 MG/DL (ref 7–30)
CALCIUM SERPL-MCNC: 8.9 MG/DL (ref 8.5–10.1)
CHLORIDE SERPL-SCNC: 107 MMOL/L (ref 94–109)
CO2 SERPL-SCNC: 29 MMOL/L (ref 20–32)
COLOR UR AUTO: YELLOW
CREAT SERPL-MCNC: 1.21 MG/DL (ref 0.66–1.25)
GFR SERPL CREATININE-BSD FRML MDRD: 59 ML/MIN/{1.73_M2}
GLUCOSE SERPL-MCNC: 114 MG/DL (ref 70–99)
GLUCOSE UR STRIP-MCNC: NEGATIVE MG/DL
HGB UR QL STRIP: ABNORMAL
KETONES UR STRIP-MCNC: NEGATIVE MG/DL
LEUKOCYTE ESTERASE UR QL STRIP: NEGATIVE
MUCOUS THREADS #/AREA URNS LPF: PRESENT /LPF
NITRATE UR QL: NEGATIVE
PH UR STRIP: 5 PH (ref 5–7)
POTASSIUM SERPL-SCNC: 3.7 MMOL/L (ref 3.4–5.3)
PROT SERPL-MCNC: 8 G/DL (ref 6.8–8.8)
RBC #/AREA URNS AUTO: 6 /HPF (ref 0–2)
SODIUM SERPL-SCNC: 142 MMOL/L (ref 133–144)
SOURCE: ABNORMAL
SP GR UR STRIP: 1.02 (ref 1–1.03)
SPERM #/AREA URNS HPF: PRESENT /HPF
UROBILINOGEN UR STRIP-MCNC: 0 MG/DL (ref 0–2)
WBC #/AREA URNS AUTO: 3 /HPF (ref 0–5)

## 2021-05-05 PROCEDURE — 36415 COLL VENOUS BLD VENIPUNCTURE: CPT | Performed by: PATHOLOGY

## 2021-05-05 PROCEDURE — 99417 PROLNG OP E/M EACH 15 MIN: CPT | Performed by: PEDIATRICS

## 2021-05-05 PROCEDURE — 71046 X-RAY EXAM CHEST 2 VIEWS: CPT | Performed by: RADIOLOGY

## 2021-05-05 PROCEDURE — 80053 COMPREHEN METABOLIC PANEL: CPT | Performed by: PATHOLOGY

## 2021-05-05 PROCEDURE — 81001 URINALYSIS AUTO W/SCOPE: CPT | Performed by: PATHOLOGY

## 2021-05-05 PROCEDURE — 99215 OFFICE O/P EST HI 40 MIN: CPT | Performed by: PEDIATRICS

## 2021-05-05 PROCEDURE — 74018 RADEX ABDOMEN 1 VIEW: CPT | Performed by: STUDENT IN AN ORGANIZED HEALTH CARE EDUCATION/TRAINING PROGRAM

## 2021-05-05 RX ORDER — MIRTAZAPINE 15 MG/1
15 TABLET, FILM COATED ORAL AT BEDTIME
Qty: 30 TABLET | Refills: 11 | Status: SHIPPED | OUTPATIENT
Start: 2021-05-05

## 2021-05-05 RX ORDER — MIRTAZAPINE 7.5 MG/1
7.5 TABLET, FILM COATED ORAL AT BEDTIME
Qty: 90 TABLET | Refills: 3 | Status: SHIPPED | OUTPATIENT
Start: 2021-05-05 | End: 2021-07-21

## 2021-05-05 RX ORDER — FAMOTIDINE 20 MG/1
20 TABLET, FILM COATED ORAL 2 TIMES DAILY
Qty: 60 TABLET | Refills: 11 | Status: SHIPPED | OUTPATIENT
Start: 2021-05-05

## 2021-05-05 RX ORDER — CALCIUM CARBONATE 500 MG/1
1 TABLET, CHEWABLE ORAL 3 TIMES DAILY
Qty: 90 TABLET | Refills: 4 | Status: SHIPPED | OUTPATIENT
Start: 2021-05-05

## 2021-05-05 RX ORDER — ZOLPIDEM TARTRATE 10 MG/1
10 TABLET ORAL AT BEDTIME
Qty: 30 TABLET | Refills: 3 | Status: SHIPPED | OUTPATIENT
Start: 2021-05-05

## 2021-05-05 NOTE — NURSING NOTE
Chief Complaint   Patient presents with     RECHECK     follow up -        Ivelisse De La Paz MA, at 2:33 PM on 5/5/2021.

## 2021-05-05 NOTE — PROGRESS NOTES
"Dear patient. Thank you for visiting with me. I want you to feel respected, understood, and empowered. \"Respect\" is valuing you as much as I would a close family member. \"Empowerment\" happens when you are fully informed, and can make the best possible decision for you.  Please ask me questions!  Challenge anything that is not clear.    Medical records are primarily used as memory aids for me and my colleagues. Things to know about my documentation style:  - The 'problem list' includes current symptoms or diagnoses, and some problems that are resolved but may return. I use the past medical history for problems not expected to return.  - I use single quotation marks for things that you or I said, when I want to clarify who was speaking.  - I use double quotation marks when copying a term from elsewhere in your records. Italics (besides here) may also denote a quotation.    If you have questions or concerns, please contact me; I will reply as soon as time allows.      About this visit:  Format: Problem-oriented note (brief context, then details for each problem, followed by general exam and other documentation as necessary)  PCP: Junaid Hernandez  Visit type: problem-oriented  Time note (e5+18025, 69-83'): The total time (on the date of service) for this service was 69 minutes, including discussion/face-to-face, chart review, interpretation not otherwise reported, documentation, and updating of the computerized record.    This visit was conducted with the assistance of an  - video, language    Context    Moises Chacon is a 70 year old man, with concerns including:  Chief Complaint   Patient presents with     RECHECK     follow up -        /77 (BP Location: Right arm, Patient Position: Sitting, Cuff Size: Adult Regular)   Pulse 87   Wt 69.4 kg (153 lb)   SpO2 98%   BMI 24.69 kg/m      Problems and progress      This meeting was mostly about his chest/rib pain, which is due to cancer. We have " "had some difficulty communicating via his facility's staff, about the medical marijuana. We sent a letter with the PEG3, and it came back that he has 6/10 pain but zeroes for interference with his life, and interference with his general activity. He has previously been on medical marijuana in [place], so I was suspecting there was some confusion.  We had a long discussion through the . I had to explain what I was looking for, and why. Eventually he seemed to understand, and gave the answers below. This and his history are sufficient for medical marijuana. I cannot do this via the website because an e-mail is required for him there. We called earlier and found that a different process exists for them. I will restart that process.     Please answer the following 3 questions, about your pain.  Question 1.   What number best describes your pain on average in the past week, on a scale from 0 to 10 where 0 is  no pain  and 10 is  pain as bad as you can imagine ?  (Please reply, Question 1 = your number)  Answer = 6    The following two questions ask you to describe how, during the past week, pain has interfered with your life on a  0 to 10  scale, where 0 is  does not interfere at all  and 10 is  completely interferes.   Question 2. What number best describes how, during the past week, pain has interfered with your enjoyment of life? (Please reply, Question 2 = your number)  Answer = 4    Question 3. What number best describes how, during the past week, pain has interfered with your general activity? (Please reply, Question 3 = your number)  Answer = 4    [The above questions are called the \"PEG3 item pain scale.\"  Documenting the PEG3 is necessary for ongoing certification of medical marijuana]    Constipation  SUBJECTIVE: This has been more of a problem lately. He wonders if there is anything else that can be done.  OBJECTIVE: Abd mildly distended, BS normal, nonspecific pain that may be from left-sided " stool or could be from traction from his cancer site  ASSESSMENT and PLAN: Difficult to say about stool burden versus pain from his cancer. Recommended increased fluid intake. I will obtain CXR and AXR.  ADDENDUM: moderate stool on right, and gas on the left (likely responsible for his pain).       In addition several medication refills and clarifications were requested by his facility.              No specialty comments available.    Other physical exam  Physical Exam  Constitutional:       Appearance: Normal appearance.   HENT:      Head: Normocephalic.      Right Ear: External ear normal.      Left Ear: External ear normal.      Nose: Nose normal.   Eyes:      General: No scleral icterus.        Right eye: No discharge.         Left eye: No discharge.      Extraocular Movements: Extraocular movements intact.   Pulmonary:      Effort: Pulmonary effort is normal. No respiratory distress.      Breath sounds: No stridor. No wheezing.   Skin:     Findings: No rash.   Neurological:      Mental Status: He is alert.   Psychiatric:         Mood and Affect: Mood normal.         Behavior: Behavior normal.         Thought Content: Thought content normal.         Judgment: Judgment normal.

## 2021-05-07 ENCOUNTER — VIRTUAL VISIT (OUTPATIENT)
Dept: ONCOLOGY | Facility: CLINIC | Age: 71
End: 2021-05-07
Attending: INTERNAL MEDICINE
Payer: COMMERCIAL

## 2021-05-07 DIAGNOSIS — C34.92 MALIGNANT NEOPLASM OF LEFT LUNG, UNSPECIFIED PART OF LUNG (H): Primary | ICD-10-CM

## 2021-05-07 DIAGNOSIS — I50.9 ACUTE ON CHRONIC CONGESTIVE HEART FAILURE, UNSPECIFIED HEART FAILURE TYPE (H): ICD-10-CM

## 2021-05-07 DIAGNOSIS — C37 MALIGNANT THYMOMA (H): ICD-10-CM

## 2021-05-07 PROCEDURE — 99215 OFFICE O/P EST HI 40 MIN: CPT | Mod: 95 | Performed by: INTERNAL MEDICINE

## 2021-05-07 NOTE — LETTER
5/7/2021         RE: Moises Chacon  [facility]  [address]  [facility] [address]        Dear Colleague,    Thank you for referring your patient, Moises Chacon, to the [facility]. Please see a copy of my visit note below.    Moises is a 70 year old who is being evaluated via a billable telephone visit.      What phone number would you like to be contacted at? [phone], please call  services at [phone]  How would you like to obtain your AVS? Mail a copy     Jaye Abreu CMA on 5/7/2021 at 2:41 PM        [facility]  HEMATOLOGY AND ONCOLOGY    FOLLOW-UP VISIT NOTE    PATIENT NAME: Moises Chacon MRN # [id number]  DATE OF VISIT: May 7, 2021 YOB: 1950    REFERRING PROVIDER: Serge Botello MD  [id number] [place] [address]    CANCER TYPE: Adenocarcinoma from left lower lobe of lung; PDL 1+60%, K-zena mutation +  STAGE: IIB vs IIIA     TREATMENT SUMMARY:  -Patient has a 50+ pack year smoking history and was noted to have a left lower lobe mass in 2018.  This was not followed initially.  He was worked up for abdominal pain which revealed growth in his left lower lobe mass.  He underwent left lower lobectomy on 5/6/2020 at [facility] in [place].  He has moved to [place] after his surgery and established care with [facility].  - He had resection of mediastinal mass on 8/21/20 which revealed a thymoma    CURRENT INTERVENTIONS:  Surveillance post surgical resection for locally advanced non-small cell lung cancer    SUBJECTIVE   Moises Chacon is being followed for non small cell lung cancer.     Mr. Chacon was admitted with acute shortness of breath on 9/18/20. Mr. Chacon comes to a scheduled follow up visit.     He still has a lot of shortness of breath which has not improved at all. He cannot walk short distance before getting short of  breath. He has gained about 13 lbs in the last few weeks. His shortness of breath has been worse lately.         PAST MEDICAL HISTORY     Past Medical History:   Diagnosis Date     Anemia      Constipation      COPD (chronic obstructive pulmonary disease) (H)      Diastolic congestive heart failure (H)      Dyslipidemia      GERD (gastroesophageal reflux disease)      Hypertension      Primary lung adenocarcinoma (H)     Stage IIB: pT3 N0 M0. Diagnosed initially 01/2020. Underwent left lower lobectomy 05/2020.     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     [START ON 5/26/2021] allopurinol (ZYLOPRIM) 300 MG tablet Take 1 tablet (300 mg) by mouth daily (beginning 5/26/21, after increasing the smaller doses)     alum & mag hydroxide-simethicone (MYLANTA ES/MAALOX  ES) 400-400-40 MG/5ML SUSP Take 30 mLs by mouth every 6 hours as needed for indigestion or heartburn     amLODIPine (NORVASC) 10 MG tablet TAKE 1 TABLET BY MOUTH DAILY.     aspirin (ASA) 81 MG EC tablet Take 1 tablet (81 mg) by mouth daily     calcium carbonate (TUMS) 500 MG chewable tablet Take 1 tablet (500 mg) by mouth 3 times daily     cetirizine (ZYRTEC) 10 MG tablet TAKE 1 TAB BY MOUTH AT BEDTIME     famotidine (PEPCID) 20 MG tablet Take 1 tablet (20 mg) by mouth 2 times daily     lidocaine (XYLOCAINE) 2 % external gel Apply topically as needed for moderate pain in affected area on left flank. Should wash hands after applying     metoprolol succinate ER (TOPROL-XL) 25 MG 24 hr tablet Take 1 tablet (25 mg) by mouth At Bedtime     mirtazapine (REMERON) 15 MG tablet Take 1  tablet (15 mg) by mouth At Bedtime     mirtazapine (REMERON) 7.5 MG tablet Take 1 tablet (7.5 mg) by mouth At Bedtime     nitroGLYcerin (NITROSTAT) 0.4 MG sublingual tablet Place 0.4 mg under the tongue every 5 minutes as needed for chest pain For chest pain place 1 tablet under the tongue every 5 minutes for 3 doses. If symptoms persist 5 minutes after 1st dose call 911.     sennosides (SENOKOT) 8.6 MG tablet Take 1 tablet by mouth 2 times daily     sertraline (ZOLOFT) 50 MG tablet Take 1 tablet (50 mg) by mouth daily Begin with half a pill daily for 8 days.     traMADol (ULTRAM) 50 MG tablet TAKE 1 TABLET(50 MG) BY MOUTH EVERY 6 HOURS AS NEEDED FOR SEVERE PAIN     umeclidinium-vilanterol (ANORO ELLIPTA) 62.5-25 MCG/INH oral inhaler Inhale 1 puff into the lungs daily     varenicline (CHANTIX IVONNE) 0.5 MG X 11 & 1 MG X 42 tablet Take 0.5 mg tab daily for 3 days, THEN 0.5 mg tab twice daily for 4 days, THEN 1 mg twice daily.     varenicline (CHANTIX) 1 MG tablet Take 1 tablet (1 mg) by mouth 2 times daily     zolpidem (AMBIEN) 10 MG tablet Take 1 tablet (10 mg) by mouth At Bedtime     No current facility-administered medications for this visit.         ALLERGIES      Allergies   Allergen Reactions     Atorvastatin Itching     Flomax [Tamsulosin]      ITCHING        REVIEW OF SYSTEMS   As above in the HPI, o/w complete 12-point ROS was negative.     PHYSICAL EXAM   There were no vitals taken for this visit.  Physical exam not done at this telephone telemedicine visit     LABORATORY AND IMAGING STUDIES     Recent Labs   Lab Test 05/05/21  1537 03/09/21  1754 02/08/21  1350 01/27/21  1426 10/09/20  1914    139 140 139 136   POTASSIUM 3.7 4.0 4.0 3.8 4.4   CHLORIDE 107 106 106 104 100   CO2 29 26 28 29 29   ANIONGAP 6 6 6 5 7   BUN 19 24 11 14 14   CR 1.21 1.14 1.03 0.92 1.11   * 95 110* 89 94   MERI 8.9 8.4* 8.5 8.9 8.7     Recent Labs   Lab Test 09/19/20  0740 08/29/20  0725 08/20/20  1524 06/19/20  1520    MAG 2.2 1.9 2.0 2.1   PHOS 4.3  --   --   --      Recent Labs   Lab Test 03/09/21  1754 02/08/21  1350 01/27/21  1426 10/09/20  1914 09/29/20  1200 09/18/20  1234 09/18/20  1234   WBC 11.7* 6.3 8.9 5.3 6.4   < > 9.0   HGB 13.5 13.1* 13.6 11.1* 12.6*   < > 11.3*    235 268 245 404   < > 297   MCV 89 88 88 95 90   < > 93   NEUTROPHIL 70.6 62.2 65.1  --  63.3  --  79.8    < > = values in this interval not displayed.     Recent Labs   Lab Test 05/05/21  1537 03/09/21  1754 02/08/21  1350 06/19/20  1523 06/19/20  1523   BILITOTAL 0.3 0.2 0.2   < > 0.3   ALKPHOS 136 118 141   < > 101   ALT 40 27 22   < > 15   AST <3 16 4   < > 10   ALBUMIN 3.5 3.4 3.2*   < > 3.1*   LDH  --  138 159  --  168    < > = values in this interval not displayed.     TSH   Date Value Ref Range Status   11/09/2009 1.63 0.4 - 5.0 mU/L Final     EXAM: CT CHEST/ABDOMEN/PELVIS W CONTRAST  LOCATION: [facility]  DATE/TIME: 5/3/2021 9:51 AM     INDICATION: Non-small cell lung cancer, monitor  COMPARISON: CT of the chest, abdomen, and pelvis 1/27/2021  TECHNIQUE: CT scan of the chest, abdomen, and pelvis was performed following injection of IV contrast. Multiplanar reformats were obtained. Dose reduction techniques were used.   CONTRAST: Isovue 370 88cc     FINDINGS:   LUNGS AND PLEURA: Image quality is degraded by respiratory motion-related blurring during the acquisition. Previous left lower lobectomy. No definite new nodules or airspace opacities, however considerable image quality degradation limits assessment.   There is a calcified granuloma in the anterior right lung along the medial minor fissure and additional granulomatous calcification in lymph node tissue in the right hilum.     Unchanged pleural thickening in the left posterior costophrenic sulcus from prior surgery. No new pleural effusion or pleural nodularity.     MEDIASTINUM: Cardiac chambers are normal in size. There is no pericardial effusion. Patchy atheromatous  calcification of the thoracic aorta. Ascending aorta, arch, and proximal descending aorta are normal caliber. Fusiform enlargement of the aorta at the thoracoabdominal abdominal junction measuring up to 3.9 cm in diameter.     An enlarged AP window node measuring 11 mm short axis (series 4, image 84) is unchanged. There are no new enlarged lymph nodes in the stephie or mediastinum. Imaged thyroid gland is normal. Patulous esophagus, likely age-related dysmotility.     CORONARY ARTERY CALCIFICATION: Moderate.     HEPATOBILIARY: Lower liver is suboptimally evaluated due to motion-related image degradation. Calcified stones in the fundus of the gallbladder are unchanged. No gallbladder dilation. No definite liver lesions.     PANCREAS: Normal.     SPLEEN: Normal.     ADRENAL GLANDS: Normal.     KIDNEYS/BLADDER: Symmetric and normal in size. Urinary bladder is incompletely distended.     BOWEL: Mild sigmoid diverticulosis. Normal appendix. No dilated bowel or bowel wall thickening.     LYMPH NODES: Normal.     VASCULATURE: Unchanged saccular aneurysm arising from the anterior infrarenal abdominal aorta measuring up to 40 x 67 mm based on double oblique post processed reconstructions. The anterior component is predominantly thrombosed. No para-aortic inflammatory stranding. Bilateral common iliac artery stents are present. Additional stents are present in the right external iliac artery.     PELVIC ORGANS: Mild prostate gland enlargement. Seminal vesicles are symmetric.     MUSCULOSKELETAL: L1 compression deformity with anterior wedging and ankylosis at the L1-L2 disc space. Mild to moderate degenerative osteophytes at multiple levels of the thoracic and lumbar spine. No new fracture or aggressive/destructive bone lesion.                                                                      IMPRESSION:     Image quality degraded by respiratory motion artifact in both the chest and abdomen.     1.  Status post left lower  lobectomy. There are no findings to suggest progressive metastatic disease in the chest, abdomen, or pelvis.  2.  Unchanged enlarged lymph node in the AP window.  3.  Coronary atherosclerosis. Fusiform enlargement of the aorta at the thoracoabdominal junction and thrombosed saccular aneurysm arising from the anterior infrarenal abdominal aorta are unchanged. Bilateral common and right external iliac artery stents   are patent.          ASSESSMENT AND PLAN     1. Shortness of breath    2. Abdominal aortic aneurysm  3. Stage IIb non-small cell adenocarcinoma of lung from left lower lobe s/p left lower lobectomy on 5/6/2020  4. 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 Dyspnea on minimal exertion  6. Dysphagia  7. Multiple medical comorbidities including hypertension, dyslipidemia  8. Limited to poor understanding of disease and health       1. 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   - Plan to follow up in 3-4 months with CT Chest/Abdomen/Pelvis w Contrast and following labs   - CBC with platelets differential; Future   - Comprehensive metabolic panel; Future   - Lactate Dehydrogenase; Future    2. Malignant thymoma (H)  Negative margins and stage I disease  He does not need any additional surveillance or interventions for this.     3. Acute on chronic congestive heart failure, unspecified heart failure type (H)  - Patient started on lasix during hospitalization   He is off lasix now   Family was shocked to hear diagnosis of  CHF    Continues to complain of shortness of breath with minimal activity which is worse   Has gained about 13# in the last couple of weeks   Refer to cardiology for his CHF        4. Abdominal aortic aneurysm:  - I have pasted representative image from his CT scan showing AAA.   - I did refer him to vascular surgery and they will continue to follow; appreciate their help   - He did quit smoking about month and half ago per him.   - He should continue to follow in vascular surgery as scheduled.     Telephone duration: 35 min  45 minutes spent on the date of the encounter doing chart review, history and exam, documentation and further activities as noted above       Again, thank you for allowing me to participate in the care of your patient.        Sincerely,        Michael Ross MD

## 2021-05-07 NOTE — PROGRESS NOTES
[facility]  HEMATOLOGY AND ONCOLOGY    FOLLOW-UP VISIT NOTE    PATIENT NAME: Moises Chacon MRN # [id number]  DATE OF VISIT: May 7, 2021 YOB: 1950    REFERRING PROVIDER: Serge Botello MD  [id number] [place] [address]    CANCER TYPE: Adenocarcinoma from left lower lobe of lung; PDL 1+60%, K-zena mutation +  STAGE: IIB vs IIIA     TREATMENT SUMMARY:  -Patient has a 50+ pack year smoking history and was noted to have a left lower lobe mass in 2018.  This was not followed initially.  He was worked up for abdominal pain which revealed growth in his left lower lobe mass.  He underwent left lower lobectomy on 5/6/2020 at [facility] in [place].  He has moved to [place] after his surgery and established care with [facility].  - He had resection of mediastinal mass on 8/21/20 which revealed a thymoma    CURRENT INTERVENTIONS:  Surveillance post surgical resection for locally advanced non-small cell lung cancer    SUBJECTIVE   Moises Chacon is being followed for non small cell lung cancer.     Mr. Chacon was admitted with acute shortness of breath on 9/18/20. Mr. Chacon comes to a scheduled follow up visit.     He still has a lot of shortness of breath which has not improved at all. He cannot walk short distance before getting short of breath. He has gained about 13 lbs in the last few weeks. His shortness of breath has been worse lately.         PAST MEDICAL HISTORY     Past Medical History:   Diagnosis Date     Anemia      Constipation      COPD (chronic obstructive pulmonary disease) (H)      Diastolic congestive heart failure (H)      Dyslipidemia      GERD (gastroesophageal reflux disease)      Hypertension      Primary lung adenocarcinoma (H)     Stage IIB: pT3 N0 M0. Diagnosed initially 01/2020. Underwent left lower lobectomy 05/2020.     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     Tobacco use          CURRENT  OUTPATIENT MEDICATIONS     Current Outpatient Medications   Medication Sig     acetaminophen (TYLENOL) 325 MG tablet Take 3 tablets (975 mg) by mouth every 6 hours     allopurinol (ZYLOPRIM) 100 MG tablet Take 1 tablet (100 mg) by mouth daily For 30 days, then increase to 2 tablets daily for 30 days, then switch to the 300 mg prescription.     [START ON 5/26/2021] allopurinol (ZYLOPRIM) 300 MG tablet Take 1 tablet (300 mg) by mouth daily (beginning 5/26/21, after increasing the smaller doses)     alum & mag hydroxide-simethicone (MYLANTA ES/MAALOX  ES) 400-400-40 MG/5ML SUSP Take 30 mLs by mouth every 6 hours as needed for indigestion or heartburn     amLODIPine (NORVASC) 10 MG tablet TAKE 1 TABLET BY MOUTH DAILY.     aspirin (ASA) 81 MG EC tablet Take 1 tablet (81 mg) by mouth daily     calcium carbonate (TUMS) 500 MG chewable tablet Take 1 tablet (500 mg) by mouth 3 times daily     cetirizine (ZYRTEC) 10 MG tablet TAKE 1 TAB BY MOUTH AT BEDTIME     famotidine (PEPCID) 20 MG tablet Take 1 tablet (20 mg) by mouth 2 times daily     lidocaine (XYLOCAINE) 2 % external gel Apply topically as needed for moderate pain in affected area on left flank. Should wash hands after applying     metoprolol succinate ER (TOPROL-XL) 25 MG 24 hr tablet Take 1 tablet (25 mg) by mouth At Bedtime     mirtazapine (REMERON) 15 MG tablet Take 1 tablet (15 mg) by mouth At Bedtime     mirtazapine (REMERON) 7.5 MG tablet Take 1 tablet (7.5 mg) by mouth At Bedtime     nitroGLYcerin (NITROSTAT) 0.4 MG sublingual tablet Place 0.4 mg under the tongue every 5 minutes as needed for chest pain For chest pain place 1 tablet under the tongue every 5 minutes for 3 doses. If symptoms persist 5 minutes after 1st dose call 911.     sennosides (SENOKOT) 8.6 MG tablet Take 1 tablet by mouth 2 times daily     sertraline (ZOLOFT) 50 MG tablet Take 1 tablet (50 mg) by mouth daily Begin with half a pill daily for 8 days.     traMADol (ULTRAM) 50 MG tablet TAKE 1  TABLET(50 MG) BY MOUTH EVERY 6 HOURS AS NEEDED FOR SEVERE PAIN     umeclidinium-vilanterol (ANORO ELLIPTA) 62.5-25 MCG/INH oral inhaler Inhale 1 puff into the lungs daily     varenicline (CHANTIX IVONNE) 0.5 MG X 11 & 1 MG X 42 tablet Take 0.5 mg tab daily for 3 days, THEN 0.5 mg tab twice daily for 4 days, THEN 1 mg twice daily.     varenicline (CHANTIX) 1 MG tablet Take 1 tablet (1 mg) by mouth 2 times daily     zolpidem (AMBIEN) 10 MG tablet Take 1 tablet (10 mg) by mouth At Bedtime     No current facility-administered medications for this visit.         ALLERGIES      Allergies   Allergen Reactions     Atorvastatin Itching     Flomax [Tamsulosin]      ITCHING        REVIEW OF SYSTEMS   As above in the HPI, o/w complete 12-point ROS was negative.     PHYSICAL EXAM   There were no vitals taken for this visit.  Physical exam not done at this telephone telemedicine visit     LABORATORY AND IMAGING STUDIES     Recent Labs   Lab Test 05/05/21  1537 03/09/21  1754 02/08/21  1350 01/27/21  1426 10/09/20  1914    139 140 139 136   POTASSIUM 3.7 4.0 4.0 3.8 4.4   CHLORIDE 107 106 106 104 100   CO2 29 26 28 29 29   ANIONGAP 6 6 6 5 7   BUN 19 24 11 14 14   CR 1.21 1.14 1.03 0.92 1.11   * 95 110* 89 94   MERI 8.9 8.4* 8.5 8.9 8.7     Recent Labs   Lab Test 09/19/20  0740 08/29/20  0725 08/20/20  1524 06/19/20  1523   MAG 2.2 1.9 2.0 2.1   PHOS 4.3  --   --   --      Recent Labs   Lab Test 03/09/21  1754 02/08/21  1350 01/27/21  1426 10/09/20  1914 09/29/20  1200 09/18/20  1234 09/18/20  1234   WBC 11.7* 6.3 8.9 5.3 6.4   < > 9.0   HGB 13.5 13.1* 13.6 11.1* 12.6*   < > 11.3*    235 268 245 404   < > 297   MCV 89 88 88 95 90   < > 93   NEUTROPHIL 70.6 62.2 65.1  --  63.3  --  79.8    < > = values in this interval not displayed.     Recent Labs   Lab Test 05/05/21  1537 03/09/21  1754 02/08/21  1350 06/19/20  1523 06/19/20  1523   BILITOTAL 0.3 0.2 0.2   < > 0.3   ALKPHOS 136 118 141   < > 101   ALT 40 27 22    < > 15   AST <3 16 4   < > 10   ALBUMIN 3.5 3.4 3.2*   < > 3.1*   LDH  --  138 159  --  168    < > = values in this interval not displayed.     TSH   Date Value Ref Range Status   11/09/2009 1.63 0.4 - 5.0 mU/L Final     EXAM: CT CHEST/ABDOMEN/PELVIS W CONTRAST  LOCATION: [facility]  DATE/TIME: 5/3/2021 9:51 AM     INDICATION: Non-small cell lung cancer, monitor  COMPARISON: CT of the chest, abdomen, and pelvis 1/27/2021  TECHNIQUE: CT scan of the chest, abdomen, and pelvis was performed following injection of IV contrast. Multiplanar reformats were obtained. Dose reduction techniques were used.   CONTRAST: Isovue 370 88cc     FINDINGS:   LUNGS AND PLEURA: Image quality is degraded by respiratory motion-related blurring during the acquisition. Previous left lower lobectomy. No definite new nodules or airspace opacities, however considerable image quality degradation limits assessment.   There is a calcified granuloma in the anterior right lung along the medial minor fissure and additional granulomatous calcification in lymph node tissue in the right hilum.     Unchanged pleural thickening in the left posterior costophrenic sulcus from prior surgery. No new pleural effusion or pleural nodularity.     MEDIASTINUM: Cardiac chambers are normal in size. There is no pericardial effusion. Patchy atheromatous calcification of the thoracic aorta. Ascending aorta, arch, and proximal descending aorta are normal caliber. Fusiform enlargement of the aorta at the thoracoabdominal abdominal junction measuring up to 3.9 cm in diameter.     An enlarged AP window node measuring 11 mm short axis (series 4, image 84) is unchanged. There are no new enlarged lymph nodes in the stephie or mediastinum. Imaged thyroid gland is normal. Patulous esophagus, likely age-related dysmotility.     CORONARY ARTERY CALCIFICATION: Moderate.     HEPATOBILIARY: Lower liver is suboptimally evaluated due to motion-related image degradation.  Calcified stones in the fundus of the gallbladder are unchanged. No gallbladder dilation. No definite liver lesions.     PANCREAS: Normal.     SPLEEN: Normal.     ADRENAL GLANDS: Normal.     KIDNEYS/BLADDER: Symmetric and normal in size. Urinary bladder is incompletely distended.     BOWEL: Mild sigmoid diverticulosis. Normal appendix. No dilated bowel or bowel wall thickening.     LYMPH NODES: Normal.     VASCULATURE: Unchanged saccular aneurysm arising from the anterior infrarenal abdominal aorta measuring up to 40 x 67 mm based on double oblique post processed reconstructions. The anterior component is predominantly thrombosed. No para-aortic inflammatory stranding. Bilateral common iliac artery stents are present. Additional stents are present in the right external iliac artery.     PELVIC ORGANS: Mild prostate gland enlargement. Seminal vesicles are symmetric.     MUSCULOSKELETAL: L1 compression deformity with anterior wedging and ankylosis at the L1-L2 disc space. Mild to moderate degenerative osteophytes at multiple levels of the thoracic and lumbar spine. No new fracture or aggressive/destructive bone lesion.                                                                      IMPRESSION:     Image quality degraded by respiratory motion artifact in both the chest and abdomen.     1.  Status post left lower lobectomy. There are no findings to suggest progressive metastatic disease in the chest, abdomen, or pelvis.  2.  Unchanged enlarged lymph node in the AP window.  3.  Coronary atherosclerosis. Fusiform enlargement of the aorta at the thoracoabdominal junction and thrombosed saccular aneurysm arising from the anterior infrarenal abdominal aorta are unchanged. Bilateral common and right external iliac artery stents   are patent.          ASSESSMENT AND PLAN     1. Shortness of breath    2. Abdominal aortic aneurysm  3. Stage IIb non-small cell adenocarcinoma of lung from left lower lobe s/p left lower  lobectomy on 5/6/2020  4. 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 Dyspnea on minimal exertion  6. Dysphagia  7. Multiple medical comorbidities including hypertension, dyslipidemia  8. Limited to poor understanding of disease and health       1. 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   - Plan to follow up in 3-4 months with CT Chest/Abdomen/Pelvis w Contrast and following labs   - CBC with platelets differential; Future   - Comprehensive metabolic panel; Future   - Lactate Dehydrogenase; Future    2. Malignant thymoma (H)  Negative margins and stage I disease  He does not need any additional surveillance or interventions for this.     3. Acute on chronic congestive heart failure, unspecified heart failure type (H)  - Patient started on lasix during hospitalization   He is off lasix now   Family was shocked to hear diagnosis of CHF    Continues to complain of shortness of breath with minimal activity which is worse   Has gained about 13# in the last couple of weeks   Refer to cardiology for his CHF        4. Abdominal aortic aneurysm:  - I have pasted representative image from his CT scan showing AAA.   - I did refer him to vascular surgery and they will continue to follow; appreciate their help   - He did quit smoking about month and half ago per him.   - He should continue to follow in vascular surgery as scheduled.     Telephone duration: 35 min  45 minutes spent on the date of the encounter doing chart review, history and  exam, documentation and further activities as noted above

## 2021-05-07 NOTE — PROGRESS NOTES
Moises is a 70 year old who is being evaluated via a billable telephone visit.      What phone number would you like to be contacted at? [phone], please call  services at [phone]  How would you like to obtain your AVS? Mail a copy     Jaye Abreu CMA on 5/7/2021 at 2:41 PM

## 2021-05-07 NOTE — LETTER
5/7/2021         RE: Moises Chacon  [facility]  [address]  [facility] [address]        Dear Colleague,    Thank you for referring your patient, Moises Chacon, to the [facility]. Please see a copy of my visit note below.    Moises is a 70 year old who is being evaluated via a billable telephone visit.      What phone number would you like to be contacted at? [phone], please call  services at [phone]  How would you like to obtain your AVS? Mail a copy     Jaye Abreu CMA on 5/7/2021 at 2:41 PM        [facility]  HEMATOLOGY AND ONCOLOGY    FOLLOW-UP VISIT NOTE    PATIENT NAME: Moises Chacon MRN # [id number]  DATE OF VISIT: May 7, 2021 YOB: 1950    REFERRING PROVIDER: Serge Botello MD  [id number] [place] [address]    CANCER TYPE: Adenocarcinoma from left lower lobe of lung; PDL 1+60%, K-zena mutation +  STAGE: IIB vs IIIA     TREATMENT SUMMARY:  -Patient has a 50+ pack year smoking history and was noted to have a left lower lobe mass in 2018.  This was not followed initially.  He was worked up for abdominal pain which revealed growth in his left lower lobe mass.  He underwent left lower lobectomy on 5/6/2020 at [facility] system in [place].  He has moved to [place] after his surgery and established care with [facility] system.  - He had resection of mediastinal mass on 8/21/20 which revealed a thymoma    CURRENT INTERVENTIONS:  Surveillance post surgical resection for locally advanced non-small cell lung cancer    SUBJECTIVE   Moises Chacon is being followed for non small cell lung cancer.     Mr. Chacon was admitted with acute shortness of breath on 9/18/20. Mr. Chacon comes to a scheduled follow up visit.     He still has a lot of shortness of breath which has not improved at all. He cannot walk short distance before getting short of  breath. He has gained about 13 lbs in the last few weeks. His shortness of breath has been worse lately.         PAST MEDICAL HISTORY     Past Medical History:   Diagnosis Date     Anemia      Constipation      COPD (chronic obstructive pulmonary disease) (H)      Diastolic congestive heart failure (H)      Dyslipidemia      GERD (gastroesophageal reflux disease)      Hypertension      Primary lung adenocarcinoma (H)     Stage IIB: pT3 N0 M0. Diagnosed initially 01/2020. Underwent left lower lobectomy 05/2020.     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     [START ON 5/26/2021] allopurinol (ZYLOPRIM) 300 MG tablet Take 1 tablet (300 mg) by mouth daily (beginning 5/26/21, after increasing the smaller doses)     alum & mag hydroxide-simethicone (MYLANTA ES/MAALOX  ES) 400-400-40 MG/5ML SUSP Take 30 mLs by mouth every 6 hours as needed for indigestion or heartburn     amLODIPine (NORVASC) 10 MG tablet TAKE 1 TABLET BY MOUTH DAILY.     aspirin (ASA) 81 MG EC tablet Take 1 tablet (81 mg) by mouth daily     calcium carbonate (TUMS) 500 MG chewable tablet Take 1 tablet (500 mg) by mouth 3 times daily     cetirizine (ZYRTEC) 10 MG tablet TAKE 1 TAB BY MOUTH AT BEDTIME     famotidine (PEPCID) 20 MG tablet Take 1 tablet (20 mg) by mouth 2 times daily     lidocaine (XYLOCAINE) 2 % external gel Apply topically as needed for moderate pain in affected area on left flank. Should wash hands after applying     metoprolol succinate ER (TOPROL-XL) 25 MG 24 hr tablet Take 1 tablet (25 mg) by mouth At Bedtime     mirtazapine (REMERON) 15 MG tablet Take 1  tablet (15 mg) by mouth At Bedtime     mirtazapine (REMERON) 7.5 MG tablet Take 1 tablet (7.5 mg) by mouth At Bedtime     nitroGLYcerin (NITROSTAT) 0.4 MG sublingual tablet Place 0.4 mg under the tongue every 5 minutes as needed for chest pain For chest pain place 1 tablet under the tongue every 5 minutes for 3 doses. If symptoms persist 5 minutes after 1st dose call 911.     sennosides (SENOKOT) 8.6 MG tablet Take 1 tablet by mouth 2 times daily     sertraline (ZOLOFT) 50 MG tablet Take 1 tablet (50 mg) by mouth daily Begin with half a pill daily for 8 days.     traMADol (ULTRAM) 50 MG tablet TAKE 1 TABLET(50 MG) BY MOUTH EVERY 6 HOURS AS NEEDED FOR SEVERE PAIN     umeclidinium-vilanterol (ANORO ELLIPTA) 62.5-25 MCG/INH oral inhaler Inhale 1 puff into the lungs daily     varenicline (CHANTIX IVONNE) 0.5 MG X 11 & 1 MG X 42 tablet Take 0.5 mg tab daily for 3 days, THEN 0.5 mg tab twice daily for 4 days, THEN 1 mg twice daily.     varenicline (CHANTIX) 1 MG tablet Take 1 tablet (1 mg) by mouth 2 times daily     zolpidem (AMBIEN) 10 MG tablet Take 1 tablet (10 mg) by mouth At Bedtime     No current facility-administered medications for this visit.         ALLERGIES      Allergies   Allergen Reactions     Atorvastatin Itching     Flomax [Tamsulosin]      ITCHING        REVIEW OF SYSTEMS   As above in the HPI, o/w complete 12-point ROS was negative.     PHYSICAL EXAM   There were no vitals taken for this visit.  Physical exam not done at this telephone telemedicine visit     LABORATORY AND IMAGING STUDIES     Recent Labs   Lab Test 05/05/21  1537 03/09/21  1754 02/08/21  1350 01/27/21  1426 10/09/20  1914    139 140 139 136   POTASSIUM 3.7 4.0 4.0 3.8 4.4   CHLORIDE 107 106 106 104 100   CO2 29 26 28 29 29   ANIONGAP 6 6 6 5 7   BUN 19 24 11 14 14   CR 1.21 1.14 1.03 0.92 1.11   * 95 110* 89 94   MERI 8.9 8.4* 8.5 8.9 8.7     Recent Labs   Lab Test 09/19/20  0740 08/29/20  0725 08/20/20  1524 06/19/20  1526    MAG 2.2 1.9 2.0 2.1   PHOS 4.3  --   --   --      Recent Labs   Lab Test 03/09/21  1754 02/08/21  1350 01/27/21  1426 10/09/20  1914 09/29/20  1200 09/18/20  1234 09/18/20  1234   WBC 11.7* 6.3 8.9 5.3 6.4   < > 9.0   HGB 13.5 13.1* 13.6 11.1* 12.6*   < > 11.3*    235 268 245 404   < > 297   MCV 89 88 88 95 90   < > 93   NEUTROPHIL 70.6 62.2 65.1  --  63.3  --  79.8    < > = values in this interval not displayed.     Recent Labs   Lab Test 05/05/21  1537 03/09/21  1754 02/08/21  1350 06/19/20  1523 06/19/20  1523   BILITOTAL 0.3 0.2 0.2   < > 0.3   ALKPHOS 136 118 141   < > 101   ALT 40 27 22   < > 15   AST <3 16 4   < > 10   ALBUMIN 3.5 3.4 3.2*   < > 3.1*   LDH  --  138 159  --  168    < > = values in this interval not displayed.     TSH   Date Value Ref Range Status   11/09/2009 1.63 0.4 - 5.0 mU/L Final     EXAM: CT CHEST/ABDOMEN/PELVIS W CONTRAST  LOCATION: [facility]  DATE/TIME: 5/3/2021 9:51 AM     INDICATION: Non-small cell lung cancer, monitor  COMPARISON: CT of the chest, abdomen, and pelvis 1/27/2021  TECHNIQUE: CT scan of the chest, abdomen, and pelvis was performed following injection of IV contrast. Multiplanar reformats were obtained. Dose reduction techniques were used.   CONTRAST: Isovue 370 88cc     FINDINGS:   LUNGS AND PLEURA: Image quality is degraded by respiratory motion-related blurring during the acquisition. Previous left lower lobectomy. No definite new nodules or airspace opacities, however considerable image quality degradation limits assessment.   There is a calcified granuloma in the anterior right lung along the medial minor fissure and additional granulomatous calcification in lymph node tissue in the right hilum.     Unchanged pleural thickening in the left posterior costophrenic sulcus from prior surgery. No new pleural effusion or pleural nodularity.     MEDIASTINUM: Cardiac chambers are normal in size. There is no pericardial effusion. Patchy atheromatous  calcification of the thoracic aorta. Ascending aorta, arch, and proximal descending aorta are normal caliber. Fusiform enlargement of the aorta at the thoracoabdominal abdominal junction measuring up to 3.9 cm in diameter.     An enlarged AP window node measuring 11 mm short axis (series 4, image 84) is unchanged. There are no new enlarged lymph nodes in the stephie or mediastinum. Imaged thyroid gland is normal. Patulous esophagus, likely age-related dysmotility.     CORONARY ARTERY CALCIFICATION: Moderate.     HEPATOBILIARY: Lower liver is suboptimally evaluated due to motion-related image degradation. Calcified stones in the fundus of the gallbladder are unchanged. No gallbladder dilation. No definite liver lesions.     PANCREAS: Normal.     SPLEEN: Normal.     ADRENAL GLANDS: Normal.     KIDNEYS/BLADDER: Symmetric and normal in size. Urinary bladder is incompletely distended.     BOWEL: Mild sigmoid diverticulosis. Normal appendix. No dilated bowel or bowel wall thickening.     LYMPH NODES: Normal.     VASCULATURE: Unchanged saccular aneurysm arising from the anterior infrarenal abdominal aorta measuring up to 40 x 67 mm based on double oblique post processed reconstructions. The anterior component is predominantly thrombosed. No para-aortic inflammatory stranding. Bilateral common iliac artery stents are present. Additional stents are present in the right external iliac artery.     PELVIC ORGANS: Mild prostate gland enlargement. Seminal vesicles are symmetric.     MUSCULOSKELETAL: L1 compression deformity with anterior wedging and ankylosis at the L1-L2 disc space. Mild to moderate degenerative osteophytes at multiple levels of the thoracic and lumbar spine. No new fracture or aggressive/destructive bone lesion.                                                                      IMPRESSION:     Image quality degraded by respiratory motion artifact in both the chest and abdomen.     1.  Status post left lower  lobectomy. There are no findings to suggest progressive metastatic disease in the chest, abdomen, or pelvis.  2.  Unchanged enlarged lymph node in the AP window.  3.  Coronary atherosclerosis. Fusiform enlargement of the aorta at the thoracoabdominal junction and thrombosed saccular aneurysm arising from the anterior infrarenal abdominal aorta are unchanged. Bilateral common and right external iliac artery stents   are patent.          ASSESSMENT AND PLAN     1. Shortness of breath    2. Abdominal aortic aneurysm  3. Stage IIb non-small cell adenocarcinoma of lung from left lower lobe s/p left lower lobectomy on 5/6/2020  4. 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 Dyspnea on minimal exertion  6. Dysphagia  7. Multiple medical comorbidities including hypertension, dyslipidemia  8. Limited to poor understanding of disease and health       1. 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   - Plan to follow up in 3-4 months with CT Chest/Abdomen/Pelvis w Contrast and following labs   - CBC with platelets differential; Future   - Comprehensive metabolic panel; Future   - Lactate Dehydrogenase; Future    2. Malignant thymoma (H)  Negative margins and stage I disease  He does not need any additional surveillance or interventions for this.     3. Acute on chronic congestive heart failure, unspecified heart failure type (H)  - Patient started on lasix during hospitalization   He is off lasix now   Family was shocked to hear diagnosis of  CHF    Continues to complain of shortness of breath with minimal activity which is worse   Has gained about 13# in the last couple of weeks   Refer to cardiology for his CHF        4. Abdominal aortic aneurysm:  - I have pasted representative image from his CT scan showing AAA.   - I did refer him to vascular surgery and they will continue to follow; appreciate their help   - He did quit smoking about month and half ago per him.   - He should continue to follow in vascular surgery as scheduled.     Telephone duration: 35 min  45 minutes spent on the date of the encounter doing chart review, history and exam, documentation and further activities as noted above       Again, thank you for allowing me to participate in the care of your patient.        Sincerely,        Michael Ross MD

## 2021-05-09 ENCOUNTER — TELEPHONE (OUTPATIENT)
Dept: INTERNAL MEDICINE | Facility: CLINIC | Age: 71
End: 2021-05-09

## 2021-05-10 NOTE — TELEPHONE ENCOUNTER
Please call the [place] cannabis registry. I can finally certify this patient, but (as you previously found) there is a different process for him because he doesn't speak english and doesn't have an e-mail.  Please call, provide them patient information, and obtain instructions for my next steps.  In case they want to know now, his PEG3 average is 4.7

## 2021-05-10 NOTE — TELEPHONE ENCOUNTER
RN called [place] cannabis registry, and gave needed information.  Staff gave placeholder e-mail of [email or web address], so Dr. Hernandez can certify patient online.  Information from [place] cannabis registry will be mailed to patient's home address at [facility].    Detsini Molina RN on 5/10/2021 at 12:09 PM

## 2021-05-12 ENCOUNTER — OFFICE VISIT (OUTPATIENT)
Dept: OPHTHALMOLOGY | Facility: CLINIC | Age: 71
End: 2021-05-12
Payer: COMMERCIAL

## 2021-05-12 DIAGNOSIS — H52.03 HYPEROPIA WITH ASTIGMATISM AND PRESBYOPIA, BILATERAL: ICD-10-CM

## 2021-05-12 DIAGNOSIS — H25.813 COMBINED FORMS OF AGE-RELATED CATARACT OF BOTH EYES: Primary | ICD-10-CM

## 2021-05-12 DIAGNOSIS — H52.4 HYPEROPIA WITH ASTIGMATISM AND PRESBYOPIA, BILATERAL: ICD-10-CM

## 2021-05-12 DIAGNOSIS — H26.8 PSEUDOEXFOLIATION OF LENS CAPSULE: ICD-10-CM

## 2021-05-12 DIAGNOSIS — H52.203 HYPEROPIA WITH ASTIGMATISM AND PRESBYOPIA, BILATERAL: ICD-10-CM

## 2021-05-12 PROCEDURE — 92015 DETERMINE REFRACTIVE STATE: CPT

## 2021-05-12 PROCEDURE — G0463 HOSPITAL OUTPT CLINIC VISIT: HCPCS | Mod: 25

## 2021-05-12 PROCEDURE — 92004 COMPRE OPH EXAM NEW PT 1/>: CPT | Mod: GC | Performed by: OPHTHALMOLOGY

## 2021-05-12 ASSESSMENT — REFRACTION_MANIFEST
OS_ADD: +2.25
OS_SPHERE: +0.50
OD_CYLINDER: +0.75
OS_AXIS: 016
OS_CYLINDER: +1.50
OD_SPHERE: +1.00
OD_ADD: +2.25
OD_AXIS: 153

## 2021-05-12 ASSESSMENT — TONOMETRY
OS_IOP_MMHG: 13
IOP_METHOD: TONOPEN
OD_IOP_MMHG: 15

## 2021-05-12 ASSESSMENT — VISUAL ACUITY
OD_CC: J7
METHOD: SNELLEN - LINEAR
OD_PH_CC: 20/80
OS_CC: 20/100
OS_CC: J7
OD_CC: 20/100
OS_PH_CC: 20/60

## 2021-05-12 ASSESSMENT — CUP TO DISC RATIO
OS_RATIO: 0.4
OD_RATIO: 0.4

## 2021-05-12 ASSESSMENT — SLIT LAMP EXAM - LIDS
COMMENTS: MILD MGD
COMMENTS: MILD MGD

## 2021-05-12 ASSESSMENT — REFRACTION_WEARINGRX
OD_SPHERE: +0.25
OD_CYLINDER: +1.00
OS_CYLINDER: +0.75
OS_ADD: +2.25
OD_AXIS: 156
OS_SPHERE: +0.25
OS_AXIS: 030
OD_ADD: +2.50

## 2021-05-12 ASSESSMENT — CONF VISUAL FIELD
OS_SUPERIOR_TEMPORAL_RESTRICTION: 3
OS_INFERIOR_TEMPORAL_RESTRICTION: 3
OD_SUPERIOR_TEMPORAL_RESTRICTION: 3
METHOD: COUNTING FINGERS

## 2021-05-12 NOTE — PROGRESS NOTES
HPI  Moises Chacon is a 70 year old male with a history of primary lung adenocarcinoma s/p lobectomy, s/p resection of mediastinal mass on 8/21/20 for malignant thymoma, COPD, HLD, HTN, CHF, presenting for comprehensive eye examination.  His glasses are from his last eye exam which was around 5 years ago.  His vision is slightly blurrier at both distance and near over the years.  He has glare around bright lights.  This is affecting his ability to read and to navigate.  No redness/discharge.  No pain.  No new flashes/floaters.  No diplopia.        POH:    Refractive error and presbyopia    PMH:  Left lower lobe primary adenocarcinoma s/p lobectomy 5/6/2020  S/p resection of mediastinal mass on 8/21/20 for malignant thymoma  COPD  HLD  HTN  HFrEF    Social History:  50 pack years    Gtts:  None    Assessment & Plan        (H25.813) Combined forms of age-related cataract of both eyes  (H52.03,  H52.203,  H52.4) Hyperopia with astigmatism and presbyopia, bilateral   Comment: BCVA 20/60 with visually significant cataracts in both eyes affecting ADL's.  Discussed RBA of cataract surgery and patient wishes to think about it.  Will give MRx today and recheck 6 months.    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 No evidence of glaucoma  Plan: Monitor. Risk of zonular laxity during CE/IOL as above      -----------------------------------------------------------------------------------    Patient disposition:   Return in about 6 months (around 11/12/2021) for VTD + MRx + IOL calcs. or sooner as needed.    Joss Diggs, PGY3  Ophthalmology  Resident    Teaching statement:  Complete documentation of historical and exam elements from today's encounter can be found in the full encounter summary report (not reduplicated in this progress note). I personally obtained the chief complaint(s) and history of present illness.  I confirmed and edited as necessary the review of systems, past medical/surgical history, family history, social history, and examination findings as documented by others; and I examined the patient myself. I personally reviewed the relevant tests, images, and reports as documented above.     I formulated and edited as necessary the assessment and plan and discussed the findings and management plan with the patient and family.    Sharita Simon MD  Comprehensive Ophthalmology & Ocular Pathology  Department of Ophthalmology and Visual Neurosciences  [email or web address]  Pager [phone]

## 2021-05-12 NOTE — NURSING NOTE
Chief Complaints and History of Present Illnesses   Patient presents with     Annual Eye Exam     Chief Complaint(s) and History of Present Illness(es)     Annual Eye Exam     Laterality: both eyes    Onset: gradual    Severity: mild    Frequency: constantly    Associated symptoms: dryness and floaters.  Negative for eye pain, foreign body sensation and flashes    Treatments tried: no treatments    Response to treatment: no improvement    Pain scale: 0/10              Comments     Moises is here for his annual eye exam. He wears glasses that are about five years old and he feels he does not see as well in the distance as he used to. Reading is also becoming harder.      Adan Ledesma COT 9:29 AM May 12, 2021

## 2021-05-13 ENCOUNTER — VIRTUAL VISIT (OUTPATIENT)
Dept: INTERNAL MEDICINE | Facility: CLINIC | Age: 71
End: 2021-05-13
Payer: COMMERCIAL

## 2021-05-13 DIAGNOSIS — R14.0 ABDOMINAL DISTENSION: Primary | ICD-10-CM

## 2021-05-13 PROCEDURE — 99441 PR PHYSICIAN TELEPHONE EVALUATION 5-10 MIN: CPT | Performed by: PEDIATRICS

## 2021-05-13 RX ORDER — SIMETHICONE 125 MG
125 TABLET,CHEWABLE ORAL 2 TIMES DAILY
Qty: 60 TABLET | Refills: 3 | Status: SHIPPED | OUTPATIENT
Start: 2021-05-13 | End: 2021-09-30

## 2021-05-13 NOTE — NURSING NOTE
Chief Complaint   Patient presents with     Results     pt would like to follow up from visit last week and lab results       Jayne Velez CMA, EMT at 2:45 PM on 5/13/2021.

## 2021-05-13 NOTE — PROGRESS NOTES
"Dear patient. Thank you for visiting with me. I want you to feel respected, understood, and empowered. \"Respect\" is valuing you as much as I would a close family member. \"Empowerment\" happens when you are fully informed, and can make the best possible decision for you.  Please ask me questions!  Challenge anything that is not clear.    Medical records are primarily used as memory aids for me and my colleagues. Things to know about my documentation style:  - The 'problem list' includes current symptoms or diagnoses, and some problems that are resolved but may return. I use the past medical history for problems not expected to return.  - I use single quotation marks for things that you or I said, when I want to clarify who was speaking.  - I use double quotation marks when copying a term from elsewhere in your records. Italics (besides here) may also denote a quotation.  If you have questions or concerns, please contact me; I will reply as soon as time allows.      Virtual Visit Details    Type of service:  Telephone Visit    Start Time: 3:39 PM    End Time:3:49 PM    Originating Location (pt. Location): Assisted Living    Distant Location (provider location):  [facility] [place]     Platform used for Visit: CFX BATTERY    PCP: Junaid Hernandez  Visit type: problem-oriented  Time note (e2, 10'): The total time (on the date of service) for this service was 15 minutes, including discussion/face-to-face, chart review, interpretation not otherwise reported, documentation, and updating of the computerized record.    This visit was conducted with the assistance of a  - telephone, language. I wasn't able to get a regular telephone  on a timely basis.      Context    Moises Chacon is a 70 year old man, with concerns including:  Chief Complaint   Patient presents with     Results     pt would like to follow up from visit last week and lab results       History, " update, and/or problems      Constipation and abdominal pain  XR results showed stool and gas. He is still having bowel movements two times per day. I recommended something for gas, but he doesn't think he has gas and didn't think he would take it. The only part of his body is the area of the surgery. This is different from my understanding of what he said before in person.     He has been certified for medical marijuana - this process is going on separately (I put in the certification with a placeholder email as requested).     They asked me to send the AVS to fax [phone]. They also asked for a copy of the recent results.    Oncologist Dr. Ross asked for a cardiology appointment because of Mr. Chacon's prolonged problem with dyspnea on exertion and gaining weight. I can see the appointment is in process.

## 2021-06-03 ENCOUNTER — OFFICE VISIT (OUTPATIENT)
Dept: URGENT CARE | Facility: URGENT CARE | Age: 71
End: 2021-06-03
Payer: COMMERCIAL

## 2021-06-03 VITALS
RESPIRATION RATE: 18 BRPM | WEIGHT: 153 LBS | TEMPERATURE: 98.2 F | BODY MASS INDEX: 24.59 KG/M2 | HEART RATE: 76 BPM | OXYGEN SATURATION: 98 % | DIASTOLIC BLOOD PRESSURE: 70 MMHG | HEIGHT: 66 IN | SYSTOLIC BLOOD PRESSURE: 118 MMHG

## 2021-06-03 DIAGNOSIS — M25.561 PAIN AND SWELLING OF RIGHT KNEE: Primary | ICD-10-CM

## 2021-06-03 DIAGNOSIS — M10.9 ACUTE GOUT OF RIGHT KNEE, UNSPECIFIED CAUSE: ICD-10-CM

## 2021-06-03 DIAGNOSIS — M25.461 PAIN AND SWELLING OF RIGHT KNEE: Primary | ICD-10-CM

## 2021-06-03 LAB
ANION GAP SERPL CALCULATED.3IONS-SCNC: 9 MMOL/L (ref 3–14)
BASOPHILS # BLD AUTO: 0.1 10E9/L (ref 0–0.2)
BASOPHILS NFR BLD AUTO: 0.7 %
BUN SERPL-MCNC: 16 MG/DL (ref 7–30)
CALCIUM SERPL-MCNC: 8.8 MG/DL (ref 8.5–10.1)
CHLORIDE SERPL-SCNC: 101 MMOL/L (ref 94–109)
CO2 SERPL-SCNC: 28 MMOL/L (ref 20–32)
CREAT SERPL-MCNC: 1.09 MG/DL (ref 0.66–1.25)
DIFFERENTIAL METHOD BLD: ABNORMAL
EOSINOPHIL # BLD AUTO: 0.3 10E9/L (ref 0–0.7)
EOSINOPHIL NFR BLD AUTO: 2.8 %
ERYTHROCYTE [DISTWIDTH] IN BLOOD BY AUTOMATED COUNT: 14.5 % (ref 10–15)
GFR SERPL CREATININE-BSD FRML MDRD: 67 ML/MIN/{1.73_M2}
GLUCOSE SERPL-MCNC: 83 MG/DL (ref 70–99)
HCT VFR BLD AUTO: 40.3 % (ref 40–53)
HGB BLD-MCNC: 14.1 G/DL (ref 13.3–17.7)
LYMPHOCYTES # BLD AUTO: 2.6 10E9/L (ref 0.8–5.3)
LYMPHOCYTES NFR BLD AUTO: 21.8 %
MCH RBC QN AUTO: 30.8 PG (ref 26.5–33)
MCHC RBC AUTO-ENTMCNC: 35 G/DL (ref 31.5–36.5)
MCV RBC AUTO: 88 FL (ref 78–100)
MONOCYTES # BLD AUTO: 0.9 10E9/L (ref 0–1.3)
MONOCYTES NFR BLD AUTO: 7.3 %
NEUTROPHILS # BLD AUTO: 8.1 10E9/L (ref 1.6–8.3)
NEUTROPHILS NFR BLD AUTO: 67.4 %
PLATELET # BLD AUTO: 209 10E9/L (ref 150–450)
POTASSIUM SERPL-SCNC: 3.8 MMOL/L (ref 3.4–5.3)
RBC # BLD AUTO: 4.58 10E12/L (ref 4.4–5.9)
SODIUM SERPL-SCNC: 138 MMOL/L (ref 133–144)
URATE SERPL-MCNC: 4.1 MG/DL (ref 3.5–7.2)
WBC # BLD AUTO: 12 10E9/L (ref 4–11)

## 2021-06-03 PROCEDURE — 84550 ASSAY OF BLOOD/URIC ACID: CPT | Performed by: PHYSICIAN ASSISTANT

## 2021-06-03 PROCEDURE — 85025 COMPLETE CBC W/AUTO DIFF WBC: CPT | Performed by: PHYSICIAN ASSISTANT

## 2021-06-03 PROCEDURE — 36415 COLL VENOUS BLD VENIPUNCTURE: CPT | Performed by: PHYSICIAN ASSISTANT

## 2021-06-03 PROCEDURE — 99214 OFFICE O/P EST MOD 30 MIN: CPT | Performed by: PHYSICIAN ASSISTANT

## 2021-06-03 PROCEDURE — 80048 BASIC METABOLIC PNL TOTAL CA: CPT | Performed by: PHYSICIAN ASSISTANT

## 2021-06-03 RX ORDER — CEPHALEXIN 500 MG/1
500 CAPSULE ORAL 3 TIMES DAILY
Qty: 30 CAPSULE | Refills: 0 | Status: SHIPPED | OUTPATIENT
Start: 2021-06-03 | End: 2021-06-13

## 2021-06-03 RX ORDER — COLCHICINE 0.6 MG/1
0.6 TABLET ORAL 2 TIMES DAILY
Qty: 28 TABLET | Refills: 0 | Status: SHIPPED | OUTPATIENT
Start: 2021-06-03 | End: 2021-06-17

## 2021-06-03 ASSESSMENT — MIFFLIN-ST. JEOR: SCORE: 1386.75

## 2021-06-03 NOTE — PATIENT INSTRUCTIONS
Patient Education     Gout    Gout is an inflammation of a joint caused by an inflammatory response to gout crystals in the joint fluid. This occurs when there is excess uric acid. Uric acid is a normal waste product in the body. It builds up in the body when the kidneys can't filter enough of it from the blood. This may occur with age. It is also associated with kidney disease. Gout occurs more often in people with obesity, diabetes, high blood pressure, or high levels of fats in the blood. It may run in families. Gout tends to come and go. A flare up of gout is called an attack. Drinking alcohol or eating certain foods (such as shellfish or foods with additives such as high-fructose corn syrup) may increase uric acid levels in the blood and cause a gout attack.   During a gout attack, the affected joint may become hot, red, swollen, and painful. If you have had one attack of gout, you are likely to have another. An attack of gout can be treated with medicine. If these attacks become frequent, a daily medicine may be prescribed to help the kidneys remove uric acid from the body.   Home care  During a gout attack:    Contact your healthcare provider for advice and therapy options at the early stages of a gout flare. Treating the flare right away can prevent it from getting worse.    Rest painful joints. If gout affects the joints of your foot or leg, you may want to use crutches for the first few days to keep from bearing weight on the affected joint.    When sitting or lying down, raise the painful joint to a level higher than your heart.    Apply an ice pack (ice cubes in a plastic bag wrapped in a thin towel) over the injured area for 20 minutes every 1 to 2 hours the first day for pain relief. Continue this 3 to 4 times a day for swelling and pain.    Avoid alcohol and foods listed below (see Preventing attacks) during a gout attack. Drink extra fluid to help flush the uric acid through your kidneys.    If you  were prescribed a medicine to treat gout, take it as your healthcare provider has instructed. Don't skip doses.    Take anti-inflammatory medicine as directed by your healthcare provider.    If pain medicines have been prescribed, take them exactly as directed.    Preventing attacks    Limit or stop  alcohol use. Excess alcohol intake can cause a gout attack.    Limit these foods and beverages:  ? Organ meats, such as kidneys and liver  ? Certain seafoods (anchovies, sardines, shrimp, scallops, herring, mackerel)  ? Wild game, meat extracts and meat gravies  ? Foods and beverages sweetened with high-fructose corn syrup, such as sodas    Eat a healthy diet including low-fat and nonfat dairy, whole grains, and vegetables.    If you are overweight, talk to your healthcare provider about a weight reduction plan. Avoid fasting or extreme low calorie diets (less than 900 calories per day). This will increase uric acid levels in the body.    If you have diabetes or high blood pressure, work with your doctor to manage these conditions.    Protect the joint from injury. Wear good fitting socks and shoes. Injury can trigger a gout attack.    Follow-up care  Follow up with your healthcare provider, or as advised.   When to seek medical advice  Call your healthcare provider if you have any of the following:    Fever over 100.4 F (38. C) with worsening joint pain    Increasing redness around the joint    Pain developing in another joint    Repeated vomiting, abdominal pain, or blood in the vomit or stool (black or red color)  [employer] last reviewed this educational content on 6/1/2019 2000-2021 [employer]. All rights reserved. This information is not intended as a substitute for professional medical care. Always follow your healthcare professional's instructions.

## 2021-06-03 NOTE — PROGRESS NOTES
(M25.561,  M25.461) Pain and swelling of right knee  (primary encounter diagnosis)  Plan: Uric acid, CBC with platelets and differential,        Basic metabolic panel  (Ca, Cl, CO2, Creat,         Gluc, K, Na, BUN), colchicine (COLCYRS) 0.6 MG         tablet, cephALEXin (KEFLEX) 500 MG capsule    Covered for potential infection due to increased WBC count.     (M10.9) Acute gout of right knee, unspecified cause  Plan: Uric acid, CBC with platelets and differential,        Basic metabolic panel  (Ca, Cl, CO2, Creat,         Gluc, K, Na, BUN), colchicine (COLCYRS) 0.6 MG         tablet      20 minutes spent on the date of the encounter doing chart review, history and exam, documentation and further activities per the note     See Patient Instructions  Patient Instructions     Patient Education     Gout    Gout is an inflammation of a joint caused by an inflammatory response to gout crystals in the joint fluid. This occurs when there is excess uric acid. Uric acid is a normal waste product in the body. It builds up in the body when the kidneys can't filter enough of it from the blood. This may occur with age. It is also associated with kidney disease. Gout occurs more often in people with obesity, diabetes, high blood pressure, or high levels of fats in the blood. It may run in families. Gout tends to come and go. A flare up of gout is called an attack. Drinking alcohol or eating certain foods (such as shellfish or foods with additives such as high-fructose corn syrup) may increase uric acid levels in the blood and cause a gout attack.   During a gout attack, the affected joint may become hot, red, swollen, and painful. If you have had one attack of gout, you are likely to have another. An attack of gout can be treated with medicine. If these attacks become frequent, a daily medicine may be prescribed to help the kidneys remove uric acid from the body.   Home care  During a gout attack:    Contact your healthcare  provider for advice and therapy options at the early stages of a gout flare. Treating the flare right away can prevent it from getting worse.    Rest painful joints. If gout affects the joints of your foot or leg, you may want to use crutches for the first few days to keep from bearing weight on the affected joint.    When sitting or lying down, raise the painful joint to a level higher than your heart.    Apply an ice pack (ice cubes in a plastic bag wrapped in a thin towel) over the injured area for 20 minutes every 1 to 2 hours the first day for pain relief. Continue this 3 to 4 times a day for swelling and pain.    Avoid alcohol and foods listed below (see Preventing attacks) during a gout attack. Drink extra fluid to help flush the uric acid through your kidneys.    If you were prescribed a medicine to treat gout, take it as your healthcare provider has instructed. Don't skip doses.    Take anti-inflammatory medicine as directed by your healthcare provider.    If pain medicines have been prescribed, take them exactly as directed.    Preventing attacks    Limit or stop  alcohol use. Excess alcohol intake can cause a gout attack.    Limit these foods and beverages:  ? Organ meats, such as kidneys and liver  ? Certain seafoods (anchovies, sardines, shrimp, scallops, herring, mackerel)  ? Wild game, meat extracts and meat gravies  ? Foods and beverages sweetened with high-fructose corn syrup, such as sodas    Eat a healthy diet including low-fat and nonfat dairy, whole grains, and vegetables.    If you are overweight, talk to your healthcare provider about a weight reduction plan. Avoid fasting or extreme low calorie diets (less than 900 calories per day). This will increase uric acid levels in the body.    If you have diabetes or high blood pressure, work with your doctor to manage these conditions.    Protect the joint from injury. Wear good fitting socks and shoes. Injury can trigger a gout attack.    Follow-up  care  Follow up with your healthcare provider, or as advised.   When to seek medical advice  Call your healthcare provider if you have any of the following:    Fever over 100.4 F (38. C) with worsening joint pain    Increasing redness around the joint    Pain developing in another joint    Repeated vomiting, abdominal pain, or blood in the vomit or stool (black or red color)  [employer] last reviewed this educational content on 6/1/2019 2000-2021 [employer]. All rights reserved. This information is not intended as a substitute for professional medical care. Always follow your healthcare professional's instructions.               [facility] U[facility]    Subjective   70 year old who presents to clinic today for the following health issues:    Urgent Care and Musculoskeletal Problem       HPI     Musculoskeletal problem/pain  Onset/Duration: 3 days  Description  Location: knee - right  Joint Swelling: YES  Redness: YES  Pain: YES  Warmth: YES  Intensity:  moderate  Progression of Symptoms:  worsening  Accompanying signs and symptoms:   Fevers: no  Numbness/tingling/weakness: no  History  Trauma to the area: no  Recent illness:  no  Previous similar problem: YES, Patient has history of gout  Previous evaluation:  no  Precipitating or alleviating factors:  Aggravating factors include: standing and walking  Therapies tried and outcome: rest/inactivity and ice      Review of Systems   Review of Systems   See HPI     Objective    Temp: 98.2  F (36.8  C) Temp src: Oral BP: 118/70 Pulse: 76   Resp: 18 SpO2: 98 %       Physical Exam   Physical Exam  Constitutional:       General: He is not in acute distress.     Appearance: Normal appearance. He is normal weight. He is not ill-appearing, toxic-appearing or diaphoretic.   HENT:      Head: Normocephalic and atraumatic.   Cardiovascular:      Rate and Rhythm: Normal rate and regular rhythm.      Pulses: Normal pulses.      Heart  sounds: Normal heart sounds. No murmur. No friction rub. No gallop.    Pulmonary:      Effort: Pulmonary effort is normal. No respiratory distress.      Breath sounds: Normal breath sounds. No stridor. No wheezing, rhonchi or rales.   Chest:      Chest wall: No tenderness.   Musculoskeletal:      Right knee: He exhibits swelling and erythema. He exhibits normal alignment, no LCL laxity, normal patellar mobility, no bony tenderness, normal meniscus and no MCL laxity. Tenderness found.        Legs:    Neurological:      General: No focal deficit present.      Mental Status: He is alert and oriented to person, place, and time. Mental status is at baseline.      Gait: Gait normal.   Psychiatric:         Mood and Affect: Mood normal.         Behavior: Behavior normal.         Thought Content: Thought content normal.         Judgment: Judgment normal.          Results for orders placed or performed in visit on 06/03/21 (from the past 24 hour(s))   Uric acid   Result Value Ref Range    Uric Acid 4.1 3.5 - 7.2 mg/dL   CBC with platelets and differential   Result Value Ref Range    WBC 12.0 (H) 4.0 - 11.0 10e9/L    RBC Count 4.58 4.4 - 5.9 10e12/L    Hemoglobin 14.1 13.3 - 17.7 g/dL    Hematocrit 40.3 40.0 - 53.0 %    MCV 88 78 - 100 fl    MCH 30.8 26.5 - 33.0 pg    MCHC 35.0 31.5 - 36.5 g/dL    RDW 14.5 10.0 - 15.0 %    Platelet Count 209 150 - 450 10e9/L    Diff Method Automated Method     % Neutrophils 67.4 %    % Lymphocytes 21.8 %    % Monocytes 7.3 %    % Eosinophils 2.8 %    % Basophils 0.7 %    Absolute Neutrophil 8.1 1.6 - 8.3 10e9/L    Absolute Lymphocytes 2.6 0.8 - 5.3 10e9/L    Absolute Monocytes 0.9 0.0 - 1.3 10e9/L    Absolute Eosinophils 0.3 0.0 - 0.7 10e9/L    Absolute Basophils 0.1 0.0 - 0.2 10e9/L   Basic metabolic panel  (Ca, Cl, CO2, Creat, Gluc, K, Na, BUN)   Result Value Ref Range    Sodium 138 133 - 144 mmol/L    Potassium 3.8 3.4 - 5.3 mmol/L    Chloride 101 94 - 109 mmol/L    Carbon Dioxide 28 20 -  32 mmol/L    Anion Gap 9 3 - 14 mmol/L    Glucose 83 70 - 99 mg/dL    Urea Nitrogen 16 7 - 30 mg/dL    Creatinine 1.09 0.66 - 1.25 mg/dL    GFR Estimate 67 >60 mL/min/[1.73_m2]    GFR Estimate If Black 78 >60 mL/min/[1.73_m2]    Calcium 8.8 8.5 - 10.1 mg/dL

## 2021-06-04 ENCOUNTER — TELEPHONE (OUTPATIENT)
Dept: INTERNAL MEDICINE | Facility: CLINIC | Age: 71
End: 2021-06-04

## 2021-06-04 NOTE — TELEPHONE ENCOUNTER
[facility] staff Anniin calling, phone [phone]    In  yesterday and she is looking for results of labwork to be faxed to them and summary of care    Fax number is [phone]    Lab results and after visit summary were printed and faxed to the [facility]isted Living Facility by this RN    Nidhi López, RN, BSN  [facility]

## 2021-06-09 ENCOUNTER — TELEPHONE (OUTPATIENT)
Dept: INTERNAL MEDICINE | Facility: CLINIC | Age: 71
End: 2021-06-09

## 2021-06-09 NOTE — TELEPHONE ENCOUNTER
[facility] Call Center    Phone Message    May a detailed message be left on voicemail: yes     Reason for Call: Other: Eriko is asking to speak to a nurse. Please call Eriko to disucss patient's care plan. Thank you.      Action Taken: Message routed to:  [facility]    Travel Screening: Not Applicable

## 2021-06-10 NOTE — TELEPHONE ENCOUNTER
RN called Mayito at patient's facility and offered a sooner appt for patient's symptoms of bloating, loose stools, and poor appetite.  Appt was made for 6/16/21.    Destini Molina RN on 6/10/2021 at 1:00 PM

## 2021-06-14 ENCOUNTER — MEDICAL CORRESPONDENCE (OUTPATIENT)
Dept: HEALTH INFORMATION MANAGEMENT | Facility: CLINIC | Age: 71
End: 2021-06-14

## 2021-06-16 ENCOUNTER — OFFICE VISIT (OUTPATIENT)
Dept: INTERNAL MEDICINE | Facility: CLINIC | Age: 71
End: 2021-06-16
Payer: COMMERCIAL

## 2021-06-16 ENCOUNTER — ANCILLARY PROCEDURE (OUTPATIENT)
Dept: GENERAL RADIOLOGY | Facility: CLINIC | Age: 71
End: 2021-06-16
Attending: PEDIATRICS
Payer: COMMERCIAL

## 2021-06-16 VITALS
WEIGHT: 150.6 LBS | OXYGEN SATURATION: 98 % | BODY MASS INDEX: 24.31 KG/M2 | SYSTOLIC BLOOD PRESSURE: 166 MMHG | HEART RATE: 89 BPM | DIASTOLIC BLOOD PRESSURE: 85 MMHG

## 2021-06-16 DIAGNOSIS — R14.0 ABDOMINAL DISTENSION: ICD-10-CM

## 2021-06-16 DIAGNOSIS — M10.9 ACUTE GOUT OF RIGHT KNEE, UNSPECIFIED CAUSE: ICD-10-CM

## 2021-06-16 DIAGNOSIS — D72.829 LEUKOCYTOSIS, UNSPECIFIED TYPE: ICD-10-CM

## 2021-06-16 DIAGNOSIS — R06.02 SOB (SHORTNESS OF BREATH): ICD-10-CM

## 2021-06-16 DIAGNOSIS — Z79.899 MEDICAL MARIJUANA USE: ICD-10-CM

## 2021-06-16 DIAGNOSIS — M10.9 ACUTE GOUT OF RIGHT KNEE, UNSPECIFIED CAUSE: Primary | ICD-10-CM

## 2021-06-16 DIAGNOSIS — C80.1 CHRONIC PAIN AFTER SURGICAL PROCEDURE FOR MALIGNANT NEOPLASM (H): ICD-10-CM

## 2021-06-16 DIAGNOSIS — M1A.00X0 IDIOPATHIC CHRONIC GOUT WITHOUT TOPHUS, UNSPECIFIED SITE: ICD-10-CM

## 2021-06-16 DIAGNOSIS — G89.28 CHRONIC PAIN AFTER SURGICAL PROCEDURE FOR MALIGNANT NEOPLASM (H): ICD-10-CM

## 2021-06-16 LAB
ALBUMIN SERPL-MCNC: 3.4 G/DL (ref 3.4–5)
ALP SERPL-CCNC: 120 U/L (ref 40–150)
ALT SERPL W P-5'-P-CCNC: 52 U/L (ref 0–70)
ANION GAP SERPL CALCULATED.3IONS-SCNC: 9 MMOL/L (ref 3–14)
AST SERPL W P-5'-P-CCNC: 28 U/L (ref 0–45)
BASOPHILS # BLD AUTO: 0.1 10E9/L (ref 0–0.2)
BASOPHILS NFR BLD AUTO: 1 %
BILIRUB SERPL-MCNC: 0.3 MG/DL (ref 0.2–1.3)
BUN SERPL-MCNC: 21 MG/DL (ref 7–30)
CALCIUM SERPL-MCNC: 8.8 MG/DL (ref 8.5–10.1)
CHLORIDE SERPL-SCNC: 108 MMOL/L (ref 94–109)
CO2 SERPL-SCNC: 22 MMOL/L (ref 20–32)
CREAT SERPL-MCNC: 0.93 MG/DL (ref 0.66–1.25)
DIFFERENTIAL METHOD BLD: NORMAL
EOSINOPHIL # BLD AUTO: 0.3 10E9/L (ref 0–0.7)
EOSINOPHIL NFR BLD AUTO: 3.4 %
ERYTHROCYTE [DISTWIDTH] IN BLOOD BY AUTOMATED COUNT: 14.2 % (ref 10–15)
GFR SERPL CREATININE-BSD FRML MDRD: 81 ML/MIN/{1.73_M2}
GLUCOSE SERPL-MCNC: 99 MG/DL (ref 70–99)
HCT VFR BLD AUTO: 42.4 % (ref 40–53)
HGB BLD-MCNC: 14.7 G/DL (ref 13.3–17.7)
IMM GRANULOCYTES # BLD: 0 10E9/L (ref 0–0.4)
IMM GRANULOCYTES NFR BLD: 0.4 %
LYMPHOCYTES # BLD AUTO: 2.1 10E9/L (ref 0.8–5.3)
LYMPHOCYTES NFR BLD AUTO: 27.5 %
MCH RBC QN AUTO: 30.8 PG (ref 26.5–33)
MCHC RBC AUTO-ENTMCNC: 34.7 G/DL (ref 31.5–36.5)
MCV RBC AUTO: 89 FL (ref 78–100)
MONOCYTES # BLD AUTO: 0.5 10E9/L (ref 0–1.3)
MONOCYTES NFR BLD AUTO: 5.8 %
NEUTROPHILS # BLD AUTO: 4.8 10E9/L (ref 1.6–8.3)
NEUTROPHILS NFR BLD AUTO: 61.9 %
NRBC # BLD AUTO: 0 10*3/UL
NRBC BLD AUTO-RTO: 0 /100
NT-PROBNP SERPL-MCNC: 152 PG/ML (ref 0–125)
PLATELET # BLD AUTO: 226 10E9/L (ref 150–450)
POTASSIUM SERPL-SCNC: 3.8 MMOL/L (ref 3.4–5.3)
PROT SERPL-MCNC: 8.3 G/DL (ref 6.8–8.8)
RBC # BLD AUTO: 4.78 10E12/L (ref 4.4–5.9)
SODIUM SERPL-SCNC: 138 MMOL/L (ref 133–144)
URATE SERPL-MCNC: 4 MG/DL (ref 3.5–7.2)
WBC # BLD AUTO: 7.7 10E9/L (ref 4–11)

## 2021-06-16 PROCEDURE — 99215 OFFICE O/P EST HI 40 MIN: CPT | Performed by: PEDIATRICS

## 2021-06-16 PROCEDURE — 36415 COLL VENOUS BLD VENIPUNCTURE: CPT | Performed by: PATHOLOGY

## 2021-06-16 PROCEDURE — 99417 PROLNG OP E/M EACH 15 MIN: CPT | Performed by: PEDIATRICS

## 2021-06-16 PROCEDURE — 80053 COMPREHEN METABOLIC PANEL: CPT | Performed by: PATHOLOGY

## 2021-06-16 PROCEDURE — 85025 COMPLETE CBC W/AUTO DIFF WBC: CPT | Performed by: PATHOLOGY

## 2021-06-16 PROCEDURE — 73562 X-RAY EXAM OF KNEE 3: CPT | Mod: RT | Performed by: RADIOLOGY

## 2021-06-16 PROCEDURE — 74019 RADEX ABDOMEN 2 VIEWS: CPT | Performed by: RADIOLOGY

## 2021-06-16 PROCEDURE — 83880 ASSAY OF NATRIURETIC PEPTIDE: CPT | Performed by: PATHOLOGY

## 2021-06-16 PROCEDURE — 84550 ASSAY OF BLOOD/URIC ACID: CPT | Performed by: PATHOLOGY

## 2021-06-16 NOTE — ASSESSMENT & PLAN NOTE
Gout       SUBJECTIVE: He has had a flare of gout while on allopurinol, beginning more than 2 weeks ago. He has been off prednisone for many weeks. He went to urgent care and was given a prescription for keflex and colchicine (the antibiotics was due to history of higher WBC count). A uric acid level was normal. The colchicine didn't help.        OBJECTIVE: Nonspecific knee pain, minimal or no effusion, no erythema.       ASSESSMENT and PLAN: Difficult to say about etiology of knee pain, but he has had gout enough that I trust his report of the feeling. I'm not sure what to make of the antibiotic history and/or response.     XR knee, just in case    Will recheck CBC and urate.

## 2021-06-16 NOTE — PROGRESS NOTES
"Dear patient. Thank you for visiting with me. I want you to feel respected, understood, and empowered. \"Respect\" is valuing you as much as I would a close family member. \"Empowerment\" happens when you are fully informed, and can make the best possible decision for you.  Please ask me questions!  Challenge anything that is not clear.    Medical records are primarily used as memory aids for me and my colleagues. Things to know about my documentation style:  - The 'problem list' includes current symptoms or diagnoses, and some problems that are resolved but may return. I use the past medical history for problems not expected to return.  - I use single quotation marks for things that you or I said, when I want to clarify who was speaking.  - I use double quotation marks when copying a term from elsewhere in your records. Italics (besides here) may also denote a quotation.  If you have questions or concerns, please contact me; I will reply as soon as time allows.      Moises Chacon is a 70 year old man, with concerns including:  Chief Complaint   Patient presents with     Abdominal Pain     lower abdominal bloating, decreased appetite secondarily, no ab pain, x1 month, weight gain, very small bowel movements but daily     Arthritis     right knee gout     PCP: Junaid Hernandez   Visit type: problem-oriented    Abdominal discomfort       SUBJECTIVE: Abdominal bloating has worsened again despite having small bowel movements daily. He avoids dairy.       OBJECTIVE: Abd is protuberant and mildly distended compared to previous exams. Bowel sounds are normal.       ASSESSMENT and PLAN: Previous problems have been blamed on constipation. Not sure about impact of small daily BM. Will check imaging and CBC (noted recent elevation in WBC uncertain etiology).    Malabsorption / intolerance seem possible, or possibly the entity known as small intestine bacterial overgrowth (SIBO).          Problem List as of 6/16/2021 Reviewed: " 1/15/2021 10:46 AM by Serge Botello MD          Noted       Respiratory    1. SOB (shortness of breath) 9/18/2020     Last Assessment & Plan 6/16/2021 Office Visit Written 6/18/2021 10:01 AM by Junaid Hernandez MD      Shortness of breath        SUBJECTIVE: Shortness of breath, thought to be due to heart failure. He received some furosemide, but this seems to have stopped.       OBJECTIVE: Lungs notable for LLF rhonchi heard as before, otherwise clear.       ASSESSMENT and PLAN: Previous imaging has been reassuring at the site of the abnormal lung exam. Concerned about possible heart failure. Check BNP again. May need more intensive follow-up by cardiology or here.               Relevant Orders     Comprehensive metabolic panel (Completed)     N terminal pro BNP (Completed)       Endocrine    2. Idiopathic chronic gout without tophus, unspecified site 2/25/2021     Overview Addendum 3/27/2021  9:01 PM by Junaid Hernandez MD      Allopurinol restarted 3/26/2021.  Did best with prednisone (partial benefit from tries of indomethacin, colchicine).      Indomethacin incomplete benefit 2021-Jan. Colchicine attempt 2021-Feb to March    Patients of  descent are at increased risk for severe cutaneous adverse reactions (SCAR) with allopurinol, but this seems unlikely given that he has been on allopurinol successfully before. If needed, we can try the HLA-[id number] genetic test, which doesn't seem to be available via our lab.            Last Assessment & Plan 6/16/2021 Office Visit Written 6/16/2021 10:47 AM by Junaid Hernandez MD      Gout       SUBJECTIVE: He has had a flare of gout while on allopurinol, beginning more than 2 weeks ago. He has been off prednisone for many weeks. He went to urgent care and was given a prescription for keflex and colchicine (the antibiotics was due to history of higher WBC count). A uric acid level was normal. The colchicine didn't help.        OBJECTIVE:  Nonspecific knee pain, minimal or no effusion, no erythema.       ASSESSMENT and PLAN: Difficult to say about etiology of knee pain, but he has had gout enough that I trust his report of the feeling. I'm not sure what to make of the antibiotic history and/or response.     XR knee, just in case    Will recheck CBC and urate.                     Chronic intractable pain  medical marijuana        UPDATE: Medical marijuana - he hasn't tried yet. It doesn't seem that he is aware of the certification/approval.       ASSESSMENT & PLAN: I wrote a comment to [facility] Ronaldo on their paperwork. My understanding is that (since there isn't an e-mail) there was to be a telephone contact. This aspect of the service is out of my hands.      Medical marijuana  Certified by Dr Hernandez - some challenges because of language and lack of e-mail. My understanding is that this si up to [facility] Place and family.        Objective     BP (!) 166/85   Pulse 89   Wt 68.3 kg (150 lb 9.6 oz)   SpO2 98%   BMI 24.31 kg/m      Physical Exam  Constitutional:       Appearance: Normal appearance.   HENT:      Head: Normocephalic.      Right Ear: External ear normal.      Left Ear: External ear normal.      Nose: Nose normal.   Eyes:      General: No scleral icterus.        Right eye: No discharge.         Left eye: No discharge.      Extraocular Movements: Extraocular movements intact.   Pulmonary:      Effort: Pulmonary effort is normal. No respiratory distress.      Breath sounds: Normal breath sounds. No stridor. No wheezing or rales.   Skin:     Findings: No rash.   Neurological:      Mental Status: He is alert.   Psychiatric:         Mood and Affect: Mood normal.         Behavior: Behavior normal.         Thought Content: Thought content normal.         Judgment: Judgment normal.              About this visit:  Time note ([id number], 69-83'): The total time (on the date of service) for this service was 70 minutes, including discussion/face-to-face,  chart review, interpretation not otherwise reported, documentation, and updating of the computerized record.  Comment about data reviewed: I personally reviewed, interpreted, and/or confirmed interpretation of knee and abdominal images

## 2021-06-16 NOTE — NURSING NOTE
Chief Complaint   Patient presents with     Abdominal Pain     lower abdominal bloating, decreased appetite secondarily, no ab pain, x1 month, weight gain, very small bowel movements but daily       Komal Suarez EMT at 10:05 AM on 6/16/2021

## 2021-06-18 DIAGNOSIS — K59.09 CHRONIC CONSTIPATION: Primary | ICD-10-CM

## 2021-06-18 PROBLEM — R06.02 SOB (SHORTNESS OF BREATH): Status: ACTIVE | Noted: 2020-09-18

## 2021-06-18 PROBLEM — R10.9 ABDOMINAL PAIN: Status: ACTIVE | Noted: 2021-06-18

## 2021-06-18 PROBLEM — R14.0 ABDOMINAL DISTENSION: Status: ACTIVE | Noted: 2021-06-18

## 2021-06-18 PROBLEM — Z79.899 MEDICAL MARIJUANA USE: Status: ACTIVE | Noted: 2021-06-18

## 2021-06-18 RX ORDER — SENNOSIDES AND DOCUSATE SODIUM 8.6; 5 MG/1; MG/1
TABLET ORAL
Qty: 30 TABLET | Refills: 11 | Status: SHIPPED | OUTPATIENT
Start: 2021-06-18

## 2021-06-18 NOTE — ASSESSMENT & PLAN NOTE
Abdominal discomfort       SUBJECTIVE: Abdominal bloating has worsened again despite having small bowel movements daily. He avoids dairy.       OBJECTIVE: Abd is protuberant and mildly distended compared to previous exams. Bowel sounds are normal.       ASSESSMENT and PLAN: Previous problems have been blamed on constipation. Not sure about impact of small daily BM. Will check imaging and CBC (noted recent elevation in WBC uncertain etiology).    Malabsorption / intolerance seem possible, or possibly the entity known as small intestine bacterial overgrowth (SIBO).

## 2021-06-18 NOTE — ASSESSMENT & PLAN NOTE
Chronic intractable pain  medical marijuana        UPDATE: Medical marijuana - he hasn't tried yet. It doesn't seem that he is aware of the certification/approval.       ASSESSMENT & PLAN: I wrote a comment to Gayatri Hgigins on their paperwork. My understanding is that (since there isn't an e-mail) there was to be a telephone contact. This aspect of the service is out of my hands.

## 2021-06-18 NOTE — TELEPHONE ENCOUNTER
Routing refill request to provider for review/approval because:  Drug not active on patient's medication list    Rusty HERNANDEZ RN

## 2021-06-18 NOTE — ASSESSMENT & PLAN NOTE
Shortness of breath        SUBJECTIVE: Shortness of breath, thought to be due to heart failure. He received some furosemide, but this seems to have stopped.       OBJECTIVE: Lungs notable for LLF rhonchi heard as before, otherwise clear.       ASSESSMENT and PLAN: Previous imaging has been reassuring at the site of the abnormal lung exam. Concerned about possible heart failure. Check BNP again. May need more intensive follow-up by cardiology or here.

## 2021-06-22 PROBLEM — C37 MALIGNANT THYMOMA (H): Status: ACTIVE | Noted: 2020-08-19

## 2021-06-23 ENCOUNTER — PATIENT OUTREACH (OUTPATIENT)
Dept: GERIATRIC MEDICINE | Facility: CLINIC | Age: 71
End: 2021-06-23

## 2021-06-23 NOTE — LETTER
June 23, 2021    Important [employer] Information    [facility]  [address]   [facility] [address]    Your New Care Coordinator  Dear Moises,  My name is Paige Hernandes RN and I am your new Care Coordinator. You may reach me by calling [phone]. I will be in touch with you shortly to address any questions you may have.   I have also been in contact with Rubi Moreno RN, your previous care coordinator, to ensure a smooth transition.  Questions?  Call me at [phone] Monday-Friday between 8am and 5pm. TTY: [phone]. I look forward to working with you as a [employer]  member.  Sincerely,    Paige Hernandes RN    E-mail: [email or web address]  Phone: [phone]      [employer]    cc: member records                                                                                                                        CB5 ([facility]) (5-2020)    Civil Rights Notice  Discrimination is against the law. [employer] does not discriminate on the basis of any of the following:    Race    Color    National Origin    Creed    Adventist    Age    Public Assistance Status    Receipt of Health Care Services    Disability (including physical or mental impairment)    Sex (including sex stereotypes and gender identity)    Marital Status    Political Beliefs    Medical Condition    Genetic Information    Sexual Orientation    Claims Experience    Medical History    Health Status    Auxiliary Aids and Services:  [employer] provides auxiliary aids and services, like qualified interpreters or information in accessible formats, free of charge and in a timely manner, to ensure an equal opportunity to participate in our health care programs. Contact [employer] at [employer]/contact medicaid or call [phone] (toll free); TTY:711 or at [employer]/contactmedicaid.    Language Assistance Services:  [employer] provides translated documents and spoken language interpreting, free of  charge and in a timely manner, when language assistance services are necessary to ensure limited English speakers have meaningful access to our information and services. Contact [employer] at [phone] (toll free); TTY: 711 or [email or web address].     Civil Rights Complaints  You have the right to file a discrimination complaint if you believe you were treated in a discriminatory way by [employer]. You may contact any of the following four agencies directly to file a discrimination complaint.        U.S. Department of Health and Human Services  Office for Civil Rights (OCR)  You have the right to file a complaint with the OCR, a federal agency, if you believe you have been discriminated against because of any of the following:    Race    Disability    Color    Sex    National Origin    Age    Hinduism (in some cases)    Contact the OCR directly to file a complaint:         Director         U.S. Department of Health and Human Services  Office for Civil Rights         [address] [place] [address]         Customer Response Center: Toll-free: [phone]          TDD: [phone]         Email: [email or web address]    [place] Department of Human Rights (MDHR)  In [place], you have the right to file a complaint with the MDHR if you believe you have been discriminated against because of any of the following:      Race    Color    National Origin    Hinduism    Creed    Sex    Sexual Orientation    Marital Status    Public Assistance Status    Disability    Contact the MDHR directly to file a complaint:         [place] Department of Human Rights         [address] [place] [address]         [phone] (voice)          [phone] (toll free)         710 or [phone] ([place] Relay)         [phone] (Fax)         [email or web address] (Email)     [place] Department of Human Services (DHS)  You have the  right to file a complaint with [facility] if you believe you have been discriminated against in our health care programs because of any of the following:    Race    Color    National Origin    Creed    Holiness    Age    Public Assistance Status    Receipt of Health Care Services    Disability (including physical or mental impairment)    Sex (including sex stereotypes and gender identity)    Marital Status    Political Beliefs    Medical Condition    Genetic Information    Sexual Orientation    Claims Experience    Medical History    Health Status    Complaints must be in writing and filed within 180 days of the date you discovered the alleged discrimination. The complaint must contain your name and address and describe the discrimination you are complaining about. After we get your complaint, we will review it and notify you in writing about whether we have authority to investigate. If we do, we will investigate the complaint.      [facility] will notify you in writing of the investigation s outcome. You have a right to appeal the outcome if you disagree with the decision. To appeal, you must send a written request to have [facility] review the investigation outcome. Be brief and state why you disagree with the decision. Include additional information you think is important.      If you file a complaint in this way, the people who work for the agency named in the complaint cannot retaliate against you. This means they cannot punish you in any way for filing a complaint. Filing a complaint in this way does not stop you from seeking out other legal or administration actions.     Contact [facility] directly to file a discrimination complaint:        Civil Rights Coordinator        [place] Department of Human Services        Equal Opportunity and Access Division        [address] [address]        [place] [address] [phone] (voice) or use your preferred relay service     [employer] Complaint Notice   You have the right to file a complaint  with [employer] if you believe you have been discriminated against because of any of the following:       Medical condition    Health status    Receipt of health care services    Claims experience    Medical history    Genetic information    Disability (including mental or physical impairment)    Marital status    Age    Sex (including sex stereotypes and gender identity)    Sexual orientation    National origin    Race    Color    Hindu    Creed    Public assistance status    Political beliefs    You can file a complaint and ask for help in filing a complaint in person or by mail, phone, fax, or email at:     [employer] Civil Rights Coordinator  [employer]  [address], [place] [address] [phone] (voice and fax) or XCF:644  Email: [email or web address]    American Indians can begin or continue to use [facility] and [facility] ([facility]) clinics. We will not require prior approval or impose any conditions for you to get services at these clinics. For elders age 65 years and older this includes Elderly Waiver (EW) services accessed through the [employer]. If a doctor or other provider in a [facility] or [facility] clinic refers you to a provider in our network, we will not require you to see your primary care provider prior to the referral.

## 2021-06-23 NOTE — PROGRESS NOTES
[facility] Care Coordination Contact    Internal CC change effective 7/1/2021.  Mailed member CC Change letter.  Additional tasks to be completed by CMS include: update database & EPIC, enter CC Change in MMIS, and move member files on Q drive.  Delilah Magdaleno  Case Management Specialist  [facility]  [phone]

## 2021-06-24 VITALS — BODY MASS INDEX: 21.3 KG/M2 | WEIGHT: 128 LBS

## 2021-06-24 VITALS
SYSTOLIC BLOOD PRESSURE: 144 MMHG | TEMPERATURE: 98 F | OXYGEN SATURATION: 98 % | RESPIRATION RATE: 16 BRPM | HEART RATE: 68 BPM | HEART RATE: 72 BPM | TEMPERATURE: 98.3 F | RESPIRATION RATE: 16 BRPM | OXYGEN SATURATION: 97 % | HEART RATE: 84 BPM | DIASTOLIC BLOOD PRESSURE: 68 MMHG | DIASTOLIC BLOOD PRESSURE: 78 MMHG | OXYGEN SATURATION: 95 % | SYSTOLIC BLOOD PRESSURE: 142 MMHG | TEMPERATURE: 98.1 F | DIASTOLIC BLOOD PRESSURE: 70 MMHG | SYSTOLIC BLOOD PRESSURE: 142 MMHG

## 2021-06-24 NOTE — TELEPHONE ENCOUNTER
NOTES Status Details   OFFICE NOTE from referring provider Internal 06.17.2021 Junaid Hernandez MD   OFFICE NOTE from other specialist Internal 06.03.2021 Demar Main PA-C   DISCHARGE SUMMARY from hospital N/A    DISCHARGE REPORT from the ER N/A    MEDICATION LIST Care Everywhere /Internal    LABS (Any and all labs)      Internal /Care Everywhere    Biopsy/pathology (Anything related to diagnoses I.e. fluid aspirations, lip biopsy, muscle biopsy)      N/A     N/A    Imaging (All imaging related to diagnoses)     Echo N/A    HRCT N/A    CXR N/A    EMG N/A                   Scleroderma/Dermatomyositis diagnoses     Previous Cardiology notes  N/A    Previous Pulmonary notes N/A    Previous Dermatology notes N/A    Previous GI notes N/A    Lupus diagnoses     Previous Nephrology notes N/A    Previous Dermatology notes N/A    Previous Cardiology notes N/A

## 2021-06-24 NOTE — PROGRESS NOTES
"Rheumatology Clinic Visit 1     Moises Chacon MRN# [id number]   YOB: 1950 Age: 70 year old     Date of Visit: 06/25/2021  Primary care provider: Junaid Hernandez          Assessment and Plan:     # Episodic inflammatory oligoarthritis: Through , patient describes three 4- 7-day long episodes of lower extremity monoarthritis in the past 6 months.  Knee pain that precipitated today's consultation has completely resolved after a 4-day course of colchicine.  Patient does describe \"bloating\" and early satiety associated with recent antibiotic and colchicine use.  Video examination today shows no gross deformity, synovitis, or altered range of motion in peripheral joints.  Right knee displays normal full flexion of 170 degrees and is not visibly swollen.  Skin shows no evidence of masses at olecranon processes or prepatellar areas.  Laboratory evaluation on June 16, 2021 showed normal comprehensive metabolic panel, uric acid 4.0. CBC was normal.  Abdominal x-ray showed no free air, stents along the iliac vessels with atherosclerosis.  Right knee x-ray showed no erosive changes, fracture, or dislocation.    History is consistent with chronic recurrent crystalline arthropathy, likely gout.  Allopurinol has appropriately been started and stepped up to achieve a uric acid serum level that is at target for maximal suppression of inflammatory gouty symptoms (less than 5 mg/dL).  I explained that allopurinol does not cure gout.  Rather allopurinol reduces the likelihood of flares, and if flares occur, the allopurinol can reduce the duration and severity of symptoms.  However, I explained that for the first 60 to 120 days after initiating allopurinol and achieving a stable daily dose, the risk of gouty flares is paradoxically increased.  So, I do not interpret the recent flare of inflammatory joint pain in the right knee to indicate failure or lack of response to allopurinol.  I strongly recommend " that patient continue taking allopurinol daily.    Because the patient has experienced probable gouty flare while taking therapeutic dose of allopurinol, I recommend that he start temporary prophylactic treatment.  I recommend resuming low-dose colchicine for the next 60 days.  Thereafter, he can reserve colchicine for ad hoc use as needed for recurrent gouty flare.    Plan:  1.  Continue allopurinol 300 mg once a day, every day without stopping.  2.  Use colchicine 0.6 mg once daily every day for the next 60 days.  On September 1, stopped taking colchicine every day.  Thereafter, use colchicine 0.6 mg twice a day only as needed for recurrent flaring joint pain.  3.  Prednisone 20 mg daily for 3 to 5 days can also be used to address recurrent flares.  4.  Follow-up with Dr. Hernandez for persistent abdominal bloating.  5.  Use acetaminophen 500 to 1000 mg up to 3 times daily as needed for joint pain.    I spent >50% of this 65 minute encounter in counseling and coordination of care regarding the patient's complex medical problem of recurrent gout despite use of therapeutic doses of allopurinol.    RTC 3 mos    Joss López MD  Staff Rheumatologist, [facility]            Active Problem List:     Patient Active Problem List    Diagnosis Date Noted     Medical marijuana use 06/18/2021     Priority: Medium     Certified by Dr Hernandez - some challenges because of language and lack of e-mail. My understanding is that this si up to [facility] and family.       Abdominal pain 06/18/2021     Priority: Medium     >1 etiology: distension, constipation, LUQ pain from cancer treatment       Insomnia due to medical condition 03/27/2021     Priority: Medium     Due to pain depression. Chronic insomnia previously.  On zolpidem and mirtazapine, wants to go on medical marijuana       COPD (chronic obstructive pulmonary disease) (H)      Priority: Medium     6/18/2021 started albuterol.  2020 PFTs mixed picture (not surprising with  lung cancer hx and treatment)       Claustrophobia for MRI purposes 03/10/2021     Priority: Medium     3/10/2021 Asked about small spaces, describing MRI. He said he has a hard time with these.       Subjective memory complaints 03/10/2021     Priority: Medium     Possible mild cognitive impairment? Needs assessment       Reactive depression 03/10/2021     Priority: Medium     Sertraline started 3/9/2021        Idiopathic chronic gout without tophus, unspecified site 02/25/2021     Priority: Medium     Allopurinol restarted 3/26/2021.  Did best with prednisone (partial benefit from tries of indomethacin, colchicine).      Indomethacin incomplete benefit 2021-Jan. Colchicine attempt 2021-Feb to March    Patients of  descent are at increased risk for severe cutaneous adverse reactions (SCAR) with allopurinol, but this seems unlikely given that he has been on allopurinol successfully before. If needed, we can try the [id number] genetic test, which doesn't seem to be available via our lab.         Acute on chronic congestive heart failure, unspecified heart failure type (H) 01/15/2021     Priority: Medium     Protein-calorie malnutrition (H) 10/05/2020     Priority: Medium     SOB (shortness of breath) 09/18/2020     Priority: Medium     More than 1 etiology: lung, pleura, or rib problems at site of pain. Heart failure. Possible COPD.        Malignant thymoma (H) 08/19/2020     Priority: Medium     s/p Bilateral subxiphoid VATS thymectomy, 8/19 06/11/2020     Priority: Medium     Chronic pain after surgical procedure for malignant neoplasm (H) 06/11/2020     Priority: Medium     Tramadol PRN, topical lidocaine PRN. Working on certifying for medical marijuana by patient request (he reports being on this before in [place]). History of disliking gabapentin and MS contin because of mood/alertness side effects.       Seasonal allergies 06/11/2020     Priority: Medium     Benign prostatic hyperplasia with post-void  tammy 06/11/2020     Priority: Medium     Malignant neoplasm of left lung, unspecified part of lung (H) 06/11/2020     Priority: Medium     Complete prior to 1.8 [facility]       Cardiomegaly 11/20/2009     Priority: Medium     GERD (gastroesophageal reflux disease) 11/11/2009     Priority: Medium     Dyslipidemia 11/09/2009     Priority: Medium     total chol 215, HDL 29, Direct LDL 86 (3/9/21). 10-year risk > 30%. Patient has declined statins. Recheck fasting lipids RE triglycerides.        Hypertension 11/09/2009     Priority: Medium     History of CVA (cerebrovascular accident) 11/09/2009     Priority: Medium     Smoker 11/09/2009     Priority: Medium            History of Present Illness:   Moises Chacon presents for evaluation of possible gout, referred by Dr. Hernandez.    Patient was seen by Dr. East on June 16, 2021 for acute right knee pain.  A 2-week period of increased joint pain was noted after discontinuation of prednisone several weeks before.  Colchicine and Keflex had not helped.  History of poor response to indomethacin and colchicine in early 2021 was reviewed.  Patient was sent for knee x-ray.  Concerns were raised about cutaneous reactions with allopurinol, but a history of successful tolerance of allopurinol in the past was noted.    Today, he is interviewed in assisted living; assisted living staff provide English interpretation.  He confirms that he had sudden onset of R knee pain several weeks ago.  He states that gout started more 10 years ago. He developed intermittent pain in R big toe, then in ankles, mid-feet, knees. In the past 6 months, he has had attacks of one or a few joints pain 3 times. He has been treated with colchicine one time, then prednisone orally.    With the recent knee pain attack, he restarted colhcicine (for 4 days) with improved relief. Prednisone and antibiotic have not been started.     Allopurinol was started 100 mg daily in March 2021. Since May 25, allopurinol  "was 300 mg daily, every day. He does not note side effects.    He formerly took allopurinol in 2019. He stopped the medication because the gout was much better. He is concerned that allopurinol is not working because gout continues to flare.         Review of Systems:       Constitutional: No fevers, chills, sweats. No recent weight change. His appetite is good, but he has early satiety.  Skin: negative  Eyes: negative  Ears/Nose/Throat: negative  Respiratory: shortness of breath due to \"big belly\"  Cardiovascular: negative  Gastrointestinal: bloating in recent days; he reports diarrhea after starting colchicine; he was given miralax in recent day. He   Genitourinary: negative  Musculoskeletal: negative  Neurologic: negative  Psychiatric: negative  Hematologic/Lymphatic/Immunologic: negative  Endocrine: negative          Past Medical History:     Past Medical History:   Diagnosis Date     Anemia      Constipation      COPD (chronic obstructive pulmonary disease) (H)      Diastolic congestive heart failure (H)      Dyslipidemia      GERD (gastroesophageal reflux disease)      Hypertension      Primary lung adenocarcinoma (H)     Stage IIB: pT3 N0 M0. Diagnosed initially 01/2020. Underwent left lower lobectomy 05/2020.     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     Tobacco use      Past Surgical History:   Procedure Laterality Date     ENDOVASCULAR ILIAC STENT PLACEMENT  2016    Bilateral common iliac artery stent placement; right external iliac stent placement     THORACOSCOPIC LOBECTOMY LUNG  05/06/2020    Robotic-assisted left lower lung lobectomy with mediastinal lymph node dissection and cervical mediastinoscopy     THORACOSCOPIC THYMECTOMY Bilateral 08/19/2020    Procedure: Bilateral subxiphoid thoracoscopic thymectomy;  Surgeon: Tate Garcia MD;  Location:  OR            Social History:     Social History     Occupational History     Not on file   Tobacco Use     Smoking status: Former Smoker     " Packs/day: 0.50     Years: 60.00     Pack years: 30.00     Quit date: 2020     Years since quittin.9     Smokeless tobacco: Former User   Substance and Sexual Activity     Alcohol use: No     Drug use: Not Currently     Types: Marijuana     Sexual activity: Not Currently     Denies drinking EtOH  Quit smoking in .       Family History:     Family History   Problem Relation Age of Onset     Heart Disease Mother      Cerebrovascular Disease Father      Other - See Comments Brother         passed in [place] war     Cerebrovascular Disease Sister      Cerebrovascular Disease Sister      Grandfather had gout.  No other history of inflammatory arthritis.       Allergies:     Allergies   Allergen Reactions     Atorvastatin Itching     Flomax [Tamsulosin]      ITCHING            Medications:     Current Outpatient Medications   Medication Sig Dispense Refill     acetaminophen (TYLENOL) 325 MG tablet Take 3 tablets (975 mg) by mouth every 6 hours       albuterol (PROAIR HFA/PROVENTIL HFA/VENTOLIN HFA) 108 (90 Base) MCG/ACT inhaler Inhale 2 puffs into the lungs every 6 hours as needed for shortness of breath / dyspnea or wheezing (use with spacer) 8 g 4     allopurinol (ZYLOPRIM) 300 MG tablet Take 1 tablet (300 mg) by mouth daily (beginning 21, after increasing the smaller doses) 90 tablet 3     alum & mag hydroxide-simethicone (MYLANTA ES/MAALOX  ES) 400-400-40 MG/5ML SUSP Take 30 mLs by mouth every 6 hours as needed for indigestion or heartburn 355 mL 3     amLODIPine (NORVASC) 10 MG tablet TAKE 1 TABLET BY MOUTH DAILY. 90 tablet 3     aspirin (ASA) 81 MG EC tablet Take 1 tablet (81 mg) by mouth daily 90 tablet 1     calcium carbonate (TUMS) 500 MG chewable tablet Take 1 tablet (500 mg) by mouth 3 times daily 90 tablet 4     cetirizine (ZYRTEC) 10 MG tablet TAKE 1 TAB BY MOUTH AT BEDTIME 30 tablet 11     lidocaine (XYLOCAINE) 2 % external gel Apply topically as needed for moderate pain in affected area  on left flank. Should wash hands after applying 30 mL 11     metoprolol succinate ER (TOPROL-XL) 25 MG 24 hr tablet Take 1 tablet (25 mg) by mouth At Bedtime 90 tablet 3     mirtazapine (REMERON) 15 MG tablet Take 1 tablet (15 mg) by mouth At Bedtime 30 tablet 11     spacer (OPTICHAMBER BERT) holding chamber Use with inhaler dosing 1 each 0     umeclidinium-vilanterol (ANORO ELLIPTA) 62.5-25 MCG/INH oral inhaler Inhale 1 puff into the lungs daily 1 each 11     zolpidem (AMBIEN) 10 MG tablet Take 1 tablet (10 mg) by mouth At Bedtime 30 tablet 3     amoxicillin-clavulanate (AUGMENTIN XR) 1000-62.5 MG 12 hr tablet Take 1 tablet by mouth 2 times daily for 10 days (Patient not taking: Reported on 6/25/2021) 20 tablet 0     diazepam (VALIUM) 10 MG tablet Take 1 tablet (10 mg) by mouth once as needed for anxiety (claustrophobia for MRI) (Patient not taking: Reported on 6/25/2021) 1 tablet 0     famotidine (PEPCID) 20 MG tablet Take 1 tablet (20 mg) by mouth 2 times daily (Patient not taking: Reported on 6/25/2021) 60 tablet 11     mirtazapine (REMERON) 7.5 MG tablet Take 1 tablet (7.5 mg) by mouth At Bedtime (Patient not taking: Reported on 6/25/2021) 90 tablet 3     nitroGLYcerin (NITROSTAT) 0.4 MG sublingual tablet Place 0.4 mg under the tongue every 5 minutes as needed for chest pain For chest pain place 1 tablet under the tongue every 5 minutes for 3 doses. If symptoms persist 5 minutes after 1st dose call 911.       predniSONE (DELTASONE) 10 MG tablet 15 mg BID until gout flare resolution begins, then taper down over 7 days (Patient not taking: Reported on 6/25/2021) 40 tablet 0     saccharomyces boulardii (FLORASTOR) 250 MG capsule Take 1 capsule (250 mg) by mouth 2 times daily for 21 days (Patient not taking: Reported on 6/25/2021) 60 capsule      sennosides (SENOKOT) 8.6 MG tablet Take 1 tablet by mouth 2 times daily (Patient not taking: Reported on 6/25/2021) 60 tablet 0     sertraline (ZOLOFT) 50 MG tablet  Take 1 tablet (50 mg) by mouth daily Begin with half a pill daily for 8 days. (Patient not taking: Reported on 6/25/2021) 60 tablet 4     simethicone (MYLICON) 125 MG chewable tablet Take 1 tablet (125 mg) by mouth 2 times daily 60 tablet 3     SM SENNA-S 8.6-50 MG tablet TAKE 1 TAB BY MOUTH ONCE DAILY (Patient not taking: Reported on 6/25/2021) 30 tablet 11     varenicline (CHANTIX IVONNE) 0.5 MG X 11 & 1 MG X 42 tablet Take 0.5 mg tab daily for 3 days, THEN 0.5 mg tab twice daily for 4 days, THEN 1 mg twice daily. (Patient not taking: Reported on 6/25/2021) 53 tablet 0     varenicline (CHANTIX) 1 MG tablet Take 1 tablet (1 mg) by mouth 2 times daily (Patient not taking: Reported on 6/25/2021) 154 tablet 0            Physical Exam:   There were no vitals taken for this visit.  Wt Readings from Last 4 Encounters:   06/16/21 68.3 kg (150 lb 9.6 oz)   06/03/21 69.4 kg (153 lb)   05/05/21 69.4 kg (153 lb)   03/09/21 65.3 kg (144 lb)       Constitutional: well-developed, appearing stated age; cooperative  Eyes: nl EOM, PERRLA, vision, conjunctiva, sclera  ENT: nl external ears, nose, hearing, lips, teeth, gums, throat  No mucous membrane lesions, normal saliva pool  Neck: no mass or thyroid enlargement  Resp: breathing unlabored  MS: /fist formation normal; minimal swelling R knee. Ambulation normal; +bunion change R great toe, no other visible swelling.  Skin: no nail pitting, alopecia, rash, nodules or lesions  Neuro: nl cranial nerves  Psych: nl judgement, orientation, memory, affect.         Data:     No results found for any visits on 06/25/21.     RHEUM RESULTS Latest Ref Rng & Units 5/5/2021 6/3/2021 6/16/2021   CRP, INFLAMMATION 0.0 - 8.0 mg/L - - -   AST 0 - 45 U/L <3 - 28   ALT 0 - 70 U/L 40 - 52   ALBUMIN 3.4 - 5.0 g/dL 3.5 - 3.4   WBC 4.0 - 11.0 10e9/L - 12.0(H) 7.7   RBC 4.4 - 5.9 10e12/L - 4.58 4.78   HGB 13.3 - 17.7 g/dL - 14.1 14.7   HCT 40.0 - 53.0 % - 40.3 42.4   MCV 78 - 100 fl - 88 89   MCHC 31.5 -  "36.5 g/dL - 35.0 34.7   RDW 10.0 - 15.0 % - 14.5 14.2    - 450 10e9/L - 209 226   CREATININE 0.66 - 1.25 mg/dL 1.21 1.09 0.93   GFR ESTIMATE, IF BLACK >60 mL/min/[1.73:m2] 69 78 >90   GFR ESTIMATE >60 mL/min/[1.73:m2] 59(L) 67 81   HEPATITIS C ANTIBODY NR:Nonreactive - - -       PLEASE USE [employer] TEXT [phone], \"JIUM\" WILL HELP FACILITATE THE VISIT AND TRANSLATE FOR PATIENT      Moises is a 70 year old who is being evaluated via a billable video visit.      How would you like to obtain your AVS? Mail a copy    Will anyone else be joining your video visit? No      Video Start Time: 1:05 PM  Video-Visit Details    Type of service:  Video Visit    Video End Time:2:10 PM    Originating Location (pt. Location): Home    Distant Location (provider location):  [facility] [place]     Platform used for Video Visit: [email or web address]      "

## 2021-06-25 ENCOUNTER — VIRTUAL VISIT (OUTPATIENT)
Dept: RHEUMATOLOGY | Facility: CLINIC | Age: 71
End: 2021-06-25
Attending: PEDIATRICS
Payer: COMMERCIAL

## 2021-06-25 ENCOUNTER — PRE VISIT (OUTPATIENT)
Dept: RHEUMATOLOGY | Facility: CLINIC | Age: 71
End: 2021-06-25

## 2021-06-25 DIAGNOSIS — M1A.00X0 IDIOPATHIC CHRONIC GOUT WITHOUT TOPHUS, UNSPECIFIED SITE: ICD-10-CM

## 2021-06-25 PROCEDURE — 99205 OFFICE O/P NEW HI 60 MIN: CPT | Mod: 95 | Performed by: INTERNAL MEDICINE

## 2021-06-25 RX ORDER — COLCHICINE 0.6 MG/1
0.6 TABLET ORAL DAILY
Qty: 60 TABLET | Refills: 1 | Status: SHIPPED | OUTPATIENT
Start: 2021-06-25 | End: 2021-08-13

## 2021-06-25 NOTE — LETTER
"6/25/2021       RE: Moises Chacon  [facility]  [address]  [facility] [address]     Dear Colleague,    Thank you for referring your patient, Moises Chacon, to the [facility] RHEUMATOLOGY CLINIC [place] [facility]. Please see a copy of my visit note below.    Rheumatology Clinic Visit 1     Moises Chacon MRN# [id number]   YOB: 1950 Age: 70 year old     Date of Visit: 06/25/2021  Primary care provider: Junaid Hernandez          Assessment and Plan:     # Episodic inflammatory oligoarthritis: Through , patient describes three 4- 7-day long episodes of lower extremity monoarthritis in the past 6 months.  Knee pain that precipitated today's consultation has completely resolved after a 4-day course of colchicine.  Patient does describe \"bloating\" and early satiety associated with recent antibiotic and colchicine use.  Video examination today shows no gross deformity, synovitis, or altered range of motion in peripheral joints.  Right knee displays normal full flexion of 170 degrees and is not visibly swollen.  Skin shows no evidence of masses at olecranon processes or prepatellar areas.  Laboratory evaluation on June 16, 2021 showed normal comprehensive metabolic panel, uric acid 4.0. CBC was normal.  Abdominal x-ray showed no free air, stents along the iliac vessels with atherosclerosis.  Right knee x-ray showed no erosive changes, fracture, or dislocation.    History is consistent with chronic recurrent crystalline arthropathy, likely gout.  Allopurinol has appropriately been started and stepped up to achieve a uric acid serum level that is at target for maximal suppression of inflammatory gouty symptoms (less than 5 mg/dL).  I explained that allopurinol does not cure gout.  Rather allopurinol reduces the likelihood of flares, and if flares occur, the allopurinol can reduce the duration and severity of " symptoms.  However, I explained that for the first 60 to 120 days after initiating allopurinol and achieving a stable daily dose, the risk of gouty flares is paradoxically increased.  So, I do not interpret the recent flare of inflammatory joint pain in the right knee to indicate failure or lack of response to allopurinol.  I strongly recommend that patient continue taking allopurinol daily.    Because the patient has experienced probable gouty flare while taking therapeutic dose of allopurinol, I recommend that he start temporary prophylactic treatment.  I recommend resuming low-dose colchicine for the next 60 days.  Thereafter, he can reserve colchicine for ad hoc use as needed for recurrent gouty flare.    Plan:  1.  Continue allopurinol 300 mg once a day, every day without stopping.  2.  Use colchicine 0.6 mg once daily every day for the next 60 days.  On September 1, stopped taking colchicine every day.  Thereafter, use colchicine 0.6 mg twice a day only as needed for recurrent flaring joint pain.  3.  Prednisone 20 mg daily for 3 to 5 days can also be used to address recurrent flares.  4.  Follow-up with Dr. Hernandez for persistent abdominal bloating.  5.  Use acetaminophen 500 to 1000 mg up to 3 times daily as needed for joint pain.    I spent >50% of this 65 minute encounter in counseling and coordination of care regarding the patient's complex medical problem of recurrent gout despite use of therapeutic doses of allopurinol.    RTC 3 mos    Joss López MD  [facility]            Active Problem List:     Patient Active Problem List    Diagnosis Date Noted     Medical marijuana use 06/18/2021     Priority: Medium     Certified by Dr Hernandez - some challenges because of language and lack of e-mail. My understanding is that this si up to [facility] and family.       Abdominal pain 06/18/2021     Priority: Medium     >1 etiology: distension, constipation, LUQ pain from cancer treatment        Insomnia due to medical condition 03/27/2021     Priority: Medium     Due to pain depression. Chronic insomnia previously.  On zolpidem and mirtazapine, wants to go on medical marijuana       COPD (chronic obstructive pulmonary disease) (H)      Priority: Medium     6/18/2021 started albuterol.  2020 PFTs mixed picture (not surprising with lung cancer hx and treatment)       Claustrophobia for MRI purposes 03/10/2021     Priority: Medium     3/10/2021 Asked about small spaces, describing MRI. He said he has a hard time with these.       Subjective memory complaints 03/10/2021     Priority: Medium     Possible mild cognitive impairment? Needs assessment       Reactive depression 03/10/2021     Priority: Medium     Sertraline started 3/9/2021        Idiopathic chronic gout without tophus, unspecified site 02/25/2021     Priority: Medium     Allopurinol restarted 3/26/2021.  Did best with prednisone (partial benefit from tries of indomethacin, colchicine).      Indomethacin incomplete benefit 2021-Jan. Colchicine attempt 2021-Feb to March    Patients of  descent are at increased risk for severe cutaneous adverse reactions (SCAR) with allopurinol, but this seems unlikely given that he has been on allopurinol successfully before. If needed, we can try the [id number] genetic test, which doesn't seem to be available via our lab.         Acute on chronic congestive heart failure, unspecified heart failure type (H) 01/15/2021     Priority: Medium     Protein-calorie malnutrition (H) 10/05/2020     Priority: Medium     SOB (shortness of breath) 09/18/2020     Priority: Medium     More than 1 etiology: lung, pleura, or rib problems at site of pain. Heart failure. Possible COPD.        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 Working on certifying for medical marijuana by patient request (he reports being on this before in [place]). History of disliking gabapentin and MS contin because of mood/alertness side effects.       Seasonal allergies 06/11/2020     Priority: Medium     Benign prostatic hyperplasia with post-void dribbling 06/11/2020     Priority: Medium     Malignant neoplasm of left lung, unspecified part of lung (H) 06/11/2020     Priority: Medium     Complete prior to 1.[facility]       Cardiomegaly 11/20/2009     Priority: Medium     GERD (gastroesophageal reflux disease) 11/11/2009     Priority: Medium     Dyslipidemia 11/09/2009     Priority: Medium     total chol 215, HDL 29, Direct LDL 86 (3/9/21). 10-year risk > 30%. Patient has declined statins. Recheck fasting lipids RE triglycerides.        Hypertension 11/09/2009     Priority: Medium     History of CVA (cerebrovascular accident) 11/09/2009     Priority: Medium     Smoker 11/09/2009     Priority: Medium            History of Present Illness:   Moises Chacon presents for evaluation of possible gout, referred by Dr. Hernandez.    Patient was seen by Dr. East on June 16, 2021 for acute right knee pain.  A 2-week period of increased joint pain was noted after discontinuation of prednisone several weeks before.  Colchicine and Keflex had not helped.  History of poor response to indomethacin and colchicine in early 2021 was reviewed.  Patient was sent for knee x-ray.  Concerns were raised about cutaneous reactions with allopurinol, but a history of successful tolerance of allopurinol in the past was noted.    Today, he is interviewed in assisted living; assisted living staff provide English interpretation.  He confirms that he had sudden onset of R knee pain several weeks ago.  He states that gout started more 10 years ago. He developed intermittent pain in R big toe, then in ankles, mid-feet, knees. In the past 6 months, he has had  "attacks of one or a few joints pain 3 times. He has been treated with colchicine one time, then prednisone orally.    With the recent knee pain attack, he restarted colhcicine (for 4 days) with improved relief. Prednisone and antibiotic have not been started.     Allopurinol was started 100 mg daily in March 2021. Since May 25, allopurinol was 300 mg daily, every day. He does not note side effects.    He formerly took allopurinol in 2019. He stopped the medication because the gout was much better. He is concerned that allopurinol is not working because gout continues to flare.         Review of Systems:       Constitutional: No fevers, chills, sweats. No recent weight change. His appetite is good, but he has early satiety.  Skin: negative  Eyes: negative  Ears/Nose/Throat: negative  Respiratory: shortness of breath due to \"big belly\"  Cardiovascular: negative  Gastrointestinal: bloating in recent days; he reports diarrhea after starting colchicine; he was given miralax in recent day. He   Genitourinary: negative  Musculoskeletal: negative  Neurologic: negative  Psychiatric: negative  Hematologic/Lymphatic/Immunologic: negative  Endocrine: negative          Past Medical History:     Past Medical History:   Diagnosis Date     Anemia      Constipation      COPD (chronic obstructive pulmonary disease) (H)      Diastolic congestive heart failure (H)      Dyslipidemia      GERD (gastroesophageal reflux disease)      Hypertension      Primary lung adenocarcinoma (H)     Stage IIB: pT3 N0 M0. Diagnosed initially 01/2020. Underwent left lower lobectomy 05/2020.     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     Tobacco use      Past Surgical History:   Procedure Laterality Date     ENDOVASCULAR ILIAC STENT PLACEMENT  2016    Bilateral common iliac artery stent placement; right external iliac stent placement     THORACOSCOPIC LOBECTOMY LUNG  05/06/2020    Robotic-assisted left lower lung lobectomy with mediastinal lymph " node dissection and cervical mediastinoscopy     THORACOSCOPIC THYMECTOMY Bilateral 2020    Procedure: Bilateral subxiphoid thoracoscopic thymectomy;  Surgeon: Tate Garcia MD;  Location:  OR            Social History:     Social History     Occupational History     Not on file   Tobacco Use     Smoking status: Former Smoker     Packs/day: 0.50     Years: 60.00     Pack years: 30.00     Quit date: 2020     Years since quittin.9     Smokeless tobacco: Former User   Substance and Sexual Activity     Alcohol use: No     Drug use: Not Currently     Types: Marijuana     Sexual activity: Not Currently     Denies drinking EtOH  Quit smoking in .       Family History:     Family History   Problem Relation Age of Onset     Heart Disease Mother      Cerebrovascular Disease Father      Other - See Comments Brother         passed in [place] war     Cerebrovascular Disease Sister      Cerebrovascular Disease Sister      Grandfather had gout.  No other history of inflammatory arthritis.       Allergies:     Allergies   Allergen Reactions     Atorvastatin Itching     Flomax [Tamsulosin]      ITCHING            Medications:     Current Outpatient Medications   Medication Sig Dispense Refill     acetaminophen (TYLENOL) 325 MG tablet Take 3 tablets (975 mg) by mouth every 6 hours       albuterol (PROAIR HFA/PROVENTIL HFA/VENTOLIN HFA) 108 (90 Base) MCG/ACT inhaler Inhale 2 puffs into the lungs every 6 hours as needed for shortness of breath / dyspnea or wheezing (use with spacer) 8 g 4     allopurinol (ZYLOPRIM) 300 MG tablet Take 1 tablet (300 mg) by mouth daily (beginning 21, after increasing the smaller doses) 90 tablet 3     alum & mag hydroxide-simethicone (MYLANTA ES/MAALOX  ES) 400-400-40 MG/5ML SUSP Take 30 mLs by mouth every 6 hours as needed for indigestion or heartburn 355 mL 3     amLODIPine (NORVASC) 10 MG tablet TAKE 1 TABLET BY MOUTH DAILY. 90 tablet 3     aspirin (ASA) 81 MG EC tablet  Take 1 tablet (81 mg) by mouth daily 90 tablet 1     calcium carbonate (TUMS) 500 MG chewable tablet Take 1 tablet (500 mg) by mouth 3 times daily 90 tablet 4     cetirizine (ZYRTEC) 10 MG tablet TAKE 1 TAB BY MOUTH AT BEDTIME 30 tablet 11     lidocaine (XYLOCAINE) 2 % external gel Apply topically as needed for moderate pain in affected area on left flank. Should wash hands after applying 30 mL 11     metoprolol succinate ER (TOPROL-XL) 25 MG 24 hr tablet Take 1 tablet (25 mg) by mouth At Bedtime 90 tablet 3     mirtazapine (REMERON) 15 MG tablet Take 1 tablet (15 mg) by mouth At Bedtime 30 tablet 11     spacer (OPTICHAMBER BERT) holding chamber Use with inhaler dosing 1 each 0     umeclidinium-vilanterol (ANORO ELLIPTA) 62.5-25 MCG/INH oral inhaler Inhale 1 puff into the lungs daily 1 each 11     zolpidem (AMBIEN) 10 MG tablet Take 1 tablet (10 mg) by mouth At Bedtime 30 tablet 3     amoxicillin-clavulanate (AUGMENTIN XR) 1000-62.5 MG 12 hr tablet Take 1 tablet by mouth 2 times daily for 10 days (Patient not taking: Reported on 6/25/2021) 20 tablet 0     diazepam (VALIUM) 10 MG tablet Take 1 tablet (10 mg) by mouth once as needed for anxiety (claustrophobia for MRI) (Patient not taking: Reported on 6/25/2021) 1 tablet 0     famotidine (PEPCID) 20 MG tablet Take 1 tablet (20 mg) by mouth 2 times daily (Patient not taking: Reported on 6/25/2021) 60 tablet 11     mirtazapine (REMERON) 7.5 MG tablet Take 1 tablet (7.5 mg) by mouth At Bedtime (Patient not taking: Reported on 6/25/2021) 90 tablet 3     nitroGLYcerin (NITROSTAT) 0.4 MG sublingual tablet Place 0.4 mg under the tongue every 5 minutes as needed for chest pain For chest pain place 1 tablet under the tongue every 5 minutes for 3 doses. If symptoms persist 5 minutes after 1st dose call 911.       predniSONE (DELTASONE) 10 MG tablet 15 mg BID until gout flare resolution begins, then taper down over 7 days (Patient not taking: Reported on 6/25/2021) 40 tablet  0     saccharomyces boulardii (FLORASTOR) 250 MG capsule Take 1 capsule (250 mg) by mouth 2 times daily for 21 days (Patient not taking: Reported on 6/25/2021) 60 capsule      sennosides (SENOKOT) 8.6 MG tablet Take 1 tablet by mouth 2 times daily (Patient not taking: Reported on 6/25/2021) 60 tablet 0     sertraline (ZOLOFT) 50 MG tablet Take 1 tablet (50 mg) by mouth daily Begin with half a pill daily for 8 days. (Patient not taking: Reported on 6/25/2021) 60 tablet 4     simethicone (MYLICON) 125 MG chewable tablet Take 1 tablet (125 mg) by mouth 2 times daily 60 tablet 3     SM SENNA-S 8.6-50 MG tablet TAKE 1 TAB BY MOUTH ONCE DAILY (Patient not taking: Reported on 6/25/2021) 30 tablet 11     varenicline (CHANTIX IVONNE) 0.5 MG X 11 & 1 MG X 42 tablet Take 0.5 mg tab daily for 3 days, THEN 0.5 mg tab twice daily for 4 days, THEN 1 mg twice daily. (Patient not taking: Reported on 6/25/2021) 53 tablet 0     varenicline (CHANTIX) 1 MG tablet Take 1 tablet (1 mg) by mouth 2 times daily (Patient not taking: Reported on 6/25/2021) 154 tablet 0            Physical Exam:   There were no vitals taken for this visit.  Wt Readings from Last 4 Encounters:   06/16/21 68.3 kg (150 lb 9.6 oz)   06/03/21 69.4 kg (153 lb)   05/05/21 69.4 kg (153 lb)   03/09/21 65.3 kg (144 lb)       Constitutional: well-developed, appearing stated age; cooperative  Eyes: nl EOM, PERRLA, vision, conjunctiva, sclera  ENT: nl external ears, nose, hearing, lips, teeth, gums, throat  No mucous membrane lesions, normal saliva pool  Neck: no mass or thyroid enlargement  Resp: breathing unlabored  MS: /fist formation normal; minimal swelling R knee. Ambulation normal; +bunion change R great toe, no other visible swelling.  Skin: no nail pitting, alopecia, rash, nodules or lesions  Neuro: nl cranial nerves  Psych: nl judgement, orientation, memory, affect.         Data:     No results found for any visits on 06/25/21.     RHEUM RESULTS Latest Ref Rng &  "Units 5/5/2021 6/3/2021 6/16/2021   CRP, INFLAMMATION 0.0 - 8.0 mg/L - - -   AST 0 - 45 U/L <3 - 28   ALT 0 - 70 U/L 40 - 52   ALBUMIN 3.4 - 5.0 g/dL 3.5 - 3.4   WBC 4.0 - 11.0 10e9/L - 12.0(H) 7.7   RBC 4.4 - 5.9 10e12/L - 4.58 4.78   HGB 13.3 - 17.7 g/dL - 14.1 14.7   HCT 40.0 - 53.0 % - 40.3 42.4   MCV 78 - 100 fl - 88 89   MCHC 31.5 - 36.5 g/dL - 35.0 34.7   RDW 10.0 - 15.0 % - 14.5 14.2    - 450 10e9/L - 209 226   CREATININE 0.66 - 1.25 mg/dL 1.21 1.09 0.93   GFR ESTIMATE, IF BLACK >60 mL/min/[1.73:m2] 69 78 >90   GFR ESTIMATE >60 mL/min/[1.73:m2] 59(L) 67 81   HEPATITIS C ANTIBODY NR:Nonreactive - - -       PLEASE USE DOXMISSION [employer] TEXT [phone], \"JIUM\" WILL HELP FACILITATE THE VISIT AND TRANSLATE FOR PATIENT    Moises is a 70 year old who is being evaluated via a billable video visit.      How would you like to obtain your AVS? Mail a copy    Will anyone else be joining your video visit? No    Video Start Time: 1:05 PM  Video-Visit Details    Type of service:  Video Visit    Video End Time:2:10 PM    Originating Location (pt. Location): Home    Distant Location (provider location):  [facility] RHEUMATOLOGY CLINIC [place]     Platform used for Video Visit: Shoef[employer]rity  "

## 2021-06-25 NOTE — PATIENT INSTRUCTIONS
Diagnosis:  1.  Gout with gouty flare in the right knee, now improved after a 4-day course of colchicine: I do not think that recent gouty flare means that the allopurinol is not working.  I recommend continuing allopurinol 300 mg once a day, every day.  Do not stop the medication.  I think that the recent knee pain is due to a gouty flare.  The gouty flare has occurred because the body takes 3 to 6 months to get completely used to allopurinol.  I recommend taking colchicine for several months to prevent more flares.    Plan:  1.  Continue allopurinol 300 mg once a day, every day without stopping.  2.  Use colchicine 0.6 mg once daily every day for the next 60 days.  On September 1, stopped taking colchicine every day.  Thereafter, use colchicine 0.6 mg twice a day only as needed for recurrent flaring joint pain.  3.  Prednisone 20 mg daily for 3 to 5 days can also be used to address recurrent flares.  4.  Follow-up with Dr. Hernandez for persistent abdominal bloating.  5.  Use acetaminophen 500 to 1000 mg up to 3 times daily as needed for joint pain.

## 2021-06-25 NOTE — TELEPHONE ENCOUNTER
RECORDS RECEIVED FROM: internal /ce    DATE RECEIVED: 9.20.21    NOTES STATUS DETAILS   OFFICE NOTE from referring provider internal  Junaid Hernandez   OFFICE NOTE from other specialist internal /ce Internal-   11.11.20 Ayan   9.1.20 Jacklyn    - 5.16.20 Tomas Doss- 5.9.20       DISCHARGE SUMMARY from hospital na    DISCHARGE REPORT from the ER internal  10.9.20 Tr[facility]   9.18.20 [facility]   8.28.20 Capecchi    MEDICATION LIST internal     IMAGING  (NEED IMAGES AND REPORTS)     CT SCAN internal  5.3.21, 1.27.21, 9.29.20, 8.28.20,    more in epic if needed  Scheduled 8.13.21    CHEST XRAY (CXR) internal  5.5.21, 9.18.20, 9.1.20, 8.28.28, 8.21.20, 8.20.20 more in epic if needed    TESTS     PULMONARY FUNCTION TESTING (PFT) internal /in process    8.19.20        Action 9.20.21 sv   Action Taken Message sent to nurse pool to place PFT order   Message sent to CC pool to help schedule CT chest order

## 2021-07-01 ENCOUNTER — HOSPITAL ENCOUNTER (OUTPATIENT)
Dept: MRI IMAGING | Facility: CLINIC | Age: 71
End: 2021-07-01
Attending: PEDIATRICS
Payer: COMMERCIAL

## 2021-07-01 ENCOUNTER — OFFICE VISIT (OUTPATIENT)
Dept: CARDIOLOGY | Facility: CLINIC | Age: 71
End: 2021-07-01
Attending: INTERNAL MEDICINE
Payer: COMMERCIAL

## 2021-07-01 VITALS
DIASTOLIC BLOOD PRESSURE: 76 MMHG | HEART RATE: 80 BPM | SYSTOLIC BLOOD PRESSURE: 134 MMHG | BODY MASS INDEX: 24.69 KG/M2 | WEIGHT: 153 LBS | OXYGEN SATURATION: 97 %

## 2021-07-01 DIAGNOSIS — C37 MALIGNANT THYMOMA (H): ICD-10-CM

## 2021-07-01 DIAGNOSIS — R06.02 SOB (SHORTNESS OF BREATH): ICD-10-CM

## 2021-07-01 DIAGNOSIS — I71.40 AAA (ABDOMINAL AORTIC ANEURYSM) WITHOUT RUPTURE (H): ICD-10-CM

## 2021-07-01 DIAGNOSIS — E78.1 HYPERTRIGLYCERIDEMIA: ICD-10-CM

## 2021-07-01 DIAGNOSIS — R93.1 ABNORMAL ECHOCARDIOGRAM: ICD-10-CM

## 2021-07-01 DIAGNOSIS — R93.5 ABNORMAL CT SCAN, PELVIS: ICD-10-CM

## 2021-07-01 DIAGNOSIS — I51.7 CARDIOMEGALY: Primary | ICD-10-CM

## 2021-07-01 DIAGNOSIS — Z01.810 PRE-OPERATIVE CARDIOVASCULAR EXAMINATION: ICD-10-CM

## 2021-07-01 DIAGNOSIS — I73.9 CLAUDICATION (H): ICD-10-CM

## 2021-07-01 DIAGNOSIS — I50.9 ACUTE ON CHRONIC CONGESTIVE HEART FAILURE, UNSPECIFIED HEART FAILURE TYPE (H): ICD-10-CM

## 2021-07-01 DIAGNOSIS — C34.92 MALIGNANT NEOPLASM OF LEFT LUNG, UNSPECIFIED PART OF LUNG (H): ICD-10-CM

## 2021-07-01 PROCEDURE — 75561 CARDIAC MRI FOR MORPH W/DYE: CPT

## 2021-07-01 PROCEDURE — 93005 ELECTROCARDIOGRAM TRACING: CPT

## 2021-07-01 PROCEDURE — A9585 GADOBUTROL INJECTION: HCPCS | Performed by: PEDIATRICS

## 2021-07-01 PROCEDURE — G0463 HOSPITAL OUTPT CLINIC VISIT: HCPCS | Mod: 25

## 2021-07-01 PROCEDURE — 75561 CARDIAC MRI FOR MORPH W/DYE: CPT | Mod: 26 | Performed by: INTERNAL MEDICINE

## 2021-07-01 PROCEDURE — 99204 OFFICE O/P NEW MOD 45 MIN: CPT | Mod: 25 | Performed by: INTERNAL MEDICINE

## 2021-07-01 PROCEDURE — 255N000002 HC RX 255 OP 636: Performed by: PEDIATRICS

## 2021-07-01 RX ORDER — GADOBUTROL 604.72 MG/ML
10 INJECTION INTRAVENOUS ONCE
Status: COMPLETED | OUTPATIENT
Start: 2021-07-01 | End: 2021-07-01

## 2021-07-01 RX ADMIN — GADOBUTROL 9 ML: 604.72 INJECTION INTRAVENOUS at 08:33

## 2021-07-01 ASSESSMENT — PAIN SCALES - GENERAL: PAINLEVEL: NO PAIN (0)

## 2021-07-01 NOTE — NURSING NOTE
Cardiac Testing: Patient given instructions regarding  nuclear pharmacologic thallium . Discussed purpose, preparation, procedure and when to expect results reported back to the patient. Patient demonstrated understanding of this information and agreed to call with further questions or concerns.  Labs: Patient was instructed to return for the next laboratory testing in at Pt convenience. Patient demonstrated understanding of this information and agreed to call with further questions or concerns.   Med Reconcile: Reviewed and verified all current medications with the patient. The updated medication list was printed and given to the patient.  Return Appointment: Patient given instructions regarding scheduling next clinic visit. Patient demonstrated understanding of this information and agreed to call with further questions or concerns.  Patient stated he understood all health information given and agreed to call with further questions or concerns.    Cristina Wesley LPN

## 2021-07-01 NOTE — PROGRESS NOTES
"HPI: I had the privilege to evaluate and examine Mr Moises Chacon,who  Is a  70 year old man with history of lung adenocarcinoma (Stage IIB, lobectomy 5/2020 in [facility]), COPD, HTN, mixed dyslipidemia ( mg/dL, non-HDL-C 185 mg/dL,  mg/dL), prior iliac stenting, and known saccular aneurysm of the abodminal aorta presenting for evaluation of dyspnea.     Interviewed with Mongolian phone interpretor. Dyspnea has been ongoing for many months, feels it started after lobectomy but also feels it has been getting worse. Becomes mildly dyspnic after 5-10 steps with progression thereafter. No orthopnea, PND/\"night cough\", no exertional chest pain or palpitations. No LE edema, but endorses enlarged/swollen abdomen.     He was planned for surgical repair of saccular aneurysm in [place], but moved to [place] last year and has not been able to follow up.     PAST MEDICAL HISTORY:  Past Medical History:   Diagnosis Date     Anemia      Constipation      COPD (chronic obstructive pulmonary disease) (H)      Diastolic congestive heart failure (H)      Dyslipidemia      GERD (gastroesophageal reflux disease)      Hypertension      Primary lung adenocarcinoma (H)     Stage IIB: pT3 N0 M0. Diagnosed initially 01/2020. Underwent left lower lobectomy 05/2020.     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     Tobacco use        CURRENT MEDICATIONS:  Current Outpatient Medications   Medication Sig Dispense Refill     acetaminophen (TYLENOL) 325 MG tablet Take 3 tablets (975 mg) by mouth every 6 hours       acetaminophen 500 MG CAPS Take 500 mg by mouth 3 times daily as needed (for joint pain) 60 capsule 1     albuterol (PROAIR HFA/PROVENTIL HFA/VENTOLIN HFA) 108 (90 Base) MCG/ACT inhaler Inhale 2 puffs into the lungs every 6 hours as needed for shortness of breath / dyspnea or wheezing (use with spacer) 8 g 4     allopurinol (ZYLOPRIM) 300 MG tablet Take 1 tablet (300 mg) by mouth daily (beginning 5/26/21, after " increasing the smaller doses) 90 tablet 3     amLODIPine (NORVASC) 10 MG tablet TAKE 1 TABLET BY MOUTH DAILY. 90 tablet 3     aspirin (ASA) 81 MG EC tablet Take 1 tablet (81 mg) by mouth daily 90 tablet 1     calcium carbonate (TUMS) 500 MG chewable tablet Take 1 tablet (500 mg) by mouth 3 times daily 90 tablet 4     cetirizine (ZYRTEC) 10 MG tablet TAKE 1 TAB BY MOUTH AT BEDTIME 30 tablet 11     colchicine (COLCRYS) 0.6 MG tablet Take 1 tablet (0.6 mg) by mouth daily Until August 25 2021 60 tablet 1     famotidine (PEPCID) 20 MG tablet Take 1 tablet (20 mg) by mouth 2 times daily 60 tablet 11     lidocaine (XYLOCAINE) 2 % external gel Apply topically as needed for moderate pain in affected area on left flank. Should wash hands after applying 30 mL 11     metoprolol succinate ER (TOPROL-XL) 25 MG 24 hr tablet Take 1 tablet (25 mg) by mouth At Bedtime 90 tablet 3     mirtazapine (REMERON) 15 MG tablet Take 1 tablet (15 mg) by mouth At Bedtime 30 tablet 11     predniSONE (DELTASONE) 10 MG tablet 15 mg BID until gout flare resolution begins, then taper down over 7 days 40 tablet 0     sertraline (ZOLOFT) 50 MG tablet Take 1 tablet (50 mg) by mouth daily Begin with half a pill daily for 8 days. 60 tablet 4     umeclidinium-vilanterol (ANORO ELLIPTA) 62.5-25 MCG/INH oral inhaler Inhale 1 puff into the lungs daily 1 each 11     varenicline (CHANTIX IVONNE) 0.5 MG X 11 & 1 MG X 42 tablet Take 0.5 mg tab daily for 3 days, THEN 0.5 mg tab twice daily for 4 days, THEN 1 mg twice daily. 53 tablet 0     varenicline (CHANTIX) 1 MG tablet Take 1 tablet (1 mg) by mouth 2 times daily 154 tablet 0     zolpidem (AMBIEN) 10 MG tablet Take 1 tablet (10 mg) by mouth At Bedtime 30 tablet 3     alum & mag hydroxide-simethicone (MYLANTA ES/MAALOX  ES) 400-400-40 MG/5ML SUSP Take 30 mLs by mouth every 6 hours as needed for indigestion or heartburn 355 mL 3     amoxicillin-clavulanate (AUGMENTIN XR) 1000-62.5 MG 12 hr tablet Take 1 tablet by  mouth 2 times daily for 10 days (Patient not taking: Reported on 6/25/2021) 20 tablet 0     diazepam (VALIUM) 10 MG tablet Take 1 tablet (10 mg) by mouth once as needed for anxiety (claustrophobia for MRI) (Patient not taking: Reported on 6/25/2021) 1 tablet 0     mirtazapine (REMERON) 7.5 MG tablet Take 1 tablet (7.5 mg) by mouth At Bedtime (Patient not taking: Reported on 6/25/2021) 90 tablet 3     nitroGLYcerin (NITROSTAT) 0.4 MG sublingual tablet Place 0.4 mg under the tongue every 5 minutes as needed for chest pain For chest pain place 1 tablet under the tongue every 5 minutes for 3 doses. If symptoms persist 5 minutes after 1st dose call 911.       saccharomyces boulardii (FLORASTOR) 250 MG capsule Take 1 capsule (250 mg) by mouth 2 times daily for 21 days (Patient not taking: Reported on 6/25/2021) 60 capsule      sennosides (SENOKOT) 8.6 MG tablet Take 1 tablet by mouth 2 times daily (Patient not taking: Reported on 6/25/2021) 60 tablet 0     simethicone (MYLICON) 125 MG chewable tablet Take 1 tablet (125 mg) by mouth 2 times daily 60 tablet 3     SM SENNA-S 8.6-50 MG tablet TAKE 1 TAB BY MOUTH ONCE DAILY (Patient not taking: Reported on 6/25/2021) 30 tablet 11     spacer (OPTICHAMBER BERT) holding chamber Use with inhaler dosing 1 each 0       PAST SURGICAL HISTORY:  Past Surgical History:   Procedure Laterality Date     ENDOVASCULAR ILIAC STENT PLACEMENT  2016    Bilateral common iliac artery stent placement; right external iliac stent placement     THORACOSCOPIC LOBECTOMY LUNG  05/06/2020    Robotic-assisted left lower lung lobectomy with mediastinal lymph node dissection and cervical mediastinoscopy     THORACOSCOPIC THYMECTOMY Bilateral 08/19/2020    Procedure: Bilateral subxiphoid thoracoscopic thymectomy;  Surgeon: Tate Garcia MD;  Location: [facility] OR       ALLERGIES     Allergies   Allergen Reactions     Atorvastatin Itching     Flomax [Tamsulosin]      ITCHING       FAMILY HISTORY:  Family  History   Problem Relation Age of Onset     Heart Disease Mother      Cerebrovascular Disease Father      Other - See Comments Brother         passed in [place] war     Cerebrovascular Disease Sister      Cerebrovascular Disease Sister        SOCIAL HISTORY:  Social History     Socioeconomic History     Marital status:      Spouse name: None     Number of children: None     Years of education: None     Highest education level: None   Occupational History     None   Social Needs     Financial resource strain: Hard     Food insecurity     Worry: Never true     Inability: Never true     Transportation needs     Medical: No     Non-medical: No   Tobacco Use     Smoking status: Former Smoker     Packs/day: 0.50     Years: 60.00     Pack years: 30.00     Quit date: 2020     Years since quittin.9     Smokeless tobacco: Former User   Substance and Sexual Activity     Alcohol use: No     Drug use: Not Currently     Types: Marijuana     Sexual activity: Not Currently   Lifestyle     Physical activity     Days per week: 7 days     Minutes per session: 10 min     Stress: Only a little   Relationships     Social connections     Talks on phone: Never     Gets together: None     Attends Zoroastrian service: None     Active member of club or organization: None     Attends meetings of clubs or organizations: None     Relationship status: None     Intimate partner violence     Fear of current or ex partner: No     Emotionally abused: No     Physically abused: No     Forced sexual activity: No   Other Topics Concern     Parent/sibling w/ CABG, MI or angioplasty before 65F 55M? Not Asked   Social History Narrative     None       ROS:   Constitutional: No fever, chills, or sweats. No weight gain/loss   ENT: No visual disturbance, ear ache, epistaxis, sore throat  Allergies/Immunologic: Negative.   Respiratory: No cough, hemoptysia  Cardiovascular: As per HPI  GI: No nausea, vomiting, hematemesis, melena, or  hematochezia  : No urinary frequency, dysuria, or hematuria  Integument: Negative  Psychiatric: Negative  Neuro: Negative  Endocrinology: Negative   Musculoskeletal: Negative    EXAM:  /76 (BP Location: Right arm, Patient Position: Chair, Cuff Size: Adult Regular)   Pulse 80   Wt 69.4 kg (153 lb)   SpO2 97%   BMI 24.69 kg/m       In general, the patient is a pleasant male in no apparent distress.    HEENT: NC/AT.  PERRLA.  EOMI.  Sclerae white, not injected.  Nares clear.  Pharynx without erythema or exudate.  Dentition intact.    Neck: No adenopathy.  No thyromegaly. Carotids +4/4 bilaterally without bruits.  No jugular venous distension.   Heart: RRR. Normal S1, S2 splits physiologically. No murmur, rub, click, or gallop. The PMI is in the 5th ICS in the midclavicular line. There is no heave.    Lungs: CTA.  No ronchi, wheezes, rales.  No dullness to percussion.   Abdomen: Distended abdomen, soft, nontender. No organomegaly.  No bruits.   Extremities: No clubbing, cyanosis, or edema.  The pulses are +4/4 at the radial, brachial, femoral, popliteal, DP, and PT sites bilaterally.  No bruits are noted.  Neurologic: Alert and oriented to person/place/time, normal speech, gait and affect  Skin: No petechiae, purpura or rash.    Labs:  LIPID RESULTS:  Lab Results   Component Value Date    CHOL 215 (H) 03/09/2021    HDL 29 (L) 03/09/2021    LDL  03/09/2021     Cannot estimate LDL when triglyceride exceeds 400 mg/dL    LDL 86 03/09/2021    TRIG 745 (H) 03/09/2021    NHDL 186 (H) 03/09/2021     LIVER ENZYME RESULTS:  Lab Results   Component Value Date    AST 28 06/16/2021    ALT 52 06/16/2021       CBC RESULTS:  Lab Results   Component Value Date    WBC 7.7 06/16/2021    RBC 4.78 06/16/2021    HGB 14.7 06/16/2021    HCT 42.4 06/16/2021    MCV 89 06/16/2021    MCH 30.8 06/16/2021    MCHC 34.7 06/16/2021    RDW 14.2 06/16/2021     06/16/2021       BMP RESULTS:  Lab Results   Component Value Date      06/16/2021    POTASSIUM 3.8 06/16/2021    CHLORIDE 108 06/16/2021    CO2 22 06/16/2021    ANIONGAP 9 06/16/2021    GLC 99 06/16/2021    BUN 21 06/16/2021    CR 0.93 06/16/2021    GFRESTIMATED 81 06/16/2021    GFRESTBLACK >90 06/16/2021    MERI 8.8 06/16/2021          INR RESULTS:  Lab Results   Component Value Date    INR 1.05 08/28/2020           Repeat Labs         Ref. Range 7/16/2021 08:35   Amylase Latest Ref Range: 30 - 110 U/L 90   Cholesterol Latest Ref Range: <200 mg/dL 268 (H)   Patient Fasting > 8hrs? Unknown Yes   Ferritin Latest Ref Range: 26 - 388 ng/mL 69   HDL Cholesterol Latest Ref Range: >=40 mg/dL 36 (L)   Iron Latest Ref Range: 35 - 180 ug/dL 157   Iron Binding Cap Latest Ref Range: 240 - 430 ug/dL 355   Iron Saturation Index Latest Ref Range: 15 - 46 % 44   LDL Cholesterol Calculated Latest Ref Range: <=100 mg/dL 167 (H)   Lipase Latest Ref Range: 73 - 393 U/L 128   Non HDL Cholesterol Latest Ref Range: <130 mg/dL 232 (H)   Triglycerids Latest Ref Range: <150 mg/dL 326 (H)   WBC Latest Ref Range: 4.0 - 11.0 10e3/uL 7.7   Hemoglobin Latest Ref Range: 13.3 - 17.7 g/dL 15.0   Hematocrit Latest Ref Range: 40.0 - 53.0 % 44.2   Platelet Count Latest Ref Range: 150 - 450 10e3/uL 175   RBC Count Latest Ref Range: 4.40 - 5.90 10e6/uL 4.80   MCV Latest Ref Range: 78 - 100 fL 92   MCH Latest Ref Range: 26.5 - 33.0 pg 31.3   MCHC Latest Ref Range: 31.5 - 36.5 g/dL 33.9   RDW Latest Ref Range: 10.0 - 15.0 % 14.7         Procedures:    EKG:  Sin rhythm, LVH, Non specific ST-T changes in inferior lead      Cardiac MRI:   Scan Date:   2021-07-01 07:23:31                                  SUMMARY   ==========================================================================================================     Clinical history: 70-year-old man with moderate left ventricular hypertrophy. CMR to assess for  infiltrative cardiomyopathy     Comparison CMR: None     1. The left ventricle is normal in cavity size. The wall  thickness is moderately increased, maximal  thickness 17 mm. There are no regional wall motion abnormalities. The global systolic function is  hyperkinetic. The LVEF is 74%.     2. The right ventricle is normal in cavity size. The right ventricle is hypertrabeculated. The global  systolic function is normal. The RVEF is 60%.      3. The atria are normal in size.     4. There is no significant valvular disease.      5. There is mid-myocardial late gadolinium enhancement in the basal septum consistent with non ischemic  myocardial fibrosis. There are no other features of amyloidosis or hypertrophic cardiomyopathy.      6. There is no pericardial effusion.     7. There is no intracardiac thrombus.     CONCLUSIONS:   Moderate left ventricular hypertrophy with non specific myocardial fibrosis in the basal septum.   Normal biventricular function.     CT Chest/Abd/Pelv 5/3/21                                                                  IMPRESSION:     Image quality degraded by respiratory motion artifact in both the chest and abdomen.     1.  Status post left lower lobectomy. There are no findings to suggest progressive metastatic disease in the chest, abdomen, or pelvis.  2.  Unchanged enlarged lymph node in the AP window.  3.  Coronary atherosclerosis. Fusiform enlargement of the aorta at the thoracoabdominal junction and thrombosed saccular aneurysm arising from the anterior infrarenal abdominal aorta are unchanged. Bilateral common and right external iliac artery stents are patent.    CLINICAL HISTORY: Iliac calcifications noted on CT.  Please also  complete segmental pressures.; Abnormal CT scan, pelvis; Claudication  (H). Bilateral iliac artery stents. Abdominal aortic aneurysm.     COMPARISONS: CT 5/3/2021.     TECHNIQUE: Bilateral DESIRE, segmental pressures, and VPR obtained.     FINDINGS:     RIGHT: (pressure / index)       Brachial: 178 mmHg       High Thigh:  183 mmHg / 1.03       Low Thigh:  180 mmHg / 1.01        Calf:  171 mmHg / 0.96       Ankle (PT): 195 mmHg / 1.10       Ankle (DP): 181 mmHg / 1.02          DESIRE: 1.10          VPR:            High thigh: Normal            Low thigh: Normal            Calf: Normal            Ankle: Normal     LEFT: (pressure / index)       Brachial: 177 mmHg       High Thigh:  171 mmHg / 0.96       Low Thigh:  185 mmHg / 1.04       Calf:  167 mmHg / 0.94       Ankle (PT): 180 mmHg / 1.01       Ankle (DP): 178 mmHg / 1.00          DESIRE: 1.01          VPR:            High thigh: Normal            Low thigh: Normal            Calf: Normal            Ankle: Normal                                                                      IMPRESSION:  1. RIGHT:       A. Resting DESIRE is 1.10, which is normal.       B. Negative segmental pressures.     2. LEFT:       A. Resting DESIRE is 1.01, which is normal.       B. ABNORMAL high thigh index suggests a degree of iliac  Narrowing.    Assessment and Plan:   I discussed the results with patient.  I discussed the importance of a heart healthy diet and lifestyle with patient.  70 year old man with established vascular disease presenting for evaluation of dyspnea thought to be due to CHF. No signs or symptoms of congestive heart failure. Cardiac MRI showed moderate LVH but non-specific pattern for non-ischemic myocardial fibrosis. Enlarged saccular aneurysm is most concerning and warrants vascular surgical evaluation for assessment of elective repair. He is intermediate risk for a high risk surgery and will also undergo a Lexiscan to evaluate for inducible ischemia. His prior diagnosis of PAD (b/l ext iliac stents) warrants interval assessment given patients inability to exert himself enough to develop symptoms.    #Saccular aneurysm -  saccular aneurysm arising from the anterior infrarenal abdominal aorta measuring up to 40 x 67 mm.    - Vascular surgery consult   - Lexiscan for perioperative risk assessment   - continue blood pressure  management    #Dyspnea - likely related to COPD, Lung CA, lobectomy. Normal biventricular function on MRI.   - Lexiscan will inform if symptoms represent coronary ischemia  - no signs/symptoms of CHF    #Mixed Dyslipidemia -  mg/dL, non-HDL-C 185 mg/dL,  mg/dL  - observed on single measurement  - repeat lipid panel - triglycerides decreased with about 50 % and now cholesterol was increased - (patient has a variabale pattern of hs diet)  - amylase and lipase to evaluate for evidence of pancreatitis    Discussed with Dr Grande who is in agreement with plan.     Rafal Cardona MD, MSc  Cardiovascular Disease Fellow  [facility]       I have interviewed patient with CV fellow. I have reviewed the laboratory tests and other diagnostic tests  with patient and CV fellow. I have reviewed the management plan with the patient and the CV fellow. I discussed with the CV fellow and agree with the findings and plan in this CV fellow s note.  In addition to the assessment and plan changes  have been incorporated into the note by myself, as to make it a single cohesive document.    Adam Grande MD, PhD  Professor of Medicine  Division of Cardiology    CC  Patient Care Team:  Junaid Hernandez MD as PCP - General (Internal Medicine - Pediatrics)  Serge Botello MD as Assigned PCP  Michael Ross MD as Assigned Cancer Care Provider  Dai Arizmendi MD as Assigned Heart and Vascular Provider  Dai Redman PA-C as Assigned Surgical Provider  Jaylan Lewis MD as MD (Critical Care)  Paige Hernandes, RN as Lead Care Coordinator (Primary Care - CC)  MICHAEL ROSS

## 2021-07-01 NOTE — LETTER
"7/1/2021      RE: Moises Chacon  [facility]  [address]  [facility] [address]       Dear Colleague,    Thank you for the opportunity to participate in the care of your patient, Moises Chacon, at the [facility] [place] [facility]. Please see a copy of my visit note below.    HPI: I had the privilege to evaluate and examine Mr Moises Chacon,who  Is a  70 year old man with history of lung adenocarcinoma (Stage IIB, lobectomy 5/2020 in [facility]), COPD, HTN, mixed dyslipidemia ( mg/dL, non-HDL-C 185 mg/dL,  mg/dL), prior iliac stenting, and known saccular aneurysm of the abodminal aorta presenting for evaluation of dyspnea.     Interviewed with Slovenian phone interpretor. Dyspnea has been ongoing for many months, feels it started after lobectomy but also feels it has been getting worse. Becomes mildly dyspnic after 5-10 steps with progression thereafter. No orthopnea, PND/\"night cough\", no exertional chest pain or palpitations. No LE edema, but endorses enlarged/swollen abdomen.     He was planned for surgical repair of saccular aneurysm in [place], but moved to [place] last year and has not been able to follow up.     PAST MEDICAL HISTORY:  Past Medical History:   Diagnosis Date     Anemia      Constipation      COPD (chronic obstructive pulmonary disease) (H)      Diastolic congestive heart failure (H)      Dyslipidemia      GERD (gastroesophageal reflux disease)      Hypertension      Primary lung adenocarcinoma (H)     Stage IIB: pT3 N0 M0. Diagnosed initially 01/2020. Underwent left lower lobectomy 05/2020.     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     Tobacco use        CURRENT MEDICATIONS:  Current Outpatient Medications   Medication Sig Dispense Refill     acetaminophen (TYLENOL) 325 MG tablet Take 3 tablets (975 mg) by mouth every 6 hours       acetaminophen 500 MG CAPS Take 500 mg by mouth 3 " times daily as needed (for joint pain) 60 capsule 1     albuterol (PROAIR HFA/PROVENTIL HFA/VENTOLIN HFA) 108 (90 Base) MCG/ACT inhaler Inhale 2 puffs into the lungs every 6 hours as needed for shortness of breath / dyspnea or wheezing (use with spacer) 8 g 4     allopurinol (ZYLOPRIM) 300 MG tablet Take 1 tablet (300 mg) by mouth daily (beginning 5/26/21, after increasing the smaller doses) 90 tablet 3     amLODIPine (NORVASC) 10 MG tablet TAKE 1 TABLET BY MOUTH DAILY. 90 tablet 3     aspirin (ASA) 81 MG EC tablet Take 1 tablet (81 mg) by mouth daily 90 tablet 1     calcium carbonate (TUMS) 500 MG chewable tablet Take 1 tablet (500 mg) by mouth 3 times daily 90 tablet 4     cetirizine (ZYRTEC) 10 MG tablet TAKE 1 TAB BY MOUTH AT BEDTIME 30 tablet 11     colchicine (COLCRYS) 0.6 MG tablet Take 1 tablet (0.6 mg) by mouth daily Until August 25 2021 60 tablet 1     famotidine (PEPCID) 20 MG tablet Take 1 tablet (20 mg) by mouth 2 times daily 60 tablet 11     lidocaine (XYLOCAINE) 2 % external gel Apply topically as needed for moderate pain in affected area on left flank. Should wash hands after applying 30 mL 11     metoprolol succinate ER (TOPROL-XL) 25 MG 24 hr tablet Take 1 tablet (25 mg) by mouth At Bedtime 90 tablet 3     mirtazapine (REMERON) 15 MG tablet Take 1 tablet (15 mg) by mouth At Bedtime 30 tablet 11     predniSONE (DELTASONE) 10 MG tablet 15 mg BID until gout flare resolution begins, then taper down over 7 days 40 tablet 0     sertraline (ZOLOFT) 50 MG tablet Take 1 tablet (50 mg) by mouth daily Begin with half a pill daily for 8 days. 60 tablet 4     umeclidinium-vilanterol (ANORO ELLIPTA) 62.5-25 MCG/INH oral inhaler Inhale 1 puff into the lungs daily 1 each 11     varenicline (CHANTIX IVONNE) 0.5 MG X 11 & 1 MG X 42 tablet Take 0.5 mg tab daily for 3 days, THEN 0.5 mg tab twice daily for 4 days, THEN 1 mg twice daily. 53 tablet 0     varenicline (CHANTIX) 1 MG tablet Take 1 tablet (1 mg) by mouth 2  times daily 154 tablet 0     zolpidem (AMBIEN) 10 MG tablet Take 1 tablet (10 mg) by mouth At Bedtime 30 tablet 3     alum & mag hydroxide-simethicone (MYLANTA ES/MAALOX  ES) 400-400-40 MG/5ML SUSP Take 30 mLs by mouth every 6 hours as needed for indigestion or heartburn 355 mL 3     amoxicillin-clavulanate (AUGMENTIN XR) 1000-62.5 MG 12 hr tablet Take 1 tablet by mouth 2 times daily for 10 days (Patient not taking: Reported on 6/25/2021) 20 tablet 0     diazepam (VALIUM) 10 MG tablet Take 1 tablet (10 mg) by mouth once as needed for anxiety (claustrophobia for MRI) (Patient not taking: Reported on 6/25/2021) 1 tablet 0     mirtazapine (REMERON) 7.5 MG tablet Take 1 tablet (7.5 mg) by mouth At Bedtime (Patient not taking: Reported on 6/25/2021) 90 tablet 3     nitroGLYcerin (NITROSTAT) 0.4 MG sublingual tablet Place 0.4 mg under the tongue every 5 minutes as needed for chest pain For chest pain place 1 tablet under the tongue every 5 minutes for 3 doses. If symptoms persist 5 minutes after 1st dose call 911.       saccharomyces boulardii (FLORASTOR) 250 MG capsule Take 1 capsule (250 mg) by mouth 2 times daily for 21 days (Patient not taking: Reported on 6/25/2021) 60 capsule      sennosides (SENOKOT) 8.6 MG tablet Take 1 tablet by mouth 2 times daily (Patient not taking: Reported on 6/25/2021) 60 tablet 0     simethicone (MYLICON) 125 MG chewable tablet Take 1 tablet (125 mg) by mouth 2 times daily 60 tablet 3     SM SENNA-S 8.6-50 MG tablet TAKE 1 TAB BY MOUTH ONCE DAILY (Patient not taking: Reported on 6/25/2021) 30 tablet 11     spacer (OPTICHAMBER BERT) holding chamber Use with inhaler dosing 1 each 0       PAST SURGICAL HISTORY:  Past Surgical History:   Procedure Laterality Date     ENDOVASCULAR ILIAC STENT PLACEMENT  2016    Bilateral common iliac artery stent placement; right external iliac stent placement     THORACOSCOPIC LOBECTOMY LUNG  05/06/2020    Robotic-assisted left lower lung lobectomy with  mediastinal lymph node dissection and cervical mediastinoscopy     THORACOSCOPIC THYMECTOMY Bilateral 2020    Procedure: Bilateral subxiphoid thoracoscopic thymectomy;  Surgeon: Tate Garcia MD;  Location: [facility] OR       ALLERGIES     Allergies   Allergen Reactions     Atorvastatin Itching     Flomax [Tamsulosin]      ITCHING       FAMILY HISTORY:  Family History   Problem Relation Age of Onset     Heart Disease Mother      Cerebrovascular Disease Father      Other - See Comments Brother         passed in [place] war     Cerebrovascular Disease Sister      Cerebrovascular Disease Sister        SOCIAL HISTORY:  Social History     Socioeconomic History     Marital status:      Spouse name: None     Number of children: None     Years of education: None     Highest education level: None   Occupational History     None   Social Needs     Financial resource strain: Hard     Food insecurity     Worry: Never true     Inability: Never true     Transportation needs     Medical: No     Non-medical: No   Tobacco Use     Smoking status: Former Smoker     Packs/day: 0.50     Years: 60.00     Pack years: 30.00     Quit date: 2020     Years since quittin.9     Smokeless tobacco: Former User   Substance and Sexual Activity     Alcohol use: No     Drug use: Not Currently     Types: Marijuana     Sexual activity: Not Currently   Lifestyle     Physical activity     Days per week: 7 days     Minutes per session: 10 min     Stress: Only a little   Relationships     Social connections     Talks on phone: Never     Gets together: None     Attends Confucianism service: None     Active member of club or organization: None     Attends meetings of clubs or organizations: None     Relationship status: None     Intimate partner violence     Fear of current or ex partner: No     Emotionally abused: No     Physically abused: No     Forced sexual activity: No   Other Topics Concern     Parent/sibling w/ CABG, MI or angioplasty  before 65F 55M? Not Asked   Social History Narrative     None       ROS:   Constitutional: No fever, chills, or sweats. No weight gain/loss   ENT: No visual disturbance, ear ache, epistaxis, sore throat  Allergies/Immunologic: Negative.   Respiratory: No cough, hemoptysia  Cardiovascular: As per HPI  GI: No nausea, vomiting, hematemesis, melena, or hematochezia  : No urinary frequency, dysuria, or hematuria  Integument: Negative  Psychiatric: Negative  Neuro: Negative  Endocrinology: Negative   Musculoskeletal: Negative    EXAM:  /76 (BP Location: Right arm, Patient Position: Chair, Cuff Size: Adult Regular)   Pulse 80   Wt 69.4 kg (153 lb)   SpO2 97%   BMI 24.69 kg/m       In general, the patient is a pleasant male in no apparent distress.    HEENT: NC/AT.  PERRLA.  EOMI.  Sclerae white, not injected.  Nares clear.  Pharynx without erythema or exudate.  Dentition intact.    Neck: No adenopathy.  No thyromegaly. Carotids +4/4 bilaterally without bruits.  No jugular venous distension.   Heart: RRR. Normal S1, S2 splits physiologically. No murmur, rub, click, or gallop. The PMI is in the 5th ICS in the midclavicular line. There is no heave.    Lungs: CTA.  No ronchi, wheezes, rales.  No dullness to percussion.   Abdomen: Distended abdomen, soft, nontender. No organomegaly.  No bruits.   Extremities: No clubbing, cyanosis, or edema.  The pulses are +4/4 at the radial, brachial, femoral, popliteal, DP, and PT sites bilaterally.  No bruits are noted.  Neurologic: Alert and oriented to person/place/time, normal speech, gait and affect  Skin: No petechiae, purpura or rash.    Labs:  LIPID RESULTS:  Lab Results   Component Value Date    CHOL 215 (H) 03/09/2021    HDL 29 (L) 03/09/2021    LDL  03/09/2021     Cannot estimate LDL when triglyceride exceeds 400 mg/dL    LDL 86 03/09/2021    TRIG 745 (H) 03/09/2021    NHDL 186 (H) 03/09/2021     LIVER ENZYME RESULTS:  Lab Results   Component Value Date    AST 28  06/16/2021    ALT 52 06/16/2021       CBC RESULTS:  Lab Results   Component Value Date    WBC 7.7 06/16/2021    RBC 4.78 06/16/2021    HGB 14.7 06/16/2021    HCT 42.4 06/16/2021    MCV 89 06/16/2021    MCH 30.8 06/16/2021    MCHC 34.7 06/16/2021    RDW 14.2 06/16/2021     06/16/2021       BMP RESULTS:  Lab Results   Component Value Date     06/16/2021    POTASSIUM 3.8 06/16/2021    CHLORIDE 108 06/16/2021    CO2 22 06/16/2021    ANIONGAP 9 06/16/2021    GLC 99 06/16/2021    BUN 21 06/16/2021    CR 0.93 06/16/2021    GFRESTIMATED 81 06/16/2021    GFRESTBLACK >90 06/16/2021    MERI 8.8 06/16/2021          INR RESULTS:  Lab Results   Component Value Date    INR 1.05 08/28/2020           Repeat Labs         Ref. Range 7/16/2021 08:35   Amylase Latest Ref Range: 30 - 110 U/L 90   Cholesterol Latest Ref Range: <200 mg/dL 268 (H)   Patient Fasting > 8hrs? Unknown Yes   Ferritin Latest Ref Range: 26 - 388 ng/mL 69   HDL Cholesterol Latest Ref Range: >=40 mg/dL 36 (L)   Iron Latest Ref Range: 35 - 180 ug/dL 157   Iron Binding Cap Latest Ref Range: 240 - 430 ug/dL 355   Iron Saturation Index Latest Ref Range: 15 - 46 % 44   LDL Cholesterol Calculated Latest Ref Range: <=100 mg/dL 167 (H)   Lipase Latest Ref Range: 73 - 393 U/L 128   Non HDL Cholesterol Latest Ref Range: <130 mg/dL 232 (H)   Triglycerids Latest Ref Range: <150 mg/dL 326 (H)   WBC Latest Ref Range: 4.0 - 11.0 10e3/uL 7.7   Hemoglobin Latest Ref Range: 13.3 - 17.7 g/dL 15.0   Hematocrit Latest Ref Range: 40.0 - 53.0 % 44.2   Platelet Count Latest Ref Range: 150 - 450 10e3/uL 175   RBC Count Latest Ref Range: 4.40 - 5.90 10e6/uL 4.80   MCV Latest Ref Range: 78 - 100 fL 92   MCH Latest Ref Range: 26.5 - 33.0 pg 31.3   MCHC Latest Ref Range: 31.5 - 36.5 g/dL 33.9   RDW Latest Ref Range: 10.0 - 15.0 % 14.7         Procedures:    EKG:  Sin rhythm, LVH, Non specific ST-T changes in inferior lead      Cardiac MRI:   Scan Date:   2021-07-01 07:23:31                                   SUMMARY   ==========================================================================================================     Clinical history: 70-year-old man with moderate left ventricular hypertrophy. CMR to assess for  infiltrative cardiomyopathy     Comparison CMR: None     1. The left ventricle is normal in cavity size. The wall thickness is moderately increased, maximal  thickness 17 mm. There are no regional wall motion abnormalities. The global systolic function is  hyperkinetic. The LVEF is 74%.     2. The right ventricle is normal in cavity size. The right ventricle is hypertrabeculated. The global  systolic function is normal. The RVEF is 60%.      3. The atria are normal in size.     4. There is no significant valvular disease.      5. There is mid-myocardial late gadolinium enhancement in the basal septum consistent with non ischemic  myocardial fibrosis. There are no other features of amyloidosis or hypertrophic cardiomyopathy.      6. There is no pericardial effusion.     7. There is no intracardiac thrombus.     CONCLUSIONS:   Moderate left ventricular hypertrophy with non specific myocardial fibrosis in the basal septum.   Normal biventricular function.     CT Chest/Abd/Pelv 5/3/21                                                                  IMPRESSION:     Image quality degraded by respiratory motion artifact in both the chest and abdomen.     1.  Status post left lower lobectomy. There are no findings to suggest progressive metastatic disease in the chest, abdomen, or pelvis.  2.  Unchanged enlarged lymph node in the AP window.  3.  Coronary atherosclerosis. Fusiform enlargement of the aorta at the thoracoabdominal junction and thrombosed saccular aneurysm arising from the anterior infrarenal abdominal aorta are unchanged. Bilateral common and right external iliac artery stents are patent.    CLINICAL HISTORY: Iliac calcifications noted on CT.  Please also  complete  segmental pressures.; Abnormal CT scan, pelvis; Claudication  (H). Bilateral iliac artery stents. Abdominal aortic aneurysm.     COMPARISONS: CT 5/3/2021.     TECHNIQUE: Bilateral DESIRE, segmental pressures, and VPR obtained.     FINDINGS:     RIGHT: (pressure / index)       Brachial: 178 mmHg       High Thigh:  183 mmHg / 1.03       Low Thigh:  180 mmHg / 1.01       Calf:  171 mmHg / 0.96       Ankle (PT): 195 mmHg / 1.10       Ankle (DP): 181 mmHg / 1.02          DESIRE: 1.10          VPR:            High thigh: Normal            Low thigh: Normal            Calf: Normal            Ankle: Normal     LEFT: (pressure / index)       Brachial: 177 mmHg       High Thigh:  171 mmHg / 0.96       Low Thigh:  185 mmHg / 1.04       Calf:  167 mmHg / 0.94       Ankle (PT): 180 mmHg / 1.01       Ankle (DP): 178 mmHg / 1.00          DESIRE: 1.01          VPR:            High thigh: Normal            Low thigh: Normal            Calf: Normal            Ankle: Normal                                                                      IMPRESSION:  1. RIGHT:       A. Resting DESIRE is 1.10, which is normal.       B. Negative segmental pressures.     2. LEFT:       A. Resting DESIRE is 1.01, which is normal.       B. ABNORMAL high thigh index suggests a degree of iliac  Narrowing.    Assessment and Plan:   I discussed the results with patient.  I discussed the importance of a heart healthy diet and lifestyle with patient.  70 year old man with established vascular disease presenting for evaluation of dyspnea thought to be due to CHF. No signs or symptoms of congestive heart failure. Cardiac MRI showed moderate LVH but non-specific pattern for non-ischemic myocardial fibrosis. Enlarged saccular aneurysm is most concerning and warrants vascular surgical evaluation for assessment of elective repair. He is intermediate risk for a high risk surgery and will also undergo a Lexiscan to evaluate for inducible ischemia. His prior diagnosis of PAD (b/l  ext iliac stents) warrants interval assessment given patients inability to exert himself enough to develop symptoms.    #Saccular aneurysm -  saccular aneurysm arising from the anterior infrarenal abdominal aorta measuring up to 40 x 67 mm.    - Vascular surgery consult   - Lexiscan for perioperative risk assessment   - continue blood pressure management    #Dyspnea - likely related to COPD, Lung CA, lobectomy. Normal biventricular function on MRI.   - Lexiscan will inform if symptoms represent coronary ischemia  - no signs/symptoms of CHF    #Mixed Dyslipidemia -  mg/dL, non-HDL-C 185 mg/dL,  mg/dL  - observed on single measurement  - repeat lipid panel - triglycerides decreased with about 50 % and now cholesterol was increased - (patient has a variabale pattern of hs diet)  - amylase and lipase to evaluate for evidence of pancreatitis    Discussed with Dr Grande who is in agreement with plan.     Rafal Cardona MD, MSc  Cardiovascular Disease Fellow  [facility]       I have interviewed patient with CV fellow. I have reviewed the laboratory tests and other diagnostic tests  with patient and CV fellow. I have reviewed the management plan with the patient and the CV fellow. I discussed with the CV fellow and agree with the findings and plan in this CV fellow s note.  In addition to the assessment and plan changes  have been incorporated into the note by myself, as to make it a single cohesive document.    Adam Grande MD, PhD  Professor of Medicine  Division of Cardiology    CC  Patient Care Team:  Junaid Hernandez MD as PCP - General (Internal Medicine - Pediatrics)  Serge Botello MD as Assigned PCP  Michael Ross MD as Assigned Cancer Care Provider  Dai Arizmendi MD as Assigned Heart and Vascular Provider  Dai Redman PA-C as Assigned Surgical Provider  Jaylan Lewis MD as MD (Critical Care)  Paige Hernandes, MOHINI as  Lead Care Coordinator (Primary Care - CC)

## 2021-07-01 NOTE — NURSING NOTE
Chief Complaint   Patient presents with     New Patient     PMH: SOB, Acute dyspnea, Chest wall pain following surgery, HYPERLIPIDEMIA LDL GOAL <130, Cardiomegaly, Dyslipidemia, Hypertension, Cerebrovascular accident      Vitals were taken, medications reconciled, and EKG was performed.    Anshu Muellre, EMT  1:08 PM

## 2021-07-01 NOTE — PATIENT INSTRUCTIONS
Patient Instructions:  It was a pleasure to see you in the cardiology clinic today.      If you have any questions, you can reach my nurse, Cristina MCCLAIN LPN, at [phone].  Press Option #1 for the [facility], and then press Option #4 for nursing.    We are encouraging the use of boldUnderline. llct to communicate with your HealthCare Provider    Medication Changes: None.    Recommendations:   - Fasting labs at your convenience.  - A referral was placed with Vascular Surgery.  They will contact you regarding scheduling.    Studies Ordered:   - Ultrasound of the lower extremities.  - Nuclear medicine lexiscan stress test.    The results from today include: None.    Please follow up: With Dr. Grande based on results of testing.    Sincerely,    Adam Grande MD     If you have an urgent need after hours (8:00 am to 4:30 pm) please call [phone] and ask for the cardiology fellow on call.

## 2021-07-02 LAB — INTERPRETATION ECG - MUSE: NORMAL

## 2021-07-09 ENCOUNTER — TELEPHONE (OUTPATIENT)
Dept: INTERNAL MEDICINE | Facility: CLINIC | Age: 71
End: 2021-07-09

## 2021-07-09 NOTE — TELEPHONE ENCOUNTER
[facility] Call Center    Phone Message    May a detailed message be left on voicemail: yes     Reason for Call: Other: .  Patient son would like to become the primary care agent and/or complete a health care directive form for his father so he can take over his care.  Please give him a call back so he can understand how to start this process.    Action Taken: Message routed to:  [facility] ([facility] primary care clinic    Travel Screening: Not Applicable

## 2021-07-12 NOTE — TELEPHONE ENCOUNTER
Spoke to patient son, Luiz Chacon.  Luiz is requesting a sooner appointment with Dr. Hernandez to discuss patient whole care.  Luiz lives in [place] and is in town a few days this week.  He just started taking over patient health care.  Luiz would like to discuss plan of care and get everything situated as he is confused.  Luiz is thinking about transferring patient to get care at [place] but would like to discuss everything with Dr. Hernandez first.  Medical records information given to Luiz.   Schedule a telephone visit with Dr. Hernandez this Thursday at 1 PM.  Luiz will be at the telephone visit.      Phuc Anna CMA (AAMA) at 2:16 PM on 7/12/2021

## 2021-07-12 NOTE — TELEPHONE ENCOUNTER
Destini can you please verify this with the patient, via the [facility] staff, I assume.  I had the impression that he moved here to be closer to family in the [facility], and that they would be involved in his care. On the other hand, I have not yet heard from them.    If the patient wants me to communicate with son Sing, I can do that.

## 2021-07-13 NOTE — TELEPHONE ENCOUNTER
RN called and spoke with Mayito at [facility].  She relayed that patient is aware and in agreement with his son, Luiz, being involved with his care.  Luiz wants to review medical history and patient's medications with Dr. Hernandez at appt on 7/15/21 and patient is agreeable to having Luiz be a part of that appt.    Destini Molina RN on 7/13/2021 at 9:37 AM

## 2021-07-15 ENCOUNTER — PATIENT OUTREACH (OUTPATIENT)
Dept: CARE COORDINATION | Facility: CLINIC | Age: 71
End: 2021-07-15

## 2021-07-15 ENCOUNTER — VIRTUAL VISIT (OUTPATIENT)
Dept: INTERNAL MEDICINE | Facility: CLINIC | Age: 71
End: 2021-07-15
Payer: COMMERCIAL

## 2021-07-15 ENCOUNTER — TELEPHONE (OUTPATIENT)
Dept: INTERNAL MEDICINE | Facility: CLINIC | Age: 71
End: 2021-07-15

## 2021-07-15 DIAGNOSIS — I71.40 ABDOMINAL AORTIC ANEURYSM (AAA) WITHOUT RUPTURE (H): Primary | ICD-10-CM

## 2021-07-15 PROCEDURE — 99443 PR PHYSICIAN TELEPHONE EVALUATION 21-30 MIN: CPT | Performed by: PEDIATRICS

## 2021-07-15 NOTE — PATIENT INSTRUCTIONS
[facility] Medication Refill Request Information:  * Please contact your pharmacy regarding ANY request for medication refills.  ** [facility] Prescription Fax = [phone]  * Please allow 3 business days for routine medication refills.  * Please allow 5 business days for controlled substance medication refills.     [facility] Test Result notification information:  *You will be notified with in 7-10 days of your appointment day regarding the results of your test.  If you are on MyChart you will be notified as soon as the provider has reviewed the results and signed off on them.    [facility]: [phone]

## 2021-07-15 NOTE — PROGRESS NOTES
"Dear patient. Thank you for visiting with me. I want you to feel respected, understood, and empowered. \"Respect\" is valuing you as much as I would a close family member. \"Empowerment\" happens when you are fully informed, and can make the best possible decision for you.  Please ask me questions!  Challenge anything that is not clear.    Medical records are primarily used as memory aids for me and my colleagues. Things to know about my documentation style:  - The 'problem list' includes current symptoms or diagnoses, and some problems that are resolved but may return. I use the past medical history for problems not expected to return.  - I use single quotation marks for things that you or I said, when I want to clarify who was speaking.  - I use double quotation marks when copying a term from elsewhere in your records. Italics (besides here) may also denote a quotation.  If you have questions or concerns, please contact me; I will reply as soon as time allows.      Virtual Visit Details    Type of service:  Phone Visit    Start Time: 1:05 PM    End Time:1:32 PM    Originating Location (pt. Location): Assisted Living (talked with son, but Mr. Chacon was also on the phone)    Distant Location (provider location):  [facility] [place]     Platform used for Visit: [employer]    PCP: Junaid Hernandez  Visit type: problem-oriented  Time note (e5, 40'): The total time (on the date of service) for this service was 45 minutes, including discussion/face-to-face, chart review, interpretation not otherwise reported, documentation, and updating of the computerized record.        Context    Moises Chacon is a 70 year old man, here with his son, with concerns including:  Chief Complaint   Patient presents with     Recheck Medication     general health check in, discuss meds        History, update, and/or problems    Son (Luiz) wants to ask about his various health conditions.  Recently took over health " care as primary agent. I asked about the circumstances surrounding his move. Son's aunt and her daughter's wouldn't tell son Moises's whereabouts - he has been searching for him for several months.   Son reviewed the cancer history and various treatments that were considered. He was brought here because Aunt was adamant that she could keep him from smoking (this mattered because of concern about recurrence of his cancer). Aunt decided they were fighting so much and decided to 'drop him off in an assisted living facility,' and refused to tell where he was. Cousins reportedly threatened to call the  on the son.    He is worried about swelling in different locations.    Also talked about his history of:  -- gout  -- diverticulitis history, hospitalized in 2019 at [facility] in [facility] (I'm not able to find records). There was supposed to be a certain diet, but he has had trouble following this.  -- hernia on right side of abdomen, patched up with some kind of mesh  -- some earlier concern about his heart being weak, and gradual improvement  -- eye problem in 2018, in [place], 'either glaucoma or clouding'  -- leg tremors, hands clenching. Etiology not clear.          Sending message to SW:    Please call son about ambiguous legal situation, vulnerable adult. Dispute between  -- 2 sons (Michael Chacon)  -- sister and neices (son's aunt Rosa)    Debate about independence of Moises Chacon.    Cardiologist Dr. Grande ordered stress test and vascular surgery evaluation, but the aunt cancelled this, angry that it was done without her being involved.    Son doesn't have immediate plans to take him back to [place]. He seems reasonable to me, says he wants best medical care.    My perspective: we have tried to get family (I guess sister and nerhys) involved but they have not replied. At this point, I am concerned that IF there is a legal authority of sister Rosa, then she does not have interests at heart.  From my perspective,  there isn't a current reason to activate a POA anyway.  It appears that the stress test was canceled by (perhaps) one of the neices (Aden?), despite us not having paperwork to authorize.  We will pursue this because of the time/risk of his aneurysm and need for stress test.

## 2021-07-15 NOTE — PROGRESS NOTES
Social Work Intervention  [facility]    Data/Intervention:    Patient Name:  Moises Chacon  /Age:  1950 (70 year old)    Visit Type: telephone  Referral Source: Dr. Hernandez, [facility]  Reason for Referral:  Possible Vulnerable Adult, consent issues    Collaborated With:    -Dr. Hernandez and RNCC Destini Molina  -Patient  -[facility], Manager Morena and Worker Karlee  -[place] Adult Protection    Psychosocial Information/Concerns:  Moises Chacon is a 70 year old man presenting in a virtual visit with Primary Care. There seems to be an ambiguous situation of possible vulnerable adult, healthcare decisions, and which parties are actively participating in Patient's healthcare (son, sister, niece, etc). As of this encounter, there is no Release of Information or Consent to Communicate on file for Patient. Dr. Hernandez is concerned that very medically necessary testing has been recently cancelled with seemingly no intent to follow up. Patient has a potentially life threatening medical concern- aneurysm- that needs medical attention.     Patient currently resides at Assisted Living Facility:  Assisted Living Facility ([facility]) [place] [address]    People involved with this encounter:  Morena, Manager at Assisted Living Facility:  [phone]  Karlee, Staff at Assisted Living Facility:  [phone]  Michael Chacon, Patient's son, [phone]  Pt's direct phone number:  [phone]     contacted Patient's [facility] manager, Morena, to understand who is involved with the care of Patient. Morena indicated that it is her impression that Patient has capacity to make his own medical decisions, but defers to others due to the language barrier. They indicated major family disagreements between Patient's son and Patient's sister/niece. They indicated that Patient's son, Michael, requested the [facility] call the clinic to cancel appointments.    Intervention/Education/Resources Provided:  Due to ambiguous and  questionable health care decisions being made by others (not Patient)  filed a Vulnerable Adult report.  [place] [facility] Abuse Reporting [facility] ([facility]).  Date/Time Submitted:   Thu Jul 15 2021 15:55:39   Web Report Number:  [id number]    [employer] report filed:  #[id number]    Consulted with Stephanie Jensen, Risk Management.    [facility] faxed completed Health Care Directive- SW sent to [facility] for validation and scanning into Epic.     called several people (indicated above) to attempt to get a direct phone number for Patient. Once direct phone number was obtained.  called and spoke with Patient directly (via Croatian  on the phone). Patient indicated that he wished to have his son Michael Chacon actively involved in his healthcare decisions. Patient was willing to sign a Consent to Communicate and form was faxed to [facility] to provide to Patient. Patient also indicated that he does not want the clinic to contact his sister going forward. Patient wishes to reschedule all appointments that were cancelled. He will be attending in-person visit with Dr. Hernandez next week along with his son Michael.    Assessment/Plan:   informed medical team of above information/conversations.     received signed Consent to Communicate via Fax and has sent to Medical Records for scanning. We now have written authorization to communicate with Michael Chacon and [facility].    Provided patient/family with contact information and availability.    Sabine Coronado [facility]  Clinical , Outpatient Specialty Clinics  Direct Phone: [phone]

## 2021-07-15 NOTE — NURSING NOTE
Chief Complaint   Patient presents with     Recheck Medication     general health check in, discuss brinda Sequeira EMT at 12:53 PM on 7/15/2021.

## 2021-07-16 ENCOUNTER — DOCUMENTATION ONLY (OUTPATIENT)
Dept: OTHER | Facility: CLINIC | Age: 71
End: 2021-07-16

## 2021-07-16 ENCOUNTER — LAB (OUTPATIENT)
Dept: LAB | Facility: CLINIC | Age: 71
End: 2021-07-16
Payer: COMMERCIAL

## 2021-07-16 DIAGNOSIS — C37 MALIGNANT THYMOMA (H): ICD-10-CM

## 2021-07-16 DIAGNOSIS — E78.1 HYPERTRIGLYCERIDEMIA: ICD-10-CM

## 2021-07-16 DIAGNOSIS — I51.7 CARDIOMEGALY: ICD-10-CM

## 2021-07-16 DIAGNOSIS — I50.9 ACUTE ON CHRONIC CONGESTIVE HEART FAILURE, UNSPECIFIED HEART FAILURE TYPE (H): ICD-10-CM

## 2021-07-16 DIAGNOSIS — C34.92 MALIGNANT NEOPLASM OF LEFT LUNG, UNSPECIFIED PART OF LUNG (H): ICD-10-CM

## 2021-07-16 LAB
AMYLASE SERPL-CCNC: 90 U/L (ref 30–110)
ERYTHROCYTE [DISTWIDTH] IN BLOOD BY AUTOMATED COUNT: 14.7 % (ref 10–15)
FERRITIN SERPL-MCNC: 69 NG/ML (ref 26–388)
HCT VFR BLD AUTO: 44.2 % (ref 40–53)
HGB BLD-MCNC: 15 G/DL (ref 13.3–17.7)
IRON SATN MFR SERPL: 44 % (ref 15–46)
IRON SERPL-MCNC: 157 UG/DL (ref 35–180)
LIPASE SERPL-CCNC: 128 U/L (ref 73–393)
MCH RBC QN AUTO: 31.3 PG (ref 26.5–33)
MCHC RBC AUTO-ENTMCNC: 33.9 G/DL (ref 31.5–36.5)
MCV RBC AUTO: 92 FL (ref 78–100)
PLATELET # BLD AUTO: 175 10E3/UL (ref 150–450)
RBC # BLD AUTO: 4.8 10E6/UL (ref 4.4–5.9)
TIBC SERPL-MCNC: 355 UG/DL (ref 240–430)
WBC # BLD AUTO: 7.7 10E3/UL (ref 4–11)

## 2021-07-16 PROCEDURE — 80061 LIPID PANEL: CPT | Performed by: PATHOLOGY

## 2021-07-16 PROCEDURE — 83550 IRON BINDING TEST: CPT | Performed by: PATHOLOGY

## 2021-07-16 PROCEDURE — 82150 ASSAY OF AMYLASE: CPT | Performed by: PATHOLOGY

## 2021-07-16 PROCEDURE — 85027 COMPLETE CBC AUTOMATED: CPT | Performed by: PATHOLOGY

## 2021-07-16 PROCEDURE — 82728 ASSAY OF FERRITIN: CPT | Performed by: PATHOLOGY

## 2021-07-16 PROCEDURE — 36415 COLL VENOUS BLD VENIPUNCTURE: CPT | Performed by: PATHOLOGY

## 2021-07-16 PROCEDURE — 83690 ASSAY OF LIPASE: CPT | Performed by: PATHOLOGY

## 2021-07-16 NOTE — TELEPHONE ENCOUNTER
Destini, can you please contact [facility], and through them the patient.     Please:       -- Ensure both of them understand that the patient should bring to the 7/21 appointment with me whichever family members he wishes to be involved in his care.  Also a [facility] representative should be present.  If any of these cannot come in person, then please arrange for them to telephone in to the visit.  Please let them know that no other medical issues will be addressed at the 7/21 visit - we will only have limited time to talk about decision-making and his heart testing.       -- Facilitate rescheduling of the stress test and vascular surgery appointment - these were ordered by Dr. Grande, but cancelled by a relative

## 2021-07-16 NOTE — TELEPHONE ENCOUNTER
RN called vascular surgery clinic, and asked about appt for referral placed by Dr. Grande.  Staff relayed that referral is being reviewed by the clinic, and schedulers will call to help schedule.  RN let staff know about patient's son, Michael, as being the family member who can help with scheduling.    RN left voicemail for patient's son, Michael, to call radiology to schedule stress test.  RN also gave vascular surgery scheduling number in message.    Destini Molina RN on 7/16/2021 at 4:08 PM

## 2021-07-19 ENCOUNTER — TELEPHONE (OUTPATIENT)
Dept: INTERNAL MEDICINE | Facility: CLINIC | Age: 71
End: 2021-07-19

## 2021-07-19 NOTE — TELEPHONE ENCOUNTER
[facility] Call Center    Phone Message    May a detailed message be left on voicemail: yes     Reason for Call: Other: Violet from [facility] at  called to verify testing that patient has has done. Patient has upcoming appt with this facility. Thank yoU!      Action Taken: Message routed to:  [facility] pcc    Travel Screening: Not Applicable

## 2021-07-20 NOTE — TELEPHONE ENCOUNTER
Spoke to Dora:  Gave cardiologist Dr. Grande and oncologist Dr. Ross contact information so Dora can obtain records.     Will fax MR Cardiac with Contrast to [facility] at [phone] Attn: Violet Anna CMA ([facility]) at 12:43 PM on 7/20/2021

## 2021-07-21 ENCOUNTER — OFFICE VISIT (OUTPATIENT)
Dept: INTERNAL MEDICINE | Facility: CLINIC | Age: 71
End: 2021-07-21
Payer: COMMERCIAL

## 2021-07-21 ENCOUNTER — PATIENT OUTREACH (OUTPATIENT)
Dept: CARE COORDINATION | Facility: CLINIC | Age: 71
End: 2021-07-21

## 2021-07-21 ENCOUNTER — ANCILLARY PROCEDURE (OUTPATIENT)
Dept: ULTRASOUND IMAGING | Facility: CLINIC | Age: 71
End: 2021-07-21
Attending: INTERNAL MEDICINE
Payer: COMMERCIAL

## 2021-07-21 VITALS
BODY MASS INDEX: 24.49 KG/M2 | SYSTOLIC BLOOD PRESSURE: 126 MMHG | DIASTOLIC BLOOD PRESSURE: 73 MMHG | OXYGEN SATURATION: 97 % | WEIGHT: 151.7 LBS | HEART RATE: 72 BPM

## 2021-07-21 DIAGNOSIS — R10.84 ABDOMINAL PAIN, GENERALIZED: ICD-10-CM

## 2021-07-21 DIAGNOSIS — K59.00 CONSTIPATION, UNSPECIFIED CONSTIPATION TYPE: ICD-10-CM

## 2021-07-21 DIAGNOSIS — K21.9 GASTROESOPHAGEAL REFLUX DISEASE, UNSPECIFIED WHETHER ESOPHAGITIS PRESENT: ICD-10-CM

## 2021-07-21 DIAGNOSIS — R93.5 ABNORMAL CT SCAN, PELVIS: ICD-10-CM

## 2021-07-21 DIAGNOSIS — Z60.9 HIGH RISK SOCIAL SITUATION: ICD-10-CM

## 2021-07-21 DIAGNOSIS — I73.9 CLAUDICATION (H): ICD-10-CM

## 2021-07-21 DIAGNOSIS — I71.43 ANEURYSM OF INFRARENAL ABDOMINAL AORTA (H): Primary | ICD-10-CM

## 2021-07-21 PROBLEM — I71.40 AAA (ABDOMINAL AORTIC ANEURYSM) (H): Status: ACTIVE | Noted: 2020-04-28

## 2021-07-21 PROCEDURE — 93922 UPR/L XTREMITY ART 2 LEVELS: CPT | Mod: GC | Performed by: RADIOLOGY

## 2021-07-21 PROCEDURE — 99417 PROLNG OP E/M EACH 15 MIN: CPT | Performed by: PEDIATRICS

## 2021-07-21 PROCEDURE — 99215 OFFICE O/P EST HI 40 MIN: CPT | Performed by: PEDIATRICS

## 2021-07-21 SDOH — SOCIAL STABILITY - SOCIAL INSECURITY: PROBLEM RELATED TO SOCIAL ENVIRONMENT, UNSPECIFIED: Z60.9

## 2021-07-21 ASSESSMENT — PAIN SCALES - GENERAL: PAINLEVEL: NO PAIN (0)

## 2021-07-21 NOTE — PROGRESS NOTES
Social Work Intervention  [facility]    Data/Intervention:    Patient Name:  Moises Chacon  /Age:  1950 (70 year old)    Visit Type: in person  Referral Source: Dr. Hernandez  Reason for Referral:  Follow up on previous concerns SW was involved in    Collaborated With:    -Patient's Son, Michael    Psychosocial Information/Concerns:  Patient's son, Michael, has ongoing concerns of the Assisted Living Facility ([facility]) relaying Patient's information to Patient's sister, even though Patient no longer wishes to have her involved in his care. Michael is also concerned that Patient's sister is threatening to call the police if Michael removes Patient from the residential. Michael is unsure of the healthcare laws in regards to health care decision making.     Intervention/Education/Resources Provided:   provided supportive listening and validation throughout conversation.   explained that with out a consent to communicate (which Patient can revoke at any time) the residential cannot provide Patient's sister with information.  also explained that if Patient completed a HCD prior that named his sister as a decision maker, it becomes invalid when a new HCD is created (Patient created a HCD naming Sang as decision maker, in Epic). However, a HCD is not 'active' unless Patient is deemed unable to make his own healthcare decisions---Patient currently has capacity.      reviewed with Michael:  Patient's rights to revoke any previously signed consents, Patient's right to make his own decisions, and Patient's right to leave [facility] if he chooses.  checked the [facility] court records and was not able to find any guardianship-related hearing for Patient which means, Patient's sister does not have any legal right to making decisions on Patient's behalf.      provided the following contacts:  [employer] [facility] (for advocacy)  Adult Protection  information (If something new comes up and Sang feels a report is necessary)      Assessment/Plan:  Sang will continue to be actively involved in Patient's care and will remain the primary contact.  will remain available.     Provided patient/family with contact information and availability.    Sabine Coronado [facility]  Clinical , Outpatient Specialty Clinics  Direct Phone: [phone]

## 2021-07-21 NOTE — ASSESSMENT & PLAN NOTE
contacted SW at [facility].  Information continues to be shared with aunt, he wants to stop that, because patient's sister and nieces have been arguing. Son has Powered access for Mr. Chacon.  I had a detailed conversation about this with patient and so (with ). Patient wishes to stay in [place], receive care here, have son be the POA and release of info, block sister and nieces from having information regarding his care.  Son is planning on coming to visit more for a while, and being present for visits.

## 2021-07-21 NOTE — ASSESSMENT & PLAN NOTE
Son questioned if he actually has this anymore, or needs treatment, but we didn't have time to discuss.

## 2021-07-21 NOTE — PROGRESS NOTES
"Dear patient. Thank you for visiting with me. I want you to feel respected, understood, and empowered. \"Respect\" is valuing you as much as I would a close family member. \"Empowerment\" happens when you are fully informed, and can make the best possible decision for you.  Please ask me questions!  Challenge anything that is not clear.    Medical records are primarily used as memory aids for me and my colleagues. Things to know about my documentation style:  - The 'problem list' includes current symptoms or diagnoses, and some problems that are resolved but may return. I use the past medical history for problems not expected to return.  - I use single quotation marks for things that you or I said, when I want to clarify who was speaking.  - I use double quotation marks when copying a term from elsewhere in your records. Italics (besides here) may also denote a quotation.  If you have questions or concerns, please contact me; I will reply as soon as time allows.    Context    Moises Chacon is a 70 year old man, here with his son, with concerns including:  Chief Complaint   Patient presents with     Recheck Medication     wants to discuss abdominal distention, his eating habits not eating a lot cant finish a full meal only eats twice a day, when he swallows he feels it goes down really slow, discuss number of medications, has an anurysm that is large in his abdomen, needs a refill on his nicotine medication, wants to know if there is a nicotine test for him     PCP: Junaid Hernandez   Visit type: problem-oriented    /73 (BP Location: Right arm, Patient Position: Sitting, Cuff Size: Adult Regular)   Pulse 72   Wt 68.8 kg (151 lb 11.2 oz)   SpO2 97%   BMI 24.49 kg/m      Problems and progress    This is a follow-up visit after a number of phone calls about the unclear relationship between our patient' health care needs, various relatives, and his legal status.    There are lots of things the son wants to " discuss, so future visits are scheduled.       Problem List as of 7/21/2021 Reviewed: 1/15/2021 10:46 AM by Serge Botello MD        Noted       Nervous and Auditory    1. Abdominal pain, generalized 6/18/2021     Overview Addendum 7/21/2021  1:07 PM by Junaid Hernandez MD      >1 etiology: distension, constipation, LUQ pain from cancer treatment, AAA          Last Assessment & Plan 7/21/2021 Office Visit Edited 7/21/2021  1:20 PM by Junaid Hernandez MD      Abdominal distension  Has trouble eating normal volumes. He usually splits up normal-sized meals, and isn't able to finish the second. Son wonders if various medications are slowing his colon down.  Suspect this is due to a mixture of (at least) his AAA and constipation (the latter is in part due to his cancer).       OBJECTIVE: distended, nonspecific tenderness, BS are quiet. No pulsatile mass detectible.       ASSESSMENT & PLAN: Orders already in place for him to have enema at [facility], but I understand there was some disagreement about this. Son asked if he can be admitted to hospital for an enema, but that isn't possible, unfortunately.          Relevant Orders     Adult Gastro Ref - Consult Only       Digestive    2. GERD (gastroesophageal reflux disease) 11/11/2009     Last Assessment & Plan 7/21/2021 Office Visit Written 7/21/2021  1:10 PM by Junaid Hernandez MD      Son questioned if he actually has this anymore, or needs treatment, but we didn't have time to discuss.         3. Constipation Unknown     Last Assessment & Plan 7/21/2021 Office Visit Written 7/21/2021  1:11 PM by Junaid Hernandez MD      See abd pain            Circulatory    4. Aneurysm of infrarenal abdominal aorta (H) 4/28/2020     Overview Addendum 7/21/2021  1:07 PM by Junaid Hernandez MD      40x67 mm, referred to vascular (may also be seen at [place]). Needs functional cardiac evaluation.          Last Assessment & Plan 7/21/2021 Office Visit  Written 7/21/2021  1:06 PM by Junaid Hernandez MD      Infrarenal AAA       UPDATE: This is the main concern for today, to ensure that Mr. Chacon and his son understand the extent of this, and we have a plan for vascular surgery and cardiac function assessment here, or going back to [place] if that ends up being their preference.  Mr. Chacon's cardiologist had this all scheduled, but the appointments were canceled for reasons that are still not entirely clear to me (there was some confusion about him going back to [place], versus whether or not he needed the evaluation, versus whether or not the other relatives wanted him to get care done).  CT done by previous provider 5/3/2021 showed  Unchanged saccular aneurysm arising from the anterior infrarenal abdominal aorta measuring up to 40 x 67 mm  He also got a second opinion at [facility] for vascular surgery. Appointment is 7/23/21. We need to call radiology to get the imaging 'pushed' to [place].       Son is concerned about radiation from the NM stress test that Dr. Grande ordered.       ASSESSMENT & PLAN: Complicated circumstances surrounding AAA > 6 cm.    We had an extensive conversation about this condition, concerns/risk, and verified understanding. I am fine with vascular surgery here or at [facility].    Cardiac functional study definitely needs to be done prior to surgery, in opinion of cardiology and myself. Radiation exposure is modest and safer than not having the test. Echo or ECG functional evaluation also possible, but may be of lesser value for this patient. If the [place] people want to do functional evaluation there, that is fine with me. I told son it is up to him to get that care, he should just keep in touch with me so I know it is happening.            Other    5. High risk social situation Unknown     Overview Signed 7/21/2021  1:12 PM by Junaid Hernandez MD      Arguments between patient and sister/niece, and son and patient's  sister/niece. Dueling release-of-info's and POAs.          Last Assessment & Plan 7/21/2021 Office Visit Written 7/21/2021  1:14 PM by Junaid Hernandez MD       contacted SW at [facility].  Information continues to be shared with aunt, he wants to stop that, because patient's sister and nieces have been arguing. Son has [employer] access for Mr. Chacon.  I had a detailed conversation about this with patient and so (with ). Patient wishes to stay in [place], receive care here, have son be the POA and release of info, block sister and nieces from having information regarding his care.  Son is planning on coming to visit more for a while, and being present for visits.                 Additional issues:    Son says he seems more 'out of it' than normal - we weren't able to work on this today, although his attention and language seemed at baseline. He is on a lot of medications and these seem to be different at [facility] (or per the patient's wishes) than what we have on our med list.    Incisions from lung surgery        Other comments    Other physical exam  Physical Exam  Constitutional:       Appearance: Normal appearance.   HENT:      Head: Normocephalic.      Right Ear: External ear normal.      Left Ear: External ear normal.      Nose: Nose normal.   Eyes:      General: No scleral icterus.        Right eye: No discharge.         Left eye: No discharge.      Extraocular Movements: Extraocular movements intact.   Pulmonary:      Effort: Pulmonary effort is normal. No respiratory distress.      Breath sounds: No stridor. No wheezing.   Skin:     Findings: No rash.   Neurological:      Mental Status: He is alert.   Psychiatric:         Mood and Affect: Mood normal.         Behavior: Behavior normal.         Thought Content: Thought content normal.            About this visit:  Time note (e5+85341, 69-83'): The total time (on the date of service) for this service was 70 minutes,  including discussion/face-to-face, chart review, interpretation not otherwise reported, documentation, and updating of the computerized record.  Comment about data reviewed: I personally reviewed, interpreted, and/or confirmed interpretation of Cardiology notes and CT scan showing AAA

## 2021-07-21 NOTE — ASSESSMENT & PLAN NOTE
Infrarenal AAA       UPDATE: This is the main concern for today, to ensure that Mr. Chacon and his son understand the extent of this, and we have a plan for vascular surgery and cardiac function assessment here, or going back to [place] if that ends up being their preference.  Mr. Chacon's cardiologist had this all scheduled, but the appointments were canceled for reasons that are still not entirely clear to me (there was some confusion about him going back to [place], versus whether or not he needed the evaluation, versus whether or not the other relatives wanted him to get care done).  CT done by previous provider 5/3/2021 showed  Unchanged saccular aneurysm arising from the anterior infrarenal abdominal aorta measuring up to 40 x 67 mm  He also got a second opinion at [facility] for vascular surgery. Appointment is 7/23/21. We need to call radiology to get the imaging 'pushed' to [place].       Son is concerned about radiation from the NM stress test that Dr. Grande ordered.       ASSESSMENT & PLAN: Complicated circumstances surrounding AAA > 6 cm.    We had an extensive conversation about this condition, concerns/risk, and verified understanding. I am fine with vascular surgery here or at [facility].    Cardiac functional study definitely needs to be done prior to surgery, in opinion of cardiology and myself. Radiation exposure is modest and safer than not having the test. Echo or ECG functional evaluation also possible, but may be of lesser value for this patient. If the [place] people want to do functional evaluation there, that is fine with me. I told son it is up to him to get that care, he should just keep in touch with me so I know it is happening.

## 2021-07-21 NOTE — NURSING NOTE
Chief Complaint   Patient presents with     Recheck Medication     wants to discuss abdominal distention, his eating habits not eating a lot cant finish a full meal only eats twice a day, when he swallows he feels it goes down really slow, discuss number of medications, has an anurysm that is large in his abdomen, needs a refill on his nicotine medication, wants to know if there is a nicotine test for him       Josseline Herrera, EMT at 11:29 AM on 7/21/2021

## 2021-07-21 NOTE — ASSESSMENT & PLAN NOTE
Abdominal distension  Has trouble eating normal volumes. He usually splits up normal-sized meals, and isn't able to finish the second. Son wonders if various medications are slowing his colon down.  Suspect this is due to a mixture of (at least) his AAA and constipation (the latter is in part due to his cancer).       OBJECTIVE: distended, nonspecific tenderness, BS are quiet. No pulsatile mass detectible.       ASSESSMENT & PLAN: Orders already in place for him to have enema at [facility], but I understand there was some disagreement about this. Son asked if he can be admitted to hospital for an enema, but that isn't possible, unfortunately.

## 2021-07-22 ENCOUNTER — TELEPHONE (OUTPATIENT)
Dept: PULMONOLOGY | Facility: CLINIC | Age: 71
End: 2021-07-22

## 2021-07-22 NOTE — TELEPHONE ENCOUNTER
Pt's son contacted incorrect Dr. Garcia nurse seeking follow up guidance for his father, with concern around father's respiratory status and needed AAA surgical repair.     Pt's son notes his father had a procedure performed by Dr. Jose (Amit) last year and was told he would need to have close monitoring of his pulmonary status with any future surgical needs. Of note, pt has upcoming new patient appt with Dr. Lewis on 9/20, but verbalizes that pt has urgent need for AAA repair.     Routing message to Thoracic Care Coordinator team to follow up with pt's son (Michael Chacon) at earliest convenience.     Vinita Moreno, RN, BSN  ILD Nurse Care Coordinator  (P) [phone]

## 2021-07-23 LAB
CHOLEST SERPL-MCNC: 268 MG/DL
FASTING STATUS PATIENT QL REPORTED: YES
HDLC SERPL-MCNC: 36 MG/DL
LDLC SERPL CALC-MCNC: 167 MG/DL
NONHDLC SERPL-MCNC: 232 MG/DL
TRIGL SERPL-MCNC: 326 MG/DL

## 2021-07-26 ENCOUNTER — APPOINTMENT (OUTPATIENT)
Dept: CT IMAGING | Age: 71
End: 2021-07-26
Attending: INTERNAL MEDICINE

## 2021-07-26 ENCOUNTER — APPOINTMENT (OUTPATIENT)
Dept: GENERAL RADIOLOGY | Age: 71
End: 2021-07-26
Attending: INTERNAL MEDICINE

## 2021-07-26 ENCOUNTER — HOSPITAL ENCOUNTER (EMERGENCY)
Age: 71
End: 2021-07-26
Attending: EMERGENCY MEDICINE

## 2021-07-26 ENCOUNTER — PATIENT OUTREACH (OUTPATIENT)
Dept: CARE COORDINATION | Facility: CLINIC | Age: 71
End: 2021-07-26

## 2021-07-26 ENCOUNTER — HOSPITAL ENCOUNTER (EMERGENCY)
Age: 71
Discharge: HOME | End: 2021-07-26
Attending: EMERGENCY MEDICINE

## 2021-07-26 VITALS
DIASTOLIC BLOOD PRESSURE: 74 MMHG | TEMPERATURE: 97.3 F | HEART RATE: 60 BPM | OXYGEN SATURATION: 98 % | SYSTOLIC BLOOD PRESSURE: 131 MMHG | RESPIRATION RATE: 17 BRPM

## 2021-07-26 VITALS
WEIGHT: 153.44 LBS | SYSTOLIC BLOOD PRESSURE: 137 MMHG | RESPIRATION RATE: 14 BRPM | TEMPERATURE: 97.7 F | DIASTOLIC BLOOD PRESSURE: 79 MMHG | OXYGEN SATURATION: 95 % | HEART RATE: 84 BPM
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
## 2021-07-26 LAB
ALBUMIN SERPL-MCNC: 3.8 G/DL (ref 3.6–5.1)
ALBUMIN/GLOB SERPL: 0.9 {RATIO} (ref 1–2.4)
ALP SERPL-CCNC: 103 UNITS/L (ref 45–117)
ALT SERPL-CCNC: 37 UNITS/L
ANION GAP SERPL CALC-SCNC: 8 MMOL/L (ref 10–20)
AST SERPL-CCNC: 36 UNITS/L
ATRIAL RATE (BPM): 78
BASOPHILS # BLD: 0.1 K/MCL (ref 0–0.3)
BASOPHILS NFR BLD: 1 %
BILIRUB SERPL-MCNC: 0.4 MG/DL (ref 0.2–1)
BUN SERPL-MCNC: 24 MG/DL (ref 6–20)
BUN/CREAT SERPL: 20 (ref 7–25)
CALCIUM SERPL-MCNC: 8.7 MG/DL (ref 8.4–10.2)
CHLORIDE SERPL-SCNC: 106 MMOL/L (ref 98–107)
CO2 SERPL-SCNC: 29 MMOL/L (ref 21–32)
CREAT SERPL-MCNC: 1.21 MG/DL (ref 0.67–1.17)
DEPRECATED RDW RBC: 49.9 FL (ref 39–50)
EOSINOPHIL # BLD: 0.3 K/MCL (ref 0–0.5)
EOSINOPHIL NFR BLD: 4 %
ERYTHROCYTE [DISTWIDTH] IN BLOOD: 14.7 % (ref 11–15)
ETHANOL SERPL-MCNC: NORMAL MG/DL
FASTING DURATION TIME PATIENT: ABNORMAL H
GFR SERPLBLD BASED ON 1.73 SQ M-ARVRAT: 59 ML/MIN/1.73M2
GLOBULIN SER-MCNC: 4.1 G/DL (ref 2–4)
GLUCOSE BLDC GLUCOMTR-MCNC: 92 MG/DL (ref 70–99)
GLUCOSE SERPL-MCNC: 91 MG/DL (ref 65–99)
HCT VFR BLD CALC: 40.9 % (ref 39–51)
HGB BLD-MCNC: 13.9 G/DL (ref 13–17)
IMM GRANULOCYTES # BLD AUTO: 0 K/MCL (ref 0–0.2)
IMM GRANULOCYTES # BLD: 0 %
LACTATE BLDV-SCNC: 0.8 MMOL/L (ref 0–2)
LYMPHOCYTES # BLD: 2.2 K/MCL (ref 1–4)
LYMPHOCYTES NFR BLD: 31 %
MCH RBC QN AUTO: 31.2 PG (ref 26–34)
MCHC RBC AUTO-ENTMCNC: 34 G/DL (ref 32–36.5)
MCV RBC AUTO: 91.9 FL (ref 78–100)
MONOCYTES # BLD: 0.4 K/MCL (ref 0.3–0.9)
MONOCYTES NFR BLD: 6 %
NEUTROPHILS # BLD: 4.1 K/MCL (ref 1.8–7.7)
NEUTROPHILS NFR BLD: 58 %
NRBC BLD MANUAL-RTO: 0 /100 WBC
P AXIS (DEGREES): 52
PLATELET # BLD AUTO: 167 K/MCL (ref 140–450)
POTASSIUM SERPL-SCNC: 3.9 MMOL/L (ref 3.4–5.1)
PR-INTERVAL (MSEC): 205
PROT SERPL-MCNC: 7.9 G/DL (ref 6.4–8.2)
QRS-INTERVAL (MSEC): 85
QT-INTERVAL (MSEC): 398
QTC: 454
R AXIS (DEGREES): 54
RBC # BLD: 4.45 MIL/MCL (ref 4.5–5.9)
REPORT TEXT: NORMAL
SODIUM SERPL-SCNC: 139 MMOL/L (ref 135–145)
T AXIS (DEGREES): 48
TROPONIN I SERPL HS-MCNC: <0.02 NG/ML
VENTRICULAR RATE EKG/MIN (BPM): 78
WBC # BLD: 7.1 K/MCL (ref 4.2–11)

## 2021-07-26 PROCEDURE — 74174 CTA ABD&PLVS W/CONTRAST: CPT

## 2021-07-26 PROCEDURE — 93005 ELECTROCARDIOGRAM TRACING: CPT | Performed by: INTERNAL MEDICINE

## 2021-07-26 PROCEDURE — 82077 ASSAY SPEC XCP UR&BREATH IA: CPT | Performed by: INTERNAL MEDICINE

## 2021-07-26 PROCEDURE — 99285 EMERGENCY DEPT VISIT HI MDM: CPT | Performed by: EMERGENCY MEDICINE

## 2021-07-26 PROCEDURE — 10002805 HB CONTRAST AGENT: Performed by: INTERNAL MEDICINE

## 2021-07-26 PROCEDURE — G1004 CDSM NDSC: HCPCS

## 2021-07-26 PROCEDURE — 93010 ELECTROCARDIOGRAM REPORT: CPT | Performed by: RADIOLOGY

## 2021-07-26 PROCEDURE — 71045 X-RAY EXAM CHEST 1 VIEW: CPT

## 2021-07-26 PROCEDURE — 70450 CT HEAD/BRAIN W/O DYE: CPT

## 2021-07-26 PROCEDURE — 84484 ASSAY OF TROPONIN QUANT: CPT | Performed by: INTERNAL MEDICINE

## 2021-07-26 PROCEDURE — 82962 GLUCOSE BLOOD TEST: CPT

## 2021-07-26 PROCEDURE — 85025 COMPLETE CBC W/AUTO DIFF WBC: CPT | Performed by: INTERNAL MEDICINE

## 2021-07-26 PROCEDURE — X1094 NO CHARGE VISIT: HCPCS | Performed by: EMERGENCY MEDICINE

## 2021-07-26 PROCEDURE — 72125 CT NECK SPINE W/O DYE: CPT

## 2021-07-26 PROCEDURE — 36415 COLL VENOUS BLD VENIPUNCTURE: CPT

## 2021-07-26 PROCEDURE — 80053 COMPREHEN METABOLIC PANEL: CPT | Performed by: INTERNAL MEDICINE

## 2021-07-26 PROCEDURE — 99285 EMERGENCY DEPT VISIT HI MDM: CPT

## 2021-07-26 PROCEDURE — 83605 ASSAY OF LACTIC ACID: CPT | Performed by: INTERNAL MEDICINE

## 2021-07-26 RX ADMIN — IOHEXOL 100 ML: 350 INJECTION, SOLUTION INTRAVENOUS at 02:39

## 2021-07-26 ASSESSMENT — ENCOUNTER SYMPTOMS
BLOOD IN STOOL: 0
ABDOMINAL PAIN: 1
DIZZINESS: 1
DIARRHEA: 0
CHILLS: 0
HEADACHES: 0
FEVER: 0
BACK PAIN: 0
UNEXPECTED WEIGHT CHANGE: 0
VOMITING: 0
SHORTNESS OF BREATH: 0
NAUSEA: 0

## 2021-07-26 ASSESSMENT — PAIN SCALES - GENERAL: PAINLEVEL_OUTOF10: 4

## 2021-07-27 ENCOUNTER — PATIENT OUTREACH (OUTPATIENT)
Dept: GERIATRIC MEDICINE | Facility: CLINIC | Age: 71
End: 2021-07-27

## 2021-07-27 NOTE — PROGRESS NOTES
[facility] Care Coordination Contact  CC received notification of Emergency Room visit.  ER visit occurred on 07/27/2021 at [facility] EMERGENCY DEPT with Dx of fall.    CC contacted adult son . and reviewed discharge summary.  Member has a follow-up appointment with PCP: Yes: scheduled on 08/02/2021  Member has had a change in condition: No  New referrals placed: No  Home Visit Needed: No  Care plan reviewed and updated.  PCP notified of ED visit via EMR.    DODIE Shelby  [facility]  [phone]  Fax: [phone]

## 2021-07-29 NOTE — PROGRESS NOTES
Appreciate efforts on this complicated situation.  My understanding has been limited by lack of contact from sister and/or nieces earlier this year. I am glad that Mr. Chacon seems content with his son, and the ongoing plan.

## 2021-07-30 ENCOUNTER — TELEPHONE (OUTPATIENT)
Dept: INTERNAL MEDICINE | Facility: CLINIC | Age: 71
End: 2021-07-30

## 2021-07-30 NOTE — TELEPHONE ENCOUNTER
I thought he was in [place] now?    I assumed he was living with his son. The son should be supervising his med use.   If he is in a facility, a nurse or other staff member should supervise his med use.    I don't have information (one way or the other) about Mr. Chacon supervising his own medication timing.

## 2021-07-30 NOTE — TELEPHONE ENCOUNTER
[facility] Call Center    Phone Message    May a detailed message be left on voicemail: yes     Reason for Call: Medication Question or concern regarding medication   Prescription Clarification  Name of Medication: medications dispensed by RN and some by patient himself  Prescribing Provider: David   Pharmacy:     What on the order needs clarification? Patient son called to request an order be sent to care facility to allow patient dispense his medications himself instead of having the RN dispense some meds and patient able to dispense some. Please follow up with patient son to advise. Thank you!           Action Taken: Message routed to:  [facility]    Travel Screening: Not Applicable

## 2021-07-30 NOTE — TELEPHONE ENCOUNTER
Patient's son, Michael, was reached by phone and Dr. Hernandez's message was relayed.  Michael wanted to know more about the reason Dr. Hernandez thinks that patient should not be allowed to dispense his own meds.  He said the patient would prefer this, and has been taking most of the meds for many years and taking care of this himself.      Destini Molina RN on 7/30/2021 at 4:02 PM

## 2021-07-31 NOTE — TELEPHONE ENCOUNTER
I think this needs a visit.    I don't know where he is living, under what circumstances, or what else is going on. If Mr. Chacon is living with the son, then they can make up their own minds what to do. If he is living in a facility, then there may be governing procedures for the facility and his providers.  In the past, when he lived at [facility], he tended to refuse some medications (we were told). Sometimes these refusals were appropriate, as when he was having trouble with gout.    (I'm not concerned about his safety based on these data, it's just that we don't know what is going on).

## 2021-08-01 ENCOUNTER — HEALTH MAINTENANCE LETTER (OUTPATIENT)
Age: 71
End: 2021-08-01

## 2021-08-02 NOTE — TELEPHONE ENCOUNTER
RN called and spoke to Mayito with [facility] facility.  She relayed that patient is living at [facility], but is planning to move to [place] at some point in the future, she is not sure exactly when.  She talked to patient and his son, Sang on 7/30/21, and patient and son are in agreement with how [facility] is managing meds and are OK with the meds that patient is allowed to take by himself and also OK with the ones that nursing staff dispense.  Mayito thinks whatever issues that patient's son had with med dispense has been resolved.  Mayito let RN know that patient has an in person cardiology appt scheduled at [place] this week.     Destini Molina RN on 8/2/2021 at 11:40 AM

## 2021-08-03 ENCOUNTER — TELEPHONE (OUTPATIENT)
Dept: GASTROENTEROLOGY | Facility: CLINIC | Age: 71
End: 2021-08-03

## 2021-08-03 NOTE — TELEPHONE ENCOUNTER
Called to r/s 10/21 appt to be seen sooner. DARIO w semaj K #. Will also send [employer] message.    R/s with [place] [facility], or Jovi using a return slot.

## 2021-08-11 DIAGNOSIS — I10 ESSENTIAL HYPERTENSION: ICD-10-CM

## 2021-08-11 RX ORDER — AMLODIPINE BESYLATE 10 MG/1
10 TABLET ORAL DAILY
Qty: 30 TABLET | Refills: 0 | Status: SHIPPED | OUTPATIENT
Start: 2021-08-11

## 2021-08-11 RX ORDER — METOPROLOL SUCCINATE 25 MG/1
25 TABLET, EXTENDED RELEASE ORAL AT BEDTIME
Qty: 30 TABLET | Refills: 0 | Status: SHIPPED | OUTPATIENT
Start: 2021-08-11

## 2021-08-11 NOTE — TELEPHONE ENCOUNTER
[facility] Call Center    Phone Message    May a detailed message be left on voicemail: yes     Reason for Call: Medication Refill Request    Has the patient contacted the pharmacy for the refill? Yes   Name of medication being requested: metoprolol succinate ER (TOPROL-XL) 25 MG 24 hr tablet, amLODIPine (NORVASC) 10 MG tablet  Provider who prescribed the medication: Dr. Hernandez  Pharmacy:[address] [address]  Date medication is needed:As Soon as possible, Patient is out of town at medical appointments and does not have enough supply. Please send to pharmacy in [place], [place].          Action Taken: Message routed to:  [facility]): [facility]    Travel Screening: Not Applicable

## 2021-08-11 NOTE — TELEPHONE ENCOUNTER
Last Clinic Visit: 7/21/21  Call to Michael, asked if 30 day supply of each medication will be enough until returns home, reports that is more than enough.  Advised 30 days of each medication sent to requested pharmacy

## 2021-08-13 DIAGNOSIS — M1A.00X0 IDIOPATHIC CHRONIC GOUT WITHOUT TOPHUS, UNSPECIFIED SITE: ICD-10-CM

## 2021-08-13 RX ORDER — COLCHICINE 0.6 MG/1
0.6 TABLET ORAL DAILY
Qty: 30 TABLET | Refills: 1 | Status: SHIPPED | OUTPATIENT
Start: 2021-08-13 | End: 2022-01-18

## 2021-08-13 RX ORDER — ALLOPURINOL 300 MG/1
TABLET ORAL
Qty: 90 TABLET | Refills: 3 | Status: SHIPPED | OUTPATIENT
Start: 2021-08-13

## 2021-08-13 NOTE — TELEPHONE ENCOUNTER
[facility] Call [facility]    Phone Message    May a detailed message be left on voicemail: yes     Reason for Call: Medication Refill Request    Has the patient contacted the pharmacy for the refill? Yes   Name of medication being requested: allopurinol (ZYLOPRIM) 300 MG tablet   Provider who prescribed the medication: Junaid Hernandez MD  Pharmacy: [id number] [address] AT [facility]  Date medication is needed: asap   The patient son would like a call once order has been sent       Action Taken: Message routed to:  [facility] ([facility]    Travel Screening: Not Applicable

## 2021-08-13 NOTE — TELEPHONE ENCOUNTER
Health Call Center    Phone Message    May a detailed message be left on voicemail: yes     Reason for Call: Medication Refill Request    Has the patient contacted the pharmacy for the refill? Yes   Name of medication being requested: colchicine   Provider who prescribed the medication: Dr López   Pharmacy: [facility] at [address] [place] [address] in [place], [place]  Date medication is needed: 8/13/21 Pt is completely out and is out of town. He will not be back in [place] for 2 weeks.    Action Taken: Message routed to:  [facility] ([facility]): [facility] Med Refill Team     Travel Screening: Not Applicable

## 2021-08-13 NOTE — TELEPHONE ENCOUNTER
colchicine (COLCRYS) 0.6 MG tablet  Last Written Prescription Date:  6/25/2021  Last Fill Quantity: 60,   # refills: 1  Last Office Visit : 6/25/2021  Future Office visit:  10/8/2021  Pt needing supply completely out and is out of town for 2 weeks.   But last order mentions taking until August 25th, 2021.  Please advise of how many tablets you would like to send to pharmacy for Pt care.   Thank you      Agnes Clinton RN  Central Triage Red Flags/Med Refills

## 2021-08-13 NOTE — TELEPHONE ENCOUNTER
allopurinol (ZYLOPRIM) 300 MG tablet  Last Written Prescription Date:  5/26/2021  Last Fill Quantity: 90,   # refills: 3  Last Office Visit : 7/21/2021  Future Office visit:  None    Routing refill request to provider for review/approval because:  Sent new order to updated pharmacy for Pt care.      Agnes Clinton RN  Central Triage Red Flags/Med Refills

## 2021-09-15 ENCOUNTER — TELEPHONE (OUTPATIENT)
Dept: PULMONOLOGY | Facility: CLINIC | Age: 71
End: 2021-09-15

## 2021-09-15 DIAGNOSIS — R06.00 DYSPNEA: Primary | ICD-10-CM

## 2021-09-15 NOTE — TELEPHONE ENCOUNTER
LVM for pt via  services for pt about scheduling a pulmonary function test prior to visit with Dr. Lewis on 9/20. Left direct call back number to schedule. Going to send same details to [employer].

## 2021-09-17 ENCOUNTER — TELEPHONE (OUTPATIENT)
Dept: PULMONOLOGY | Facility: CLINIC | Age: 71
End: 2021-09-17

## 2021-09-17 NOTE — TELEPHONE ENCOUNTER
LVM for pt via  services about scheduling full PFT prior to visit with Dr. Lewis on 9/20. Left direct call back number to schedule. Already sent same details to [facility]. Also called pt's transportation Juin at [facility] to schedule PFT. No answer, LVM with direct call back number to schedule.

## 2021-09-17 NOTE — TELEPHONE ENCOUNTER
Worker from pt's assisted living returned my phone call about scheduling a chest xray prior to pt's visit with Dr. Garcia on 9/20. She stated that pt Moises is not in [place] right now, he's been with his son in [place]. They thought the appointment he was supposed to have with Dr. Garcia on 9/20 got cancelled but appointment was still scheduled. Pt will not be able to make it due to currently not in the state. Going to cancel appointment, details confirmed.

## 2021-09-20 ENCOUNTER — PRE VISIT (OUTPATIENT)
Dept: PULMONOLOGY | Facility: CLINIC | Age: 71
End: 2021-09-20

## 2021-09-26 ENCOUNTER — HEALTH MAINTENANCE LETTER (OUTPATIENT)
Age: 71
End: 2021-09-26

## 2021-09-29 DIAGNOSIS — R14.0 ABDOMINAL DISTENSION: ICD-10-CM

## 2021-09-30 ENCOUNTER — PATIENT OUTREACH (OUTPATIENT)
Dept: GERIATRIC MEDICINE | Facility: CLINIC | Age: 71
End: 2021-09-30

## 2021-09-30 RX ORDER — SIMETHICONE 125 MG
125 TABLET,CHEWABLE ORAL 2 TIMES DAILY
Qty: 60 TABLET | Refills: 3 | Status: SHIPPED | OUTPATIENT
Start: 2021-09-30

## 2021-10-15 ENCOUNTER — PATIENT OUTREACH (OUTPATIENT)
Dept: ONCOLOGY | Facility: CLINIC | Age: 71
End: 2021-10-15

## 2021-10-15 NOTE — PROGRESS NOTES
Writer sent letter on 10/15/21. To update patient to schedule a 3-4 month follow up with CT scans/labs.    Brodie Darnell, RN, BSN.  RN Care Coordinator     [facility]   [phone]

## 2021-10-20 ASSESSMENT — ANXIETY QUESTIONNAIRES: 7. FEELING AFRAID AS IF SOMETHING AWFUL MIGHT HAPPEN: NOT AT ALL

## 2021-10-20 ASSESSMENT — PATIENT HEALTH QUESTIONNAIRE - PHQ9: SUM OF ALL RESPONSES TO PHQ QUESTIONS 1-9: 2

## 2021-10-20 NOTE — PROGRESS NOTES
[facility] Care Coordination Contact    [facility] Home Visit Assessment     Home visit for Health Risk Assessment with Moises Chacon completed on Sept 30 telephonically with   Type of residence:: Assisted living  Current living arrangement:: I live in assisted living          Current Care Plan  Member currently receiving the following home care services:     Member currently receiving the following community resources:    24 hour care at AL    Medication Review  Medication reconciliation completed in Epic: Yes  Medication set-up & administration: RN set up weekly.  Assisting Living staff administers medications.  Medication Risk Assessment Medication (1 or more, place referral to MTM): Lacks understanding of medication regimen  MTM Referral Placed: No: Member is already followed by MTM. Will follow up with current MTM.    Mental/Behavioral Health   Depression Screening:   PHQ-2 Total Score (Adult) - Positive if 3 or more points; Administer PHQ-9 if positive: 1  PHQ-9 Total Score: 2    Mental health DX:: Yes (F33.1)   Mental health DX how managed:: Medication    Falls Assessment:   Fallen 2 or more times in the past year?: No   Any fall with injury in the past year?: No    ADL/IADL Dependencies:   Dependent ADLs:: Ambulation-cane, Bathing       [facility] Health Plan sponsored benefits: Shared information re: Silver Sneakers/gym memberships, ASA, Calcium +D.    PCA Assessment completed at visit: No     Elderly Waiver Eligibility: Yes-will continue on EW    Care Plan & Recommendations: member lives in an AL but his sons live in the [place] and have taken member there for long periods of time.  Currently he is there to have surgery on an aneurysm. Son said they don't trust [place] doctors.on reports that member will return but unsure when. Member lives in AL where the language of Turk[place] is spoken. Staff report that member's depression is severe and he walks with his head down and speaks little. He  is struggling with several health issues including and aneuysm, Gout, Lung CA.  Member continues to smoke frequently throughout the day. The only exercise he gets is walking around the facility when weather permits. Nurse at the AL will call when member returns.  While in [place] he is staying at his son's home.    See [facility] for detailed assessment information.    Follow-Up Plan: Member informed of future contact, plan to f/u with member with a 6 month telephone assessment.  Contact information shared with member and family, encouraged member to call with any questions or concerns at any time.    [facility] care continuum providers: Please refer to Health Care Home on the [facility] Problem List to view this patient's [facility] Care Plan Summary.    Paige Hernandes RN  [facility]   [phone]

## 2021-10-21 ENCOUNTER — PRE VISIT (OUTPATIENT)
Dept: GASTROENTEROLOGY | Facility: CLINIC | Age: 71
End: 2021-10-21

## 2021-10-21 ENCOUNTER — TELEPHONE (OUTPATIENT)
Dept: GASTROENTEROLOGY | Facility: CLINIC | Age: 71
End: 2021-10-21

## 2021-10-21 NOTE — TELEPHONE ENCOUNTER
LVM via interp that appt has been converted to telephone as Monica GONZALEZ is not able to be here physically today for appointments. Gave call center number for questions

## 2021-11-10 ENCOUNTER — PATIENT OUTREACH (OUTPATIENT)
Dept: GERIATRIC MEDICINE | Facility: CLINIC | Age: 71
End: 2021-11-10

## 2021-11-10 NOTE — PROGRESS NOTES
[facility] Care Coordination Contact    Received after visit chart from care coordinator.  Completed following tasks: Mailed copy of care plan to client, Updated services in access and mailed POC sig sheet w/SASE.  , Mailed copy of CL tool to member, faxed copy to AL facility, and submitted authorization to health plan.   and Provider Signature - Summary:  Member indicates that they would like a summary of their POC shared with the following EW providers:  [facility].  Letter faxed to providers for signature.   Delilha Magdaleno  Case Management Specialist  [facility]  [phone]

## 2021-11-10 NOTE — LETTER
November 10, 2021    Important [employer] Information    [facility]  [address]   [facility] [address]  Your Care Plan  Dear Moises,  When we spoke recently, I promised to send you a Care Plan. The plan enclosed is a summary of our discussion. It includes the steps we agreed would help you meet your health goals. In addition, I can help you with:  Zzhodpm-M-EscsOH  This program is available to members who need a ride to medical and dental visits. To schedule a ride, call [phone] or [phone] (toll free). TTY: 711. You can call 8 a.m. to 8 p.m. Seven days a week. Access to a representative may be limited at times.    We Are Knitters   The [employer] Are [employer]it[employer] program empowers you to improve your health through education and exercise. To learn more, visit [employer], or call [employer] at [phone] (toll free) (TTY:711) from 7 a.m. - 7 p.m. Central Time, Monday-Friday.  Health Care Directive   This form helps you outline your health care wishes. You can request a form from me and I will answer any questions you have before you discuss it with your doctor.   Annual Physical  Take a key step on your path to good health and set up an annual physical at your clinic.  Questions?  Call me at [phone] Monday-Friday between 8am and 5pm.  TTY: 711. As we discussed, I plan to be in touch with you again in 6 months to follow up via phone.  Sincerely,    [email or web address]  Phone: [phone]      [employer]    cc: member records                                                                                             CB5 ([facility]) (5-2020)    Civil Rights Notice  Discrimination is against the law. [employer] does not discriminate on the basis of any of the following:    Race    Color    National Origin    Creed    Christianity    Age    Public Assistance Status    Receipt of Health Care Services    Disability  (including physical or mental impairment)    Sex (including sex stereotypes and gender identity)    Marital Status    Political Beliefs    Medical Condition    Genetic Information    Sexual Orientation    Claims Experience    Medical History    Health Status    Auxiliary Aids and Services:  [employer] provides auxiliary aids and services, like qualified interpreters or information in accessible formats, free of charge and in a timely manner, to ensure an equal opportunity to participate in our health care programs. Contact [employer] at [employer]/contact medicaid or call [phone] (toll free); TTY:409 or at [employer]/contactmedicaid.    Language Assistance Services:  [employer] provides translated documents and spoken language interpreting, free of charge and in a timely manner, when language assistance services are necessary to ensure limited English speakers have meaningful access to our information and services. Contact [employer] at [phone] (toll free); TTY: 326 or [employer]/contactmedicaid.     Civil Rights Complaints  You have the right to file a discrimination complaint if you believe you were treated in a discriminatory way by [employer]. You may contact any of the following four agencies directly to file a discrimination complaint.    U.S. Department of Health and Human Services  Office for Civil Rights (OCR)  You have the right to file a complaint with the OCR, a federal agency, if you believe you have been discriminated against because of any of the following:    Race    Disability    Color    Sex    National Origin    Age    Anabaptism (in some cases)    Contact the OCR directly to file a complaint:         Director         U.S. Department of Health and Human Services  Office for Civil Rights         [address] [place] [address]         Customer Response Center: Toll-free: [phone]          TDD: [phone]         Email:  [email or web address]    [place] Department of Human Rights (MDHR)  In [place], you have the right to file a complaint with the MDHR if you believe you have been discriminated against because of any of the following:      Race    Color    National Origin    Restorationism    Creed    Sex    Sexual Orientation    Marital Status    Public Assistance Status    Disability    Contact the MDHR directly to file a complaint:         [place] Department of Human Rights         [address] [address]         [phone] (voice)          [phone] (toll free)         837 or [phone] ([place] Relay)         [phone] (Fax)         [email or web address] (Email)     [place] Department of Human Services (DHS)  You have the right to file a complaint with [facility] if you believe you have been discriminated against in our health care programs because of any of the following:    Race    Color    National Origin    Creed    Restorationism    Age    Public Assistance Status    Receipt of Health Care Services    Disability (including physical or mental impairment)    Sex (including sex stereotypes and gender identity)    Marital Status    Political Beliefs    Medical Condition    Genetic Information    Sexual Orientation    Claims Experience    Medical History    Health Status    Complaints must be in writing and filed within 180 days of the date you discovered the alleged discrimination. The complaint must contain your name and address and describe the discrimination you are complaining about. After we get your complaint, we will review it and notify you in writing about whether we have authority to investigate. If we do, we will investigate the complaint.      [facility] will notify you in writing of the investigation s outcome. You have a right to appeal the outcome if you disagree with the decision. To appeal, you must send a written request to have [facility] review the investigation outcome. Be brief and  state why you disagree with the decision. Include additional information you think is important.      If you file a complaint in this way, the people who work for the agency named in the complaint cannot retaliate against you. This means they cannot punish you in any way for filing a complaint. Filing a complaint in this way does not stop you from seeking out other legal or administration actions.     Contact DHS directly to file a discrimination complaint:        Civil Rights Coordinator        [place] Department of Human Services        Equal Opportunity and Access Division        [address] [address]        [place] [address] [phone] (voice) or use your preferred relay service     [employer] Complaint Notice   You have the right to file a complaint with [employer] if you believe you have been discriminated against because of any of the following:       Medical condition    Health status    Receipt of health care services    Claims experience    Medical history    Genetic information    Disability (including mental or physical impairment)    Marital status    Age    Sex (including sex stereotypes and gender identity)    Sexual orientation    National origin    Race    Color    Voodoo    Creed    Public assistance status    Political beliefs    You can file a complaint and ask for help in filing a complaint in person or by mail, phone, fax, or email at:     [employer] Civil Rights Coordinator  [facility]  [address], [place] [address] [phone] (voice and fax) or MYX:265  Email: [email or web address]    American Indians can begin or continue to use [facility] and [facility] ([facility]) clinics. We will not require prior approval or impose any conditions for you to get services at these clinics. For elders age 65 years and older this includes Elderly Waiver (EW) services accessed through the [place]. If a doctor or other provider in a [facility] or [facility] clinic  refers you to a provider in our network, we will not require you to see your primary care provider prior to the referral.

## 2021-12-09 ENCOUNTER — PATIENT OUTREACH (OUTPATIENT)
Dept: GERIATRIC MEDICINE | Facility: CLINIC | Age: 71
End: 2021-12-09

## 2021-12-09 NOTE — PROGRESS NOTES
[facility] Care Coordination Contact    Care coordinator call to the DON at [facility] to get update on member since he had a planned surgery yesterday for an aortic aneurysm at [facility] in [place]. DON said member no longer lives there and has been in [place] for several months.  She got a call saying that member's niece will be making the arrangements for member to move out of the apartment and that he is not coming. Back.  Family apparently wanted to get member's health insurance in [place] but as long as he is current in [place] cannot have coverage in both.   Member [place] sister [place].  Call to member's son Michael who shared that he had canceled [employer] and after much back and forth with [employer], Medicare, social security he was able to get medical stay covered by straight Medicare. Said he was interested in getting a supplemental plan as well for member.  [place] which [place] changed to [employer] MSC+ on 11/1/21 and is case managed by [employer].  Will disenroll for 10/31/21. The AL contact, Sarah, said the family has paid rent each month and they will hold member's room until he returns following his aortic aneurysm repair on 12/8/21.Said that it was since the end of July that member has stayed for more than a few days to an appt and then he is back to [place]. Son Michael confirmed that member will indeed return to the AL after his hospitalization.     Paige Hernandes RN  [facility]   [phone]

## 2021-12-09 NOTE — PROGRESS NOTES
TRANSITIONS OF CARE (DORIE) LOG   DORIE tasks should be completed by the CC within one (1) business day of notification of each transition. Follow up contact with member is required after return to their usual care setting.  Note:  If CC finds out about the transitions fifteen (15) days or more after the member has returned to their usual care setting, no DORIE log is needed. However, the CC should check in with the member to discuss the transition process, any changes needed to the care plan and document it in a case note.    Member Name:  Moises Chacon O Name:  [employer] Member ID#: [id number]   Product: [facility] Care Coordinator Contact:  Paige Hernandes RN Agency/[facility]/Care System: [facility]   Transition Communication Actions from Care Management Contact   Transition #1   Notification Date: 12/9/21 Transition Date:   12/08/21 Transition From: [facility]isted [facility]     Is this the member s usual care setting?               yes Transition To: [facility] in [place], [place]   Transition Type:  Unplanned  Reason for Admission/Comments:  Abdominal aortic aneurysm non rupture     Shared CC contact info, care plan/services with receiving setting--Date completed: 12/9/21    Notified PCP of transition--Date completed:       via  (OR) Members PCP was the admitting physician

## 2021-12-14 NOTE — TELEPHONE ENCOUNTER
[facility] Care Coordination Contact    Provider Sig: Tracking discontinued - member disenrolled.    Maile Tee  Care Management Specialist  [facility]  [phone]

## 2021-12-15 ENCOUNTER — PATIENT OUTREACH (OUTPATIENT)
Dept: GERIATRIC MEDICINE | Facility: CLINIC | Age: 71
End: 2021-12-15

## 2021-12-15 NOTE — PROGRESS NOTES
[facility] Care Coordination Contact    According to [employer] member has changed to [employer] so will send UTF to [employer] and he will disenroll as of 10/31/21.

## 2021-12-27 ENCOUNTER — PATIENT OUTREACH (OUTPATIENT)
Dept: GERIATRIC MEDICINE | Facility: CLINIC | Age: 71
End: 2021-12-27

## 2021-12-27 NOTE — PROGRESS NOTES
"[facility] Care Coordination Contact    Member had aerotic valve repair at [facility] in [place] 12/8/21. Discharged 12/9/21 to \"home\" which assuming is at son Michael's home in [place]. Called and left VM for Michael to confirm discharge date.  Also left VM for the Assisted Living in [place] where member has an apartment. Member has been out of state longer than 30 days so will notify the county.   "

## 2021-12-28 ENCOUNTER — PATIENT OUTREACH (OUTPATIENT)
Dept: GERIATRIC MEDICINE | Facility: CLINIC | Age: 71
End: 2021-12-28

## 2021-12-28 NOTE — PROGRESS NOTES
[facility] Care Coordination Contact    No longer active with [facility] community case management effective 1/1/2022.  Reason for community disenrollment: Change Insurance from [employer] to [employer] Member has been out of state for longer than 30 days so will close EW. Called Suun at member's Assisted Living and informed of the closure. Suun will talk with son. Care coordinator also called son and left VM.    Paige Hernandes RN  [facility]   [phone]

## 2022-01-18 VITALS
RESPIRATION RATE: 16 BRPM | HEART RATE: 84 BPM | DIASTOLIC BLOOD PRESSURE: 70 MMHG | OXYGEN SATURATION: 96 % | SYSTOLIC BLOOD PRESSURE: 156 MMHG | TEMPERATURE: 98.7 F

## 2022-01-18 VITALS
DIASTOLIC BLOOD PRESSURE: 80 MMHG | OXYGEN SATURATION: 99 % | HEART RATE: 64 BPM | DIASTOLIC BLOOD PRESSURE: 72 MMHG | RESPIRATION RATE: 16 BRPM | TEMPERATURE: 98 F | TEMPERATURE: 97.8 F | SYSTOLIC BLOOD PRESSURE: 144 MMHG | SYSTOLIC BLOOD PRESSURE: 138 MMHG | OXYGEN SATURATION: 94 % | HEART RATE: 68 BPM

## 2022-01-18 VITALS
RESPIRATION RATE: 16 BRPM | SYSTOLIC BLOOD PRESSURE: 142 MMHG | TEMPERATURE: 98.3 F | HEART RATE: 72 BPM | DIASTOLIC BLOOD PRESSURE: 70 MMHG | OXYGEN SATURATION: 95 %

## 2022-01-18 VITALS
SYSTOLIC BLOOD PRESSURE: 148 MMHG | TEMPERATURE: 98.4 F | OXYGEN SATURATION: 97 % | HEART RATE: 71 BPM | DIASTOLIC BLOOD PRESSURE: 68 MMHG

## 2022-01-18 VITALS
RESPIRATION RATE: 16 BRPM | OXYGEN SATURATION: 97 % | DIASTOLIC BLOOD PRESSURE: 80 MMHG | HEART RATE: 66 BPM | TEMPERATURE: 98.3 F | SYSTOLIC BLOOD PRESSURE: 150 MMHG

## 2022-01-18 VITALS
RESPIRATION RATE: 16 BRPM | DIASTOLIC BLOOD PRESSURE: 68 MMHG | TEMPERATURE: 98.1 F | SYSTOLIC BLOOD PRESSURE: 144 MMHG | HEART RATE: 68 BPM | BODY MASS INDEX: 21.3 KG/M2 | WEIGHT: 128 LBS | OXYGEN SATURATION: 98 %

## 2022-01-18 VITALS
SYSTOLIC BLOOD PRESSURE: 140 MMHG | TEMPERATURE: 98.8 F | DIASTOLIC BLOOD PRESSURE: 82 MMHG | OXYGEN SATURATION: 98 % | HEART RATE: 64 BPM

## 2022-01-18 VITALS
TEMPERATURE: 98 F | OXYGEN SATURATION: 97 % | SYSTOLIC BLOOD PRESSURE: 142 MMHG | HEART RATE: 84 BPM | DIASTOLIC BLOOD PRESSURE: 78 MMHG

## 2022-01-18 DIAGNOSIS — M1A.00X0 IDIOPATHIC CHRONIC GOUT WITHOUT TOPHUS, UNSPECIFIED SITE: ICD-10-CM

## 2022-01-18 RX ORDER — COLCHICINE 0.6 MG/1
0.6 TABLET ORAL DAILY
Qty: 30 TABLET | Refills: 1 | Status: SHIPPED | OUTPATIENT
Start: 2022-01-18

## 2022-01-18 NOTE — TELEPHONE ENCOUNTER
colchicine (COLCRYS) 0.6 MG tablet      Last Written Prescription Date:  8/13/21  Last Fill Quantity: 30,   # refills: 1  Last Office Visit : 6/25/21  Future Office visit:  None scheduled    Routing refill request to provider for review/approval because:  Discrepancy between requested sig and last dictation note for ongoing use after 60 days of daily use  No changes made to requested refill  Plan:  1.  Continue allopurinol 300 mg once a day, every day without stopping.  2.  Use colchicine 0.6 mg once daily every day for the next 60 days.  On September 1, stopped taking colchicine every day.  Thereafter, use colchicine 0.6 mg twice a day only as needed for recurrent flaring joint pain.  3.  Prednisone 20 mg daily for 3 to 5 days can also be used to address recurrent flares.  4.  Follow-up with Dr. Hernandez for persistent abdominal bloating.  5.  Use acetaminophen 500 to 1000 mg up to 3 times daily as needed for joint pain.

## 2022-08-25 NOTE — TELEPHONE ENCOUNTER
REFERRAL INFORMATION:    Referring Provider:  Dr. Junaid Hernandez     Referring Clinic:  [facility] Internal Medicine     Reason for Visit/Diagnosis: Abdominal pain      FUTURE VISIT INFORMATION:    Appointment Date: 10/21/2021    Appointment Time: 10 AM      NOTES STATUS DETAILS   OFFICE NOTE from Referring Provider Internal 7/21/2021, 7/15/2021, 5/13/2021, 5/5/2021 Office visit with Dr. Hernandez     OFFICE NOTE from Other Specialist Internal 1/15/2021 Office visit with Dr. Serge Botello ([facility])      HOSPITAL DISCHARGE SUMMARY/  ED VISITS Internal 10/9/2020 ([facility])   OPERATIVE REPORT N/A    MEDICATION LIST Internal         ENDOSCOPY  N/A    COLONOSCOPY N/A    ERCP N/A    EUS N/A    STOOL TESTING N/A    PERTINENT LABS Internal/ Care Everywhere    PATHOLOGY REPORTS (RELATED) N/A    IMAGING (CT, MRI, EGD, MRCP, Small Bowel Follow Through/SBT, MR/CT Enterography) Internal CT Chest Abdomen Pelvis: 5/3/2021, 1/27/2021           
Grossly Intact

## 2022-08-28 ENCOUNTER — HEALTH MAINTENANCE LETTER (OUTPATIENT)
Age: 72
End: 2022-08-28

## 2023-01-14 ENCOUNTER — HEALTH MAINTENANCE LETTER (OUTPATIENT)
Age: 73
End: 2023-01-14

## 2023-09-24 ENCOUNTER — HEALTH MAINTENANCE LETTER (OUTPATIENT)
Age: 73
End: 2023-09-24

## 2024-03-02 NOTE — TELEPHONE ENCOUNTER
"Spoke with pt's sister - I am not sure they under stand what hospice is.    Maybe better to discuss in person at office visit.       She will try hard to get him to take the Remeron. She states he \"it just does not feel good\"     Pt does have a swallow study coming up next week do you still want to order endoscopy?    Agnes Dowd, RN     " yes

## 2024-06-07 NOTE — TELEPHONE ENCOUNTER
Please call son about ambiguous legal situation, vulnerable adult. Dispute between  -- 2 sons (Michael Chacon)  -- sister and neices (son's aunt Rosa)    Debate about independence of Moises Chacon.    Cardiologist Dr. Grande ordered stress test and vascular surgery evaluation, but the aunt cancelled this, angry that it was done without her being involved.    Son doesn't have immediate plans to take him back to [place]. He seems reasonable to me, says he wants best medical care.    My perspective: we have tried to get family (I guess sister and nerhys) involved but they have not replied. At this point, I am concerned that IF there is a legal authority of sister Rosa, then she does not have interests at heart.   no

## 2024-09-06 NOTE — PROGRESS NOTES
Henrique Cain arrive to clinic awake and alert.  Pt verified name and .   Allergies reviewed with patient.  Pt given Tdap via Intramuscular Left deltoid per provider orders.    Well tolerated by patient.  denies further needs.    Patient instructed to wait 15 mins after injection.   Patient states no vaccines in the last 14 days.  Vaccine information sheet was given to patient. Patient voiced understanding.    Veto Campos LPN    Social Work Follow-Up  [facility]    Data/Intervention:  Patient Name:  Moises Chacon  /Age:  1950 (70 year old)    Reason for Follow-Up:  Return Patient's son, Michael's, phone call. See previous SW notes for a detailed history.    Collaborated With:    -Patient's son, Michael Rodriguez reported he is still concerned with the Assisted Living Facility ([facility]) communicating with other family members--against Patient's wishes and a potential violation of HIPAA.  encouraged Michael to have Patient contact the director of the assisted to see if they have any consent to communicate on file to speak to other family members. If they do not have a consent to communicate, Patient or Sang should reach out to the Ombudsman for [facility] to file a complaint (contact info previously provided to Patient/Sang).    Michael also reported that he brought Patient back to [place] and they have a Cardiology appointment scheduled at [facility]. Patient fell early this morning and was taken to the ED in the [place] for evaluation. Sang reported Patient is out of the ED, at Michael's home, and resting at the moment.     Michael reported that they will travel back to [place] for upcoming visits, but will likely relocate Patient to [place] for ongoing treatment.     Assessment/Plan:   provided supportive listening and validation throughout conversation.  further explained the difference between a Healthcare Directive and a Consent to Communicate, as Michael seemed to have some confusion regarding any documents that may or may not be signed by Patient giving his sister/niece rights to his care. Patient can revoke a previous HCD by completing a new one (which he has) and can revoke a Consent to Communicate by putting his request in writing.      will remain available for assistance as needed.   Will update Dr. Hernandez on this conversation.  Previously provided patient/family with  writer's contact information and availability.       Sabine Coronado [facility]  Clinical , Outpatient Specialty Clinics  Direct Phone: [phone]

## 2024-11-17 ENCOUNTER — HEALTH MAINTENANCE LETTER (OUTPATIENT)
Age: 74
End: 2024-11-17

## (undated) DEVICE — SUCTION MANIFOLD DORNOCH ULTRA CART UL-CL500

## (undated) DEVICE — SU VICRYL 3-0 SH 27" J316H

## (undated) DEVICE — SU PROLENE 4-0 SHDA 36" 8521H

## (undated) DEVICE — BLADE SAW STERNAL 20X30MM KM-32

## (undated) DEVICE — WIPES FOLEY CARE SURESTEP PROVON DFC100

## (undated) DEVICE — DRSG WOUND VAC GRANUFOAM LG BLACK 26X15CM M8275053/5

## (undated) DEVICE — TIES BANDING T50R

## (undated) DEVICE — SU SILK 0 SH 30" K834H

## (undated) DEVICE — LIGHT HANDLE X1 31140133

## (undated) DEVICE — CONNECTOR BLAKE DRAIN SGL BCC1

## (undated) DEVICE — DRAIN JACKSON PRATT ROUND SIL 19FR W/TROCAR LF JP-2232

## (undated) DEVICE — SU ETHIBOND 0 CT-1 CR 8X18" CX21D

## (undated) DEVICE — Device

## (undated) DEVICE — ENDO TROCAR OPTICAL ACCESS KII Z-THRD 15X100MM C0R37

## (undated) DEVICE — TUBING SUCTION 10'X3/16" N510

## (undated) DEVICE — SU TICRON 2 GS-21DA 36" 3146-81

## (undated) DEVICE — ESU PENCIL W/COATED BLADE E2450H

## (undated) DEVICE — VESSEL LOOPS YELLOW MAXI 31145694

## (undated) DEVICE — CLIP HORIZON SM RED WIDE SLOT 001201

## (undated) DEVICE — DRSG WOUND VAC SPONGE MED BLACK M8275052/5

## (undated) DEVICE — SU MONOCRYL 4-0 PS-2 27" UND Y426H

## (undated) DEVICE — SU SILK 0 TIE 6X30" A306H

## (undated) DEVICE — CATH TRAY FOLEY SURESTEP 16FR W/URNE MTR STLK LATEX A303316A

## (undated) DEVICE — ESU ELEC BLADE 2.75" COATED/INSULATED E1455

## (undated) DEVICE — SOL WATER IRRIG 1000ML BOTTLE 2F7114

## (undated) DEVICE — SUCTION DRY CHEST DRAIN OASIS 3600-100

## (undated) DEVICE — SU PLEDGET SOFT TFE 3/8"X3/26"X1/16" PCP40

## (undated) DEVICE — ESU LIGASURE MARYLAND LAPAROSCOPIC SLR/DVDR 5MMX37CM LF1937

## (undated) DEVICE — ESU GROUND PAD ADULT W/CORD E7507

## (undated) DEVICE — SU VICRYL 0 UR-6 27" J603H

## (undated) DEVICE — DRAPE IOBAN INCISE 23X17" 6650EZ

## (undated) DEVICE — ENDO SCOPE WARMER SEAL  C3101

## (undated) DEVICE — PREP CHLORAPREP 26ML TINTED ORANGE  260815

## (undated) DEVICE — LINEN TOWEL PACK X6 WHITE 5487

## (undated) DEVICE — APPLICATOR COTTON TIP 6"X2 STERILE LF 6012

## (undated) DEVICE — ENDO POUCH UNIVERSAL RETRIEVAL SYSTEM INZII 12/15MM CD004

## (undated) DEVICE — LINEN TOWEL PACK X30 5481

## (undated) DEVICE — DRSG DRAIN 4X4" 7086

## (undated) DEVICE — SPONGE RAY-TEC 4X8" 7318

## (undated) DEVICE — SU ETHIBOND 0 TIE 6X30" X306H

## (undated) DEVICE — SURGICEL HEMOSTAT 4X8" 1952

## (undated) DEVICE — CLIP APPLIER ENDO 5MM M/L LIGAMAX EL5ML

## (undated) DEVICE — SU PROLENE 4-0 RB-1DA 36" 8557H

## (undated) DEVICE — ADH SKIN CLOSURE PREMIERPRO EXOFIN 1.0ML 3470

## (undated) RX ORDER — CEFAZOLIN SODIUM 2 G/100ML
INJECTION, SOLUTION INTRAVENOUS
Status: DISPENSED
Start: 2020-08-19

## (undated) RX ORDER — ONDANSETRON 2 MG/ML
INJECTION INTRAMUSCULAR; INTRAVENOUS
Status: DISPENSED
Start: 2020-08-19

## (undated) RX ORDER — FENTANYL CITRATE 50 UG/ML
INJECTION, SOLUTION INTRAMUSCULAR; INTRAVENOUS
Status: DISPENSED
Start: 2020-08-19

## (undated) RX ORDER — HYDROMORPHONE HYDROCHLORIDE 1 MG/ML
INJECTION, SOLUTION INTRAMUSCULAR; INTRAVENOUS; SUBCUTANEOUS
Status: DISPENSED
Start: 2020-08-19

## (undated) RX ORDER — BUPIVACAINE HYDROCHLORIDE 2.5 MG/ML
INJECTION, SOLUTION EPIDURAL; INFILTRATION; INTRACAUDAL
Status: DISPENSED
Start: 2020-08-19